# Patient Record
Sex: FEMALE | Race: WHITE | NOT HISPANIC OR LATINO | Employment: OTHER | URBAN - METROPOLITAN AREA
[De-identification: names, ages, dates, MRNs, and addresses within clinical notes are randomized per-mention and may not be internally consistent; named-entity substitution may affect disease eponyms.]

---

## 2020-03-14 ENCOUNTER — APPOINTMENT (OUTPATIENT)
Dept: GENERAL RADIOLOGY | Facility: HOSPITAL | Age: 55
End: 2020-03-14

## 2020-03-14 ENCOUNTER — APPOINTMENT (OUTPATIENT)
Dept: CT IMAGING | Facility: HOSPITAL | Age: 55
End: 2020-03-14

## 2020-03-14 ENCOUNTER — HOSPITAL ENCOUNTER (EMERGENCY)
Facility: HOSPITAL | Age: 55
Discharge: HOME | End: 2020-03-14
Attending: EMERGENCY MEDICINE | Admitting: EMERGENCY MEDICINE

## 2020-03-14 VITALS
OXYGEN SATURATION: 97 % | RESPIRATION RATE: 22 BRPM | HEIGHT: 66 IN | BODY MASS INDEX: 18.48 KG/M2 | SYSTOLIC BLOOD PRESSURE: 108 MMHG | WEIGHT: 115 LBS | DIASTOLIC BLOOD PRESSURE: 71 MMHG | TEMPERATURE: 97.8 F | HEART RATE: 91 BPM

## 2020-03-14 DIAGNOSIS — R10.9 ACUTE ABDOMINAL PAIN: ICD-10-CM

## 2020-03-14 DIAGNOSIS — R19.00 PELVIC MASS IN FEMALE: Primary | ICD-10-CM

## 2020-03-14 LAB
ALBUMIN SERPL-MCNC: 4 G/DL (ref 3.5–5.2)
ALBUMIN/GLOB SERPL: 1.8 G/DL
ALP SERPL-CCNC: 66 U/L (ref 39–117)
ALT SERPL W P-5'-P-CCNC: 5 U/L (ref 1–33)
ANION GAP SERPL CALCULATED.3IONS-SCNC: 12 MMOL/L (ref 5–15)
AST SERPL-CCNC: 16 U/L (ref 1–32)
BACTERIA UR QL AUTO: NORMAL /HPF
BASOPHILS # BLD AUTO: 0.02 10*3/MM3 (ref 0–0.2)
BASOPHILS NFR BLD AUTO: 0.2 % (ref 0–1.5)
BILIRUB SERPL-MCNC: 0.4 MG/DL (ref 0.2–1.2)
BILIRUB UR QL STRIP: NEGATIVE
BUN BLD-MCNC: 14 MG/DL (ref 6–20)
BUN/CREAT SERPL: 21.9 (ref 7–25)
CALCIUM SPEC-SCNC: 9 MG/DL (ref 8.6–10.5)
CHLORIDE SERPL-SCNC: 103 MMOL/L (ref 98–107)
CLARITY UR: CLEAR
CO2 SERPL-SCNC: 23 MMOL/L (ref 22–29)
COLOR UR: YELLOW
CREAT BLD-MCNC: 0.64 MG/DL (ref 0.57–1)
D-LACTATE SERPL-SCNC: 2.5 MMOL/L (ref 0.5–2)
DEPRECATED RDW RBC AUTO: 41.9 FL (ref 37–54)
EOSINOPHIL # BLD AUTO: 0.04 10*3/MM3 (ref 0–0.4)
EOSINOPHIL NFR BLD AUTO: 0.5 % (ref 0.3–6.2)
ERYTHROCYTE [DISTWIDTH] IN BLOOD BY AUTOMATED COUNT: 12 % (ref 12.3–15.4)
GFR SERPL CREATININE-BSD FRML MDRD: 97 ML/MIN/1.73
GLOBULIN UR ELPH-MCNC: 2.2 GM/DL
GLUCOSE BLD-MCNC: 116 MG/DL (ref 65–99)
GLUCOSE UR STRIP-MCNC: NEGATIVE MG/DL
HCT VFR BLD AUTO: 33.9 % (ref 34–46.6)
HGB BLD-MCNC: 11.3 G/DL (ref 12–15.9)
HGB UR QL STRIP.AUTO: NEGATIVE
HOLD SPECIMEN: NORMAL
HOLD SPECIMEN: NORMAL
HYALINE CASTS UR QL AUTO: NORMAL /LPF
IMM GRANULOCYTES # BLD AUTO: 0.01 10*3/MM3 (ref 0–0.05)
IMM GRANULOCYTES NFR BLD AUTO: 0.1 % (ref 0–0.5)
KETONES UR QL STRIP: NEGATIVE
LACTATE HOLD SPECIMEN: NORMAL
LEUKOCYTE ESTERASE UR QL STRIP.AUTO: ABNORMAL
LIPASE SERPL-CCNC: 13 U/L (ref 13–60)
LYMPHOCYTES # BLD AUTO: 1.84 10*3/MM3 (ref 0.7–3.1)
LYMPHOCYTES NFR BLD AUTO: 22.9 % (ref 19.6–45.3)
MCH RBC QN AUTO: 31.2 PG (ref 26.6–33)
MCHC RBC AUTO-ENTMCNC: 33.3 G/DL (ref 31.5–35.7)
MCV RBC AUTO: 93.6 FL (ref 79–97)
MONOCYTES # BLD AUTO: 0.4 10*3/MM3 (ref 0.1–0.9)
MONOCYTES NFR BLD AUTO: 5 % (ref 5–12)
NEUTROPHILS # BLD AUTO: 5.71 10*3/MM3 (ref 1.7–7)
NEUTROPHILS NFR BLD AUTO: 71.3 % (ref 42.7–76)
NITRITE UR QL STRIP: NEGATIVE
NRBC BLD AUTO-RTO: 0 /100 WBC (ref 0–0.2)
PH UR STRIP.AUTO: <=5 [PH] (ref 5–8)
PLATELET # BLD AUTO: 230 10*3/MM3 (ref 140–450)
PMV BLD AUTO: 10.5 FL (ref 6–12)
POTASSIUM BLD-SCNC: 4.1 MMOL/L (ref 3.5–5.2)
PROT SERPL-MCNC: 6.2 G/DL (ref 6–8.5)
PROT UR QL STRIP: NEGATIVE
RBC # BLD AUTO: 3.62 10*6/MM3 (ref 3.77–5.28)
RBC # UR: NORMAL /HPF
REF LAB TEST METHOD: NORMAL
SODIUM BLD-SCNC: 138 MMOL/L (ref 136–145)
SP GR UR STRIP: 1.01 (ref 1–1.03)
SQUAMOUS #/AREA URNS HPF: NORMAL /HPF
UROBILINOGEN UR QL STRIP: ABNORMAL
WBC NRBC COR # BLD: 8.02 10*3/MM3 (ref 3.4–10.8)
WBC UR QL AUTO: NORMAL /HPF
WHOLE BLOOD HOLD SPECIMEN: NORMAL
WHOLE BLOOD HOLD SPECIMEN: NORMAL

## 2020-03-14 PROCEDURE — 71045 X-RAY EXAM CHEST 1 VIEW: CPT

## 2020-03-14 PROCEDURE — 85025 COMPLETE CBC W/AUTO DIFF WBC: CPT | Performed by: EMERGENCY MEDICINE

## 2020-03-14 PROCEDURE — 80053 COMPREHEN METABOLIC PANEL: CPT | Performed by: EMERGENCY MEDICINE

## 2020-03-14 PROCEDURE — 83690 ASSAY OF LIPASE: CPT | Performed by: EMERGENCY MEDICINE

## 2020-03-14 PROCEDURE — 96374 THER/PROPH/DIAG INJ IV PUSH: CPT

## 2020-03-14 PROCEDURE — 96376 TX/PRO/DX INJ SAME DRUG ADON: CPT

## 2020-03-14 PROCEDURE — 74176 CT ABD & PELVIS W/O CONTRAST: CPT

## 2020-03-14 PROCEDURE — 96375 TX/PRO/DX INJ NEW DRUG ADDON: CPT

## 2020-03-14 PROCEDURE — 25010000002 ONDANSETRON PER 1 MG: Performed by: EMERGENCY MEDICINE

## 2020-03-14 PROCEDURE — 25010000002 MORPHINE PER 10 MG: Performed by: EMERGENCY MEDICINE

## 2020-03-14 PROCEDURE — 83605 ASSAY OF LACTIC ACID: CPT | Performed by: EMERGENCY MEDICINE

## 2020-03-14 PROCEDURE — 99284 EMERGENCY DEPT VISIT MOD MDM: CPT

## 2020-03-14 PROCEDURE — 81001 URINALYSIS AUTO W/SCOPE: CPT | Performed by: EMERGENCY MEDICINE

## 2020-03-14 RX ORDER — SODIUM CHLORIDE 0.9 % (FLUSH) 0.9 %
10 SYRINGE (ML) INJECTION AS NEEDED
Status: DISCONTINUED | OUTPATIENT
Start: 2020-03-14 | End: 2020-03-14 | Stop reason: HOSPADM

## 2020-03-14 RX ORDER — MORPHINE SULFATE 4 MG/ML
4 INJECTION, SOLUTION INTRAMUSCULAR; INTRAVENOUS ONCE
Status: COMPLETED | OUTPATIENT
Start: 2020-03-14 | End: 2020-03-14

## 2020-03-14 RX ORDER — DIPHENHYDRAMINE HCL 25 MG
25 CAPSULE ORAL EVERY 6 HOURS PRN
Status: ON HOLD | COMMUNITY
End: 2020-04-03

## 2020-03-14 RX ORDER — ONDANSETRON 4 MG/1
4 TABLET, ORALLY DISINTEGRATING ORAL EVERY 6 HOURS PRN
Qty: 20 TABLET | Refills: 0 | Status: SHIPPED | OUTPATIENT
Start: 2020-03-14 | End: 2020-03-19

## 2020-03-14 RX ORDER — IBUPROFEN 200 MG
TABLET ORAL EVERY 6 HOURS PRN
COMMUNITY

## 2020-03-14 RX ORDER — ONDANSETRON 2 MG/ML
4 INJECTION INTRAMUSCULAR; INTRAVENOUS ONCE
Status: COMPLETED | OUTPATIENT
Start: 2020-03-14 | End: 2020-03-14

## 2020-03-14 RX ORDER — OXYCODONE HYDROCHLORIDE AND ACETAMINOPHEN 5; 325 MG/1; MG/1
1 TABLET ORAL EVERY 4 HOURS PRN
Qty: 15 TABLET | Refills: 0 | OUTPATIENT
Start: 2020-03-14 | End: 2020-03-16

## 2020-03-14 RX ADMIN — ONDANSETRON 4 MG: 2 INJECTION INTRAMUSCULAR; INTRAVENOUS at 17:27

## 2020-03-14 RX ADMIN — MORPHINE SULFATE 4 MG: 4 INJECTION, SOLUTION INTRAMUSCULAR; INTRAVENOUS at 20:10

## 2020-03-14 RX ADMIN — MORPHINE SULFATE 4 MG: 4 INJECTION, SOLUTION INTRAMUSCULAR; INTRAVENOUS at 17:27

## 2020-03-14 RX ADMIN — SODIUM CHLORIDE 1000 ML: 9 INJECTION, SOLUTION INTRAVENOUS at 17:26

## 2020-03-14 NOTE — DISCHARGE INSTRUCTIONS
Contact the gynecology oncologist office, Dr. Mcguire, on Monday to schedule a follow-up for next week.    Take Percocet as needed for pain and Zofran as needed for nausea.    Rest and drink plenty of fluids.    Return to the ER sooner with any further concern or worsening of symptoms.

## 2020-03-14 NOTE — ED PROVIDER NOTES
Subjective   Roxie Carter is a 54 y.o. female who presents to the ED with c/o abdominal pain. The patient states that at approximately 03:00 this morning she started experiencing abdominal pain, right flank pain, diaphoresis, and diarrhea. Her flank pain worsens with movement and deep breaths, and her abdominal pain worsens when urinating. She also complains of left neck pain that radiates to her left elbow and her son notes that she experienced a syncopal episode. She reportedly has not experienced any diarrhea since this morning, and she denies a fever, congestion, and dysuria. She also notes that she was experiencing a cough and rhinorrhea approximately three weeks ago, which has resolved. She notes that last week she was experiencing suprapubic abdominal pain that resolved, but returned this morning at a higher severity. She denies any injuries. She reports a medical history of ovarian cancer that is in remission, but denies a history of a deep vein thrombosis or pulmonary embolism. She reports a surgical history of a hysterectomy and three back surgeries. There are no other acute complaints at this time.           History provided by:  Patient  Abdominal Pain   Pain location:  Generalized  Pain severity:  Moderate  Onset quality:  Sudden  Timing:  Constant  Associated symptoms: diarrhea    Associated symptoms: no cough, no dysuria and no fever        Review of Systems   Constitutional: Positive for diaphoresis. Negative for fever.   HENT: Negative for congestion and rhinorrhea.    Respiratory: Negative for cough.    Gastrointestinal: Positive for abdominal pain and diarrhea.   Genitourinary: Positive for flank pain. Negative for dysuria.   Musculoskeletal: Positive for neck pain.   Neurological: Positive for syncope.   All other systems reviewed and are negative.      Past Medical History:   Diagnosis Date   • Cancer (CMS/HCC)     OVARIAN   • Pneumonia        Allergies   Allergen Reactions   • Penicillins  Hives       Past Surgical History:   Procedure Laterality Date   • BACK SURGERY     • HYSTERECTOMY         History reviewed. No pertinent family history.    Social History     Socioeconomic History   • Marital status: Single     Spouse name: Not on file   • Number of children: Not on file   • Years of education: Not on file   • Highest education level: Not on file   Tobacco Use   • Smoking status: Current Every Day Smoker     Types: Electronic Cigarette   Substance and Sexual Activity   • Alcohol use: Defer   • Drug use: Never         Objective   Physical Exam   Constitutional: She is oriented to person, place, and time. She appears well-developed and well-nourished. No distress.   HENT:   Head: Normocephalic and atraumatic.   Nose: Nose normal.   Eyes: Conjunctivae are normal. No scleral icterus.   Neck: Normal range of motion. Neck supple.   Cardiovascular: Normal rate, regular rhythm and normal heart sounds.   Pulmonary/Chest: Effort normal and breath sounds normal. No respiratory distress.   Abdominal: Soft. There is tenderness.   There is mild diffuse tenderness to palpation. No peritoneal signs.    Musculoskeletal: Normal range of motion. She exhibits tenderness. She exhibits no edema.   Over the left trapezius muscle, there is reproducible tenderness to palpation. Over the right posterior thoracic cage, there is reproducible tenderness to palpation. There is no lower extremity edema. The calves are equal in size.   Neurological: She is alert and oriented to person, place, and time.   Skin: Skin is warm and dry.   Psychiatric: She has a normal mood and affect. Her behavior is normal.   Nursing note and vitals reviewed.      Procedures         ED Course     Recent Results (from the past 24 hour(s))   Comprehensive Metabolic Panel    Collection Time: 03/14/20  4:40 PM   Result Value Ref Range    Glucose 116 (H) 65 - 99 mg/dL    BUN 14 6 - 20 mg/dL    Creatinine 0.64 0.57 - 1.00 mg/dL    Sodium 138 136 - 145  mmol/L    Potassium 4.1 3.5 - 5.2 mmol/L    Chloride 103 98 - 107 mmol/L    CO2 23.0 22.0 - 29.0 mmol/L    Calcium 9.0 8.6 - 10.5 mg/dL    Total Protein 6.2 6.0 - 8.5 g/dL    Albumin 4.00 3.50 - 5.20 g/dL    ALT (SGPT) 5 1 - 33 U/L    AST (SGOT) 16 1 - 32 U/L    Alkaline Phosphatase 66 39 - 117 U/L    Total Bilirubin 0.4 0.2 - 1.2 mg/dL    eGFR Non African Amer 97 >60 mL/min/1.73    Globulin 2.2 gm/dL    A/G Ratio 1.8 g/dL    BUN/Creatinine Ratio 21.9 7.0 - 25.0    Anion Gap 12.0 5.0 - 15.0 mmol/L   Lipase    Collection Time: 03/14/20  4:40 PM   Result Value Ref Range    Lipase 13 13 - 60 U/L   Light Blue Top    Collection Time: 03/14/20  4:40 PM   Result Value Ref Range    Extra Tube hold for add-on    Green Top (Gel)    Collection Time: 03/14/20  4:40 PM   Result Value Ref Range    Extra Tube Hold for add-ons.    Lavender Top    Collection Time: 03/14/20  4:40 PM   Result Value Ref Range    Extra Tube hold for add-on    Gold Top - SST    Collection Time: 03/14/20  4:40 PM   Result Value Ref Range    Extra Tube Hold for add-ons.    CBC Auto Differential    Collection Time: 03/14/20  4:40 PM   Result Value Ref Range    WBC 8.02 3.40 - 10.80 10*3/mm3    RBC 3.62 (L) 3.77 - 5.28 10*6/mm3    Hemoglobin 11.3 (L) 12.0 - 15.9 g/dL    Hematocrit 33.9 (L) 34.0 - 46.6 %    MCV 93.6 79.0 - 97.0 fL    MCH 31.2 26.6 - 33.0 pg    MCHC 33.3 31.5 - 35.7 g/dL    RDW 12.0 (L) 12.3 - 15.4 %    RDW-SD 41.9 37.0 - 54.0 fl    MPV 10.5 6.0 - 12.0 fL    Platelets 230 140 - 450 10*3/mm3    Neutrophil % 71.3 42.7 - 76.0 %    Lymphocyte % 22.9 19.6 - 45.3 %    Monocyte % 5.0 5.0 - 12.0 %    Eosinophil % 0.5 0.3 - 6.2 %    Basophil % 0.2 0.0 - 1.5 %    Immature Grans % 0.1 0.0 - 0.5 %    Neutrophils, Absolute 5.71 1.70 - 7.00 10*3/mm3    Lymphocytes, Absolute 1.84 0.70 - 3.10 10*3/mm3    Monocytes, Absolute 0.40 0.10 - 0.90 10*3/mm3    Eosinophils, Absolute 0.04 0.00 - 0.40 10*3/mm3    Basophils, Absolute 0.02 0.00 - 0.20 10*3/mm3    Immature  Grans, Absolute 0.01 0.00 - 0.05 10*3/mm3    nRBC 0.0 0.0 - 0.2 /100 WBC   Lactic Acid, Plasma    Collection Time: 03/14/20  4:40 PM   Result Value Ref Range    Lactate 2.5 (C) 0.5 - 2.0 mmol/L   Urinalysis With Microscopic If Indicated (No Culture) - Urine, Clean Catch    Collection Time: 03/14/20  6:22 PM   Result Value Ref Range    Color, UA Yellow Yellow, Straw    Appearance, UA Clear Clear    pH, UA <=5.0 5.0 - 8.0    Specific Gravity, UA 1.014 1.001 - 1.030    Glucose, UA Negative Negative    Ketones, UA Negative Negative    Bilirubin, UA Negative Negative    Blood, UA Negative Negative    Protein, UA Negative Negative    Leuk Esterase, UA Trace (A) Negative    Nitrite, UA Negative Negative    Urobilinogen, UA 0.2 E.U./dL 0.2 - 1.0 E.U./dL   Urinalysis, Microscopic Only - Urine, Clean Catch    Collection Time: 03/14/20  6:22 PM   Result Value Ref Range    RBC, UA 0-2 None Seen, 0-2 /HPF    WBC, UA 0-2 None Seen, 0-2 /HPF    Bacteria, UA None Seen None Seen, Trace /HPF    Squamous Epithelial Cells, UA 0-2 None Seen, 0-2 /HPF    Hyaline Casts, UA 0-6 0 - 6 /LPF    Methodology Automated Microscopy      Note: In addition to lab results from this visit, the labs listed above may include labs taken at another facility or during a different encounter within the last 24 hours. Please correlate lab times with ED admission and discharge times for further clarification of the services performed during this visit.    CT Abdomen Pelvis Without Contrast   Preliminary Result   Large heterogeneous mass identified within the pelvis with   fluid seen scattered throughout the abdomen and pelvis. Findings   concerning for recurrence with some stranding of the mesentery in which   spread to the mesentery cannot be excluded.       DICTATED:   03/14/2020   EDITED/ls :   03/14/2020               XR Chest 1 View   Preliminary Result   Chronic changes within the lung fields with no acute   parenchymal disease.       DICTATED:    03/14/2020   EDITED/ls :   03/14/2020                 Vitals:    03/14/20 1830 03/14/20 1831 03/14/20 1900 03/14/20 1910   BP: 111/73  108/71    Patient Position:       Pulse:  90 82 91   Resp:       Temp:       TempSrc:       SpO2:  98% 94% 97%   Weight:       Height:         Medications   sodium chloride 0.9 % flush 10 mL (has no administration in time range)   Morphine sulfate (PF) injection 4 mg (has no administration in time range)   Morphine sulfate (PF) injection 4 mg (4 mg Intravenous Given 3/14/20 1727)   ondansetron (ZOFRAN) injection 4 mg (4 mg Intravenous Given 3/14/20 1727)   sodium chloride 0.9 % bolus 1,000 mL (0 mL Intravenous Stopped 3/14/20 1819)     ECG/EMG Results (last 24 hours)     ** No results found for the last 24 hours. **        No orders to display                                              MDM  Number of Diagnoses or Management Options  Acute abdominal pain: new and requires workup  Pelvic mass in female: new and requires workup  Diagnosis management comments: The patient presents with a complaint of lower abdominal pain.  Has a previous history of ovarian cancer in which she received chemotherapy for in 2014.  She reports that she was disease-free upon last check.    CT scan of the abdomen pelvis shows approximate 9 x 9 cm mass in the lower pelvis with scattered areas of fluid throughout the abdomen pelvis.    I discussed this finding with the on-call gynecology oncologist, Dr. Dwyer.  Dr. Dwyer is agreeable with outpatient follow-up.    Discussed findings with the patient and she likewise is agreeable with outpatient follow-up.    Lab evaluation shows mild elevation of lactic acid level, the patient was given IV fluids while here in the ER.    She will be discharged with Percocet for pain and Zofran to be taken as needed for nausea.    She is advised to rest and drink plenty of fluids.    Advised to return to the ER immediately with any further concern or worsening of  symptoms.       Amount and/or Complexity of Data Reviewed  Clinical lab tests: ordered and reviewed  Tests in the radiology section of CPT®: ordered and reviewed  Obtain history from someone other than the patient: yes  Review and summarize past medical records: yes  Discuss the patient with other providers: yes  Independent visualization of images, tracings, or specimens: yes        Final diagnoses:   Pelvic mass in female   Acute abdominal pain       Documentation assistance provided by bao Loredo.  Information recorded by the scribe was done at my direction and has been verified and validated by me.     Roxanne Loredo  03/14/20 1715       Nehal Campos MD  03/14/20 1953

## 2020-03-16 ENCOUNTER — TELEPHONE (OUTPATIENT)
Dept: GYNECOLOGIC ONCOLOGY | Facility: CLINIC | Age: 55
End: 2020-03-16

## 2020-03-16 ENCOUNTER — HOSPITAL ENCOUNTER (EMERGENCY)
Facility: HOSPITAL | Age: 55
Discharge: HOME | End: 2020-03-16
Attending: EMERGENCY MEDICINE | Admitting: EMERGENCY MEDICINE

## 2020-03-16 VITALS
BODY MASS INDEX: 18 KG/M2 | RESPIRATION RATE: 24 BRPM | TEMPERATURE: 98.7 F | SYSTOLIC BLOOD PRESSURE: 108 MMHG | OXYGEN SATURATION: 100 % | HEART RATE: 120 BPM | DIASTOLIC BLOOD PRESSURE: 78 MMHG | WEIGHT: 112 LBS | HEIGHT: 66 IN

## 2020-03-16 DIAGNOSIS — R10.9 ABDOMINAL PAIN, UNSPECIFIED ABDOMINAL LOCATION: Primary | ICD-10-CM

## 2020-03-16 DIAGNOSIS — R19.00 PELVIC MASS IN FEMALE: ICD-10-CM

## 2020-03-16 LAB
ALBUMIN SERPL-MCNC: 4.3 G/DL (ref 3.5–5.2)
ALBUMIN/GLOB SERPL: 1.5 G/DL
ALP SERPL-CCNC: 65 U/L (ref 39–117)
ALT SERPL W P-5'-P-CCNC: 7 U/L (ref 1–33)
ANION GAP SERPL CALCULATED.3IONS-SCNC: 10 MMOL/L (ref 5–15)
AST SERPL-CCNC: 20 U/L (ref 1–32)
BACTERIA UR QL AUTO: NORMAL /HPF
BASOPHILS # BLD AUTO: 0.02 10*3/MM3 (ref 0–0.2)
BASOPHILS NFR BLD AUTO: 0.3 % (ref 0–1.5)
BILIRUB SERPL-MCNC: 0.6 MG/DL (ref 0.2–1.2)
BILIRUB UR QL STRIP: NEGATIVE
BUN BLD-MCNC: 9 MG/DL (ref 6–20)
BUN/CREAT SERPL: 11.8 (ref 7–25)
CALCIUM SPEC-SCNC: 9.1 MG/DL (ref 8.6–10.5)
CHLORIDE SERPL-SCNC: 104 MMOL/L (ref 98–107)
CLARITY UR: CLEAR
CO2 SERPL-SCNC: 26 MMOL/L (ref 22–29)
COLOR UR: YELLOW
CREAT BLD-MCNC: 0.76 MG/DL (ref 0.57–1)
D-LACTATE SERPL-SCNC: 2.1 MMOL/L (ref 0.5–2)
DEPRECATED RDW RBC AUTO: 41.2 FL (ref 37–54)
EOSINOPHIL # BLD AUTO: 0.08 10*3/MM3 (ref 0–0.4)
EOSINOPHIL NFR BLD AUTO: 1.3 % (ref 0.3–6.2)
ERYTHROCYTE [DISTWIDTH] IN BLOOD BY AUTOMATED COUNT: 12 % (ref 12.3–15.4)
GFR SERPL CREATININE-BSD FRML MDRD: 79 ML/MIN/1.73
GLOBULIN UR ELPH-MCNC: 2.8 GM/DL
GLUCOSE BLD-MCNC: 121 MG/DL (ref 65–99)
GLUCOSE UR STRIP-MCNC: NEGATIVE MG/DL
HCT VFR BLD AUTO: 25.6 % (ref 34–46.6)
HGB BLD-MCNC: 8.5 G/DL (ref 12–15.9)
HGB UR QL STRIP.AUTO: NEGATIVE
HOLD SPECIMEN: NORMAL
HOLD SPECIMEN: NORMAL
HYALINE CASTS UR QL AUTO: NORMAL /LPF
IMM GRANULOCYTES # BLD AUTO: 0.01 10*3/MM3 (ref 0–0.05)
IMM GRANULOCYTES NFR BLD AUTO: 0.2 % (ref 0–0.5)
KETONES UR QL STRIP: ABNORMAL
LACTATE HOLD SPECIMEN: NORMAL
LEUKOCYTE ESTERASE UR QL STRIP.AUTO: ABNORMAL
LIPASE SERPL-CCNC: 9 U/L (ref 13–60)
LYMPHOCYTES # BLD AUTO: 1.64 10*3/MM3 (ref 0.7–3.1)
LYMPHOCYTES NFR BLD AUTO: 27.2 % (ref 19.6–45.3)
MCH RBC QN AUTO: 31.3 PG (ref 26.6–33)
MCHC RBC AUTO-ENTMCNC: 33.2 G/DL (ref 31.5–35.7)
MCV RBC AUTO: 94.1 FL (ref 79–97)
MONOCYTES # BLD AUTO: 0.44 10*3/MM3 (ref 0.1–0.9)
MONOCYTES NFR BLD AUTO: 7.3 % (ref 5–12)
NEUTROPHILS # BLD AUTO: 3.83 10*3/MM3 (ref 1.7–7)
NEUTROPHILS NFR BLD AUTO: 63.7 % (ref 42.7–76)
NITRITE UR QL STRIP: NEGATIVE
NRBC BLD AUTO-RTO: 0 /100 WBC (ref 0–0.2)
PH UR STRIP.AUTO: 7 [PH] (ref 5–8)
PLATELET # BLD AUTO: 193 10*3/MM3 (ref 140–450)
PMV BLD AUTO: 10.9 FL (ref 6–12)
POTASSIUM BLD-SCNC: 3.7 MMOL/L (ref 3.5–5.2)
PROT SERPL-MCNC: 7.1 G/DL (ref 6–8.5)
PROT UR QL STRIP: NEGATIVE
RBC # BLD AUTO: 2.72 10*6/MM3 (ref 3.77–5.28)
RBC # UR: NORMAL /HPF
REF LAB TEST METHOD: NORMAL
SODIUM BLD-SCNC: 140 MMOL/L (ref 136–145)
SP GR UR STRIP: 1.01 (ref 1–1.03)
SQUAMOUS #/AREA URNS HPF: NORMAL /HPF
UROBILINOGEN UR QL STRIP: ABNORMAL
WBC NRBC COR # BLD: 6.02 10*3/MM3 (ref 3.4–10.8)
WBC UR QL AUTO: NORMAL /HPF
WHOLE BLOOD HOLD SPECIMEN: NORMAL
WHOLE BLOOD HOLD SPECIMEN: NORMAL

## 2020-03-16 PROCEDURE — 96374 THER/PROPH/DIAG INJ IV PUSH: CPT

## 2020-03-16 PROCEDURE — 96375 TX/PRO/DX INJ NEW DRUG ADDON: CPT

## 2020-03-16 PROCEDURE — 99284 EMERGENCY DEPT VISIT MOD MDM: CPT

## 2020-03-16 PROCEDURE — 81001 URINALYSIS AUTO W/SCOPE: CPT | Performed by: EMERGENCY MEDICINE

## 2020-03-16 PROCEDURE — 83605 ASSAY OF LACTIC ACID: CPT | Performed by: EMERGENCY MEDICINE

## 2020-03-16 PROCEDURE — 25010000002 HYDROMORPHONE PER 4 MG: Performed by: EMERGENCY MEDICINE

## 2020-03-16 PROCEDURE — 80053 COMPREHEN METABOLIC PANEL: CPT | Performed by: EMERGENCY MEDICINE

## 2020-03-16 PROCEDURE — 96376 TX/PRO/DX INJ SAME DRUG ADON: CPT

## 2020-03-16 PROCEDURE — 85025 COMPLETE CBC W/AUTO DIFF WBC: CPT | Performed by: EMERGENCY MEDICINE

## 2020-03-16 PROCEDURE — 83690 ASSAY OF LIPASE: CPT | Performed by: EMERGENCY MEDICINE

## 2020-03-16 PROCEDURE — 25010000002 ONDANSETRON PER 1 MG: Performed by: EMERGENCY MEDICINE

## 2020-03-16 PROCEDURE — 25010000002 KETOROLAC TROMETHAMINE PER 15 MG: Performed by: EMERGENCY MEDICINE

## 2020-03-16 RX ORDER — OXYCODONE AND ACETAMINOPHEN 10; 325 MG/1; MG/1
1 TABLET ORAL EVERY 4 HOURS PRN
Qty: 15 TABLET | Refills: 0 | Status: SHIPPED | OUTPATIENT
Start: 2020-03-16 | End: 2020-03-19 | Stop reason: SDUPTHER

## 2020-03-16 RX ORDER — ONDANSETRON 2 MG/ML
4 INJECTION INTRAMUSCULAR; INTRAVENOUS ONCE
Status: COMPLETED | OUTPATIENT
Start: 2020-03-16 | End: 2020-03-16

## 2020-03-16 RX ORDER — HYDROMORPHONE HYDROCHLORIDE 1 MG/ML
0.5 INJECTION, SOLUTION INTRAMUSCULAR; INTRAVENOUS; SUBCUTANEOUS ONCE
Status: COMPLETED | OUTPATIENT
Start: 2020-03-16 | End: 2020-03-16

## 2020-03-16 RX ORDER — SODIUM CHLORIDE 0.9 % (FLUSH) 0.9 %
10 SYRINGE (ML) INJECTION AS NEEDED
Status: DISCONTINUED | OUTPATIENT
Start: 2020-03-16 | End: 2020-03-16 | Stop reason: HOSPADM

## 2020-03-16 RX ORDER — KETOROLAC TROMETHAMINE 30 MG/ML
15 INJECTION, SOLUTION INTRAMUSCULAR; INTRAVENOUS ONCE
Status: COMPLETED | OUTPATIENT
Start: 2020-03-16 | End: 2020-03-16

## 2020-03-16 RX ADMIN — ONDANSETRON 4 MG: 2 INJECTION INTRAMUSCULAR; INTRAVENOUS at 11:06

## 2020-03-16 RX ADMIN — HYDROMORPHONE HYDROCHLORIDE 0.5 MG: 1 INJECTION, SOLUTION INTRAMUSCULAR; INTRAVENOUS; SUBCUTANEOUS at 11:08

## 2020-03-16 RX ADMIN — KETOROLAC TROMETHAMINE 15 MG: 30 INJECTION, SOLUTION INTRAMUSCULAR; INTRAVENOUS at 11:07

## 2020-03-16 RX ADMIN — ONDANSETRON 4 MG: 2 INJECTION INTRAMUSCULAR; INTRAVENOUS at 14:12

## 2020-03-16 RX ADMIN — HYDROMORPHONE HYDROCHLORIDE 0.5 MG: 1 INJECTION, SOLUTION INTRAMUSCULAR; INTRAVENOUS; SUBCUTANEOUS at 14:13

## 2020-03-16 RX ADMIN — SODIUM CHLORIDE 1000 ML: 9 INJECTION, SOLUTION INTRAVENOUS at 11:06

## 2020-03-16 NOTE — TELEPHONE ENCOUNTER
Received call from patient son. States patient was told to be seen as an outpatient with Dr. Mcguire. Appt given for tomorrow at 1230. Patient son states that patient is having pretty significant pain and temp is 99.0. I asked if bowels were moving and he said yes but not like they should. I spoke with NP and Ester Castro. Plan was made for me to add patient to schedule and Ester to call to give advice on taking new meds.

## 2020-03-16 NOTE — TELEPHONE ENCOUNTER
I called and spoke with patient.  I was originally going to evaluate pain per APRN and see if this could be managed as outpatient. Patient was in distress on the phone and states her current narcotic is not helping. She is taking it as scheduled and it does not touch her pain which she rates as a 9/10. I offered to see if she could take immediate release oxycodone in conjunction with her current narcotic. I also told patient dr. Mcguire would be in surgery all day today and first available outpatient appointment would be tomorrow. She states she is in so much pain and can not wait that long and inquired about going to ED.  APRN aggred that if patient is asking to go she can go since pain can not be controlled outpatient. Patient will go to ED.

## 2020-03-16 NOTE — ED PROVIDER NOTES
Subjective   Ms. Merida is a 54-year-old female that presents to the emergency department with intractable abdominal pain.  She seen here 2 days ago and diagnosed with a new pelvic abdominal mass.  She has a history of ovarian cancer and this is concerning for a cancerous mass.  She is supposed to see Dr. Jamee Dwyer tomorrow.  Her pain is out of control she called the office they told her to come to the emergency department for evaluation.  She been taken Percocet fives without relief.  She had no nausea vomiting.  She denies any vaginal bleeding.  She has no other complaints.      History provided by:  Relative and patient   used: No    Abdominal Pain   Pain location:  LLQ and RLQ  Pain quality: cramping    Pain radiates to:  Does not radiate  Pain severity:  Severe  Onset quality:  Gradual  Timing:  Intermittent  Progression:  Worsening  Context comment:  New pelvic mass history of ovarian cancer.  Relieved by:  Nothing  Worsened by:  Nothing  Ineffective treatments: Oral Percocet 5.  Associated symptoms: nausea    Associated symptoms: no anorexia, no belching, no chest pain, no chills, no constipation, no fever, no flatus, no shortness of breath and no vomiting    Risk factors: has not had multiple surgeries, no NSAID use and not obese        Review of Systems   Constitutional: Negative for chills and fever.   Respiratory: Negative for chest tightness, shortness of breath and wheezing.    Cardiovascular: Negative for chest pain and palpitations.   Gastrointestinal: Positive for abdominal pain and nausea. Negative for anorexia, constipation, flatus and vomiting.   All other systems reviewed and are negative.      Past Medical History:   Diagnosis Date   • Cancer (CMS/HCC)     OVARIAN   • Pneumonia        Allergies   Allergen Reactions   • Penicillins Hives       Past Surgical History:   Procedure Laterality Date   • BACK SURGERY     • HYSTERECTOMY         No family history on file.    Social  History     Socioeconomic History   • Marital status: Single     Spouse name: Not on file   • Number of children: Not on file   • Years of education: Not on file   • Highest education level: Not on file   Tobacco Use   • Smoking status: Current Every Day Smoker     Types: Electronic Cigarette   Substance and Sexual Activity   • Alcohol use: Defer   • Drug use: Never           Objective   Physical Exam   Constitutional: She is oriented to person, place, and time. She appears well-developed and well-nourished. No distress.   HENT:   Head: Normocephalic and atraumatic.   Nose: Nose normal.   Eyes: Conjunctivae are normal. No scleral icterus.   Neck: Normal range of motion. Neck supple.   Cardiovascular: Normal rate, regular rhythm, normal heart sounds and intact distal pulses.   No murmur heard.  Pulmonary/Chest: Effort normal and breath sounds normal. No respiratory distress.   Abdominal: Soft. Bowel sounds are normal. There is tenderness.   Musculoskeletal: Normal range of motion.   Neurological: She is alert and oriented to person, place, and time.   Skin: Skin is warm and dry. She is not diaphoretic.   Psychiatric: She has a normal mood and affect. Her behavior is normal.   Nursing note and vitals reviewed.      Procedures           ED Course  ED Course as of Mar 16 1701   Mon Mar 16, 2020   1343 Discussed the patient with Lexy Castro nurse practitioner with Dr. Jamee Dwyer.  We discussed she has an appointment tomorrow.  She had for pain was uncontrolled that we could offer admission.  I spoke to Dr. Pang the hospitalist he advised that all elective surgeries have been canceled due to the national emergency.  Patient would like to try to go home with increasing oral pain medications.  She has an appointment tomorrow with Dr. Dwyer's office.    [AMAURY]      ED Course User Index  [AMAURY] Ronaldo Herzog PA           Recent Results (from the past 24 hour(s))   Comprehensive Metabolic Panel    Collection  Time: 03/16/20 10:23 AM   Result Value Ref Range    Glucose 121 (H) 65 - 99 mg/dL    BUN 9 6 - 20 mg/dL    Creatinine 0.76 0.57 - 1.00 mg/dL    Sodium 140 136 - 145 mmol/L    Potassium 3.7 3.5 - 5.2 mmol/L    Chloride 104 98 - 107 mmol/L    CO2 26.0 22.0 - 29.0 mmol/L    Calcium 9.1 8.6 - 10.5 mg/dL    Total Protein 7.1 6.0 - 8.5 g/dL    Albumin 4.30 3.50 - 5.20 g/dL    ALT (SGPT) 7 1 - 33 U/L    AST (SGOT) 20 1 - 32 U/L    Alkaline Phosphatase 65 39 - 117 U/L    Total Bilirubin 0.6 0.2 - 1.2 mg/dL    eGFR Non African Amer 79 >60 mL/min/1.73    Globulin 2.8 gm/dL    A/G Ratio 1.5 g/dL    BUN/Creatinine Ratio 11.8 7.0 - 25.0    Anion Gap 10.0 5.0 - 15.0 mmol/L   Lipase    Collection Time: 03/16/20 10:23 AM   Result Value Ref Range    Lipase 9 (L) 13 - 60 U/L   Light Blue Top    Collection Time: 03/16/20 10:23 AM   Result Value Ref Range    Extra Tube hold for add-on    Green Top (Gel)    Collection Time: 03/16/20 10:23 AM   Result Value Ref Range    Extra Tube Hold for add-ons.    Lavender Top    Collection Time: 03/16/20 10:23 AM   Result Value Ref Range    Extra Tube hold for add-on    Gold Top - SST    Collection Time: 03/16/20 10:23 AM   Result Value Ref Range    Extra Tube Hold for add-ons.    CBC Auto Differential    Collection Time: 03/16/20 10:23 AM   Result Value Ref Range    WBC 6.02 3.40 - 10.80 10*3/mm3    RBC 2.72 (L) 3.77 - 5.28 10*6/mm3    Hemoglobin 8.5 (L) 12.0 - 15.9 g/dL    Hematocrit 25.6 (L) 34.0 - 46.6 %    MCV 94.1 79.0 - 97.0 fL    MCH 31.3 26.6 - 33.0 pg    MCHC 33.2 31.5 - 35.7 g/dL    RDW 12.0 (L) 12.3 - 15.4 %    RDW-SD 41.2 37.0 - 54.0 fl    MPV 10.9 6.0 - 12.0 fL    Platelets 193 140 - 450 10*3/mm3    Neutrophil % 63.7 42.7 - 76.0 %    Lymphocyte % 27.2 19.6 - 45.3 %    Monocyte % 7.3 5.0 - 12.0 %    Eosinophil % 1.3 0.3 - 6.2 %    Basophil % 0.3 0.0 - 1.5 %    Immature Grans % 0.2 0.0 - 0.5 %    Neutrophils, Absolute 3.83 1.70 - 7.00 10*3/mm3    Lymphocytes, Absolute 1.64 0.70 - 3.10  10*3/mm3    Monocytes, Absolute 0.44 0.10 - 0.90 10*3/mm3    Eosinophils, Absolute 0.08 0.00 - 0.40 10*3/mm3    Basophils, Absolute 0.02 0.00 - 0.20 10*3/mm3    Immature Grans, Absolute 0.01 0.00 - 0.05 10*3/mm3    nRBC 0.0 0.0 - 0.2 /100 WBC   Lactic Acid, Plasma    Collection Time: 03/16/20 11:23 AM   Result Value Ref Range    Lactate 2.1 (C) 0.5 - 2.0 mmol/L   Lactic Acid, Reflex Timer (This will reflex a repeat order 3-3:15 hours after ordered.)    Collection Time: 03/16/20 11:23 AM   Result Value Ref Range    Hold Tube Hold for add-ons.    Urinalysis With Microscopic If Indicated (No Culture) - Urine, Clean Catch    Collection Time: 03/16/20 11:35 AM   Result Value Ref Range    Color, UA Yellow Yellow, Straw    Appearance, UA Clear Clear    pH, UA 7.0 5.0 - 8.0    Specific Gravity, UA 1.015 1.001 - 1.030    Glucose, UA Negative Negative    Ketones, UA Trace (A) Negative    Bilirubin, UA Negative Negative    Blood, UA Negative Negative    Protein, UA Negative Negative    Leuk Esterase, UA Small (1+) (A) Negative    Nitrite, UA Negative Negative    Urobilinogen, UA 1.0 E.U./dL 0.2 - 1.0 E.U./dL   Urinalysis, Microscopic Only - Urine, Clean Catch    Collection Time: 03/16/20 11:35 AM   Result Value Ref Range    RBC, UA None Seen None Seen, 0-2 /HPF    WBC, UA 0-2 None Seen, 0-2 /HPF    Bacteria, UA None Seen None Seen, Trace /HPF    Squamous Epithelial Cells, UA 0-2 None Seen, 0-2 /HPF    Hyaline Casts, UA 0-6 0 - 6 /LPF    Methodology Automated Microscopy      Note: In addition to lab results from this visit, the labs listed above may include labs taken at another facility or during a different encounter within the last 24 hours. Please correlate lab times with ED admission and discharge times for further clarification of the services performed during this visit.    No orders to display     Vitals:    03/16/20 1200 03/16/20 1311 03/16/20 1330 03/16/20 1400   BP: 122/74 127/79 116/73 108/78   BP Location:        Patient Position:       Pulse:       Resp:       Temp:       TempSrc:       SpO2: 95% 100% 99% 100%   Weight:       Height:         Medications   HYDROmorphone (DILAUDID) injection 0.5 mg (0.5 mg Intravenous Given 3/16/20 1108)   ondansetron (ZOFRAN) injection 4 mg (4 mg Intravenous Given 3/16/20 1106)   ketorolac (TORADOL) injection 15 mg (15 mg Intravenous Given 3/16/20 1107)   sodium chloride 0.9 % bolus 1,000 mL (0 mL Intravenous Stopped 3/16/20 1232)   HYDROmorphone (DILAUDID) injection 0.5 mg (0.5 mg Intravenous Given 3/16/20 1413)   ondansetron (ZOFRAN) injection 4 mg (4 mg Intravenous Given 3/16/20 1412)     ECG/EMG Results (last 24 hours)     ** No results found for the last 24 hours. **        No orders to display                                       MDM  Number of Diagnoses or Management Options  Abdominal pain, unspecified abdominal location: new and requires workup  Pelvic mass in female: new and requires workup     Amount and/or Complexity of Data Reviewed  Clinical lab tests: reviewed and ordered  Tests in the radiology section of CPT®: reviewed and ordered  Tests in the medicine section of CPT®: ordered and reviewed  Decide to obtain previous medical records or to obtain history from someone other than the patient: yes  Discuss the patient with other providers: yes    Patient Progress  Patient progress: stable      Final diagnoses:   Abdominal pain, unspecified abdominal location   Pelvic mass in female            Ronaldo Herzog PA  03/16/20 2613

## 2020-03-17 ENCOUNTER — TELEPHONE (OUTPATIENT)
Dept: GYNECOLOGIC ONCOLOGY | Facility: CLINIC | Age: 55
End: 2020-03-17

## 2020-03-17 ENCOUNTER — OFFICE VISIT (OUTPATIENT)
Dept: GYNECOLOGIC ONCOLOGY | Facility: CLINIC | Age: 55
End: 2020-03-17

## 2020-03-17 ENCOUNTER — HOSPITAL ENCOUNTER (OUTPATIENT)
Dept: CT IMAGING | Facility: HOSPITAL | Age: 55
Discharge: HOME | End: 2020-03-17
Admitting: OBSTETRICS & GYNECOLOGY

## 2020-03-17 ENCOUNTER — TELEPHONE (OUTPATIENT)
Dept: ONCOLOGY | Facility: CLINIC | Age: 55
End: 2020-03-17

## 2020-03-17 VITALS
BODY MASS INDEX: 19.29 KG/M2 | SYSTOLIC BLOOD PRESSURE: 123 MMHG | HEART RATE: 86 BPM | DIASTOLIC BLOOD PRESSURE: 54 MMHG | TEMPERATURE: 98.8 F | RESPIRATION RATE: 14 BRPM | HEIGHT: 66 IN | WEIGHT: 120 LBS

## 2020-03-17 VITALS
DIASTOLIC BLOOD PRESSURE: 76 MMHG | SYSTOLIC BLOOD PRESSURE: 138 MMHG | HEART RATE: 102 BPM | OXYGEN SATURATION: 94 % | RESPIRATION RATE: 20 BRPM | TEMPERATURE: 99.4 F

## 2020-03-17 DIAGNOSIS — R18.8 OTHER ASCITES: ICD-10-CM

## 2020-03-17 DIAGNOSIS — K59.03 DRUG-INDUCED CONSTIPATION: ICD-10-CM

## 2020-03-17 DIAGNOSIS — C56.9 MALIGNANT NEOPLASM OF OVARY, UNSPECIFIED LATERALITY (HCC): Primary | ICD-10-CM

## 2020-03-17 DIAGNOSIS — R63.0 POOR APPETITE: ICD-10-CM

## 2020-03-17 DIAGNOSIS — C56.9 MALIGNANT NEOPLASM OF OVARY, UNSPECIFIED LATERALITY (HCC): ICD-10-CM

## 2020-03-17 DIAGNOSIS — R18.0 MALIGNANT ASCITES: ICD-10-CM

## 2020-03-17 DIAGNOSIS — R19.00 PELVIC MASS: ICD-10-CM

## 2020-03-17 LAB
APPEARANCE FLD: ABNORMAL
COLOR FLD: ABNORMAL
LYMPHOCYTES NFR FLD MANUAL: 12 %
MACROPHAGE FLUID: 14 %
MESOTHL CELL NFR FLD MANUAL: 2 %
MONOCYTES NFR FLD: 8 %
NEUTROPHILS NFR FLD MANUAL: 64 %
RBC # FLD AUTO: ABNORMAL /MM3
WBC # FLD AUTO: 7406 /MM3

## 2020-03-17 PROCEDURE — 75989 ABSCESS DRAINAGE UNDER X-RAY: CPT

## 2020-03-17 PROCEDURE — C1729 CATH, DRAINAGE: HCPCS

## 2020-03-17 PROCEDURE — 99205 OFFICE O/P NEW HI 60 MIN: CPT | Performed by: OBSTETRICS & GYNECOLOGY

## 2020-03-17 PROCEDURE — 89051 BODY FLUID CELL COUNT: CPT | Performed by: RADIOLOGY

## 2020-03-17 RX ORDER — LIDOCAINE HYDROCHLORIDE 10 MG/ML
10 INJECTION, SOLUTION EPIDURAL; INFILTRATION; INTRACAUDAL; PERINEURAL ONCE
Status: DISCONTINUED | OUTPATIENT
Start: 2020-03-17 | End: 2020-03-19 | Stop reason: HOSPADM

## 2020-03-17 RX ORDER — POLYETHYLENE GLYCOL 3350 17 G/17G
17 POWDER, FOR SOLUTION ORAL DAILY
Qty: 250 G | Refills: 1 | Status: ON HOLD | OUTPATIENT
Start: 2020-03-17 | End: 2020-04-03

## 2020-03-17 NOTE — TELEPHONE ENCOUNTER
Pt sister, Steph, would like to be called with the results from today's appt, 3/17/20.    Please call Steph with these results at [phone].

## 2020-03-17 NOTE — H&P (VIEW-ONLY)
Roxie Carter  [id number]  1965      Reason for visit: History of ovarian cancer now with new pelvic mass, pain    Consultation:  Patient is being seen at the request of Dr. Campos, emergency room physician    History of present illness:  The patient is a 54 y.o. year old female who presents today for treatment and evaluation of the above issues.    Patient presented to the emergency room 3/14/2020 due to pain.  At that time, she underwent CT scan with the below results.  I discussed this with Dr. Campos the day of the CT scan.  She was given narcotics and discharge.  She represented to the emergency room yesterday with complaints of ongoing pain.  Due to COVID-19 patient was advised that it was in her best interest to be discharged and undergo outpatient management of her pain.  It was not thought that inpatient hospital admit was in her best interest.    Today, patient notes she had a history of ovarian cancer 2013.  She has noted to have a cyst at the time of routine Pap smear and was taken to surgery by her general OB/GYN.  She underwent subsequent chemotherapy, perhaps 5 cycles, she is uncertain of the drugs but did have complete alopecia.  She received G-CSF as a part of her treatment.  She complains of pain in her lower abdomen which radiates to her rib cage.  Movement exacerbates her pain.  She is taking Percocet and ibuprofen with some decrease in her pain but is still fairly persistent.  She notes that she has had intermittent pain for months.  She notes that her pain became severe on 3/13/2020 which prompted her emergency room visit the next day.  She is having a very poor appetite and is drinking liquids and urinating well but tolerating minimal solids.  She has not had a bowel movement for 4 days and is only taking stool softeners to facilitate this.  She complains of nausea but no emesis.  She is accompanied by her son.  For new patients, [facility] intake form from today was reviewed and confirmed  today.    OBGYN History:  She is a .  She does not use HRT. She does not have a history of abnormal pap smears.      Oncologic History:   No history exists.         Past Medical History:   Diagnosis Date   • Cancer (CMS/HCC)     OVARIAN   • Pneumonia        Past Surgical History:   Procedure Laterality Date   • BACK SURGERY     • HYSTERECTOMY         MEDICATIONS: The current medication list was reviewed with the patient and updated in the EMR this date per the Medical Assistant. Medication dosages and frequencies were confirmed to be accurate.      Allergies:  is allergic to penicillins.    Social History:   Social History     Socioeconomic History   • Marital status: Single     Spouse name: Not on file   • Number of children: Not on file   • Years of education: Not on file   • Highest education level: Not on file   Tobacco Use   • Smoking status: Current Every Day Smoker     Types: Electronic Cigarette   Substance and Sexual Activity   • Alcohol use: Defer   • Drug use: Never       Family History:  History reviewed. No pertinent family history.    Health Maintenance:    Health Maintenance   Topic Date Due   • MAMMOGRAM  1965   • ANNUAL PHYSICAL  1968   • ZOSTER VACCINE (1 of 2) 2015   • INFLUENZA VACCINE  2019   • HEPATITIS C SCREENING  2020   • COLONOSCOPY  2020   • TDAP/TD VACCINES (2 - Td) 2029   • PNEUMOCOCCAL VACCINE (19-64 MEDIUM RISK)  Completed         Review of Systems   Constitutional: Positive for appetite change, chills and fatigue. Negative for fever and unexpected weight change.   HENT: Positive for tinnitus. Negative for congestion, hearing loss and sore throat.    Eyes: Negative for redness and visual disturbance.   Respiratory: Positive for shortness of breath. Negative for cough and wheezing.         Chest pain   Cardiovascular: Negative for chest pain, palpitations and leg swelling.   Gastrointestinal: Positive for abdominal pain, constipation and  "nausea. Negative for abdominal distention, blood in stool, diarrhea and vomiting.        Heartburn   Endocrine: Negative.  Negative for cold intolerance and heat intolerance.   Genitourinary: Negative for difficulty urinating, dyspareunia, dysuria, frequency, genital sores, hematuria, pelvic pain, urgency, vaginal bleeding, vaginal discharge and vaginal pain.   Musculoskeletal: Negative for arthralgias, back pain, gait problem and joint swelling.   Skin: Negative for rash and wound.   Allergic/Immunologic: Negative for food allergies and immunocompromised state.   Neurological: Positive for dizziness, syncope, numbness and headaches. Negative for seizures, weakness and light-headedness.   Hematological: Negative for adenopathy. Does not bruise/bleed easily.   Psychiatric/Behavioral: Negative.  Negative for confusion, dysphoric mood and sleep disturbance. The patient is not nervous/anxious.        Physical Exam    Vitals:    03/17/20 1345   BP: 123/54   Pulse: 86   Resp: 14   Temp: 98.8 °F (37.1 °C)   TempSrc: Temporal   Weight: 54.4 kg (120 lb)   Height: 167.6 cm (66\")   PainSc:   5       Body mass index is 19.37 kg/m².    Wt Readings from Last 3 Encounters:   03/17/20 54.4 kg (120 lb)   03/16/20 50.8 kg (112 lb)   03/14/20 52.2 kg (115 lb)         GENERAL: Alert, well-appearing female appearing her stated age who is in no apparent distress.   HEENT: Sclera anicteric. Head normocephalic, atraumatic. Mucus membranes moist.   NECK: Trachea midline, supple, without masses.  No thyromegaly.   BREASTS: Deferred  CARDIOVASCULAR: Normal rate, regular rhythm, no murmurs, rubs, or gallops.  No peripheral edema.  RESPIRATORY: Clear to auscultation bilaterally, normal respiratory effort  BACK:  No CVA tenderness, no vertebral tenderness on palpation  GASTROINTESTINAL:  Abdomen is soft, mild to moderately-distended, no rebound or guarding, no palpable masses, or hernias.  Abdomen is diffusely tender particularly the " periumbilical and lower midabdomen areas.  No HSM.    SKIN:  Warm, dry, well-perfused.  All visible areas intact.  No rashes, lesions, ulcers.  PSYCHIATRIC: AO x3, with appropriate affect, normal thought processes.  NEUROLOGIC: No focal deficits.  Using wheelchair for long distance ambulation today.  MUSCULOSKELETAL: Tentative movements, using wheelchair for long distance ambulation today  EXTREMITIES:   No cyanosis, clubbing, symmetric.  LYMPHATICS:  No cervical or inguinal adenopathy noted.     PELVIC exam:    External genitalia are free from lesion.  On bimanual examination, fullness was appreciated.  Uterus, cervix and adnexa were absent.  There was no significant tenderness.  Rectovaginal exam was deferred.    ECOG PS 2    PROCEDURES: None    Diagnostic Data:    I personally reviewed CT images and discussed the case with Dr. Joyce Lee who originally read the CT scan.  There is perihepatic ascites.  There is pelvic ascites.  There is a pelvic mass with intimate relationship with the colon and this directly abuts the bladder as well.  This is a limited CT scan as it is a noncontrast scan.    Ct Abdomen Pelvis Without Contrast    Result Date: 3/15/2020  Large heterogeneous mass identified within the pelvis with fluid seen scattered throughout the abdomen and pelvis. Findings concerning for recurrence with some stranding of the mesentery in which spread to the mesentery cannot be excluded.  DICTATED:   03/14/2020 EDITED/ls :   03/14/2020   This report was finalized on 3/15/2020 11:17 AM by Dr. Gladys Lee MD.      Xr Chest 1 View    Result Date: 3/15/2020  Chronic changes within the lung fields with no acute parenchymal disease.  DICTATED:   03/14/2020 EDITED/ls :   03/14/2020  This report was finalized on 3/15/2020 11:16 AM by Dr. Gladys Lee MD.        Lab Results   Component Value Date    WBC 6.02 03/16/2020    HGB 8.5 (L) 03/16/2020    HCT 25.6 (L) 03/16/2020    MCV 94.1 03/16/2020    PLT  193 03/16/2020    NEUTROABS 3.83 03/16/2020    GLUCOSE 121 (H) 03/16/2020    BUN 9 03/16/2020    CREATININE 0.76 03/16/2020    EGFRIFNONA 79 03/16/2020     03/16/2020    K 3.7 03/16/2020     03/16/2020    CO2 26.0 03/16/2020    CALCIUM 9.1 03/16/2020    ALBUMIN 4.30 03/16/2020    AST 20 03/16/2020    ALT 7 03/16/2020    BILITOT 0.6 03/16/2020     No results found for:         Assessment/Plan   This is a 54 y.o. woman with history of ovarian cancer status post completion of treatment including chemotherapy in 2013.  No records are available.  New onset abdominal pain and findings consistent with recurrence.  Poor appetite, constipation.  Encounter Diagnoses   Name Primary?   • Malignant neoplasm of ovary, unspecified laterality (CMS/HCC) Yes   • Other ascites    • Malignant ascites    • Pelvic mass    • Poor appetite    • Drug-induced constipation      History of ovarian cancer now with clinical findings consistent with recurrence  -We will order contrast scan of chest abdomen and pelvis, Ca125  -We will get records from previous treatment at [facility]  -Patient was sent for CT-guided paracentesis.  Hopefully, cytology will be diagnostic.  -We discussed repeat treatment with carboplatin at AUC of 6+ paclitaxel at 175 mg/m² IV q. 21 days x 3 cycles with repeat assessment and consideration of interval debulking given the 7-year progression free interval of the patient's cancer and then a subsequent 3 cycles of chemotherapy.  -We reviewed the inherent side effects of Carboplatin/Paclitaxel chemotherapy, including but not limited to: bone marrow suppression, peripheral neuropathy, fatigue, alopecia, constipation, and less commonly nausea/vomiting, allergic reaction, extravasation.  Patient has minimal peripheral neuropathy from previous treatment.  -If cytology is not diagnostic, patient will need to undergo diagnostic laparoscopy with biopsies.    Poor appetite  -Patient was encouraged to  drink plenty of liquids and to take boost or Ensure for nutrition supplementation.  She is encouraged to undergo protein shakes.    Narcotic related and cancer related constipation  -It is recommended that patient start laxatives in addition to her stool softeners.  MiraLAX was prescribed.  I think that her narcotics are significantly contributing to her worsening pain and she has not had a bowel movement for 4 days.  We discussed enemas as well.    Pain assessment was performed today as a part of patient’s care.  For patients with pain related to surgery, gynecologic malignancy or cancer treatment, the plan is as noted in the assessment/plan.  For patients with pain not related to these issues, they are to seek any further needed care from a more appropriate provider, such as PCP.      Orders Placed This Encounter   Procedures   • CT Guided Paracentesis     Standing Status:   Future     Standing Expiration Date:   3/17/2021     Order Specific Question:   Reason for Exam:     Answer:   ascites     Order Specific Question:   Does this patient have a diabetic monitoring/medication delivering device on?     Answer:   No       FOLLOW UP: Discussion of cytology    Electronically Signed by: Jamee Mcguire MD  Date: 3/17/2020

## 2020-03-17 NOTE — POST-PROCEDURE NOTE
Interventional Radiology Operative Note    Date: 03/17/20     Time: 18:58     Pre-op Diagnosis: Ovarian cancer with new pelvic mass and pelvic fluid collection. Smaller perihepatic fluid collection.  Post-op Diagnosis: Same    Procedure: CT guided aspiration of pelvic fluid    Surgeon: JAIRO Alexander M.D.  Assistants: None    Sedation: None    Estimated Blood Loss (EBL): Trace     Urine Output (UOP): N/A (short procedure)    IVF: N/A (short procedure)    Findings: Small pelvic fluid collection in close association with pelvic mass    Specimens: 35 mL bloody fluid. Sent for request laboratory analysis    Complications: No immediate    Disposition: Recovery. Stable.

## 2020-03-17 NOTE — PROGRESS NOTES
Roxie Carter  [id number]  1965      Reason for visit: History of ovarian cancer now with new pelvic mass, pain    Consultation:  Patient is being seen at the request of Dr. Campos, emergency room physician    History of present illness:  The patient is a 54 y.o. year old female who presents today for treatment and evaluation of the above issues.    Patient presented to the emergency room 3/14/2020 due to pain.  At that time, she underwent CT scan with the below results.  I discussed this with Dr. Campos the day of the CT scan.  She was given narcotics and discharge.  She represented to the emergency room yesterday with complaints of ongoing pain.  Due to COVID-19 patient was advised that it was in her best interest to be discharged and undergo outpatient management of her pain.  It was not thought that inpatient hospital admit was in her best interest.    Today, patient notes she had a history of ovarian cancer 2013.  She has noted to have a cyst at the time of routine Pap smear and was taken to surgery by her general OB/GYN.  She underwent subsequent chemotherapy, perhaps 5 cycles, she is uncertain of the drugs but did have complete alopecia.  She received G-CSF as a part of her treatment.  She complains of pain in her lower abdomen which radiates to her rib cage.  Movement exacerbates her pain.  She is taking Percocet and ibuprofen with some decrease in her pain but is still fairly persistent.  She notes that she has had intermittent pain for months.  She notes that her pain became severe on 3/13/2020 which prompted her emergency room visit the next day.  She is having a very poor appetite and is drinking liquids and urinating well but tolerating minimal solids.  She has not had a bowel movement for 4 days and is only taking stool softeners to facilitate this.  She complains of nausea but no emesis.  She is accompanied by her son.  For new patients, [facility] intake form from today was reviewed and confirmed  today.    OBGYN History:  She is a .  She does not use HRT. She does not have a history of abnormal pap smears.      Oncologic History:   No history exists.         Past Medical History:   Diagnosis Date   • Cancer (CMS/HCC)     OVARIAN   • Pneumonia        Past Surgical History:   Procedure Laterality Date   • BACK SURGERY     • HYSTERECTOMY         MEDICATIONS: The current medication list was reviewed with the patient and updated in the EMR this date per the Medical Assistant. Medication dosages and frequencies were confirmed to be accurate.      Allergies:  is allergic to penicillins.    Social History:   Social History     Socioeconomic History   • Marital status: Single     Spouse name: Not on file   • Number of children: Not on file   • Years of education: Not on file   • Highest education level: Not on file   Tobacco Use   • Smoking status: Current Every Day Smoker     Types: Electronic Cigarette   Substance and Sexual Activity   • Alcohol use: Defer   • Drug use: Never       Family History:  History reviewed. No pertinent family history.    Health Maintenance:    Health Maintenance   Topic Date Due   • MAMMOGRAM  1965   • ANNUAL PHYSICAL  1968   • ZOSTER VACCINE (1 of 2) 2015   • INFLUENZA VACCINE  2019   • HEPATITIS C SCREENING  2020   • COLONOSCOPY  2020   • TDAP/TD VACCINES (2 - Td) 2029   • PNEUMOCOCCAL VACCINE (19-64 MEDIUM RISK)  Completed         Review of Systems   Constitutional: Positive for appetite change, chills and fatigue. Negative for fever and unexpected weight change.   HENT: Positive for tinnitus. Negative for congestion, hearing loss and sore throat.    Eyes: Negative for redness and visual disturbance.   Respiratory: Positive for shortness of breath. Negative for cough and wheezing.         Chest pain   Cardiovascular: Negative for chest pain, palpitations and leg swelling.   Gastrointestinal: Positive for abdominal pain, constipation and  "nausea. Negative for abdominal distention, blood in stool, diarrhea and vomiting.        Heartburn   Endocrine: Negative.  Negative for cold intolerance and heat intolerance.   Genitourinary: Negative for difficulty urinating, dyspareunia, dysuria, frequency, genital sores, hematuria, pelvic pain, urgency, vaginal bleeding, vaginal discharge and vaginal pain.   Musculoskeletal: Negative for arthralgias, back pain, gait problem and joint swelling.   Skin: Negative for rash and wound.   Allergic/Immunologic: Negative for food allergies and immunocompromised state.   Neurological: Positive for dizziness, syncope, numbness and headaches. Negative for seizures, weakness and light-headedness.   Hematological: Negative for adenopathy. Does not bruise/bleed easily.   Psychiatric/Behavioral: Negative.  Negative for confusion, dysphoric mood and sleep disturbance. The patient is not nervous/anxious.        Physical Exam    Vitals:    03/17/20 1345   BP: 123/54   Pulse: 86   Resp: 14   Temp: 98.8 °F (37.1 °C)   TempSrc: Temporal   Weight: 54.4 kg (120 lb)   Height: 167.6 cm (66\")   PainSc:   5       Body mass index is 19.37 kg/m².    Wt Readings from Last 3 Encounters:   03/17/20 54.4 kg (120 lb)   03/16/20 50.8 kg (112 lb)   03/14/20 52.2 kg (115 lb)         GENERAL: Alert, well-appearing female appearing her stated age who is in no apparent distress.   HEENT: Sclera anicteric. Head normocephalic, atraumatic. Mucus membranes moist.   NECK: Trachea midline, supple, without masses.  No thyromegaly.   BREASTS: Deferred  CARDIOVASCULAR: Normal rate, regular rhythm, no murmurs, rubs, or gallops.  No peripheral edema.  RESPIRATORY: Clear to auscultation bilaterally, normal respiratory effort  BACK:  No CVA tenderness, no vertebral tenderness on palpation  GASTROINTESTINAL:  Abdomen is soft, mild to moderately-distended, no rebound or guarding, no palpable masses, or hernias.  Abdomen is diffusely tender particularly the " periumbilical and lower midabdomen areas.  No HSM.    SKIN:  Warm, dry, well-perfused.  All visible areas intact.  No rashes, lesions, ulcers.  PSYCHIATRIC: AO x3, with appropriate affect, normal thought processes.  NEUROLOGIC: No focal deficits.  Using wheelchair for long distance ambulation today.  MUSCULOSKELETAL: Tentative movements, using wheelchair for long distance ambulation today  EXTREMITIES:   No cyanosis, clubbing, symmetric.  LYMPHATICS:  No cervical or inguinal adenopathy noted.     PELVIC exam:    External genitalia are free from lesion.  On bimanual examination, fullness was appreciated.  Uterus, cervix and adnexa were absent.  There was no significant tenderness.  Rectovaginal exam was deferred.    ECOG PS 2    PROCEDURES: None    Diagnostic Data:    I personally reviewed CT images and discussed the case with Dr. Joyce Lee who originally read the CT scan.  There is perihepatic ascites.  There is pelvic ascites.  There is a pelvic mass with intimate relationship with the colon and this directly abuts the bladder as well.  This is a limited CT scan as it is a noncontrast scan.    Ct Abdomen Pelvis Without Contrast    Result Date: 3/15/2020  Large heterogeneous mass identified within the pelvis with fluid seen scattered throughout the abdomen and pelvis. Findings concerning for recurrence with some stranding of the mesentery in which spread to the mesentery cannot be excluded.  DICTATED:   03/14/2020 EDITED/ls :   03/14/2020   This report was finalized on 3/15/2020 11:17 AM by Dr. Gladys Lee MD.      Xr Chest 1 View    Result Date: 3/15/2020  Chronic changes within the lung fields with no acute parenchymal disease.  DICTATED:   03/14/2020 EDITED/ls :   03/14/2020  This report was finalized on 3/15/2020 11:16 AM by Dr. Gladys Lee MD.        Lab Results   Component Value Date    WBC 6.02 03/16/2020    HGB 8.5 (L) 03/16/2020    HCT 25.6 (L) 03/16/2020    MCV 94.1 03/16/2020    PLT  193 03/16/2020    NEUTROABS 3.83 03/16/2020    GLUCOSE 121 (H) 03/16/2020    BUN 9 03/16/2020    CREATININE 0.76 03/16/2020    EGFRIFNONA 79 03/16/2020     03/16/2020    K 3.7 03/16/2020     03/16/2020    CO2 26.0 03/16/2020    CALCIUM 9.1 03/16/2020    ALBUMIN 4.30 03/16/2020    AST 20 03/16/2020    ALT 7 03/16/2020    BILITOT 0.6 03/16/2020     No results found for:         Assessment/Plan   This is a 54 y.o. woman with history of ovarian cancer status post completion of treatment including chemotherapy in 2013.  No records are available.  New onset abdominal pain and findings consistent with recurrence.  Poor appetite, constipation.  Encounter Diagnoses   Name Primary?   • Malignant neoplasm of ovary, unspecified laterality (CMS/HCC) Yes   • Other ascites    • Malignant ascites    • Pelvic mass    • Poor appetite    • Drug-induced constipation      History of ovarian cancer now with clinical findings consistent with recurrence  -We will order contrast scan of chest abdomen and pelvis, Ca125  -We will get records from previous treatment at [facility]  -Patient was sent for CT-guided paracentesis.  Hopefully, cytology will be diagnostic.  -We discussed repeat treatment with carboplatin at AUC of 6+ paclitaxel at 175 mg/m² IV q. 21 days x 3 cycles with repeat assessment and consideration of interval debulking given the 7-year progression free interval of the patient's cancer and then a subsequent 3 cycles of chemotherapy.  -We reviewed the inherent side effects of Carboplatin/Paclitaxel chemotherapy, including but not limited to: bone marrow suppression, peripheral neuropathy, fatigue, alopecia, constipation, and less commonly nausea/vomiting, allergic reaction, extravasation.  Patient has minimal peripheral neuropathy from previous treatment.  -If cytology is not diagnostic, patient will need to undergo diagnostic laparoscopy with biopsies.    Poor appetite  -Patient was encouraged to  drink plenty of liquids and to take boost or Ensure for nutrition supplementation.  She is encouraged to undergo protein shakes.    Narcotic related and cancer related constipation  -It is recommended that patient start laxatives in addition to her stool softeners.  MiraLAX was prescribed.  I think that her narcotics are significantly contributing to her worsening pain and she has not had a bowel movement for 4 days.  We discussed enemas as well.    Pain assessment was performed today as a part of patient’s care.  For patients with pain related to surgery, gynecologic malignancy or cancer treatment, the plan is as noted in the assessment/plan.  For patients with pain not related to these issues, they are to seek any further needed care from a more appropriate provider, such as PCP.      Orders Placed This Encounter   Procedures   • CT Guided Paracentesis     Standing Status:   Future     Standing Expiration Date:   3/17/2021     Order Specific Question:   Reason for Exam:     Answer:   ascites     Order Specific Question:   Does this patient have a diabetic monitoring/medication delivering device on?     Answer:   No       FOLLOW UP: Discussion of cytology    Electronically Signed by: Jamee Mcguire MD  Date: 3/17/2020

## 2020-03-17 NOTE — TELEPHONE ENCOUNTER
Patient is sitting at registration to get CT guided paracentesis.  Says she was sent there by Dr. Mcguire to get it done today after her appointment.  Registration is telling her she isn't scheduled. Tried to warm transfer patient, but could not get anyone.    She is in a lot of pain and wonders if she can take a pain pill while she waits for the test to be done.        Patient's cell phone [phone]

## 2020-03-17 NOTE — NURSING NOTE
Diagnostic paracentesis performed by Dr Alexander.  35 ml bloody fluid removed.  Pt tolerated well.    Report called to [facility].

## 2020-03-18 ENCOUNTER — HOSPITAL ENCOUNTER (OUTPATIENT)
Dept: CT IMAGING | Facility: HOSPITAL | Age: 55
Discharge: HOME | End: 2020-03-18

## 2020-03-18 ENCOUNTER — TELEPHONE (OUTPATIENT)
Dept: INFUSION THERAPY | Facility: HOSPITAL | Age: 55
End: 2020-03-18

## 2020-03-19 ENCOUNTER — APPOINTMENT (OUTPATIENT)
Dept: LAB | Facility: HOSPITAL | Age: 55
End: 2020-03-19

## 2020-03-19 DIAGNOSIS — R10.9 ABDOMINAL PAIN, UNSPECIFIED ABDOMINAL LOCATION: ICD-10-CM

## 2020-03-19 DIAGNOSIS — R19.00 PELVIC MASS IN FEMALE: ICD-10-CM

## 2020-03-19 DIAGNOSIS — C56.9 MALIGNANT NEOPLASM OF OVARY, UNSPECIFIED LATERALITY (HCC): Primary | ICD-10-CM

## 2020-03-19 DIAGNOSIS — T45.1X5A ALOPECIA DUE TO CYTOTOXIC DRUG: ICD-10-CM

## 2020-03-19 DIAGNOSIS — L65.8 ALOPECIA DUE TO CYTOTOXIC DRUG: ICD-10-CM

## 2020-03-19 LAB
LAB AP CASE REPORT: NORMAL
PATH REPORT.FINAL DX SPEC: NORMAL

## 2020-03-19 PROCEDURE — 88112 CYTOPATH CELL ENHANCE TECH: CPT | Performed by: OBSTETRICS & GYNECOLOGY

## 2020-03-19 RX ORDER — OXYCODONE AND ACETAMINOPHEN 10; 325 MG/1; MG/1
1 TABLET ORAL EVERY 4 HOURS PRN
Qty: 20 TABLET | Refills: 0 | Status: SHIPPED | OUTPATIENT
Start: 2020-03-19 | End: 2020-03-19 | Stop reason: SDUPTHER

## 2020-03-19 RX ORDER — OXYCODONE AND ACETAMINOPHEN 10; 325 MG/1; MG/1
1 TABLET ORAL EVERY 4 HOURS PRN
Qty: 40 TABLET | Refills: 0 | Status: SHIPPED | OUTPATIENT
Start: 2020-03-19 | End: 2020-04-02

## 2020-03-19 NOTE — TELEPHONE ENCOUNTER
Pt requests refill on percocet.  She only has 2 left and continues to have pain.  Counseled pt on bowel regimen of miralax, stool softener.  Milk of Magnesia and / or glycerin suppositories, if the first 2 don't work.

## 2020-03-20 ENCOUNTER — TELEPHONE (OUTPATIENT)
Dept: GYNECOLOGIC ONCOLOGY | Facility: CLINIC | Age: 55
End: 2020-03-20

## 2020-03-20 DIAGNOSIS — C56.9 MALIGNANT NEOPLASM OF OVARY, UNSPECIFIED LATERALITY (HCC): Primary | ICD-10-CM

## 2020-03-20 RX ORDER — ONDANSETRON HYDROCHLORIDE 8 MG/1
8 TABLET, FILM COATED ORAL 3 TIMES DAILY PRN
Qty: 30 TABLET | Refills: 5 | Status: ON HOLD | OUTPATIENT
Start: 2020-03-20 | End: 2020-04-03

## 2020-03-20 RX ORDER — LORATADINE 10 MG/1
TABLET ORAL
Qty: 12 TABLET | Refills: 1 | Status: SHIPPED | OUTPATIENT
Start: 2020-03-20 | End: 2020-04-02

## 2020-03-20 RX ORDER — PROMETHAZINE HYDROCHLORIDE 25 MG/1
25 TABLET ORAL EVERY 6 HOURS PRN
Qty: 30 TABLET | Refills: 5 | Status: ON HOLD | OUTPATIENT
Start: 2020-03-20 | End: 2020-04-03

## 2020-03-20 RX ORDER — DEXAMETHASONE 4 MG/1
TABLET ORAL
Qty: 11 TABLET | Refills: 5 | Status: ON HOLD | OUTPATIENT
Start: 2020-03-20 | End: 2020-04-03

## 2020-03-20 NOTE — TELEPHONE ENCOUNTER
Spoke with patient and sister. Informed them that the appointment on Monday was a repeat CT scan with contrast. Also, informed of tentative start date of Chemo and education appointment. Gave directions and pre procedure requirements. They both v/u.  I have requested records again from [place].      Patient sister stated that patient is still in an extreme amount of pain.

## 2020-03-20 NOTE — TELEPHONE ENCOUNTER
CALLER-MANISHA WALSH (SISTER)    PT-GIANA MORA-DR. LUIS DYER CALLED FOR SHUBHAM, MESSI, OR GABRIEL.    SHE ASKED WHY THE PT NEEDS ANOTHER BIOPSY FOR 3/23/20. SHE SAID THE PT HAD ANOTHER BIOPSY BEFORE AND WANTS TO KNOW IF STAFF HAS RESULTS FOR THIS.    MANISHA'S PHONE #: [phone] (ANYTIME)

## 2020-03-23 ENCOUNTER — HOSPITAL ENCOUNTER (OUTPATIENT)
Dept: CT IMAGING | Facility: HOSPITAL | Age: 55
Discharge: HOME | End: 2020-03-23
Admitting: OBSTETRICS & GYNECOLOGY

## 2020-03-23 ENCOUNTER — TELEPHONE (OUTPATIENT)
Dept: GYNECOLOGIC ONCOLOGY | Facility: CLINIC | Age: 55
End: 2020-03-23

## 2020-03-23 DIAGNOSIS — C56.9 MALIGNANT NEOPLASM OF OVARY, UNSPECIFIED LATERALITY (HCC): ICD-10-CM

## 2020-03-23 PROCEDURE — 74177 CT ABD & PELVIS W/CONTRAST: CPT

## 2020-03-23 PROCEDURE — 25010000002 IOPAMIDOL 61 % SOLUTION: Performed by: OBSTETRICS & GYNECOLOGY

## 2020-03-23 RX ADMIN — IOPAMIDOL 80 ML: 612 INJECTION, SOLUTION INTRAVENOUS at 15:30

## 2020-03-23 NOTE — TELEPHONE ENCOUNTER
Patient's sister called to ask what doctor called about patient is getting a CT scan done right now.  I told her that Dr. Mcguire wants to do surgery first because of the cancer cell type that she had, an appointment was made for her to come in and see Dr. Mcguire for discussion.     Patients sister phone number [phone]

## 2020-03-23 NOTE — TELEPHONE ENCOUNTER
I reviewed records from patient's original diagnosis.  She had a granulosa cell tumor of the ovary.  Given this histology, it is really in her best interest to proceed with surgery.  I called and left her message that she needed to call my office to discuss this.

## 2020-03-23 NOTE — TELEPHONE ENCOUNTER
Update for Edelmira Braxton.    Pt sister Deepa stated she call over to [facility] and was informed that they have not received any requests for pt medical records.    Deepa is calling to find out what is going on and to verify the office has the right cell and fax number.    The correct phone number is [phone] and the correct fax number is [phone].

## 2020-03-23 NOTE — TELEPHONE ENCOUNTER
Phoned patient and informed I have faxed request for records 4 times to number listed. They have only sent path report. Asked patient if she would call to request records as well.   English

## 2020-03-24 ENCOUNTER — TELEPHONE (OUTPATIENT)
Dept: GYNECOLOGIC ONCOLOGY | Facility: CLINIC | Age: 55
End: 2020-03-24

## 2020-03-24 NOTE — TELEPHONE ENCOUNTER
Patient's sister called and wants to know if her sister still needs to take her pre-chemo medications. I told her no she does not.    She wanted to know what stage her sisters cancer is. I told her that was not something I could answer and she can ask Dr. Mcguire at the appointment on 03/26/2020.

## 2020-03-26 ENCOUNTER — PATIENT EDUCATION (SURGERY INSTRUCTIONS) (OUTPATIENT)
Dept: GYNECOLOGIC ONCOLOGY | Facility: CLINIC | Age: 55
End: 2020-03-26

## 2020-03-26 ENCOUNTER — OFFICE VISIT (OUTPATIENT)
Dept: GYNECOLOGIC ONCOLOGY | Facility: CLINIC | Age: 55
End: 2020-03-26

## 2020-03-26 ENCOUNTER — APPOINTMENT (OUTPATIENT)
Dept: ONCOLOGY | Facility: HOSPITAL | Age: 55
End: 2020-03-26

## 2020-03-26 ENCOUNTER — DOCUMENTATION (OUTPATIENT)
Dept: SOCIAL WORK | Facility: HOSPITAL | Age: 55
End: 2020-03-26

## 2020-03-26 VITALS
TEMPERATURE: 98.9 F | WEIGHT: 116.3 LBS | SYSTOLIC BLOOD PRESSURE: 119 MMHG | HEART RATE: 109 BPM | HEIGHT: 66 IN | OXYGEN SATURATION: 97 % | DIASTOLIC BLOOD PRESSURE: 82 MMHG | RESPIRATION RATE: 16 BRPM | BODY MASS INDEX: 18.69 KG/M2

## 2020-03-26 DIAGNOSIS — Z59.9 FINANCIAL DIFFICULTIES: ICD-10-CM

## 2020-03-26 DIAGNOSIS — R63.0 POOR APPETITE: ICD-10-CM

## 2020-03-26 DIAGNOSIS — K59.03 DRUG-INDUCED CONSTIPATION: ICD-10-CM

## 2020-03-26 DIAGNOSIS — R18.0 MALIGNANT ASCITES: ICD-10-CM

## 2020-03-26 DIAGNOSIS — Z65.9 OTHER SOCIAL STRESSOR: ICD-10-CM

## 2020-03-26 DIAGNOSIS — C56.9 MALIGNANT NEOPLASM OF OVARY, UNSPECIFIED LATERALITY (HCC): Primary | ICD-10-CM

## 2020-03-26 DIAGNOSIS — R53.81 PHYSICAL DECONDITIONING: ICD-10-CM

## 2020-03-26 DIAGNOSIS — R10.9 ABDOMINAL PAIN, UNSPECIFIED ABDOMINAL LOCATION: ICD-10-CM

## 2020-03-26 DIAGNOSIS — D39.11 GRANULOSA CELL TUMOR OF OVARY, RIGHT: ICD-10-CM

## 2020-03-26 DIAGNOSIS — Z74.09 POOR MOBILITY: ICD-10-CM

## 2020-03-26 DIAGNOSIS — R19.00 PELVIC MASS: ICD-10-CM

## 2020-03-26 PROBLEM — G89.3 CANCER ASSOCIATED PAIN: Status: ACTIVE | Noted: 2020-03-26

## 2020-03-26 PROCEDURE — 99214 OFFICE O/P EST MOD 30 MIN: CPT | Performed by: OBSTETRICS & GYNECOLOGY

## 2020-03-26 RX ORDER — NEOMYCIN SULFATE 500 MG/1
TABLET ORAL
Qty: 6 TABLET | Refills: 0 | Status: ON HOLD | OUTPATIENT
Start: 2020-03-26 | End: 2020-04-03

## 2020-03-26 RX ORDER — ONDANSETRON 4 MG/1
TABLET, FILM COATED ORAL
Qty: 10 TABLET | Refills: 0 | Status: SHIPPED | OUTPATIENT
Start: 2020-03-26 | End: 2020-04-02

## 2020-03-26 RX ORDER — METRONIDAZOLE 500 MG/1
TABLET ORAL
Qty: 3 TABLET | Refills: 0 | Status: ON HOLD | OUTPATIENT
Start: 2020-03-26 | End: 2020-04-03

## 2020-03-26 RX ORDER — CELECOXIB 100 MG/1
200 CAPSULE ORAL
Status: CANCELLED | OUTPATIENT
Start: 2020-03-26

## 2020-03-26 RX ORDER — SODIUM, POTASSIUM,MAG SULFATES 17.5-3.13G
SOLUTION, RECONSTITUTED, ORAL ORAL
Qty: 2 BOTTLE | Refills: 0 | Status: ON HOLD | OUTPATIENT
Start: 2020-03-26 | End: 2020-04-03

## 2020-03-26 RX ORDER — ACETAMINOPHEN 325 MG/1
650 TABLET ORAL
Status: CANCELLED | OUTPATIENT
Start: 2020-03-26

## 2020-03-26 SDOH — ECONOMIC STABILITY - INCOME SECURITY: PROBLEM RELATED TO HOUSING AND ECONOMIC CIRCUMSTANCES, UNSPECIFIED: Z59.9

## 2020-03-26 NOTE — PROGRESS NOTES
Roxie Carter  [id number]  1965      Reason for visit: History of ovarian cancer now with new pelvic mass, pain    Consultation:  Patient is being seen at the request of Dr. Campos, emergency room physician    History of present illness:  The patient is a 54 y.o. year old female who presents today for treatment and evaluation of the above issues.    At previous visit 3/17/2020 :  Patient presented to the emergency room 3/14/2020 due to pain.  At that time, she underwent CT scan with the below results.  I discussed this with Dr. Campos the day of the CT scan.  She was given narcotics and discharge.  She represented to the emergency room yesterday with complaints of ongoing pain.  Due to COVID-19 patient was advised that it was in her best interest to be discharged and undergo outpatient management of her pain.  It was not thought that inpatient hospital admit was in her best interest.    Today, patient represents to discuss treatment options.  Initially, I had considered combined carboplatin and paclitaxel IV due to patient's symptoms and concern regarding locally aggressive cancer.  She underwent CT-guided paracentesis 3/19/2020 which was atypical on cytology.  In the interim, I was able to obtain records from [place] where she had her previous treatment.  This is now detailed in the oncologic history below.  Patient was diagnosed with a granulosa cell tumor at that time.  Patient is accompanied by her sister today.  She notes that her p.o. intake is improved and she is trying to drink as much Ensure nutritional supplements as possible.  She is in a wheelchair which she is using for ambulation due to pain.  She has questions about next steps in her care.  She expresses concerns regarding surgery and financial difficulties.    OBGYN History:  She is a .  She does not use HRT. She does not have a history of abnormal pap smears.  Oncologic History:     Ovarian cancer (CMS/HCC)      Granulosa cell tumor  of ovary, right    8/13/2013 Cancer Staged     Staging form: Ovary, Fallopian Tube, And Primary Peritoneal Carcinoma, AJCC 8th Edition  - Clinical stage from 8/13/2013: FIGO Stage IC (cT1c, cN0, cM0) - Signed by Jamee Mcguire MD on 3/26/2020       - 1/4/2014 Chemotherapy     OP OVARIAN PACLitaxel / CARBOplatin (Q21D)  x6 cycles remarkable for bone marrow suppression and G-CSF support      8/13/2013 Surgery     Total abdominal hysterectomy, bilateral salpingo-oophorectomy with pathology showing 5 cm granulosa cell tumor of the right ovary and a left fallopian tube intraepithelial serous neoplasm.      3/14/2020 Progression     CT Abdomen/Pelvis IMPRESSION:  Large heterogeneous mass identified within the pelvis with  fluid seen scattered throughout the abdomen and pelvis. Findings  concerning for recurrence with some stranding of the mesentery in which  spread to the mesentery cannot be excluded.           Past Medical History:   Diagnosis Date   • Cancer (CMS/HCC)     OVARIAN   • Pneumonia    • Vitamin D deficiency        Past Surgical History:   Procedure Laterality Date   • BACK SURGERY     • HYSTERECTOMY         MEDICATIONS: The current medication list was reviewed with the patient and updated in the EMR this date per the Medical Assistant. Medication dosages and frequencies were confirmed to be accurate.      Allergies:  is allergic to penicillins.    Social History:   Social History     Socioeconomic History   • Marital status: Single     Spouse name: Not on file   • Number of children: Not on file   • Years of education: Not on file   • Highest education level: Not on file   Tobacco Use   • Smoking status: Current Every Day Smoker     Types: Electronic Cigarette   • Smokeless tobacco: Never Used   Substance and Sexual Activity   • Alcohol use: Never     Frequency: Never   • Drug use: Never       Family History:  History reviewed. No pertinent family history.    Health Maintenance:    Health Maintenance  "  Topic Date Due   • MAMMOGRAM  1965   • ANNUAL PHYSICAL  04/19/1968   • ZOSTER VACCINE (1 of 2) 04/19/2015   • INFLUENZA VACCINE  08/01/2019   • HEPATITIS C SCREENING  03/16/2020   • COLONOSCOPY  03/16/2020   • TDAP/TD VACCINES (2 - Td) 01/02/2029   • PNEUMOCOCCAL VACCINE (19-64 MEDIUM RISK)  Completed         Review of Systems   Constitutional: Positive for appetite change, chills and fatigue. Negative for fever and unexpected weight change.   HENT: Positive for tinnitus. Negative for congestion, hearing loss and sore throat.    Eyes: Negative for redness and visual disturbance.   Respiratory: Positive for shortness of breath. Negative for cough and wheezing.         Chest pain   Cardiovascular: Negative for chest pain, palpitations and leg swelling.   Gastrointestinal: Positive for abdominal pain, constipation and nausea. Negative for abdominal distention, blood in stool, diarrhea and vomiting.        Heartburn   Endocrine: Negative.  Negative for cold intolerance and heat intolerance.   Genitourinary: Negative for difficulty urinating, dyspareunia, dysuria, frequency, genital sores, hematuria, pelvic pain, urgency, vaginal bleeding, vaginal discharge and vaginal pain.   Musculoskeletal: Negative for arthralgias, back pain, gait problem and joint swelling.   Skin: Negative for rash and wound.   Allergic/Immunologic: Negative for food allergies and immunocompromised state.   Neurological: Positive for dizziness, syncope, numbness and headaches. Negative for seizures, weakness and light-headedness.   Hematological: Negative for adenopathy. Does not bruise/bleed easily.   Psychiatric/Behavioral: Negative.  Negative for confusion, dysphoric mood and sleep disturbance. The patient is not nervous/anxious.        Physical Exam    Vitals:    03/26/20 1004   BP: 119/82   Pulse: 109   Resp: 16   Temp: 98.9 °F (37.2 °C)   TempSrc: Temporal   SpO2: 97%   Weight: 52.8 kg (116 lb 4.8 oz)   Height: 167 cm (65.75\") "   PainSc:   3   PainLoc: Abdomen       Body mass index is 18.92 kg/m².    Wt Readings from Last 3 Encounters:   03/26/20 52.8 kg (116 lb 4.8 oz)   03/17/20 54.4 kg (120 lb)   03/16/20 50.8 kg (112 lb)         GENERAL: Alert, thin-appearing female appearing her stated age who is in no apparent distress.   HEENT: Sclera anicteric. Head normocephalic, atraumatic. Mucus membranes moist.   NECK: Trachea midline, supple, without masses.  No thyromegaly.   BREASTS: Deferred  CARDIOVASCULAR:  Deferred  RESPIRATORY:  Deferred  BACK:  No CVA tenderness, no vertebral tenderness on palpation  GASTROINTESTINAL:   Deferred  SKIN:  Warm, dry, well-perfused.  All visible areas intact.  No rashes, lesions, ulcers.  PSYCHIATRIC: AO x3, with appropriate affect, normal thought processes.  NEUROLOGIC: No focal deficits.  Using wheelchair for long distance ambulation today.  MUSCULOSKELETAL: Tentative movements, using wheelchair for long distance ambulation today  EXTREMITIES:   No cyanosis, clubbing, symmetric.  LYMPHATICS:   Deferred     PELVIC exam:  Deferred    ECOG PS 2    PROCEDURES: None    Diagnostic Data:    I personally reviewed CT images and discussed the case with Dr. Joyce Lee who originally read the CT scan.  There is perihepatic ascites.  There is pelvic ascites.  There is a pelvic mass with intimate relationship with the colon and this directly abuts the bladder as well.  This is a limited CT scan as it is a noncontrast scan.    Ct Abdomen Pelvis Without Contrast    Result Date: 3/15/2020  Large heterogeneous mass identified within the pelvis with fluid seen scattered throughout the abdomen and pelvis. Findings concerning for recurrence with some stranding of the mesentery in which spread to the mesentery cannot be excluded.  DICTATED:   03/14/2020 EDITED/ls :   03/14/2020   This report was finalized on 3/15/2020 11:17 AM by Dr. Gladys Lee MD.      Ct Abdomen Pelvis With Contrast    Result Date:  3/24/2020  There is decrease seen in the amount of fluid within the abdomen and pelvis in the interval. There is a heterogeneous mass seen in the leftward aspect of the pelvis suggesting recurrent ovarian malignancy. Remainder of the CT abdomen and pelvis is stable.  D:  03/23/2020 E:  03/24/2020   This report was finalized on 3/24/2020 8:44 AM by Dr. Gladys Lee MD.      Xr Chest 1 View    Result Date: 3/15/2020  Chronic changes within the lung fields with no acute parenchymal disease.  DICTATED:   03/14/2020 EDITED/ls :   03/14/2020  This report was finalized on 3/15/2020 11:16 AM by Dr. Gladys Lee MD.      Ct Guided Paracentesis    Result Date: 3/17/2020   Percutaneous aspiration of pelvic fluid collection.  Plan:  Resume care with primary care provider. Sample of fluid sent for requested laboratory analysis. ______________________________________________________________________  PROCEDURE SUMMARY: - CT-guided aspiration of pelvic fluid collection - Additional procedure(s): None  PROCEDURE DETAILS:  Pre-procedure Consent: Informed consent for the procedure including risks, benefits and alternatives was obtained and time-out was performed prior to the procedure. Preparation: The site was prepared and draped using maximal sterile barrier technique including cutaneous antisepsis.  Anesthesia/sedation Level of anesthesia/sedation: None  Drainage catheter placement The patient was positioned supine. Initial imaging was performed. Local anesthesia was administered. The pelvic fluid collection was accessed under CT fluoroscopic guidance using initially an 18-gauge Chiba needle. The needle was found to push the fluid collection away. This needle was removed. A 22-gauge Chiba needle was then advanced under CT fluoroscopic guidance. Again, the needle would push the fluid collection away. However, we were able to place the tip of the needle in the fluid collection. Approximately 35 mL of bloody fluid was  aspirated. Needle removed. Occlusive dressing applied. - Initial imaging findings: Pelvic fluid collection associated with pelvic mass -Needle placed: 18-gauge followed by 22-gauge Chiba needle -Post-aspiration imaging findings: No immediate complication  Contrast Contrast agent: None Contrast volume (mL): 0  Radiation Dose CT dose length product (mGy-cm): 890  Additional Details Additional description of procedure: None Equipment details: None Specimens removed: 35 mL bloody fluid aspirated and sent for analysis. Estimated blood loss (mL): Less than 10 Standardized report: DrainPlacement  Attestation I was present and scrubbed for the entire procedure. Imaging reviewed. Agree with final report as written.  This report was finalized on 3/17/2020 7:07 PM by Rudolph Alexander.            Lab Results   Component Value Date    WBC 6.02 03/16/2020    HGB 8.5 (L) 03/16/2020    HCT 25.6 (L) 03/16/2020    MCV 94.1 03/16/2020     03/16/2020    NEUTROABS 3.83 03/16/2020    GLUCOSE 121 (H) 03/16/2020    BUN 9 03/16/2020    CREATININE 0.76 03/16/2020    EGFRIFNONA 79 03/16/2020     03/16/2020    K 3.7 03/16/2020     03/16/2020    CO2 26.0 03/16/2020    CALCIUM 9.1 03/16/2020    ALBUMIN 4.30 03/16/2020    AST 20 03/16/2020    ALT 7 03/16/2020    BILITOT 0.6 03/16/2020     No results found for:         Assessment/Plan   This is a 54 y.o. woman with history of granulosa cell tumor of the right ovary now with findings consistent with recurrence.  Pain, poor mobility, concern for nutritional compromise.  I reviewed CT with contrast images with patient and her sister in the context of discussing surgical plan.  Close proximity of mass to:, Its large size, close proximity to bladder, and close proximity to small bowel were discussed.    Encounter Diagnoses   Name Primary?   • Malignant neoplasm of ovary, unspecified laterality (CMS/HCC) Yes   • Poor mobility    • Abdominal pain, unspecified abdominal location    •  Physical deconditioning    • Granulosa cell tumor of ovary, right    • Malignant ascites    • Pelvic mass    • Drug-induced constipation    • Financial difficulties    • Poor appetite    • Other social stressor      History of granulosa cell tumor of right ovary now with clinical findings consistent with recurrence  -Patient is significantly symptomatic.  We discussed surgery as granulosa cell tumors are resistant to chemotherapy.  Patient was consented for  cystoscopy with temporary bilateral ureteral catheters, exploratory laparotomy, debulking of cancer, possible bowel resection, possible colostomy, possible bladder resection.      Risks and benefits of surgery were discussed.  This included, but was not limited to, infection and bleeding like when the skin is cut; damage to surrounding structures; and incisional complications.  Risk of DVT was addressed for major surgeries.  Standard of care efforts to minimize these risks were reviewed.  Typical hospital stay and recovery were discussed as well as post-procedure precautions.  Surgical implications of chronic illnesses on recovery and surgical outcome were reviewed.     Pain medication regimen for postoperative care was discussed.  Typical regimen and avoidance of narcotics was discussed.  Patient was educated that other factors, such as existing narcotic use, can impact postoperative pain management.      Risk of anastomotic leak both with side to side anastomosis and end anastomosis was discussed.  Patient and sister verbalized understanding that if the cancer is too deep in the pelvis there may not be an option for colonic reanastomosis.  We also discussed the possibility of bladder resection given the review the images.  Patient understands that if there is bladder resection or injury, she would have to have a Boateng catheter for an extended period of time.    Patient verbalized understanding of the plan including the risks and benefits.  Appropriate  perioperative testing including laboratory evaluation, EKG as clinically indicated, chest x-ray as clinically indicated, and preadmission evaluation were all ordered as a part of this patient's care.    Poor appetite  -Nutrition status improved, patient looks and feels better, she is taking nutritional supplements    Narcotic related and cancer related constipation  -Continue MiraLAX as needed    Poor mobility  -Wheelchair ordered    Financial concerns, social stressors  -Patient was seen by  today.    Pain assessment was performed today as a part of patient’s care.  For patients with pain related to surgery, gynecologic malignancy or cancer treatment, the plan is as noted in the assessment/plan.  For patients with pain not related to these issues, they are to seek any further needed care from a more appropriate provider, such as PCP.      Orders Placed This Encounter   Procedures   • Wheelchair     Order Specific Question:   Equipment     Answer:    Standard Wheelchair     Order Specific Question:   Wheelchair accessories     Answer:    Transport Chair, Adult, weight less than or equal to 300 lbs     Order Specific Question:   The face to face evaluation was performed on     Answer:   3/26/2020     Order Specific Question:   Length of Need (99 Months = Lifetime)     Answer:   99 Months = Lifetime   • Comprehensive metabolic panel     Standing Status:   Future     Standing Expiration Date:   3/26/2021   • Follow anesthesia standing orders.     Standing Status:   Future   • Obtain informed consent     Order Specific Question:   Informed Consent Given For     Answer:   LAPAROTOMY EXPLORATORY, DEBULKING, CYSTOSCOPY WITH TEMPORARY BILATERAL URETERAL STENTS, COLOSTOMY VS REANASTOMOSIS   • Provide NPO Instructions to Patient     Standing Status:   Future   • Chlorhexidine Skin Prep     Chlorhexidine Skin Prep and Instructions For All Patients Having A Procedure Requiring an Outward Incision if Not  Allergic. If Allergic, Give Antibacterial Skin Wipes and Instructions. Do Not Use For Facial Cases or on Any Mucus Membranes.     Standing Status:   Future   • ECG 12 Lead     Standing Status:   Future     Standing Expiration Date:   3/26/2021     Order Specific Question:   Reason for Exam:     Answer:   PRE OP   • Type and screen     Standing Status:   Future     Standing Expiration Date:   3/26/2021   • CBC and Differential     Standing Status:   Future     Standing Expiration Date:   3/26/2021     Order Specific Question:   Manual Differential     Answer:   No       FOLLOW UP: Surgical debulking    Electronically Signed by: Jamee Mcguire MD  Date: 3/26/2020

## 2020-03-26 NOTE — PATIENT INSTRUCTIONS
Inpatient Surgery Instructions          Roxie Carter  [id number]  1965    SURGEON:  Jamee Mcguire MD    Surgery Coordinator: Ester Castro  If you have any questions before or after surgery please call.  [phone]         Appointment    1. You have pre-admission testing (PAT) appointment on                                              .   You will need to be at hospital registration 10 minutes before that time. The registration department is located in the long hallway between the [facility] and [facility].If your PAT appointment is on Sunday, please enter through the Emergency Department.     2.  Your surgery has been scheduled on                                      You will need to be at the [address] [place] [address] second floor surgery registration on that day at              The Day(s) Before Surgery     ·  No solid food on                              all day. You may have clear liquids like chicken broth and jello.     · Plan to have someone take you home from the hospital.    · Do not use any products that contain nicotine or tobacco, such as cigarettes and e-cigarettes. These can delay healing after surgery. If you need help quitting, ask your health care provider.    ·  Do not take vitamins or aspirin one week before surgery ( if applicable).  On the morning of your surgery, you may take you prescription medications with a sip of water, unless told otherwise by your provider.  Bring them with you to the hospital (Diabetic patient should bring insulin if instructed by the managing physician). If you are taking a blood thinner ( Eliquis, Coumadin, Xarelto, Lovenox, Asprin, Heparin, etc.) please have the provider that manages this instruct you on when to stop taking prior to surgery.     · If you are feeling sick, have a fever or cough and have seen your PCP let our office know 48 hours prior to surgery. It may be  subject to rescheduling.       Eating and drinking restrictions              Follow instructions from your health care provider about eating and drinking, which may include:               Follow the clear liquid diet the day before surgery. If you chose to drink the IMPACT drink you can drink this as scheduled on this day.     ·  NO MILK, CREAM, OR ORANGE JUICE.                                                                                                    Bowel Prep Instructions            MOVIPREP or SUPREP      To begin the day before surgery:  Start 1st dose at 9:00am and finish within an hour.  Start 2nd dose at 10:30 am and finish within an hour.      METRONIDAZOLE 500 MG    To start the day before surgery:   Take 1 tablet by mouth  at 2 pm, 1 at 3 pm, and 1 at 10 pm.      NEOMYCIN 500 MG    To begin the day before surgery:  Take (2) 500 mg  tabs at 2:00pm, take (2) 500 mg tabs at 3:00pm, and take (2) 500 mg tabs at 10:00pm.      ONDANSETRON 4 MG      Take one tablet by mouth at 1 pm and then every 6 hours as needed for nausea.              Bowel Prep Instructions                 What supplies do I need to prepare in advance?   Obtain the following supplies at your local pharmacy:   · Ondansetron 4 mg tablet- take one tablet an hour prior to bowel prep.  · All of your prescriptions from the pharmacy including the bowel prep.  • Two bottles of clear liquid (32 ounces each): Gatorade        At 9 am take your first dose of the bowel prep and finish within an hour. Set a timer for every 15 minutes to keep pace until all of the solution is gone. Begin the second dose at 10:30 am follow the same instructions.      • Stay near a toilet, as you will have diarrhea.               Colon cleansing tips:   1. Stay near a toilet! You will have diarrhea, which can be quite sudden. This is normal.   2. Try drinking the solution with a straw. It may be easier to tolerate.   3. Rarely, people may have nausea or vomiting with  the prep. If this occurs, give yourself a 30 -minute break, rinse your mouth or brush your teeth, then continue drinking the prep solution.   4. You may have bowel cramps until the stool has flushed from your colon (this may take 2 to 4 hours and sometimes much longer).      Anal skin irritation or a flare of hemorrhoid inflammation may occur. If this happens, treat it with over-the-counter-remedies, such as hydrocortisone cream, baby wipes, Vaseline or TUCKS ( witch hazel)       The 2 bottles of gatorade need to be drank within 4 hours of your arrival time at the hospital. This means if your surgery arrival time is 05:30 am, then you need to begin drinking the gatorade at 01:30 am and have it finished before you leave your house. If you drink gatorade on the way to the hospital or in the lobby during registration this may effect your sugery time or cause it to be rescheduled.  The gatorade is important to replenish electrolytes after having diarrhea from the bowel prep. If these are not replenished and your lab work is abnormal the morning of surgery this may cause a delay or cancellation of your surgery.                   What happens after the procedure?  · You will be given pain medicine as needed.  · Your blood pressure, heart rate, breathing rate, and blood oxygen level will be monitored until the medicines you were given have worn off.  · You will need to stay in the hospital to recover for one to two days.  · You may have a liquid diet at first. You will most likely return to your usual diet the day after surgery.  · You will still have the urinary catheter in place. It will likely be removed the day after surgery.  · You may have to wear compression stockings. These stockings help to prevent blood clots and reduce swelling in your legs.  · You will be encouraged to walk as soon as possible. You will also use a device or do breathing exercises to keep your lungs clear.  · You may need to use a maxi-pad for  vaginal discharge/bleeding.      Post Surgical Care Instructions.     • You may be discharged from surgery with an abdominal binder, this is given to you for your comfort only. You do not need to wear it unless you feel that it helps with discomfort after your surgery.   • You will have a large abdominal incision, this will sometimes have glue or staples. If you have staples a one week appointment will be made to have them removed during a nurse visit. Sutures will dissolve on their own and glue will wear off over time. Please do not pick the glue.   • Keep incisions clean and dry. Do not use antibacterial washes or ointments on your incisions.   • Small amount of clear or pink tinged drainage from incisions is normal.  • You may have light vaginal bleeding and spotting up to 6 weeks after surgery.  • You will need to continue a bowel regimen after surgery to prevent constipation or bowel obstruction. Take your stool softener as prescribed. If you do not produce a bowel movent in 24 hours after surgery take a laxative ( milk of magnesia or miralax). Narcotics cause constipation, please take them as directed, try alternating ibuprofen and tylenol before taking the narcotic ( oxycodone, hydrocodone, etc.).

## 2020-03-26 NOTE — PROGRESS NOTES
MARINA met with pt and her sister during her clinic appointment to provide support and resources.  Pt is very emotional today due to several stressors that she is currently dealing with.  She has been in significant pain for over a week and has been told that she has a large mass and that she will need to have surgery to remove it.  Pt started working full-time with her employer in January (she had previously worked part-time) and she started receiving her insurance coverage.  She is now looking at being off for a minimum of six weeks, so is unsure how she will be able to afford to keep her insurance premiums paid.  She is also unsure if they will hold her job while she is off.  Pt states that she lives with her two adult sons.  Pt is very concerned about the financial stress of not being able to pay her household and monthly bills.  Pt is also concerned about her medical bills that will come along with her surgery.  MARINA informed pt of the various options she may have for coverage and that after it is discovered how much longer she will have her insurance, we will know which direction to point her in.  Pt has a supportive sister who accompanies her today.  MARINA will provide all the resource information to pt and also assist throughout her treatment process as needed.  MARINA provided support and contact information for future needs or concerns.  SW available for ongoing support and resource needs.

## 2020-03-30 ENCOUNTER — TELEPHONE (OUTPATIENT)
Dept: GYNECOLOGIC ONCOLOGY | Facility: CLINIC | Age: 55
End: 2020-03-30

## 2020-03-30 PROBLEM — C56.9 MALIGNANT NEOPLASM OF OVARY: Status: ACTIVE | Noted: 2020-03-30

## 2020-03-30 NOTE — TELEPHONE ENCOUNTER
Sister Amber called and states that her sister did get the impact drink and has been drinking it.       I also let her know that surgery time is now arrival at 07;30 am.      If she has questions she needs to call me back at my direct number. All of this message was left on a voicemail.

## 2020-04-02 ENCOUNTER — ANESTHESIA EVENT (OUTPATIENT)
Dept: PERIOP | Facility: HOSPITAL | Age: 55
End: 2020-04-02

## 2020-04-02 ENCOUNTER — APPOINTMENT (OUTPATIENT)
Dept: PREADMISSION TESTING | Facility: HOSPITAL | Age: 55
End: 2020-04-02

## 2020-04-02 ENCOUNTER — TELEPHONE (OUTPATIENT)
Dept: SOCIAL WORK | Facility: HOSPITAL | Age: 55
End: 2020-04-02

## 2020-04-02 VITALS — HEIGHT: 66 IN | BODY MASS INDEX: 18.54 KG/M2 | WEIGHT: 115.4 LBS

## 2020-04-02 DIAGNOSIS — C56.9 MALIGNANT NEOPLASM OF OVARY, UNSPECIFIED LATERALITY (HCC): ICD-10-CM

## 2020-04-02 LAB
ABO GROUP BLD: NORMAL
ALBUMIN SERPL-MCNC: 4.1 G/DL (ref 3.5–5.2)
ALBUMIN/GLOB SERPL: 1.3 G/DL
ALP SERPL-CCNC: 121 U/L (ref 39–117)
ALT SERPL W P-5'-P-CCNC: 38 U/L (ref 1–33)
ANION GAP SERPL CALCULATED.3IONS-SCNC: 11 MMOL/L (ref 5–15)
AST SERPL-CCNC: 41 U/L (ref 1–32)
BASOPHILS # BLD AUTO: 0.04 10*3/MM3 (ref 0–0.2)
BASOPHILS NFR BLD AUTO: 0.7 % (ref 0–1.5)
BILIRUB SERPL-MCNC: 0.4 MG/DL (ref 0.2–1.2)
BLD GP AB SCN SERPL QL: NEGATIVE
BUN BLD-MCNC: 30 MG/DL (ref 6–20)
BUN/CREAT SERPL: 47.6 (ref 7–25)
CALCIUM SPEC-SCNC: 9.8 MG/DL (ref 8.6–10.5)
CHLORIDE SERPL-SCNC: 103 MMOL/L (ref 98–107)
CO2 SERPL-SCNC: 26 MMOL/L (ref 22–29)
CREAT BLD-MCNC: 0.63 MG/DL (ref 0.57–1)
DEPRECATED RDW RBC AUTO: 44.9 FL (ref 37–54)
EOSINOPHIL # BLD AUTO: 0.16 10*3/MM3 (ref 0–0.4)
EOSINOPHIL NFR BLD AUTO: 2.7 % (ref 0.3–6.2)
ERYTHROCYTE [DISTWIDTH] IN BLOOD BY AUTOMATED COUNT: 12.9 % (ref 12.3–15.4)
GFR SERPL CREATININE-BSD FRML MDRD: 98 ML/MIN/1.73
GLOBULIN UR ELPH-MCNC: 3.1 GM/DL
GLUCOSE BLD-MCNC: 99 MG/DL (ref 65–99)
HBA1C MFR BLD: 4.5 % (ref 4.8–5.6)
HCT VFR BLD AUTO: 35.7 % (ref 34–46.6)
HGB BLD-MCNC: 11.5 G/DL (ref 12–15.9)
IMM GRANULOCYTES # BLD AUTO: 0.01 10*3/MM3 (ref 0–0.05)
IMM GRANULOCYTES NFR BLD AUTO: 0.2 % (ref 0–0.5)
LYMPHOCYTES # BLD AUTO: 1.1 10*3/MM3 (ref 0.7–3.1)
LYMPHOCYTES NFR BLD AUTO: 18.3 % (ref 19.6–45.3)
MCH RBC QN AUTO: 30.7 PG (ref 26.6–33)
MCHC RBC AUTO-ENTMCNC: 32.2 G/DL (ref 31.5–35.7)
MCV RBC AUTO: 95.2 FL (ref 79–97)
MONOCYTES # BLD AUTO: 0.44 10*3/MM3 (ref 0.1–0.9)
MONOCYTES NFR BLD AUTO: 7.3 % (ref 5–12)
NEUTROPHILS # BLD AUTO: 4.25 10*3/MM3 (ref 1.7–7)
NEUTROPHILS NFR BLD AUTO: 70.8 % (ref 42.7–76)
NRBC BLD AUTO-RTO: 0 /100 WBC (ref 0–0.2)
PLATELET # BLD AUTO: 520 10*3/MM3 (ref 140–450)
PMV BLD AUTO: 9.6 FL (ref 6–12)
POTASSIUM BLD-SCNC: 4.1 MMOL/L (ref 3.5–5.2)
PROT SERPL-MCNC: 7.2 G/DL (ref 6–8.5)
RBC # BLD AUTO: 3.75 10*6/MM3 (ref 3.77–5.28)
RH BLD: POSITIVE
SODIUM BLD-SCNC: 140 MMOL/L (ref 136–145)
T&S EXPIRATION DATE: NORMAL
WBC NRBC COR # BLD: 6 10*3/MM3 (ref 3.4–10.8)

## 2020-04-02 PROCEDURE — 36415 COLL VENOUS BLD VENIPUNCTURE: CPT

## 2020-04-02 PROCEDURE — 83036 HEMOGLOBIN GLYCOSYLATED A1C: CPT | Performed by: OBSTETRICS & GYNECOLOGY

## 2020-04-02 PROCEDURE — 86901 BLOOD TYPING SEROLOGIC RH(D): CPT | Performed by: OBSTETRICS & GYNECOLOGY

## 2020-04-02 PROCEDURE — 86850 RBC ANTIBODY SCREEN: CPT | Performed by: OBSTETRICS & GYNECOLOGY

## 2020-04-02 PROCEDURE — 85025 COMPLETE CBC W/AUTO DIFF WBC: CPT | Performed by: OBSTETRICS & GYNECOLOGY

## 2020-04-02 PROCEDURE — 93010 ELECTROCARDIOGRAM REPORT: CPT | Performed by: INTERNAL MEDICINE

## 2020-04-02 PROCEDURE — 86900 BLOOD TYPING SEROLOGIC ABO: CPT | Performed by: OBSTETRICS & GYNECOLOGY

## 2020-04-02 PROCEDURE — 93005 ELECTROCARDIOGRAM TRACING: CPT

## 2020-04-02 PROCEDURE — 80053 COMPREHEN METABOLIC PANEL: CPT | Performed by: OBSTETRICS & GYNECOLOGY

## 2020-04-02 RX ORDER — NUT.TX.COMP. IMMUNE SYSTM,REG 0.06G-1/ML
1 LIQUID (ML) ORAL 3 TIMES DAILY
Status: ON HOLD | COMMUNITY
End: 2020-04-03

## 2020-04-02 RX ORDER — SODIUM CHLORIDE 0.9 % (FLUSH) 0.9 %
10 SYRINGE (ML) INJECTION EVERY 12 HOURS SCHEDULED
Status: CANCELLED | OUTPATIENT
Start: 2020-04-02

## 2020-04-02 RX ORDER — FAMOTIDINE 10 MG/ML
20 INJECTION, SOLUTION INTRAVENOUS ONCE
Status: CANCELLED | OUTPATIENT
Start: 2020-04-02 | End: 2020-04-02

## 2020-04-02 RX ORDER — SODIUM CHLORIDE 0.9 % (FLUSH) 0.9 %
10 SYRINGE (ML) INJECTION AS NEEDED
Status: CANCELLED | OUTPATIENT
Start: 2020-04-02

## 2020-04-02 NOTE — DISCHARGE INSTRUCTIONS
The following information and instructions were given:    Do not eat, drink, smoke or chew gum after midnight the night before surgery. This includes no mints.  Take all routine, prescribed medications including heart and blood pressure medicines with a sip of water unless otherwise instructed by your physician.   Do NOT take diabetic medication unless instructed by your physician.    DO NOT shave for two days before your procedure.  Do not wear makeup.      DO NOT wear fingernail polish (gel/regular) and/or acrylic/artificial nails on the day of surgery.   If you had a recent manicure and would rather not remove polish or artificial nails, the minimum requirement is that the polish/artificial nails must be removed from the middle finger on each hand.      If you are having surgery/procedure on an upper extremity, fingernail polish/artificial fingernails must be removed for surgery.  NO EXCEPTIONS.      If you are having surgery/procedure on a lower extremity, toenail polish on both extremities must be removed for surgery.  NO EXCEPTIONS.    Remove all jewelry (advise to go to jeweler if unable to remove).  Jewelry, especially rings, can no longer be taped for surgery.    Leave anything you consider valuable at home.    Leave your suitcase in the car until after your surgery.    Bring the following with you the day of your procedure (when applicable):       -Picture ID and insurance cards       -Co-pay/deductible required by insurance       -Medications in the original bottles (not a list) including all over-the-counter medications if not brought to PAT       -Copy of advance directive, living will or power of  documents if not brought to PAT       -CPAP or BIPAP mask and tubing (do not bring machine)       -Skin prep instruction(s) sheet       -PAT Pass    Education booklet, brochure, handout or binder related to procedure given to patient.  Education booklet also includes general information related to  their recovery that mentions signs and symptoms of infection and when to call the doctor.    When applicable, an ERAS handout/booklet was given to patient.    Pain Control After Surgery handout given to patient.    Respirex use (handout given to patient) and pneumonia prevention education provided.    Signs and Symptoms of infection discussed with patient in Pre Admission Testing.  Patient instructed to call their doctor if any of the following symptoms are noted during recovery:  Fever of 100.4 F or higher, incision that is warm or has increasing bleeding, redness or drainage.    DVT Prevention instructions given verbally during Pre Admission Testing appointment that stress the importance of ambulation to improve blood circulation.  Also encouraged patient to perform foot exercises when in bed and application of a sequential device may be applied to lower extremities to improve circulation.      Please apply Chlorhexidine wipes to surgical area (if instructed) the night before procedure and the AM of procedure and document date/time of applications on skin prep instruction sheet.

## 2020-04-02 NOTE — TELEPHONE ENCOUNTER
MARINA called pt sister, Amber, to check on pt.  She states that pt is doing well and is understandably nervous about her surgery tomorrow.  She states that pt was approved for Medicaid.  She is planning to help pt apply for Social Security disability.  MARINA emailed the SS adult started kit checklist to help her with all the necessary paperwork that is required.  SW available for ongoing support and resource needs.

## 2020-04-02 NOTE — PAT
Patient to apply Chlorhexadine wipes  to surgical area (as instructed) the night before procedure and the AM of procedure. Wipes provided.    Patient told by Dr Mcguire that she can drink Gatorade in the morning.     Called and spoke with Dr Stephen regarding history of heart murmur with no recent echocardiogram. Patient denies any chest pain, increased shortness breath, or dizziness.  Patient takes prophylactic antibiotics before dental work.  Patient does not see a cardiologist and does not have a primary physician either.  Patient thinks the heart murmur was detected around 2013 when she was diagnosed with ovarian cancer.   Dr Stephen came to PAT and examined patient including listening to heart tones and did not detect a murmur at this time. Thus no further testing needed and patient may proceed with surgery per Dr Stephen.

## 2020-04-03 ENCOUNTER — ANESTHESIA (OUTPATIENT)
Dept: PERIOP | Facility: HOSPITAL | Age: 55
End: 2020-04-03

## 2020-04-03 ENCOUNTER — HOSPITAL ENCOUNTER (INPATIENT)
Facility: HOSPITAL | Age: 55
LOS: 3 days | Discharge: HOME | End: 2020-04-06
Attending: OBSTETRICS & GYNECOLOGY | Admitting: OBSTETRICS & GYNECOLOGY

## 2020-04-03 DIAGNOSIS — C56.9 MALIGNANT NEOPLASM OF OVARY, UNSPECIFIED LATERALITY (HCC): Primary | ICD-10-CM

## 2020-04-03 DIAGNOSIS — R53.81 DEBILITY: ICD-10-CM

## 2020-04-03 DIAGNOSIS — R63.0 POOR APPETITE: ICD-10-CM

## 2020-04-03 DIAGNOSIS — R19.00 PELVIC MASS: ICD-10-CM

## 2020-04-03 DIAGNOSIS — D39.11 GRANULOSA CELL TUMOR OF OVARY, RIGHT: ICD-10-CM

## 2020-04-03 LAB
ABO GROUP BLD: NORMAL
RH BLD: POSITIVE

## 2020-04-03 PROCEDURE — 25010000002 NEOSTIGMINE 10 MG/10ML SOLUTION: Performed by: NURSE ANESTHETIST, CERTIFIED REGISTERED

## 2020-04-03 PROCEDURE — 25010000002 FENTANYL CITRATE (PF) 100 MCG/2ML SOLUTION: Performed by: NURSE ANESTHETIST, CERTIFIED REGISTERED

## 2020-04-03 PROCEDURE — 25010000002 HYDROMORPHONE PER 4 MG: Performed by: NURSE ANESTHETIST, CERTIFIED REGISTERED

## 2020-04-03 PROCEDURE — 58957: CPT | Performed by: OBSTETRICS & GYNECOLOGY

## 2020-04-03 PROCEDURE — 25010000002 DEXAMETHASONE PER 1 MG: Performed by: NURSE ANESTHETIST, CERTIFIED REGISTERED

## 2020-04-03 PROCEDURE — 0DBU0ZZ EXCISION OF OMENTUM, OPEN APPROACH: ICD-10-PCS | Performed by: OBSTETRICS & GYNECOLOGY

## 2020-04-03 PROCEDURE — 25010000002 DEXAMETHASONE SODIUM PHOSPHATE 10 MG/ML SOLUTION: Performed by: ANESTHESIOLOGY

## 2020-04-03 PROCEDURE — 86901 BLOOD TYPING SEROLOGIC RH(D): CPT

## 2020-04-03 PROCEDURE — 0DBN0ZZ EXCISION OF SIGMOID COLON, OPEN APPROACH: ICD-10-PCS | Performed by: OBSTETRICS & GYNECOLOGY

## 2020-04-03 PROCEDURE — 0DBB0ZZ EXCISION OF ILEUM, OPEN APPROACH: ICD-10-PCS | Performed by: OBSTETRICS & GYNECOLOGY

## 2020-04-03 PROCEDURE — 44145 PARTIAL REMOVAL OF COLON: CPT | Performed by: OBSTETRICS & GYNECOLOGY

## 2020-04-03 PROCEDURE — 86900 BLOOD TYPING SEROLOGIC ABO: CPT

## 2020-04-03 PROCEDURE — 25010000002 BUPRENORPHINE PER 0.1 MG: Performed by: ANESTHESIOLOGY

## 2020-04-03 PROCEDURE — 0DBW0ZZ EXCISION OF PERITONEUM, OPEN APPROACH: ICD-10-PCS | Performed by: OBSTETRICS & GYNECOLOGY

## 2020-04-03 PROCEDURE — 25010000002 HYDROMORPHONE PER 4 MG: Performed by: OBSTETRICS & GYNECOLOGY

## 2020-04-03 PROCEDURE — 88309 TISSUE EXAM BY PATHOLOGIST: CPT | Performed by: OBSTETRICS & GYNECOLOGY

## 2020-04-03 PROCEDURE — 25010000002 ERTAPENEM 1 GM/100ML SOLUTION

## 2020-04-03 PROCEDURE — 88305 TISSUE EXAM BY PATHOLOGIST: CPT | Performed by: OBSTETRICS & GYNECOLOGY

## 2020-04-03 PROCEDURE — 25010000002 PROPOFOL 10 MG/ML EMULSION: Performed by: NURSE ANESTHETIST, CERTIFIED REGISTERED

## 2020-04-03 PROCEDURE — C1758 CATHETER, URETERAL: HCPCS | Performed by: OBSTETRICS & GYNECOLOGY

## 2020-04-03 PROCEDURE — 25010000002 ONDANSETRON PER 1 MG: Performed by: NURSE ANESTHETIST, CERTIFIED REGISTERED

## 2020-04-03 PROCEDURE — 44140 PARTIAL REMOVAL OF COLON: CPT | Performed by: OBSTETRICS & GYNECOLOGY

## 2020-04-03 DEVICE — CONTOUR CURVED CUTTER STAPLER RELOAD
Type: IMPLANTABLE DEVICE | Site: SIGMOID COLON | Status: FUNCTIONAL
Brand: CONTOUR

## 2020-04-03 DEVICE — CONTOUR CURVED CUTTER STAPLER
Type: IMPLANTABLE DEVICE | Site: ABDOMEN | Status: FUNCTIONAL
Brand: CONTOUR

## 2020-04-03 DEVICE — PROXIMATE RELOADABLE LINEAR CUTTER WITH SAFETY LOCK-OUT.  75MM LINEAR CUTTER.
Type: IMPLANTABLE DEVICE | Site: ABDOMEN | Status: FUNCTIONAL
Brand: PROXIMATE

## 2020-04-03 DEVICE — PROXIMATE LINEAR CUTTER RELOAD, BLUE, 75MM
Type: IMPLANTABLE DEVICE | Site: ABDOMEN | Status: FUNCTIONAL
Brand: PROXIMATE

## 2020-04-03 DEVICE — ENDOSCOPIC CURVED INTRALUMINAL STAPLER (ILS) 20 TITANIUM ADJUSTABLE HEIGHT STAPLES: Type: IMPLANTABLE DEVICE | Site: SIGMOID COLON | Status: FUNCTIONAL

## 2020-04-03 RX ORDER — PROMETHAZINE HYDROCHLORIDE 25 MG/1
25 TABLET ORAL ONCE AS NEEDED
Status: DISCONTINUED | OUTPATIENT
Start: 2020-04-03 | End: 2020-04-03

## 2020-04-03 RX ORDER — OXYCODONE HYDROCHLORIDE 5 MG/1
5 TABLET ORAL EVERY 4 HOURS PRN
Status: DISCONTINUED | OUTPATIENT
Start: 2020-04-03 | End: 2020-04-06 | Stop reason: HOSPADM

## 2020-04-03 RX ORDER — IPRATROPIUM BROMIDE AND ALBUTEROL SULFATE 2.5; .5 MG/3ML; MG/3ML
3 SOLUTION RESPIRATORY (INHALATION) ONCE AS NEEDED
Status: DISCONTINUED | OUTPATIENT
Start: 2020-04-03 | End: 2020-04-03

## 2020-04-03 RX ORDER — PROMETHAZINE HYDROCHLORIDE 25 MG/1
25 SUPPOSITORY RECTAL ONCE AS NEEDED
Status: DISCONTINUED | OUTPATIENT
Start: 2020-04-03 | End: 2020-04-03

## 2020-04-03 RX ORDER — NEOSTIGMINE METHYLSULFATE 1 MG/ML
INJECTION, SOLUTION INTRAVENOUS AS NEEDED
Status: DISCONTINUED | OUTPATIENT
Start: 2020-04-03 | End: 2020-04-03 | Stop reason: SURG

## 2020-04-03 RX ORDER — DEXAMETHASONE SODIUM PHOSPHATE 4 MG/ML
INJECTION, SOLUTION INTRA-ARTICULAR; INTRALESIONAL; INTRAMUSCULAR; INTRAVENOUS; SOFT TISSUE AS NEEDED
Status: DISCONTINUED | OUTPATIENT
Start: 2020-04-03 | End: 2020-04-03 | Stop reason: SURG

## 2020-04-03 RX ORDER — MEPERIDINE HYDROCHLORIDE 25 MG/ML
12.5 INJECTION INTRAMUSCULAR; INTRAVENOUS; SUBCUTANEOUS
Status: DISCONTINUED | OUTPATIENT
Start: 2020-04-03 | End: 2020-04-03

## 2020-04-03 RX ORDER — PROPOFOL 10 MG/ML
VIAL (ML) INTRAVENOUS AS NEEDED
Status: DISCONTINUED | OUTPATIENT
Start: 2020-04-03 | End: 2020-04-03 | Stop reason: SURG

## 2020-04-03 RX ORDER — SODIUM CHLORIDE 0.9 % (FLUSH) 0.9 %
3 SYRINGE (ML) INJECTION EVERY 12 HOURS SCHEDULED
Status: DISCONTINUED | OUTPATIENT
Start: 2020-04-03 | End: 2020-04-03

## 2020-04-03 RX ORDER — GLYCOPYRROLATE 0.2 MG/ML
INJECTION INTRAMUSCULAR; INTRAVENOUS AS NEEDED
Status: DISCONTINUED | OUTPATIENT
Start: 2020-04-03 | End: 2020-04-03 | Stop reason: SURG

## 2020-04-03 RX ORDER — HYDROCODONE BITARTRATE AND ACETAMINOPHEN 5; 325 MG/1; MG/1
1 TABLET ORAL ONCE AS NEEDED
Status: DISCONTINUED | OUTPATIENT
Start: 2020-04-03 | End: 2020-04-03

## 2020-04-03 RX ORDER — LIDOCAINE HYDROCHLORIDE 10 MG/ML
INJECTION, SOLUTION EPIDURAL; INFILTRATION; INTRACAUDAL; PERINEURAL AS NEEDED
Status: DISCONTINUED | OUTPATIENT
Start: 2020-04-03 | End: 2020-04-03 | Stop reason: SURG

## 2020-04-03 RX ORDER — TEMAZEPAM 7.5 MG/1
7.5 CAPSULE ORAL NIGHTLY PRN
Status: DISCONTINUED | OUTPATIENT
Start: 2020-04-03 | End: 2020-04-06 | Stop reason: HOSPADM

## 2020-04-03 RX ORDER — PROMETHAZINE HYDROCHLORIDE 12.5 MG/1
12.5 SUPPOSITORY RECTAL EVERY 6 HOURS PRN
Status: DISCONTINUED | OUTPATIENT
Start: 2020-04-03 | End: 2020-04-06 | Stop reason: HOSPADM

## 2020-04-03 RX ORDER — BUPIVACAINE HYDROCHLORIDE 2.5 MG/ML
INJECTION, SOLUTION EPIDURAL; INFILTRATION; INTRACAUDAL
Status: COMPLETED | OUTPATIENT
Start: 2020-04-03 | End: 2020-04-03

## 2020-04-03 RX ORDER — MAGNESIUM HYDROXIDE 1200 MG/15ML
LIQUID ORAL AS NEEDED
Status: DISCONTINUED | OUTPATIENT
Start: 2020-04-03 | End: 2020-04-03 | Stop reason: HOSPADM

## 2020-04-03 RX ORDER — FENTANYL CITRATE 50 UG/ML
INJECTION, SOLUTION INTRAMUSCULAR; INTRAVENOUS AS NEEDED
Status: DISCONTINUED | OUTPATIENT
Start: 2020-04-03 | End: 2020-04-03 | Stop reason: SURG

## 2020-04-03 RX ORDER — FENTANYL CITRATE 50 UG/ML
50 INJECTION, SOLUTION INTRAMUSCULAR; INTRAVENOUS
Status: DISCONTINUED | OUTPATIENT
Start: 2020-04-03 | End: 2020-04-03

## 2020-04-03 RX ORDER — SIMETHICONE 80 MG
80 TABLET,CHEWABLE ORAL 4 TIMES DAILY PRN
Status: DISCONTINUED | OUTPATIENT
Start: 2020-04-03 | End: 2020-04-06 | Stop reason: HOSPADM

## 2020-04-03 RX ORDER — OXYCODONE HYDROCHLORIDE 5 MG/1
10 TABLET ORAL EVERY 4 HOURS PRN
Status: DISCONTINUED | OUTPATIENT
Start: 2020-04-03 | End: 2020-04-06 | Stop reason: HOSPADM

## 2020-04-03 RX ORDER — HYDROMORPHONE HCL 110MG/55ML
0.5 PATIENT CONTROLLED ANALGESIA SYRINGE INTRAVENOUS
Status: DISCONTINUED | OUTPATIENT
Start: 2020-04-03 | End: 2020-04-06 | Stop reason: HOSPADM

## 2020-04-03 RX ORDER — DEXAMETHASONE SODIUM PHOSPHATE 10 MG/ML
INJECTION, SOLUTION INTRAMUSCULAR; INTRAVENOUS
Status: COMPLETED | OUTPATIENT
Start: 2020-04-03 | End: 2020-04-03

## 2020-04-03 RX ORDER — LIDOCAINE HYDROCHLORIDE 10 MG/ML
0.5 INJECTION, SOLUTION EPIDURAL; INFILTRATION; INTRACAUDAL; PERINEURAL ONCE AS NEEDED
Status: COMPLETED | OUTPATIENT
Start: 2020-04-03 | End: 2020-04-03

## 2020-04-03 RX ORDER — PROMETHAZINE HYDROCHLORIDE 25 MG/ML
6.25 INJECTION, SOLUTION INTRAMUSCULAR; INTRAVENOUS ONCE AS NEEDED
Status: DISCONTINUED | OUTPATIENT
Start: 2020-04-03 | End: 2020-04-03

## 2020-04-03 RX ORDER — NALOXONE HCL 0.4 MG/ML
0.4 VIAL (ML) INJECTION
Status: DISCONTINUED | OUTPATIENT
Start: 2020-04-03 | End: 2020-04-06 | Stop reason: HOSPADM

## 2020-04-03 RX ORDER — ONDANSETRON 4 MG/1
4 TABLET, FILM COATED ORAL EVERY 6 HOURS PRN
Status: DISCONTINUED | OUTPATIENT
Start: 2020-04-03 | End: 2020-04-06 | Stop reason: HOSPADM

## 2020-04-03 RX ORDER — FAMOTIDINE 20 MG/1
20 TABLET, FILM COATED ORAL ONCE
Status: COMPLETED | OUTPATIENT
Start: 2020-04-03 | End: 2020-04-03

## 2020-04-03 RX ORDER — BUPRENORPHINE HYDROCHLORIDE 0.32 MG/ML
INJECTION INTRAMUSCULAR; INTRAVENOUS
Status: COMPLETED | OUTPATIENT
Start: 2020-04-03 | End: 2020-04-03

## 2020-04-03 RX ORDER — HYDRALAZINE HYDROCHLORIDE 20 MG/ML
5 INJECTION INTRAMUSCULAR; INTRAVENOUS
Status: DISCONTINUED | OUTPATIENT
Start: 2020-04-03 | End: 2020-04-03

## 2020-04-03 RX ORDER — PROMETHAZINE HYDROCHLORIDE 12.5 MG/1
12.5 TABLET ORAL EVERY 6 HOURS PRN
Status: DISCONTINUED | OUTPATIENT
Start: 2020-04-03 | End: 2020-04-06 | Stop reason: HOSPADM

## 2020-04-03 RX ORDER — IBUPROFEN 600 MG/1
600 TABLET ORAL EVERY 6 HOURS PRN
Status: DISCONTINUED | OUTPATIENT
Start: 2020-04-03 | End: 2020-04-06 | Stop reason: HOSPADM

## 2020-04-03 RX ORDER — ROCURONIUM BROMIDE 10 MG/ML
INJECTION, SOLUTION INTRAVENOUS AS NEEDED
Status: DISCONTINUED | OUTPATIENT
Start: 2020-04-03 | End: 2020-04-03 | Stop reason: SURG

## 2020-04-03 RX ORDER — CELECOXIB 200 MG/1
200 CAPSULE ORAL
Status: COMPLETED | OUTPATIENT
Start: 2020-04-03 | End: 2020-04-03

## 2020-04-03 RX ORDER — PROMETHAZINE HYDROCHLORIDE 25 MG/ML
12.5 INJECTION, SOLUTION INTRAMUSCULAR; INTRAVENOUS EVERY 6 HOURS PRN
Status: DISCONTINUED | OUTPATIENT
Start: 2020-04-03 | End: 2020-04-06 | Stop reason: HOSPADM

## 2020-04-03 RX ORDER — ACETAMINOPHEN 325 MG/1
650 TABLET ORAL EVERY 6 HOURS SCHEDULED
Status: DISCONTINUED | OUTPATIENT
Start: 2020-04-03 | End: 2020-04-06 | Stop reason: HOSPADM

## 2020-04-03 RX ORDER — ACETAMINOPHEN 325 MG/1
650 TABLET ORAL
Status: COMPLETED | OUTPATIENT
Start: 2020-04-03 | End: 2020-04-03

## 2020-04-03 RX ORDER — ONDANSETRON 2 MG/ML
4 INJECTION INTRAMUSCULAR; INTRAVENOUS EVERY 6 HOURS PRN
Status: DISCONTINUED | OUTPATIENT
Start: 2020-04-03 | End: 2020-04-06 | Stop reason: HOSPADM

## 2020-04-03 RX ORDER — LABETALOL HYDROCHLORIDE 5 MG/ML
5 INJECTION, SOLUTION INTRAVENOUS
Status: DISCONTINUED | OUTPATIENT
Start: 2020-04-03 | End: 2020-04-03

## 2020-04-03 RX ORDER — SODIUM CHLORIDE, SODIUM LACTATE, POTASSIUM CHLORIDE, CALCIUM CHLORIDE 600; 310; 30; 20 MG/100ML; MG/100ML; MG/100ML; MG/100ML
75 INJECTION, SOLUTION INTRAVENOUS CONTINUOUS
Status: DISCONTINUED | OUTPATIENT
Start: 2020-04-03 | End: 2020-04-06 | Stop reason: HOSPADM

## 2020-04-03 RX ORDER — HYDROMORPHONE HYDROCHLORIDE 1 MG/ML
0.5 INJECTION, SOLUTION INTRAMUSCULAR; INTRAVENOUS; SUBCUTANEOUS
Status: DISCONTINUED | OUTPATIENT
Start: 2020-04-03 | End: 2020-04-03

## 2020-04-03 RX ORDER — NALOXONE HCL 0.4 MG/ML
0.1 VIAL (ML) INJECTION
Status: DISCONTINUED | OUTPATIENT
Start: 2020-04-03 | End: 2020-04-06 | Stop reason: HOSPADM

## 2020-04-03 RX ORDER — SODIUM CHLORIDE 0.9 % (FLUSH) 0.9 %
3-10 SYRINGE (ML) INJECTION AS NEEDED
Status: DISCONTINUED | OUTPATIENT
Start: 2020-04-03 | End: 2020-04-03

## 2020-04-03 RX ORDER — ONDANSETRON 2 MG/ML
4 INJECTION INTRAMUSCULAR; INTRAVENOUS ONCE AS NEEDED
Status: COMPLETED | OUTPATIENT
Start: 2020-04-03 | End: 2020-04-03

## 2020-04-03 RX ORDER — FAMOTIDINE 20 MG/1
20 TABLET, FILM COATED ORAL
Status: DISCONTINUED | OUTPATIENT
Start: 2020-04-03 | End: 2020-04-06 | Stop reason: HOSPADM

## 2020-04-03 RX ORDER — NALOXONE HCL 0.4 MG/ML
0.4 VIAL (ML) INJECTION AS NEEDED
Status: DISCONTINUED | OUTPATIENT
Start: 2020-04-03 | End: 2020-04-03

## 2020-04-03 RX ORDER — SODIUM CHLORIDE, SODIUM LACTATE, POTASSIUM CHLORIDE, CALCIUM CHLORIDE 600; 310; 30; 20 MG/100ML; MG/100ML; MG/100ML; MG/100ML
9 INJECTION, SOLUTION INTRAVENOUS CONTINUOUS
Status: DISCONTINUED | OUTPATIENT
Start: 2020-04-03 | End: 2020-04-03

## 2020-04-03 RX ADMIN — FENTANYL CITRATE 50 MCG: 50 INJECTION INTRAMUSCULAR; INTRAVENOUS at 15:23

## 2020-04-03 RX ADMIN — ACETAMINOPHEN 650 MG: 325 TABLET, FILM COATED ORAL at 23:55

## 2020-04-03 RX ADMIN — DEXAMETHASONE SODIUM PHOSPHATE 4 MG: 4 INJECTION, SOLUTION INTRAMUSCULAR; INTRAVENOUS at 11:50

## 2020-04-03 RX ADMIN — ROCURONIUM BROMIDE 50 MG: 10 INJECTION INTRAVENOUS at 11:40

## 2020-04-03 RX ADMIN — SODIUM CHLORIDE, POTASSIUM CHLORIDE, SODIUM LACTATE AND CALCIUM CHLORIDE 9 ML/HR: 600; 310; 30; 20 INJECTION, SOLUTION INTRAVENOUS at 09:54

## 2020-04-03 RX ADMIN — HYDROMORPHONE HYDROCHLORIDE 0.5 MG: 1 INJECTION, SOLUTION INTRAMUSCULAR; INTRAVENOUS; SUBCUTANEOUS at 15:34

## 2020-04-03 RX ADMIN — ONDANSETRON 4 MG: 2 INJECTION INTRAMUSCULAR; INTRAVENOUS at 14:55

## 2020-04-03 RX ADMIN — GLYCOPYRROLATE 0.3 MG: 0.2 INJECTION INTRAMUSCULAR; INTRAVENOUS at 14:55

## 2020-04-03 RX ADMIN — FAMOTIDINE 20 MG: 20 TABLET ORAL at 17:06

## 2020-04-03 RX ADMIN — OXYCODONE HYDROCHLORIDE 10 MG: 5 TABLET ORAL at 18:36

## 2020-04-03 RX ADMIN — LIDOCAINE HYDROCHLORIDE 0.2 ML: 10 INJECTION, SOLUTION EPIDURAL; INFILTRATION; INTRACAUDAL; PERINEURAL at 09:55

## 2020-04-03 RX ADMIN — BUPIVACAINE HYDROCHLORIDE 60 ML: 2.5 INJECTION, SOLUTION EPIDURAL; INFILTRATION; INTRACAUDAL; PERINEURAL at 11:42

## 2020-04-03 RX ADMIN — HYDROMORPHONE HYDROCHLORIDE 0.5 MG: 2 INJECTION, SOLUTION INTRAMUSCULAR; INTRAVENOUS; SUBCUTANEOUS at 17:06

## 2020-04-03 RX ADMIN — DEXAMETHASONE SODIUM PHOSPHATE 4 MG: 10 INJECTION INTRAMUSCULAR; INTRAVENOUS at 11:42

## 2020-04-03 RX ADMIN — SODIUM CHLORIDE, POTASSIUM CHLORIDE, SODIUM LACTATE AND CALCIUM CHLORIDE 75 ML/HR: 600; 310; 30; 20 INJECTION, SOLUTION INTRAVENOUS at 16:48

## 2020-04-03 RX ADMIN — BUPRENORPHINE HYDROCHLORIDE 0.3 MG: 0.32 INJECTION INTRAMUSCULAR; INTRAVENOUS at 11:42

## 2020-04-03 RX ADMIN — CELECOXIB 200 MG: 200 CAPSULE ORAL at 09:51

## 2020-04-03 RX ADMIN — HYDROMORPHONE HYDROCHLORIDE 0.5 MG: 1 INJECTION, SOLUTION INTRAMUSCULAR; INTRAVENOUS; SUBCUTANEOUS at 15:46

## 2020-04-03 RX ADMIN — NEOSTIGMINE 3 MG: 1 INJECTION INTRAVENOUS at 14:55

## 2020-04-03 RX ADMIN — ERTAPENEM SODIUM 1 G: 1 INJECTION, POWDER, LYOPHILIZED, FOR SOLUTION INTRAMUSCULAR; INTRAVENOUS at 11:34

## 2020-04-03 RX ADMIN — PROPOFOL 200 MG: 10 INJECTION, EMULSION INTRAVENOUS at 11:40

## 2020-04-03 RX ADMIN — SODIUM CHLORIDE, POTASSIUM CHLORIDE, SODIUM LACTATE AND CALCIUM CHLORIDE 75 ML/HR: 600; 310; 30; 20 INJECTION, SOLUTION INTRAVENOUS at 18:32

## 2020-04-03 RX ADMIN — ACETAMINOPHEN 650 MG: 325 TABLET ORAL at 09:51

## 2020-04-03 RX ADMIN — FAMOTIDINE 20 MG: 20 TABLET ORAL at 09:51

## 2020-04-03 RX ADMIN — ACETAMINOPHEN 650 MG: 325 TABLET, FILM COATED ORAL at 17:05

## 2020-04-03 RX ADMIN — IBUPROFEN 600 MG: 600 TABLET, FILM COATED ORAL at 19:00

## 2020-04-03 RX ADMIN — LIDOCAINE HYDROCHLORIDE 50 MG: 10 INJECTION, SOLUTION EPIDURAL; INFILTRATION; INTRACAUDAL; PERINEURAL at 11:40

## 2020-04-03 RX ADMIN — OXYCODONE HYDROCHLORIDE 10 MG: 5 TABLET ORAL at 23:55

## 2020-04-03 RX ADMIN — HYDROMORPHONE HYDROCHLORIDE 0.5 MG: 2 INJECTION, SOLUTION INTRAMUSCULAR; INTRAVENOUS; SUBCUTANEOUS at 22:26

## 2020-04-03 RX ADMIN — HYDROMORPHONE HYDROCHLORIDE 0.5 MG: 1 INJECTION, SOLUTION INTRAMUSCULAR; INTRAVENOUS; SUBCUTANEOUS at 15:58

## 2020-04-03 RX ADMIN — FENTANYL CITRATE 50 MCG: 50 INJECTION INTRAMUSCULAR; INTRAVENOUS at 15:28

## 2020-04-03 RX ADMIN — FENTANYL CITRATE 50 MCG: 50 INJECTION, SOLUTION INTRAMUSCULAR; INTRAVENOUS at 11:38

## 2020-04-03 RX ADMIN — FENTANYL CITRATE 50 MCG: 50 INJECTION, SOLUTION INTRAMUSCULAR; INTRAVENOUS at 13:49

## 2020-04-03 NOTE — ANESTHESIA POSTPROCEDURE EVALUATION
Patient: Roxie Carter    Procedure Summary     Date:  04/03/20 Room / Location:   [facility]    Anesthesia Start:  1134 Anesthesia Stop:  1507    Procedure:  LAPAROTOMY EXPLORATORY,  OPTIMAL DEBULKING, R=O , CYSTOSCOPY WITH URETERAL CATHTER INSERTION AND REMOVAL,ILEO RESECTION, SIDE TO SIDE ANASTOMOSIS, RECTAL SIGMOID RESECTION, LEFT URETERA LYSIS, AND PERITONEAL STRIPPING (N/A Abdomen) Diagnosis:       Malignant neoplasm of ovary, unspecified laterality (CMS/HCC)      (Malignant neoplasm of ovary, unspecified laterality (CMS/HCC) [C56.9])    Surgeon:  Jamee Mcguire MD Provider:  Jagjit Novoa Jr., MD    Anesthesia Type:  general ASA Status:  2          Anesthesia Type: general    Vitals  Vitals Value Taken Time   /94 4/3/2020  3:05 PM   Temp     Pulse 75 4/3/2020  3:06 PM   Resp     SpO2 100 % 4/3/2020  3:06 PM   Vitals shown include unvalidated device data.        Post Anesthesia Care and Evaluation    Patient location during evaluation: PACU  Patient participation: complete - patient participated  Level of consciousness: awake and alert  Pain score: 0  Pain management: adequate  Airway patency: patent  Anesthetic complications: No anesthetic complications  PONV Status: none  Cardiovascular status: hemodynamically stable and acceptable  Respiratory status: nonlabored ventilation, acceptable and nasal cannula  Hydration status: acceptable

## 2020-04-03 NOTE — ANESTHESIA PROCEDURE NOTES
Peripheral Block      Patient reassessed immediately prior to procedure    Start time: 4/3/2020 11:41 AM  Performed by  CRNA: Theron Lomas CRNA  Preanesthetic Checklist  Completed: patient identified, site marked, surgical consent, pre-op evaluation, timeout performed, IV checked, risks and benefits discussed and monitors and equipment checked  Prep:  Sterile barriers:cap, gloves and sterile barriers  Prep: ChloraPrep  Patient monitoring: blood pressure monitoring, continuous pulse oximetry and EKG  Procedure  Sedation:no  Performed under: general  Guidance:ultrasound guided  Images:still images not obtained    Laterality:Bilateral  Block Type:TAP  Injection Technique:single-shot  Needle Type:echogenic  Needle Gauge:20 G  Resistance on Injection: none    Medications Used: buprenorphine (BUPRENEX) injection, 0.3 mg  dexamethasone sodium phosphate injection, 4 mg  bupivacaine PF (MARCAINE) 0.25 % injection, 60 mL  Med admintered at 4/3/2020 11:42 AM      Post Assessment  Injection Assessment: negative aspiration for heme, no paresthesia on injection and incremental injection  Patient Tolerance:comfortable throughout block  Complications:no

## 2020-04-03 NOTE — ANESTHESIA PREPROCEDURE EVALUATION
Anesthesia Evaluation     Patient summary reviewed and Nursing notes reviewed   NPO Solid Status: > 8 hours  NPO Liquid Status: > 2 hours           Airway   Mallampati: I  TM distance: >3 FB  Neck ROM: full  No difficulty expected  Dental - normal exam     Pulmonary - normal exam    breath sounds clear to auscultation  (+) a smoker Former,   (-) asthma, sleep apnea  Cardiovascular - normal exam  Exercise tolerance: good (4-7 METS)    ECG reviewed  Rhythm: regular  Rate: normal    (-) hypertension, dysrhythmias, angina, CHF, orthopnea, WALLER, murmur, cardiac stents, hyperlipidemia      Neuro/Psych- negative ROS  (-) seizures, CVA  GI/Hepatic/Renal/Endo    (-) liver disease, no renal disease, diabetes    ROS Comment: Malignant ascites    Musculoskeletal     Abdominal    Substance History      OB/GYN      Comment: Pelvic mass. H/o ovarian cancer with evidence of recurrance.       Other   arthritis,    history of cancer active                    Anesthesia Plan    ASA 2     general       Anesthetic plan, all risks, benefits, and alternatives have been provided, discussed and informed consent has been obtained with: patient.    Plan discussed with CRNA.

## 2020-04-03 NOTE — PLAN OF CARE
Post op vss, cath in place draining tea color urine, pain managed with prn med. Abd binder on, staples intact.

## 2020-04-03 NOTE — ANESTHESIA PROCEDURE NOTES
Airway  Urgency: elective    Date/Time: 4/3/2020 11:42 AM  Airway not difficult    General Information and Staff    Patient location during procedure: OR  CRNA: Sha Cain CRNA    Indications and Patient Condition  Indications for airway management: airway protection    Preoxygenated: yes  MILS not maintained throughout  Mask difficulty assessment: 1 - vent by mask    Final Airway Details  Final airway type: endotracheal airway      Successful airway: ETT  Cuffed: yes   Successful intubation technique: video laryngoscopy  Facilitating devices/methods: intubating stylet  Blade: Robertson  ETT size (mm): 7.0  Cormack-Lehane Classification: grade I - full view of glottis  Placement verified by: chest auscultation and capnometry   Inital cuff pressure (cm H2O): 19  Measured from: lips  Number of attempts at approach: 1  Assessment: lips, teeth, and gum same as pre-op and atraumatic intubation

## 2020-04-03 NOTE — INTERVAL H&P NOTE
H&P reviewed. The patient was examined and there are no changes to the H&P.   ROS:  No fever, chills.  No presyncope/syncope.  +baseline SOA.  No CP, palps  T 98.6 HR 82 O2 99% /85    CV:  S1S2 reg no murmur appreciated  Resp:  CTAB, unlabored    CASEY Real    I saw and evaluated the patient. I agree with the findings and the plan of care as documented in the note.    Jamee Mcguire MD  04/03/20  11:02 AM

## 2020-04-03 NOTE — OP NOTE
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  Bilateral ureteral orifices were unremarkable.  There was tumor throughout the pelvis.  No ascites was encountered.  No nodularity was appreciated at the omentum.  This was adhered to the bladder peritoneum, vaginal apex, portion of ileum, and portion of rectosigmoid colon.  Remainder of the small and large bowel were free from tumor.  Liver, spleen, and stomach were free from palpable tumor.  At the completion of the procedure, no visible tumor remained.     Specimens removed: ID Type Source Tests Collected by Time   A (Not marked as sent) : left pelvic brim Tissue Pelvis TISSUE PATHOLOGY EXAM Jamee Mcguire MD 4/3/2020 1242   B (Not marked as sent) : PELVIC TUMOR WITH OF RECTO SIGMOID, AND PORTION OF TERMINAL ILEUM Tissue Pelvis TISSUE PATHOLOGY EXAM Jamee Mcguire MD 4/3/2020 1314   C (Not marked as sent) : PELVIC PERITONEUM  Tissue Pelvis TISSUE PATHOLOGY EXAM Jamee Mcguire MD 4/3/2020 1346   D (Not marked as sent) : ADDITIONAL RECTUM, STITCH AT PROXIMAL MARGIN Tissue Large Intestine, Rectum TISSUE  PATHOLOGY EXAM Jamee Mcguire MD 4/3/2020 1160      Fluid Intake and Output: I/O this shift:  In: 250 [I.V.:250]  Out: 950 [Urine:350; Blood:600]   Blood products used: No   Drains: Closed/Suction Drain Left LLQ Bulb 15 Fr. (Active)       Urethral Catheter Silicone 16 Fr. (Active)      Implant Information: Implant Name Type Inv. Item Serial No.  Lot No. LRB No. Used   STPLR LNR CUT PROX THK 75MM GRN TCT75 - [id number] Implant STPLR LNR CUT PROX THK 75MM GRN TCT75  [employer]  DIV OF J AND J [id number] N/A 1   RELOAD STPLR LNR CUT PROX 75MM LEAH TCR75 - [id number] Implant RELOAD STPLR LNR CUT PROX 75MM LEAH TCR75  [employer]  DIV OF J AND J [id number] N/A 4   STPLR CRV CUT CONTRD 2MM CS40G - [id number] Implant STPLR CRV CUT CONTRD 2MM CS40G  [employer]  DIV OF J AND J [id number] N/A 1   RELOAD STPLR THK CONTRD GRN CR40G - [id number] Implant RELOAD STPLR THK CONTRD GRN CR40G  [employer]  DIV OF J AND J [id number] N/A 1   STPLR ENDO CRV 25MM [id number] - [id number] Implant STPLR ENDO CRV 25MM [id number]  [employer]  DIV OF J AND J [id number] N/A 1      Complications:  No immediate   Intraoperative consult(s):    Condition: stable   Disposition: to PACU and then admit to  medical - surgical floor       Indications:  Patient is a pleasant 54-year-old woman with a history of granulosa cell tumor.  She was noted to have a pelvic mass and findings consistent with cancer recurrence.  Risks and benefits were discussed.  Consent was signed and on chart.    Procedure: After obtaining informed consent, patient was taken to the operating room and underwent general endotracheal anesthesia after patient and site verification.  Regional anesthesia block was performed by anesthesia team.  Feet were placed in Víctor stirrups.  Abdomen, perineum, and vagina were prepped and draped in the usual sterile fashion.   Cystoscopy was performed with the above findings.  Bilateral 5 Filipino whistle-tip  catheters were placed without difficulty under direct visualization.  Boateng catheter was anchored in the whistle-tip catheters were attached to sterile glove.  At the completion of the surgery, the ureteral catheters were removed.  Vertical midline incision was made and the abdomen was entered without difficulty.  The above findings were noted.  Bookwalter self-retaining retractor was placed.  Small bowel was adhered to the anterior pelvis.  Small bowel was serially inspected and this was a isolated area.  Therefore, right angle clamps were used to separate the mesentery from the terminal ileum approximately 20 cm away from the cecum.  This area was transected with a AYANNA stapler with a blue load and a subsequent area was transected in order to divide tumor from normal-appearing viscera.  LigaSure impact was used to divide the mesentery and the specimen was left intact with the tumor in the pelvis.  Colon was inspected and portion of the distal sigmoid colon and rectum were noted to be involved with tumor.  AYANNA stapler with a green load was used to transect the colon proximal to the tumor.  Blunt dissection and dissection with Bovie electrocautery was used to mobilize the tumor from the pelvis.  LigaSure impact was used to divide the colonic mesentery until the distal colon was isolated to the extent it could be transected using a contour stapler with a green load.  Mass was removed and sent for permanent pathology.  Peritoneal stripping was performed throughout the pelvis.  In order to facilitate this, left ureteral lysis was performed.  Dissection was facilitated with Bovie electrocautery and, when appropriate, LigaSure.  Sizer was placed in the vagina in order to peel tumor off the vaginal apex.  Vaginal apex was not entered.  Eventually, tumor was completely freed from the most distal portion of the rectum and the entire pelvis.  This was handed off the field for permanent pathology.  Portion of the distal rectum  was inspected and serosa remained.  There is concerned that this represented some residual tumor.  Therefore, contour stapler was again used with a green load to transect the small portion of the rectum.  This was marked with a suture at the proximal staple line was sent for permanent pathology.  Additional hemostasis was achieved at the deep pelvis using 2-0 Vicryl suture in a figure-of-eight fashion at the vaginal apex and a separate 2-0 Vicryl suture in a figure-of-eight fashion x2 at the left lateral perirectal tissue.  Bovie electrocautery was used to achieve further hemostasis.  Shoestring was placed on the colon which was noted to have adequate length and be well mobilized.  This descending portion of colon was noted to be viable.  Staple line was elevated with Allis clamps.  Suturing clamp was placed and a Ammon needle with 2-0 silk was passed through the suturing clamp.  Sizers were called for and a 25 mm EEA stapler was called for.  Anvil was secured.  Surgeon passed the EEA stapler through the anus and stapler was fired without difficulty.  Bubble test was performed and no leakage of air was identified.  Stapler was taken to a separate table and 2 concentric rings of tissue were identified.  Bowel clamp was removed.  Attention was turned to the ileum.  Shoestring clamps were again placed and subsequently removed.  Staple lines were elevated with Allis clamps and side-to-side anastomosis was performed using a 75 mm AYANNA stapler with a blue load.  Open portion of the viscera was elevated and a separate 75 mm stapler was used to close the defect.  Mesentery was reapproximated using 2-0 Vicryl suture in a simple running stitch at the small bowel.  Surgical field was copiously irrigated and aspirated.  Good hemostasis was noted throughout.  All instruments, retractors, laparotomy sponges were removed from the abdominal cavity.  Surgeon and assistant regowned and gloved and a closure tray was used to close the  abdominal cavity.  15 round Jose-Blum drain was placed at the right lower quadrant with its tail terminating deep in the pelvis.  This was secured to the skin using 2-0 silk in the usual fashion.  Fascia was closed with #1 looped PDS in a bulk closure.  All suprafascial tissue was irrigated, aspirated, and made hemostatic.  Deep dermis was reapproximated with 2-0 Vicryl in a simple running stitch.  Skin was closed with absorbable stapler and glue was placed at the skin.  All counts were correct.  Patient was taken to the recovery room in good condition.  There were no immediate complications.    Jamee Mcguire MD  04/03/20  15:12

## 2020-04-03 NOTE — NURSING NOTE
[facility] nurse consult for colostomy teaching and stoma marking in pre-op.  [facility] colostomy booklet used for brief education.  All quesitons answered to the best of my ability.  Stoma marking done to all 4 quadrants.  Patient has a deep umbilicus crease, please avoid.  Please consult [facility] nurse after surgery if needed.  Thank you

## 2020-04-04 LAB
ANION GAP SERPL CALCULATED.3IONS-SCNC: 8 MMOL/L (ref 5–15)
BASOPHILS # BLD AUTO: 0.03 10*3/MM3 (ref 0–0.2)
BASOPHILS NFR BLD AUTO: 0.4 % (ref 0–1.5)
BUN BLD-MCNC: 12 MG/DL (ref 6–20)
BUN/CREAT SERPL: 21.4 (ref 7–25)
CALCIUM SPEC-SCNC: 8.9 MG/DL (ref 8.6–10.5)
CHLORIDE SERPL-SCNC: 102 MMOL/L (ref 98–107)
CO2 SERPL-SCNC: 29 MMOL/L (ref 22–29)
CREAT BLD-MCNC: 0.56 MG/DL (ref 0.57–1)
DEPRECATED RDW RBC AUTO: 45.4 FL (ref 37–54)
EOSINOPHIL # BLD AUTO: 0.05 10*3/MM3 (ref 0–0.4)
EOSINOPHIL NFR BLD AUTO: 0.7 % (ref 0.3–6.2)
ERYTHROCYTE [DISTWIDTH] IN BLOOD BY AUTOMATED COUNT: 13.2 % (ref 12.3–15.4)
GFR SERPL CREATININE-BSD FRML MDRD: 113 ML/MIN/1.73
GLUCOSE BLD-MCNC: 128 MG/DL (ref 65–99)
HCT VFR BLD AUTO: 29 % (ref 34–46.6)
HGB BLD-MCNC: 9.2 G/DL (ref 12–15.9)
IMM GRANULOCYTES # BLD AUTO: 0.02 10*3/MM3 (ref 0–0.05)
IMM GRANULOCYTES NFR BLD AUTO: 0.3 % (ref 0–0.5)
LYMPHOCYTES # BLD AUTO: 1.5 10*3/MM3 (ref 0.7–3.1)
LYMPHOCYTES NFR BLD AUTO: 21.2 % (ref 19.6–45.3)
MCH RBC QN AUTO: 30.6 PG (ref 26.6–33)
MCHC RBC AUTO-ENTMCNC: 31.7 G/DL (ref 31.5–35.7)
MCV RBC AUTO: 96.3 FL (ref 79–97)
MONOCYTES # BLD AUTO: 0.43 10*3/MM3 (ref 0.1–0.9)
MONOCYTES NFR BLD AUTO: 6.1 % (ref 5–12)
NEUTROPHILS # BLD AUTO: 5.06 10*3/MM3 (ref 1.7–7)
NEUTROPHILS NFR BLD AUTO: 71.3 % (ref 42.7–76)
NRBC BLD AUTO-RTO: 0 /100 WBC (ref 0–0.2)
PLATELET # BLD AUTO: 422 10*3/MM3 (ref 140–450)
PMV BLD AUTO: 10.4 FL (ref 6–12)
POTASSIUM BLD-SCNC: 4.5 MMOL/L (ref 3.5–5.2)
RBC # BLD AUTO: 3.01 10*6/MM3 (ref 3.77–5.28)
SODIUM BLD-SCNC: 139 MMOL/L (ref 136–145)
WBC NRBC COR # BLD: 7.09 10*3/MM3 (ref 3.4–10.8)

## 2020-04-04 PROCEDURE — 25010000002 HYDROMORPHONE PER 4 MG: Performed by: OBSTETRICS & GYNECOLOGY

## 2020-04-04 PROCEDURE — 85025 COMPLETE CBC W/AUTO DIFF WBC: CPT | Performed by: OBSTETRICS & GYNECOLOGY

## 2020-04-04 PROCEDURE — 80048 BASIC METABOLIC PNL TOTAL CA: CPT | Performed by: OBSTETRICS & GYNECOLOGY

## 2020-04-04 PROCEDURE — 25010000002 ERTAPENEM PER 500 MG: Performed by: OBSTETRICS & GYNECOLOGY

## 2020-04-04 PROCEDURE — 25010000002 ENOXAPARIN PER 10 MG: Performed by: OBSTETRICS & GYNECOLOGY

## 2020-04-04 PROCEDURE — 63710000001 ONDANSETRON PER 8 MG: Performed by: OBSTETRICS & GYNECOLOGY

## 2020-04-04 RX ORDER — L.ACID,PARA/B.BIFIDUM/S.THERM 8B CELL
1 CAPSULE ORAL DAILY
Status: DISCONTINUED | OUTPATIENT
Start: 2020-04-04 | End: 2020-04-06 | Stop reason: HOSPADM

## 2020-04-04 RX ADMIN — ONDANSETRON HYDROCHLORIDE 4 MG: 4 TABLET, FILM COATED ORAL at 10:16

## 2020-04-04 RX ADMIN — OXYCODONE HYDROCHLORIDE 10 MG: 5 TABLET ORAL at 06:32

## 2020-04-04 RX ADMIN — ACETAMINOPHEN 650 MG: 325 TABLET, FILM COATED ORAL at 23:39

## 2020-04-04 RX ADMIN — ACETAMINOPHEN 650 MG: 325 TABLET, FILM COATED ORAL at 06:32

## 2020-04-04 RX ADMIN — OXYCODONE HYDROCHLORIDE 10 MG: 5 TABLET ORAL at 10:16

## 2020-04-04 RX ADMIN — SIMETHICONE CHEW TAB 80 MG 80 MG: 80 TABLET ORAL at 07:40

## 2020-04-04 RX ADMIN — FAMOTIDINE 20 MG: 20 TABLET ORAL at 06:32

## 2020-04-04 RX ADMIN — FAMOTIDINE 20 MG: 20 TABLET ORAL at 17:34

## 2020-04-04 RX ADMIN — OXYCODONE HYDROCHLORIDE 10 MG: 5 TABLET ORAL at 15:48

## 2020-04-04 RX ADMIN — IBUPROFEN 600 MG: 600 TABLET, FILM COATED ORAL at 07:35

## 2020-04-04 RX ADMIN — Medication 1 CAPSULE: at 11:54

## 2020-04-04 RX ADMIN — ERTAPENEM SODIUM 1 G: 1 INJECTION, POWDER, LYOPHILIZED, FOR SOLUTION INTRAMUSCULAR; INTRAVENOUS at 11:54

## 2020-04-04 RX ADMIN — ACETAMINOPHEN 650 MG: 325 TABLET, FILM COATED ORAL at 17:34

## 2020-04-04 RX ADMIN — HYDROMORPHONE HYDROCHLORIDE 0.5 MG: 2 INJECTION, SOLUTION INTRAMUSCULAR; INTRAVENOUS; SUBCUTANEOUS at 20:13

## 2020-04-04 RX ADMIN — ENOXAPARIN SODIUM 40 MG: 40 INJECTION SUBCUTANEOUS at 10:04

## 2020-04-04 RX ADMIN — OXYCODONE HYDROCHLORIDE 10 MG: 5 TABLET ORAL at 23:39

## 2020-04-04 RX ADMIN — ACETAMINOPHEN 650 MG: 325 TABLET, FILM COATED ORAL at 11:54

## 2020-04-04 NOTE — PLAN OF CARE
Problem: Patient Care Overview  Goal: Plan of Care Review  Outcome: Ongoing (interventions implemented as appropriate)  Flowsheets (Taken 4/4/2020 8739)  Progress: improving  Plan of Care Reviewed With: patient  Outcome Summary: VSS, c/o pain managed with prn and schd meds, f/c in place, rested well between care, ivf cont, clear liquid diet, adequate UOP, chewing gum at bedside, will cont to monitor

## 2020-04-04 NOTE — PLAN OF CARE
Problem: Patient Care Overview  Goal: Plan of Care Review  Outcome: Ongoing (interventions implemented as appropriate)  Flowsheets (Taken 4/4/2020 1831)  Progress: improving  Plan of Care Reviewed With: patient  Note:   Pt urinating without difficulty after Boateng removal this am. She is tolerating clear liquids. Nausea this am with zofran po x 1 which was effective. Up in room x 3 today and in ross x 1 this afternoon. QUIN drain remains in place. Pt sat in chair today until afternoon.

## 2020-04-04 NOTE — PROGRESS NOTES
Gynecologic Oncology   Daily Progress Note    Chief Complaint: Post-op    Subjective   Patient did well oernight.  Her pain is controlled.  She is not having nausea or emesis.  She is tolerating modest clear liquids.  She is using her incentive spirometer.      Objective   Temp:  [97.3 °F (36.3 °C)-98.6 °F (37 °C)] 98.2 °F (36.8 °C)  Heart Rate:  [75-92] 75  Resp:  [16-18] 18  BP: (100-156)/(62-95) 100/63  Vitals:    04/04/20 0300   BP: 100/63   Pulse: 75   Resp: 18   Temp: 98.2 °F (36.8 °C)   SpO2: 98%     I/O last 3 completed shifts:  In: 1750 [P.O.:500; I.V.:1250]  Out: 1815 [Urine:1150; Drains:65; Blood:600]     GENERAL: Alert, well-appearing female in no apparent distress.    CARDIOVASCULAR: Normal rate, regular rhythm, no murmurs, rubs, or gallops.    RESPIRATORY: Clear to auscultation bilaterally, normal respiratory effort  GASTROINTESTINAL:  Soft, appropriately tender, non-distended, no rebound or guarding.  Positive bowel sounds.  Incision(s) c/d/i.  GENITOURINARY: Boateng in place   SKIN:  Warm, dry, well-perfused.    PSYCHIATRIC: AO x3, with appropriate affect, normal thought processes  EXREMITIES: Symmetric. No peripheral edema.    Lab Results   Component Value Date    WBC 6.00 04/02/2020    HGB 11.5 (L) 04/02/2020    HCT 35.7 04/02/2020    MCV 95.2 04/02/2020     (H) 04/02/2020    NEUTROABS 4.25 04/02/2020    GLUCOSE 99 04/02/2020    BUN 30 (H) 04/02/2020    CREATININE 0.63 04/02/2020    EGFRIFNONA 98 04/02/2020     04/02/2020    K 4.1 04/02/2020     04/02/2020    CO2 26.0 04/02/2020    CALCIUM 9.8 04/02/2020         Assessment/Plan   Roxie Carter is a 54 y.o. female s/p LAPAROTOMY EXPLORATORY,  OPTIMAL DEBULKING, R=O , CYSTOSCOPY WITH URETERAL CATHTER INSERTION AND REMOVAL, ILEAL RESECTION WITH SIDE TO SIDE ANASTOMOSIS, RECTAL SIGMOID RESECTION/LAR, LEFT URETEROLYSIS, AND PERITONEAL STRIPPING performed 4/3/2020    1.  Post-operative care  -Routine care  encourage ambulation, IS  use, saline lock IV, d/c crow  -keep on clears for now, clear boost to BS    2.  PPX  -lovenox  -Pepcid    3.  Disposition  -continue hospital stay until reaching goals of d/c         Jamee Mcguire MD  04/04/20  08:31

## 2020-04-05 LAB
ANION GAP SERPL CALCULATED.3IONS-SCNC: 8 MMOL/L (ref 5–15)
BASOPHILS # BLD AUTO: 0.04 10*3/MM3 (ref 0–0.2)
BASOPHILS NFR BLD AUTO: 0.6 % (ref 0–1.5)
BUN BLD-MCNC: 8 MG/DL (ref 6–20)
BUN/CREAT SERPL: 12.9 (ref 7–25)
CALCIUM SPEC-SCNC: 8.4 MG/DL (ref 8.6–10.5)
CHLORIDE SERPL-SCNC: 104 MMOL/L (ref 98–107)
CO2 SERPL-SCNC: 29 MMOL/L (ref 22–29)
CREAT BLD-MCNC: 0.62 MG/DL (ref 0.57–1)
DEPRECATED RDW RBC AUTO: 46.4 FL (ref 37–54)
EOSINOPHIL # BLD AUTO: 0.25 10*3/MM3 (ref 0–0.4)
EOSINOPHIL NFR BLD AUTO: 3.9 % (ref 0.3–6.2)
ERYTHROCYTE [DISTWIDTH] IN BLOOD BY AUTOMATED COUNT: 13.1 % (ref 12.3–15.4)
GFR SERPL CREATININE-BSD FRML MDRD: 100 ML/MIN/1.73
GLUCOSE BLD-MCNC: 87 MG/DL (ref 65–99)
HCT VFR BLD AUTO: 27.2 % (ref 34–46.6)
HGB BLD-MCNC: 8.4 G/DL (ref 12–15.9)
IMM GRANULOCYTES # BLD AUTO: 0.02 10*3/MM3 (ref 0–0.05)
IMM GRANULOCYTES NFR BLD AUTO: 0.3 % (ref 0–0.5)
LYMPHOCYTES # BLD AUTO: 1.64 10*3/MM3 (ref 0.7–3.1)
LYMPHOCYTES NFR BLD AUTO: 25.4 % (ref 19.6–45.3)
MCH RBC QN AUTO: 30.4 PG (ref 26.6–33)
MCHC RBC AUTO-ENTMCNC: 30.9 G/DL (ref 31.5–35.7)
MCV RBC AUTO: 98.6 FL (ref 79–97)
MONOCYTES # BLD AUTO: 0.41 10*3/MM3 (ref 0.1–0.9)
MONOCYTES NFR BLD AUTO: 6.4 % (ref 5–12)
NEUTROPHILS # BLD AUTO: 4.09 10*3/MM3 (ref 1.7–7)
NEUTROPHILS NFR BLD AUTO: 63.4 % (ref 42.7–76)
NRBC BLD AUTO-RTO: 0 /100 WBC (ref 0–0.2)
PLATELET # BLD AUTO: 381 10*3/MM3 (ref 140–450)
PMV BLD AUTO: 10.1 FL (ref 6–12)
POTASSIUM BLD-SCNC: 3.4 MMOL/L (ref 3.5–5.2)
RBC # BLD AUTO: 2.76 10*6/MM3 (ref 3.77–5.28)
SODIUM BLD-SCNC: 141 MMOL/L (ref 136–145)
WBC NRBC COR # BLD: 6.45 10*3/MM3 (ref 3.4–10.8)

## 2020-04-05 PROCEDURE — 25010000002 ENOXAPARIN PER 10 MG: Performed by: OBSTETRICS & GYNECOLOGY

## 2020-04-05 PROCEDURE — 80048 BASIC METABOLIC PNL TOTAL CA: CPT | Performed by: OBSTETRICS & GYNECOLOGY

## 2020-04-05 PROCEDURE — 25010000002 ONDANSETRON PER 1 MG: Performed by: OBSTETRICS & GYNECOLOGY

## 2020-04-05 PROCEDURE — 25010000002 HYDROMORPHONE PER 4 MG: Performed by: OBSTETRICS & GYNECOLOGY

## 2020-04-05 PROCEDURE — 85025 COMPLETE CBC W/AUTO DIFF WBC: CPT | Performed by: OBSTETRICS & GYNECOLOGY

## 2020-04-05 RX ADMIN — ACETAMINOPHEN 650 MG: 325 TABLET, FILM COATED ORAL at 23:43

## 2020-04-05 RX ADMIN — ONDANSETRON 4 MG: 2 INJECTION INTRAMUSCULAR; INTRAVENOUS at 11:09

## 2020-04-05 RX ADMIN — FAMOTIDINE 20 MG: 20 TABLET ORAL at 08:06

## 2020-04-05 RX ADMIN — IBUPROFEN 600 MG: 600 TABLET, FILM COATED ORAL at 23:43

## 2020-04-05 RX ADMIN — HYDROMORPHONE HYDROCHLORIDE 0.5 MG: 2 INJECTION, SOLUTION INTRAMUSCULAR; INTRAVENOUS; SUBCUTANEOUS at 00:52

## 2020-04-05 RX ADMIN — ACETAMINOPHEN 650 MG: 325 TABLET, FILM COATED ORAL at 18:32

## 2020-04-05 RX ADMIN — FAMOTIDINE 20 MG: 20 TABLET ORAL at 16:33

## 2020-04-05 RX ADMIN — Medication 1 CAPSULE: at 11:09

## 2020-04-05 RX ADMIN — POLYETHYLENE GLYCOL 3350 17 G: 17 POWDER, FOR SOLUTION ORAL at 08:07

## 2020-04-05 RX ADMIN — IBUPROFEN 600 MG: 600 TABLET, FILM COATED ORAL at 16:33

## 2020-04-05 RX ADMIN — OXYCODONE HYDROCHLORIDE 5 MG: 5 TABLET ORAL at 16:54

## 2020-04-05 RX ADMIN — OXYCODONE HYDROCHLORIDE 10 MG: 5 TABLET ORAL at 11:28

## 2020-04-05 RX ADMIN — ACETAMINOPHEN 650 MG: 325 TABLET, FILM COATED ORAL at 05:29

## 2020-04-05 RX ADMIN — OXYCODONE HYDROCHLORIDE 5 MG: 5 TABLET ORAL at 16:21

## 2020-04-05 RX ADMIN — HYDROMORPHONE HYDROCHLORIDE 0.5 MG: 2 INJECTION, SOLUTION INTRAMUSCULAR; INTRAVENOUS; SUBCUTANEOUS at 05:41

## 2020-04-05 RX ADMIN — OXYCODONE HYDROCHLORIDE 10 MG: 5 TABLET ORAL at 20:50

## 2020-04-05 RX ADMIN — ACETAMINOPHEN 650 MG: 325 TABLET, FILM COATED ORAL at 11:09

## 2020-04-05 RX ADMIN — SIMETHICONE CHEW TAB 80 MG 80 MG: 80 TABLET ORAL at 05:34

## 2020-04-05 RX ADMIN — ENOXAPARIN SODIUM 40 MG: 40 INJECTION SUBCUTANEOUS at 08:06

## 2020-04-05 RX ADMIN — IBUPROFEN 600 MG: 600 TABLET, FILM COATED ORAL at 05:34

## 2020-04-05 RX ADMIN — OXYCODONE HYDROCHLORIDE 10 MG: 5 TABLET ORAL at 08:06

## 2020-04-05 NOTE — PLAN OF CARE
Problem: Patient Care Overview  Goal: Plan of Care Review  Outcome: Ongoing (interventions implemented as appropriate)  Flowsheets (Taken 4/5/2020 1872)  Progress: improving  Plan of Care Reviewed With: patient  Outcome Summary: VSS, Pt slept well. pain controlled with IV and oral meds. UOP-WNL. no c/o nausea. tolerating diet. will continue monitoring.

## 2020-04-05 NOTE — PLAN OF CARE
Problem: Patient Care Overview  Goal: Plan of Care Review  Outcome: Ongoing (interventions implemented as appropriate)  Flowsheets (Taken 4/5/2020 1529)  Progress: improving  Plan of Care Reviewed With: patient  Note:   Pt up x 4 today. She had nausea with zofran x 1 which was effective. Tolerated clear liquids, a few crackers today. She has not had a bm and states she has gas on her stomach but not passing flatus yet. Urinating without difficulty. QUIN drain pulled this am. VSS.

## 2020-04-05 NOTE — PROGRESS NOTES
Gynecologic Oncology   Daily Progress Note    Chief Complaint: Post-op    Subjective   Patient did well oernight.  Her pain is controlled.  She is not having nausea or emesis.  She is tolerating modest clear liquids.  She is using her incentive spirometer.  She is voiding.  She has not passed flatus.  She is tolerating clear liquid diet.    Objective   Temp:  [97.1 °F (36.2 °C)-98.6 °F (37 °C)] 97.9 °F (36.6 °C)  Heart Rate:  [81-94] 94  Resp:  [16-18] 16  BP: (104-128)/(60-78) 110/63  Vitals:    04/05/20 0700   BP: 110/63   Pulse: 94   Resp: 16   Temp: 97.9 °F (36.6 °C)   SpO2: 99%     I/O last 3 completed shifts:  In: 1420 [P.O.:1420]  Out: 2670 [Urine:2600; Drains:70]     GENERAL: Alert, well-appearing female in no apparent distress.    CARDIOVASCULAR: Normal rate, regular rhythm, no murmurs, rubs, or gallops.    RESPIRATORY: Clear to auscultation bilaterally, normal respiratory effort  GASTROINTESTINAL:  Soft, appropriately tender, non-distended, no rebound or guarding.  Positive bowel sounds.  Incision(s) c/d/i.  GENITOURINARY: Boateng removed  SKIN:  Warm, dry, well-perfused.    PSYCHIATRIC: AO x3, with appropriate affect, normal thought processes  EXREMITIES: Symmetric. No peripheral edema.    Lab Results   Component Value Date    WBC 6.45 04/05/2020    HGB 8.4 (L) 04/05/2020    HCT 27.2 (L) 04/05/2020    MCV 98.6 (H) 04/05/2020     04/05/2020    NEUTROABS 4.09 04/05/2020    GLUCOSE 87 04/05/2020    BUN 8 04/05/2020    CREATININE 0.62 04/05/2020    EGFRIFNONA 100 04/05/2020     04/05/2020    K 3.4 (L) 04/05/2020     04/05/2020    CO2 29.0 04/05/2020    CALCIUM 8.4 (L) 04/05/2020         Assessment/Plan   Roxie Carter is a 54 y.o. female s/p LAPAROTOMY EXPLORATORY,  OPTIMAL DEBULKING, R=O , CYSTOSCOPY WITH URETERAL CATHTER INSERTION AND REMOVAL, ILEAL RESECTION WITH SIDE TO SIDE ANASTOMOSIS, RECTAL SIGMOID RESECTION/LAR, LEFT URETEROLYSIS, AND PERITONEAL STRIPPING performed  4/3/2020    1.  Post-operative care  -Routine care  encourage ambulation, IS use, saline lock IV, d/c crow  -keep on clears for now, clear boost to BS  lactobacillus  -post op abx completed  -DC QUIN drain  2.  PPX  -lovenox  -Pepcid    3.  Disposition  -continue hospital stay until reaching goals of d/c; possible discharge in 1 to 2 days  -Sister with questions regarding discharge and can patient travel to [place] and stay with family during her recovery.  I think this is up to the patient and her family.         Jamee Mcguire MD  04/05/20  08:08

## 2020-04-06 VITALS
BODY MASS INDEX: 18.53 KG/M2 | RESPIRATION RATE: 18 BRPM | TEMPERATURE: 98.3 F | DIASTOLIC BLOOD PRESSURE: 55 MMHG | OXYGEN SATURATION: 98 % | HEART RATE: 85 BPM | HEIGHT: 66 IN | WEIGHT: 115.3 LBS | SYSTOLIC BLOOD PRESSURE: 105 MMHG

## 2020-04-06 LAB
ANION GAP SERPL CALCULATED.3IONS-SCNC: 11 MMOL/L (ref 5–15)
BASOPHILS # BLD AUTO: 0.02 10*3/MM3 (ref 0–0.2)
BASOPHILS NFR BLD AUTO: 0.2 % (ref 0–1.5)
BUN BLD-MCNC: 10 MG/DL (ref 6–20)
BUN/CREAT SERPL: 19.2 (ref 7–25)
CALCIUM SPEC-SCNC: 9 MG/DL (ref 8.6–10.5)
CHLORIDE SERPL-SCNC: 101 MMOL/L (ref 98–107)
CO2 SERPL-SCNC: 28 MMOL/L (ref 22–29)
CREAT BLD-MCNC: 0.52 MG/DL (ref 0.57–1)
DEPRECATED RDW RBC AUTO: 46.1 FL (ref 37–54)
EOSINOPHIL # BLD AUTO: 0.31 10*3/MM3 (ref 0–0.4)
EOSINOPHIL NFR BLD AUTO: 3.1 % (ref 0.3–6.2)
ERYTHROCYTE [DISTWIDTH] IN BLOOD BY AUTOMATED COUNT: 13 % (ref 12.3–15.4)
GFR SERPL CREATININE-BSD FRML MDRD: 123 ML/MIN/1.73
GLUCOSE BLD-MCNC: 92 MG/DL (ref 65–99)
HCT VFR BLD AUTO: 30.6 % (ref 34–46.6)
HGB BLD-MCNC: 9.5 G/DL (ref 12–15.9)
IMM GRANULOCYTES # BLD AUTO: 0.03 10*3/MM3 (ref 0–0.05)
IMM GRANULOCYTES NFR BLD AUTO: 0.3 % (ref 0–0.5)
LYMPHOCYTES # BLD AUTO: 1.3 10*3/MM3 (ref 0.7–3.1)
LYMPHOCYTES NFR BLD AUTO: 13.1 % (ref 19.6–45.3)
MCH RBC QN AUTO: 30.2 PG (ref 26.6–33)
MCHC RBC AUTO-ENTMCNC: 31 G/DL (ref 31.5–35.7)
MCV RBC AUTO: 97.1 FL (ref 79–97)
MONOCYTES # BLD AUTO: 0.48 10*3/MM3 (ref 0.1–0.9)
MONOCYTES NFR BLD AUTO: 4.8 % (ref 5–12)
NEUTROPHILS # BLD AUTO: 7.77 10*3/MM3 (ref 1.7–7)
NEUTROPHILS NFR BLD AUTO: 78.5 % (ref 42.7–76)
NRBC BLD AUTO-RTO: 0 /100 WBC (ref 0–0.2)
PLATELET # BLD AUTO: 416 10*3/MM3 (ref 140–450)
PMV BLD AUTO: 10 FL (ref 6–12)
POTASSIUM BLD-SCNC: 4.1 MMOL/L (ref 3.5–5.2)
RBC # BLD AUTO: 3.15 10*6/MM3 (ref 3.77–5.28)
SODIUM BLD-SCNC: 140 MMOL/L (ref 136–145)
WBC NRBC COR # BLD: 9.91 10*3/MM3 (ref 3.4–10.8)

## 2020-04-06 PROCEDURE — 63710000001 ONDANSETRON PER 8 MG: Performed by: OBSTETRICS & GYNECOLOGY

## 2020-04-06 PROCEDURE — 25010000002 ENOXAPARIN PER 10 MG: Performed by: OBSTETRICS & GYNECOLOGY

## 2020-04-06 PROCEDURE — 85025 COMPLETE CBC W/AUTO DIFF WBC: CPT | Performed by: OBSTETRICS & GYNECOLOGY

## 2020-04-06 PROCEDURE — 80048 BASIC METABOLIC PNL TOTAL CA: CPT | Performed by: OBSTETRICS & GYNECOLOGY

## 2020-04-06 RX ORDER — ANASTROZOLE 1 MG/1
1 TABLET ORAL DAILY
Qty: 30 TABLET | Refills: 6 | Status: SHIPPED | OUTPATIENT
Start: 2020-04-06 | End: 2020-11-07 | Stop reason: SDUPTHER

## 2020-04-06 RX ORDER — ONDANSETRON 4 MG/1
4 TABLET, FILM COATED ORAL EVERY 6 HOURS PRN
Qty: 10 TABLET | Refills: 0 | Status: SHIPPED | OUTPATIENT
Start: 2020-04-06 | End: 2020-10-05

## 2020-04-06 RX ORDER — POLYETHYLENE GLYCOL 3350 17 G/17G
17 POWDER, FOR SOLUTION ORAL DAILY PRN
Qty: 238 G | Refills: 0 | Status: SHIPPED | OUTPATIENT
Start: 2020-04-06 | End: 2020-10-05

## 2020-04-06 RX ORDER — OXYCODONE HYDROCHLORIDE 5 MG/1
5 TABLET ORAL EVERY 4 HOURS PRN
Qty: 20 TABLET | Refills: 0 | Status: SHIPPED | OUTPATIENT
Start: 2020-04-06 | End: 2020-04-13

## 2020-04-06 RX ORDER — ACETAMINOPHEN 325 MG/1
650 TABLET ORAL EVERY 6 HOURS PRN
Qty: 60 TABLET | Refills: 0 | Status: SHIPPED | OUTPATIENT
Start: 2020-04-06 | End: 2020-04-26 | Stop reason: SDUPTHER

## 2020-04-06 RX ADMIN — ACETAMINOPHEN 650 MG: 325 TABLET, FILM COATED ORAL at 05:57

## 2020-04-06 RX ADMIN — OXYCODONE HYDROCHLORIDE 10 MG: 5 TABLET ORAL at 03:00

## 2020-04-06 RX ADMIN — ACETAMINOPHEN 650 MG: 325 TABLET, FILM COATED ORAL at 12:25

## 2020-04-06 RX ADMIN — FAMOTIDINE 20 MG: 20 TABLET ORAL at 08:00

## 2020-04-06 RX ADMIN — ENOXAPARIN SODIUM 40 MG: 40 INJECTION SUBCUTANEOUS at 08:00

## 2020-04-06 RX ADMIN — Medication 1 CAPSULE: at 12:25

## 2020-04-06 RX ADMIN — ONDANSETRON HYDROCHLORIDE 4 MG: 4 TABLET, FILM COATED ORAL at 07:16

## 2020-04-06 RX ADMIN — IBUPROFEN 600 MG: 600 TABLET, FILM COATED ORAL at 05:57

## 2020-04-06 RX ADMIN — OXYCODONE HYDROCHLORIDE 10 MG: 5 TABLET ORAL at 08:00

## 2020-04-06 NOTE — DISCHARGE SUMMARY
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    On 4/3/2020 she was admitted and underwent LAPAROTOMY EXPLORATORY,  OPTIMAL DEBULKING, R=O , CYSTOSCOPY WITH URETERAL CATHTER INSERTION AND REMOVAL, ILEAL RESECTION WITH SIDE TO SIDE ANASTOMOSIS, RECTAL SIGMOID RESECTION/LAR, LEFT URETEROLYSIS, AND PERITONEAL STRIPPING.  Please refer to dictated operative note for further details.  Post-operatively she did well.  Her diet was advanced.  Nonsurgical medical illnesses were appropriately managed during her hospital stay.  Her POD1 labs showed no significant anemia.  Chemistries were unremarkable.  By POD3 she was tolerating a general diet, voiding spontaneously, having her pain controlled with oral medications, and otherwise meeting criteria for discharge and she was discharged home in stable condition.    /55   Pulse 85   Temp 98.3 °F (36.8 °C) (Oral)   Resp 18   Ht 167.6 cm (65.98\")   Wt 52.3 kg (115 lb 4.8 oz)   SpO2 98%   Breastfeeding No   BMI 18.62 kg/m²       Physical examination:  Constitutional: Patient is a pleasant woman appearing her stated age who is in no acute distress  Respiratory: Clear to auscultation bilaterally, normal effort  Cardiovascular: Heart regular rate and rhythm, no extremity edema  Gastrointestinal: Positive bowel sounds, soft, nondistended, appropriately tender, no rebound, no guarding.  Incision clean dry and intact  Extremities: No cyanosis clubbing or edema    WBC   Date Value Ref Range Status   04/06/2020 9.91 3.40 - 10.80 10*3/mm3 Final     RBC "   Date Value Ref Range Status   04/06/2020 3.15 (L) 3.77 - 5.28 10*6/mm3 Final     Hemoglobin   Date Value Ref Range Status   04/06/2020 9.5 (L) 12.0 - 15.9 g/dL Final     Hematocrit   Date Value Ref Range Status   04/06/2020 30.6 (L) 34.0 - 46.6 % Final     MCV   Date Value Ref Range Status   04/06/2020 97.1 (H) 79.0 - 97.0 fL Final     MCH   Date Value Ref Range Status   04/06/2020 30.2 26.6 - 33.0 pg Final     MCHC   Date Value Ref Range Status   04/06/2020 31.0 (L) 31.5 - 35.7 g/dL Final     RDW   Date Value Ref Range Status   04/06/2020 13.0 12.3 - 15.4 % Final     RDW-SD   Date Value Ref Range Status   04/06/2020 46.1 37.0 - 54.0 fl Final     MPV   Date Value Ref Range Status   04/06/2020 10.0 6.0 - 12.0 fL Final     Platelets   Date Value Ref Range Status   04/06/2020 416 140 - 450 10*3/mm3 Final     Neutrophil %   Date Value Ref Range Status   04/06/2020 78.5 (H) 42.7 - 76.0 % Final     Lymphocyte %   Date Value Ref Range Status   04/06/2020 13.1 (L) 19.6 - 45.3 % Final     Monocyte %   Date Value Ref Range Status   04/06/2020 4.8 (L) 5.0 - 12.0 % Final     Eosinophil %   Date Value Ref Range Status   04/06/2020 3.1 0.3 - 6.2 % Final     Basophil %   Date Value Ref Range Status   04/06/2020 0.2 0.0 - 1.5 % Final     Immature Grans %   Date Value Ref Range Status   04/06/2020 0.3 0.0 - 0.5 % Final     Neutrophils, Absolute   Date Value Ref Range Status   04/06/2020 7.77 (H) 1.70 - 7.00 10*3/mm3 Final     Lymphocytes, Absolute   Date Value Ref Range Status   04/06/2020 1.30 0.70 - 3.10 10*3/mm3 Final     Monocytes, Absolute   Date Value Ref Range Status   04/06/2020 0.48 0.10 - 0.90 10*3/mm3 Final     Eosinophils, Absolute   Date Value Ref Range Status   04/06/2020 0.31 0.00 - 0.40 10*3/mm3 Final     Basophils, Absolute   Date Value Ref Range Status   04/06/2020 0.02 0.00 - 0.20 10*3/mm3 Final     Immature Grans, Absolute   Date Value Ref Range Status   04/06/2020 0.03 0.00 - 0.05 10*3/mm3 Final     nRBC    Date Value Ref Range Status   04/06/2020 0.0 0.0 - 0.2 /100 WBC Final         Discharge Medications:     Discharge Medications      New Medications      Instructions Start Date   acetaminophen 325 MG tablet  Commonly known as:  TYLENOL   650 mg, Oral, Every 6 Hours PRN      anastrozole 1 MG tablet  Commonly known as:  ARIMIDEX   1 mg, Oral, Daily      ondansetron 4 MG tablet  Commonly known as:  ZOFRAN   4 mg, Oral, Every 6 Hours PRN      oxyCODONE 5 MG immediate release tablet  Commonly known as:  ROXICODONE   5 mg, Oral, Every 4 Hours PRN      polyethylene glycol powder  Commonly known as:  MIRALAX   17 g, Oral, Daily PRN      rivaroxaban 10 MG tablet  Commonly known as:  Xarelto   10 mg, Oral, Daily         Continue These Medications      Instructions Start Date   ibuprofen 200 MG tablet  Commonly known as:  ADVIL,MOTRIN   400-600 mg, Oral, Every 6 Hours PRN             Discharge Instructions:  She was instructed to call or return to medical attention for fever, severe pain, persistent nausea and vomiting, questions regarding medications, concerns regarding her incisions, excessive vaginal bleeding or discharge, or for any other acute concerns.  She was also instructed not to drive while taking narcotic pain medications, to abide by pelvic rest, avoid tub baths, and to avoid heavy lifting for at least 6 weeks.  Patient was educated regarding constipation prevention.      Condition at Discharge: Stable    Discharge Destination: Home    Results pending at time of discharge: Final surgical pathology     Follow Up: 3 weeks telemedicine visit    Electronically Signed by: Jamee Mcguire MD  Date: 4/6/2020      Time:  9:30 AM

## 2020-04-06 NOTE — PAYOR COMM NOTE
"Niurka Beth RN Utilization Review [phone]  Fax # [phone]  Ref # [id number]    Updated clinicals for continued IP stay      Roxie Carter (54 y.o. Female)     Date of Birth Social Security Number Address Home Phone MRN    1965  [address] [facility]  [address] [place] [address] [phone] [id number]    Mandaeism Marital Status          None Single       Admission Date Admission Type Admitting Provider Attending Provider Department, Room/Bed    4/3/20 Elective Jamee Mcguire MD Cottrill, Hope M., MD [facility] 5B, N542/1    Discharge Date Discharge Disposition Discharge Destination                       Attending Provider:  Jamee Mcguire MD    Allergies:  Penicillins    Isolation:  None   Infection:  None   Code Status:  CPR    Ht:  167.6 cm (65.98\")   Wt:  52.3 kg (115 lb 4.8 oz)    Admission Cmt:  None   Principal Problem:  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                 Active Insurance as of 4/3/2020     Primary Coverage     Payor Plan Insurance Group Employer/Plan Group    ANTHEM BLUE CROSS ANTHEM BLUE CROSS BLUE SHIELD PPO [id number]     Payor Plan Address Payor Plan Phone Number Payor Plan Fax Number Effective Dates    [address] [phone]  1/1/2020 - None Entered    [facility] [id number]       Subscriber Name Subscriber Birth Date Member ID       ROXIE CARTER 1965 [id number]                 Emergency Contacts      (Rel.) Home Phone Work Phone Mobile Phone    MANISHA WALSH (Power of ) -- -- [phone]    MARLEE CARTER (Brother) -- -- [phone]    michelle corbett (Son) -- -- [phone]            Vital Signs (last day)     Date/Time   Temp   Temp src   Pulse   Resp   BP   Patient Position   SpO2    04/06/20 0700   98.3 (36.8)   Oral   85   18   105/55   --   98    04/06/20 0300   97.2 (36.2)   Axillary   86   18   109/65   --   98    04/05/20 1900   98.2 (36.8)   Oral   91   18   " 104/54   --   98    04/05/20 1500   98.7 (37.1)   Oral   104   18   100/56   --   99    04/05/20 1100   97.8 (36.6)   Oral   84   20   110/65   --   99    04/05/20 0700   97.9 (36.6)   Oral   94   16   110/63   --   99    04/05/20 0300   98.6 (37)   Oral   81   16   104/60   Lying   97              Current Facility-Administered Medications   Medication Dose Route Frequency Provider Last Rate Last Dose   • acetaminophen (TYLENOL) tablet 650 mg  650 mg Oral Q6H Jamee Mcguire MD   650 mg at 04/06/20 0557   • enoxaparin (LOVENOX) syringe 40 mg  40 mg Subcutaneous Daily Jamee Mcguire MD   40 mg at 04/06/20 0800   • famotidine (PEPCID) tablet 20 mg  20 mg Oral BID AC Jamee Mcguire MD   20 mg at 04/06/20 0800   • HYDROmorphone (DILAUDID) injection 0.5 mg  0.5 mg Intravenous Q2H PRN Jamee Mcguire MD   0.5 mg at 04/05/20 0541    And   • naloxone (NARCAN) injection 0.4 mg  0.4 mg Intravenous Q5 Min PRN Jamee Mcguire MD       • HYDROmorphone (DILAUDID) injection 0.5 mg  0.5 mg Intravenous Q2H PRN Jamee Mcguire MD   0.5 mg at 04/05/20 0052    And   • naloxone (NARCAN) injection 0.1 mg  0.1 mg Intravenous Q5 Min PRN Jamee Mcguire MD       • ibuprofen (ADVIL,MOTRIN) tablet 600 mg  600 mg Oral Q6H PRN Jamee Mcguire MD   600 mg at 04/06/20 0557   • lactated ringers bolus 500 mL  500 mL Intravenous Once PRN Jamee Mcguire MD       • lactated ringers infusion  75 mL/hr Intravenous Continuous Jamee Mcguire MD   Stopped at 04/04/20 0900   • lactobacillus acidophilus (RISAQUAD) capsule 1 capsule  1 capsule Oral Daily Jamee Mcguire MD   1 capsule at 04/05/20 1109   • ondansetron (ZOFRAN) tablet 4 mg  4 mg Oral Q6H PRN Jamee Mcguire MD   4 mg at 04/06/20 0716    Or   • ondansetron (ZOFRAN) injection 4 mg  4 mg Intravenous Q6H PRN Jamee Mcguire MD   4 mg at 04/05/20 1109   • oxyCODONE (ROXICODONE) immediate release tablet 10 mg  10 mg Oral Q4H PRN Jamee Mcguire MD   10 mg  at 04/06/20 0800   • oxyCODONE (ROXICODONE) immediate release tablet 5 mg  5 mg Oral Q4H PRN Jamee Mcguire MD   5 mg at 04/05/20 1654   • polyethylene glycol 3350 powder (packet)  17 g Oral Daily Jamee Mcguire MD   17 g at 04/05/20 0807   • promethazine (PHENERGAN) injection 12.5 mg  12.5 mg Intravenous Q6H PRN Jamee Mcguire MD        Or   • promethazine (PHENERGAN) suppository 12.5 mg  12.5 mg Rectal Q6H PRN Jamee Mcguire MD        Or   • promethazine (PHENERGAN) tablet 12.5 mg  12.5 mg Oral Q6H PRN Jamee Mcguire MD       • simethicone (MYLICON) chewable tablet 80 mg  80 mg Oral 4x Daily PRN Jamee Mcguire MD   80 mg at 04/05/20 0534   • temazepam (RESTORIL) capsule 7.5 mg  7.5 mg Oral Nightly PRN Jamee Mcguire MD         Orders (active)      Start     Ordered    04/06/20 0814  Diet Regular; GI Soft  Diet Effective Now      04/06/20 0814    04/06/20 0814  DIET MESSAGE Patient would like toast with butter and breakfast potatoes for breakfast please.  Once     Comments:  Patient would like toast with butter and breakfast potatoes for breakfast please.    04/06/20 0814    04/06/20 0000  acetaminophen (TYLENOL) 325 MG tablet  Every 6 Hours PRN      04/06/20 0929    04/06/20 0000  oxyCODONE (ROXICODONE) 5 MG immediate release tablet  Every 4 Hours PRN      04/06/20 0929    04/06/20 0000  polyethylene glycol (MIRALAX) pack packet  Daily PRN      04/06/20 0929    04/06/20 0000  ondansetron (ZOFRAN) 4 MG tablet  Every 6 Hours PRN      04/06/20 0929    04/06/20 0000  rivaroxaban (Xarelto) 10 MG tablet  Daily      04/06/20 0929    04/05/20 0900  polyethylene glycol 3350 powder (packet)  Daily      04/03/20 1635    04/05/20 0832  Patient May Shower  Once      04/05/20 0831    04/05/20 0831  LEANDRO TSAI drain  Misc Nursing Order (Specify)  Once     Comments:  LEANDRO TSAI drain    04/05/20 0831    04/04/20 1600  Dietary Nutrition Supplements Boost Breeze (Clear Liquid)  3 Times Daily      04/04/20 0904     04/04/20 1200  lactobacillus acidophilus (RISAQUAD) capsule 1 capsule  Daily      04/04/20 0904    04/04/20 0958  Remove Boateng catheter  Misc Nursing Order (Specify)  Once     Comments:  Remove Boateng catheter    04/04/20 0958    04/04/20 0900  enoxaparin (LOVENOX) syringe 40 mg  Daily      04/03/20 1635    04/04/20 0600  CBC & Differential  Daily      04/03/20 1635    04/04/20 0600  Basic Metabolic Panel  Daily      04/03/20 1635    04/03/20 2000  Strict Intake and Output  Every 4 Hours     Comments:  q4h x2, then q8 hours unless different by floor policy    04/03/20 1635    04/03/20 1800  acetaminophen (TYLENOL) tablet 650 mg  Every 6 Hours Scheduled      04/03/20 1635    04/03/20 1730  famotidine (PEPCID) tablet 20 mg  2 Times Daily Before Meals      04/03/20 1635    04/03/20 1637  lactated ringers infusion  Continuous      04/03/20 1635    04/03/20 1636  Code Status and Medical Interventions:  Continuous      04/03/20 1635    04/03/20 1636  Vital Signs per hospital policy  Per Hospital Policy      04/03/20 1635    04/03/20 1636  Activity - Ad Marva  Until Discontinued      04/03/20 1635    04/03/20 1636  Abdominal Binder  Once      04/03/20 1635    04/03/20 1636  Incentive Spirometer - Nursing to Perform  Until Discontinued     Comments:  Every hour While Awake    04/03/20 1635    04/03/20 1636  Strip QUIN Drain  Every Shift      04/03/20 1635    04/03/20 1636  Continue Indwelling Urinary Catheter  Once      04/03/20 1635    04/03/20 1636  Assess Need for Indwelling Urinary Catheter - Follow Removal Protocol  Continuous     Comments:  Indwelling Urinary Catheter Removal Criteria  Discontinue Indwelling Urinary Catheter Unless One of the Following is Present:  Urinary Retention or Obstruction  Chronic Urinary Catheter Use  End of Life  Critical Illness with Strict I/O   Tract or Abdominal Surgery  Stage 3/4 Sacral / Perineal Wound  Required Activity Restriction: Trauma  Required Activity Restriction: Spine  Surgery  If Patient is Being Followed by Urology Contact Them PRIOR to Removal  Do Not Remove Indwelling Urinary Catheter Order is Present with a CLINICAL REASON to Maintain the Catheter. Provider is Required to Include a Clinical Reason to Maintain a Urinary Catheter    Patient Admitted With Indwelling Urinary Catheter (Not Placed at [facility])  Assess for Continued Need & Document Medical Necessity  If Infection is Suspected, Contact the Provider        04/03/20 1635 04/03/20 1636  Urinary Catheter Care  Every Shift      04/03/20 1635 04/03/20 1635  HYDROmorphone (DILAUDID) injection 0.5 mg  Every 2 Hours PRN      04/03/20 1635    04/03/20 1635  naloxone (NARCAN) injection 0.4 mg  Every 5 Minutes PRN      04/03/20 1635    04/03/20 1635  HYDROmorphone (DILAUDID) injection 0.5 mg  Every 2 Hours PRN      04/03/20 1635    04/03/20 1635  naloxone (NARCAN) injection 0.1 mg  Every 5 Minutes PRN      04/03/20 1635    04/03/20 1635  temazepam (RESTORIL) capsule 7.5 mg  Nightly PRN      04/03/20 1635    04/03/20 1635  promethazine (PHENERGAN) injection 12.5 mg  Every 6 Hours PRN      04/03/20 1635    04/03/20 1635  promethazine (PHENERGAN) suppository 12.5 mg  Every 6 Hours PRN      04/03/20 1635    04/03/20 1635  promethazine (PHENERGAN) tablet 12.5 mg  Every 6 Hours PRN      04/03/20 1635    04/03/20 1635  ondansetron (ZOFRAN) tablet 4 mg  Every 6 Hours PRN      04/03/20 1635    04/03/20 1635  ondansetron (ZOFRAN) injection 4 mg  Every 6 Hours PRN      04/03/20 1635    04/03/20 1635  simethicone (MYLICON) chewable tablet 80 mg  4 Times Daily PRN      04/03/20 1635    04/03/20 1635  ibuprofen (ADVIL,MOTRIN) tablet 600 mg  Every 6 Hours PRN      04/03/20 1635    04/03/20 1635  oxyCODONE (ROXICODONE) immediate release tablet 5 mg  Every 4 Hours PRN      04/03/20 1635    04/03/20 1635  oxyCODONE (ROXICODONE) immediate release tablet 10 mg  Every 4 Hours PRN      04/03/20 1635    04/03/20 1635  lactated ringers  bolus 500 mL  Once As Needed      04/03/20 1635    04/03/20 1454  Oxygen Therapy- Nasal Cannula; 2 LPM; Titrate for SPO2: equal to or greater than, 90%  Continuous      04/03/20 1453    04/03/20 1243  Tissue Pathology Exam      04/03/20 1243                   Operative/Procedure Notes (all)      Jamee Mcguire MD at 04/03/20 1205  Version 1 of 1           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]     Post-operative diagnosis(es): Post-Op Diagnosis Codes:     * Malignant neoplasm of ovary, unspecified laterality (CMS/HCC) [C56.9]   Procedure(s): Procedure(s):  LAPAROTOMY EXPLORATORY,  OPTIMAL DEBULKING, R=O , CYSTOSCOPY WITH URETERAL CATHTER INSERTION AND REMOVAL, ILEAL RESECTION WITH SIDE TO SIDE ANASTOMOSIS, RECTAL SIGMOID RESECTION/LAR, LEFT URETEROLYSIS, AND PERITONEAL STRIPPING     Antibiotics:  Advance ordered on call to OR     Anesthesia: Type: General with Block  ASA:  II     Objective      Operative findings:  At the time of cystoscopy, no tumor was noted at the bladder mucosa.  Bilateral ureteral orifices were unremarkable.  There was tumor throughout the pelvis.  No ascites was encountered.  No nodularity was appreciated at the omentum.  This was adhered to the bladder peritoneum, vaginal apex, portion of ileum, and portion of rectosigmoid colon.  Remainder of the small and large bowel were free from tumor.  Liver, spleen, and stomach were free from palpable tumor.  At the completion of the procedure, no visible tumor remained.     Specimens removed: ID Type Source Tests Collected by Time   A (Not marked as sent) : left pelvic brim Tissue Pelvis TISSUE PATHOLOGY EXAM Jamee Mcguire MD 4/3/2020 1242   B (Not marked as sent) : PELVIC TUMOR  WITH OF RECTO SIGMOID, AND PORTION OF TERMINAL ILEUM Tissue Pelvis TISSUE PATHOLOGY EXAM Jamee Mcguire MD 4/3/2020 1314   C (Not marked as sent) : PELVIC PERITONEUM  Tissue Pelvis TISSUE PATHOLOGY EXAM Jamee Mcguire MD 4/3/2020 1346   D (Not marked as sent) : ADDITIONAL RECTUM, STITCH AT PROXIMAL MARGIN Tissue Large Intestine, Rectum TISSUE PATHOLOGY EXAM Jamee Mcguire MD 4/3/2020 1348      Fluid Intake and Output: I/O this shift:  In: 250 [I.V.:250]  Out: 950 [Urine:350; Blood:600]   Blood products used: No   Drains: Closed/Suction Drain Left LLQ Bulb 15 Fr. (Active)       Urethral Catheter Silicone 16 Fr. (Active)      Implant Information: Implant Name Type Inv. Item Serial No.  Lot No. LRB No. Used   STPLR LNR CUT PROX THK 75MM GRN TCT75 - [id number] Implant STPLR LNR CUT PROX THK 75MM GRN TCT75  [employer]  DIV OF J AND J [id number] N/A 1   RELOAD STPLR LNR CUT PROX 75MM LEAH TCR75 - [id number] Implant RELOAD STPLR LNR CUT PROX 75MM LEAH TCR75  [employer]  DIV OF J AND J [id number] N/A 4   STPLR CRV CUT CONTRD 2MM CS40G - [id number] Implant STPLR CRV CUT CONTRD 2MM CS40G  [employer]  DIV OF J AND J [id number] N/A 1   RELOAD STPLR THK CONTRD GRN CR40G - [id number] Implant RELOAD STPLR THK CONTRD GRN CR40G  [employer]  DIV OF J AND J [id number] N/A 1   STPLR ENDO CRV 25MM ECS25A - [id number] Implant STPLR ENDO CRV 25MM [id number]  [employer]  DIV OF J AND J [id number] N/A 1      Complications:  No immediate   Intraoperative consult(s):    Condition: stable   Disposition: to PACU and then admit to  medical - surgical floor       Indications:  Patient is a pleasant 54-year-old woman with a history of granulosa cell tumor.  She was noted to have a pelvic mass and findings consistent with cancer recurrence.  Risks and benefits were discussed.  Consent was signed and on chart.    Procedure: After obtaining informed consent, patient was taken to the operating room and  underwent general endotracheal anesthesia after patient and site verification.  Regional anesthesia block was performed by anesthesia team.  Feet were placed in Víctor stirrups.  Abdomen, perineum, and vagina were prepped and draped in the usual sterile fashion.   Cystoscopy was performed with the above findings.  Bilateral 5 Turkmen whistle-tip catheters were placed without difficulty under direct visualization.  Boateng catheter was anchored in the whistle-tip catheters were attached to sterile glove.  At the completion of the surgery, the ureteral catheters were removed.  Vertical midline incision was made and the abdomen was entered without difficulty.  The above findings were noted.  Bookwalter self-retaining retractor was placed.  Small bowel was adhered to the anterior pelvis.  Small bowel was serially inspected and this was a isolated area.  Therefore, right angle clamps were used to separate the mesentery from the terminal ileum approximately 20 cm away from the cecum.  This area was transected with a AYANNA stapler with a blue load and a subsequent area was transected in order to divide tumor from normal-appearing viscera.  LigaSure impact was used to divide the mesentery and the specimen was left intact with the tumor in the pelvis.  Colon was inspected and portion of the distal sigmoid colon and rectum were noted to be involved with tumor.  AYANNA stapler with a green load was used to transect the colon proximal to the tumor.  Blunt dissection and dissection with Bovie electrocautery was used to mobilize the tumor from the pelvis.  LigaSure impact was used to divide the colonic mesentery until the distal colon was isolated to the extent it could be transected using a contour stapler with a green load.  Mass was removed and sent for permanent pathology.  Peritoneal stripping was performed throughout the pelvis.  In order to facilitate this, left ureteral lysis was performed.  Dissection was facilitated with Bovie  electrocautery and, when appropriate, LigaSure.  Sizer was placed in the vagina in order to peel tumor off the vaginal apex.  Vaginal apex was not entered.  Eventually, tumor was completely freed from the most distal portion of the rectum and the entire pelvis.  This was handed off the field for permanent pathology.  Portion of the distal rectum was inspected and serosa remained.  There is concerned that this represented some residual tumor.  Therefore, contour stapler was again used with a green load to transect the small portion of the rectum.  This was marked with a suture at the proximal staple line was sent for permanent pathology.  Additional hemostasis was achieved at the deep pelvis using 2-0 Vicryl suture in a figure-of-eight fashion at the vaginal apex and a separate 2-0 Vicryl suture in a figure-of-eight fashion x2 at the left lateral perirectal tissue.  Bovie electrocautery was used to achieve further hemostasis.  Shoestring was placed on the colon which was noted to have adequate length and be well mobilized.  This descending portion of colon was noted to be viable.  Staple line was elevated with Allis clamps.  Suturing clamp was placed and a Ammon needle with 2-0 silk was passed through the suturing clamp.  Sizers were called for and a 25 mm EEA stapler was called for.  Anvil was secured.  Surgeon passed the EEA stapler through the anus and stapler was fired without difficulty.  Bubble test was performed and no leakage of air was identified.  Stapler was taken to a separate table and 2 concentric rings of tissue were identified.  Bowel clamp was removed.  Attention was turned to the ileum.  Shoestring clamps were again placed and subsequently removed.  Staple lines were elevated with Allis clamps and side-to-side anastomosis was performed using a 75 mm AYANNA stapler with a blue load.  Open portion of the viscera was elevated and a separate 75 mm stapler was used to close the defect.  Mesentery was  reapproximated using 2-0 Vicryl suture in a simple running stitch at the small bowel.  Surgical field was copiously irrigated and aspirated.  Good hemostasis was noted throughout.  All instruments, retractors, laparotomy sponges were removed from the abdominal cavity.  Surgeon and assistant regowned and gloved and a closure tray was used to close the abdominal cavity.  15 round Jose-Blum drain was placed at the right lower quadrant with its tail terminating deep in the pelvis.  This was secured to the skin using 2-0 silk in the usual fashion.  Fascia was closed with #1 looped PDS in a bulk closure.  All suprafascial tissue was irrigated, aspirated, and made hemostatic.  Deep dermis was reapproximated with 2-0 Vicryl in a simple running stitch.  Skin was closed with absorbable stapler and glue was placed at the skin.  All counts were correct.  Patient was taken to the recovery room in good condition.  There were no immediate complications.    Jamee Mcguire MD  04/03/20  15:12      Electronically signed by Jamee Mcguire MD at 04/03/20 1530         Jamee Mcguire MD   Physician   Gynecology   Progress Notes   Signed   Date of Service:  04/05/20 0808   Creation Time:  04/05/20 0808            Signed        Expand All Collapse All      Show:Clear all  [x]Manual[x]Template[x]Copied    Added by:  [x]Jamee Mcguire MD    []Usman for details  Gynecologic Oncology   Daily Progress Note     Chief Complaint: Post-op        Subjective      Patient did well oernight.  Her pain is controlled.  She is not having nausea or emesis.  She is tolerating modest clear liquids.  She is using her incentive spirometer.  She is voiding.  She has not passed flatus.  She is tolerating clear liquid diet.        Objective      Temp:  [97.1 °F (36.2 °C)-98.6 °F (37 °C)] 97.9 °F (36.6 °C)  Heart Rate:  [81-94] 94  Resp:  [16-18] 16  BP: (104-128)/(60-78) 110/63      Vitals:     04/05/20 0700   BP: 110/63   Pulse: 94   Resp: 16     Temp: 97.9 °F (36.6 °C)   SpO2: 99%      I/O last 3 completed shifts:  In: 1420 [P.O.:1420]  Out: 2670 [Urine:2600; Drains:70]      GENERAL: Alert, well-appearing female in no apparent distress.    CARDIOVASCULAR: Normal rate, regular rhythm, no murmurs, rubs, or gallops.    RESPIRATORY: Clear to auscultation bilaterally, normal respiratory effort  GASTROINTESTINAL:  Soft, appropriately tender, non-distended, no rebound or guarding.  Positive bowel sounds.  Incision(s) c/d/i.  GENITOURINARY: Crow removed  SKIN:  Warm, dry, well-perfused.    PSYCHIATRIC: AO x3, with appropriate affect, normal thought processes  EXREMITIES: Symmetric. No peripheral edema.           Lab Results   Component Value Date     WBC 6.45 04/05/2020     HGB 8.4 (L) 04/05/2020     HCT 27.2 (L) 04/05/2020     MCV 98.6 (H) 04/05/2020      04/05/2020     NEUTROABS 4.09 04/05/2020     GLUCOSE 87 04/05/2020     BUN 8 04/05/2020     CREATININE 0.62 04/05/2020     EGFRIFNONA 100 04/05/2020      04/05/2020     K 3.4 (L) 04/05/2020      04/05/2020     CO2 29.0 04/05/2020     CALCIUM 8.4 (L) 04/05/2020               Assessment/Plan      Roxie Carter is a 54 y.o. female s/p LAPAROTOMY EXPLORATORY,  OPTIMAL DEBULKING, R=O , CYSTOSCOPY WITH URETERAL CATHTER INSERTION AND REMOVAL, ILEAL RESECTION WITH SIDE TO SIDE ANASTOMOSIS, RECTAL SIGMOID RESECTION/LAR, LEFT URETEROLYSIS, AND PERITONEAL STRIPPING performed 4/3/2020     1.  Post-operative care  -Routine care  encourage ambulation, IS use, saline lock IV, d/c crow  -keep on clears for now, clear boost to BS  lactobacillus  -post op abx completed  -DC QUIN drain  2.  PPX  -lovenox  -Pepcid     3.  Disposition  -continue hospital stay until reaching goals of d/c; possible discharge in 1 to 2 days  -Sister with questions regarding discharge and can patient travel to [place] and stay with family during her recovery.  I think this is up to the patient and her family.             Jamee Mcguire MD  04/05/20  08:08

## 2020-04-06 NOTE — PROGRESS NOTES
Case Management Discharge Note      Final Note: Home    Provided Post Acute Provider List?: N/A    Destination      No service has been selected for the patient.      Durable Medical Equipment - Selection Complete      Service Provider Request Status Selected Services Address Phone Number Fax Number    [facility] [address] [place] [id number] [phone]      Dialysis/Infusion      No service has been selected for the patient.      Home Medical Care      No service has been selected for the patient.      Therapy      No service has been selected for the patient.      Community Resources      No service has been selected for the patient.             Final Discharge Disposition Code: 01 - home or self-care

## 2020-04-06 NOTE — PAYOR COMM NOTE
"Niurka Beth RN Utilization Review [phone]  Fax # [phone]  Ref # [id number]      Please note discharge date. Auth needed for one additional day.    Roxie Carter (54 y.o. Female)     Date of Birth Social Security Number Address Home Phone MRN    1965  [facility]  [address] [place] [address] [phone] [id number]    Shinto Marital Status          None Single       Admission Date Admission Type Admitting Provider Attending Provider Department, Room/Bed    4/3/20 Elective Jamee Mcguire MD Cottrill, Hope M., MD [facility] 5B, N542/1    Discharge Date Discharge Disposition Discharge Destination         Home or Self Care              Attending Provider:  Jamee Mcguire MD    Allergies:  Penicillins    Isolation:  None   Infection:  None   Code Status:  CPR    Ht:  167.6 cm (65.98\")   Wt:  52.3 kg (115 lb 4.8 oz)    Admission Cmt:  None   Principal Problem:  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                 Active Insurance as of 4/3/2020     Primary Coverage     Payor Plan Insurance Group Employer/Plan Group    [employer] CROSS BLUE [employer]IELD PPO [id number]     Payor Plan Address Payor Plan Phone Number Payor Plan Fax Number Effective Dates    [address] [phone]  1/1/2020 - None Entered    [facility] [id number]       Subscriber Name Subscriber Birth Date Member ID       ROXIE CARTER 1965 [id number]                 Emergency Contacts      (Rel.) Home Phone Work Phone Mobile Phone    MANISHA WALSH (Power of ) -- -- [phone]    MARLEE CARTER (Brother) -- -- [phone]    lovmichelle soto (Son) -- -- [phone]               Discharge Summary      Jamee Mcguire MD at 04/06/20 0930          Gynecologic Oncology      Discharge Summary    Date of Admission: 4/3/2020    Date of Discharge: 4/6/2020    Admission Diagnoses:    Malignant neoplasm of " "ovary, unspecified laterality (CMS/HCC) [C56.9]  Ovarian cancer (CMS/HCC) [C56.9]      Discharge Diagnoses: Same      Hospital Course:  Roxie Carter is a 54 y.o. 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    On 4/3/2020 she was admitted and underwent LAPAROTOMY EXPLORATORY,  OPTIMAL DEBULKING, R=O , CYSTOSCOPY WITH URETERAL CATHTER INSERTION AND REMOVAL, ILEAL RESECTION WITH SIDE TO SIDE ANASTOMOSIS, RECTAL SIGMOID RESECTION/LAR, LEFT URETEROLYSIS, AND PERITONEAL STRIPPING.  Please refer to dictated operative note for further details.  Post-operatively she did well.  Her diet was advanced.  Nonsurgical medical illnesses were appropriately managed during her hospital stay.  Her POD1 labs showed no significant anemia.  Chemistries were unremarkable.  By POD3 she was tolerating a general diet, voiding spontaneously, having her pain controlled with oral medications, and otherwise meeting criteria for discharge and she was discharged home in stable condition.    /55   Pulse 85   Temp 98.3 °F (36.8 °C) (Oral)   Resp 18   Ht 167.6 cm (65.98\")   Wt 52.3 kg (115 lb 4.8 oz)   SpO2 98%   Breastfeeding No   BMI 18.62 kg/m²        Physical examination:  Constitutional: Patient is a pleasant woman appearing her stated age who is in no acute distress  Respiratory: Clear to auscultation bilaterally, normal effort  Cardiovascular: Heart regular rate and rhythm, no extremity edema  Gastrointestinal: Positive bowel sounds, soft, nondistended, appropriately tender, no rebound, no guarding.  Incision clean dry and intact  Extremities: No cyanosis clubbing or edema    WBC   Date Value Ref Range Status   04/06/2020 9.91 3.40 - 10.80 10*3/mm3 Final     RBC   Date Value Ref Range Status   04/06/2020 3.15 (L) 3.77 - 5.28 10*6/mm3 Final     Hemoglobin   Date Value Ref Range Status   04/06/2020 9.5 (L) 12.0 - 15.9 g/dL Final "     Hematocrit   Date Value Ref Range Status   04/06/2020 30.6 (L) 34.0 - 46.6 % Final     MCV   Date Value Ref Range Status   04/06/2020 97.1 (H) 79.0 - 97.0 fL Final     MCH   Date Value Ref Range Status   04/06/2020 30.2 26.6 - 33.0 pg Final     MCHC   Date Value Ref Range Status   04/06/2020 31.0 (L) 31.5 - 35.7 g/dL Final     RDW   Date Value Ref Range Status   04/06/2020 13.0 12.3 - 15.4 % Final     RDW-SD   Date Value Ref Range Status   04/06/2020 46.1 37.0 - 54.0 fl Final     MPV   Date Value Ref Range Status   04/06/2020 10.0 6.0 - 12.0 fL Final     Platelets   Date Value Ref Range Status   04/06/2020 416 140 - 450 10*3/mm3 Final     Neutrophil %   Date Value Ref Range Status   04/06/2020 78.5 (H) 42.7 - 76.0 % Final     Lymphocyte %   Date Value Ref Range Status   04/06/2020 13.1 (L) 19.6 - 45.3 % Final     Monocyte %   Date Value Ref Range Status   04/06/2020 4.8 (L) 5.0 - 12.0 % Final     Eosinophil %   Date Value Ref Range Status   04/06/2020 3.1 0.3 - 6.2 % Final     Basophil %   Date Value Ref Range Status   04/06/2020 0.2 0.0 - 1.5 % Final     Immature Grans %   Date Value Ref Range Status   04/06/2020 0.3 0.0 - 0.5 % Final     Neutrophils, Absolute   Date Value Ref Range Status   04/06/2020 7.77 (H) 1.70 - 7.00 10*3/mm3 Final     Lymphocytes, Absolute   Date Value Ref Range Status   04/06/2020 1.30 0.70 - 3.10 10*3/mm3 Final     Monocytes, Absolute   Date Value Ref Range Status   04/06/2020 0.48 0.10 - 0.90 10*3/mm3 Final     Eosinophils, Absolute   Date Value Ref Range Status   04/06/2020 0.31 0.00 - 0.40 10*3/mm3 Final     Basophils, Absolute   Date Value Ref Range Status   04/06/2020 0.02 0.00 - 0.20 10*3/mm3 Final     Immature Grans, Absolute   Date Value Ref Range Status   04/06/2020 0.03 0.00 - 0.05 10*3/mm3 Final     nRBC   Date Value Ref Range Status   04/06/2020 0.0 0.0 - 0.2 /100 WBC Final         Discharge Medications:     Discharge Medications      New Medications      Instructions Start  Date   acetaminophen 325 MG tablet  Commonly known as:  TYLENOL   650 mg, Oral, Every 6 Hours PRN      anastrozole 1 MG tablet  Commonly known as:  ARIMIDEX   1 mg, Oral, Daily      ondansetron 4 MG tablet  Commonly known as:  ZOFRAN   4 mg, Oral, Every 6 Hours PRN      oxyCODONE 5 MG immediate release tablet  Commonly known as:  ROXICODONE   5 mg, Oral, Every 4 Hours PRN      polyethylene glycol powder  Commonly known as:  MIRALAX   17 g, Oral, Daily PRN      rivaroxaban 10 MG tablet  Commonly known as:  Xarelto   10 mg, Oral, Daily         Continue These Medications      Instructions Start Date   ibuprofen 200 MG tablet  Commonly known as:  ADVIL,MOTRIN   400-600 mg, Oral, Every 6 Hours PRN             Discharge Instructions:  She was instructed to call or return to medical attention for fever, severe pain, persistent nausea and vomiting, questions regarding medications, concerns regarding her incisions, excessive vaginal bleeding or discharge, or for any other acute concerns.  She was also instructed not to drive while taking narcotic pain medications, to abide by pelvic rest, avoid tub baths, and to avoid heavy lifting for at least 6 weeks.  Patient was educated regarding constipation prevention.      Condition at Discharge: Stable    Discharge Destination: Home    Results pending at time of discharge: Final surgical pathology     Follow Up: 3 weeks telemedicine visit    Electronically Signed by: Aileen Mcguire MD  Date: 4/6/2020      Time:  9:30 AM      Electronically signed by Aileen Mcguire MD at 04/06/20 0946       Discharge Order (From admission, onward)     Start     Ordered    04/06/20 0925  Discharge patient  Once     Expected Discharge Date:  04/06/20    Discharge Disposition:  Home or Self Care    Physician of Record for Attribution - Please select from Treatment Team:  AILEEN MCGUIRE [[id number]]    Review needed by CMO to determine Physician of Record:  No       Question Answer Comment    Physician of Record for Attribution - Please select from Treatment Team AILEEN SMITH    Review needed by CMO to determine Physician of Record No        04/06/20 0906

## 2020-04-06 NOTE — PLAN OF CARE
Problem: Patient Care Overview  Goal: Plan of Care Review  Outcome: Ongoing (interventions implemented as appropriate)  Flowsheets (Taken 4/6/2020 1229)  Progress: improving  Plan of Care Reviewed With: patient  Outcome Summary: VSS, pain well controlled, tolerating PO diet, voiding spontaneously, ambulating well, incision CDI. Patient to be discharged, all instructions reviewed.

## 2020-04-06 NOTE — PLAN OF CARE
Problem: Patient Care Overview  Goal: Plan of Care Review  Outcome: Ongoing (interventions implemented as appropriate)  Flowsheets (Taken 4/6/2020 8161)  Progress: improving  Plan of Care Reviewed With: patient  Outcome Summary: VSS, pain controlled with oral meds. UOP-WNL. Pt slept well. no c/o nausea. will continue to monitor. anticipate d/c today. tolerating diet.

## 2020-04-06 NOTE — PROGRESS NOTES
Discharge Planning Assessment  [facility]     Patient Name: Roxie Carter  MRN: [id number]  Today's Date: 4/6/2020    Admit Date: 4/3/2020    Discharge Needs Assessment     Row Name 04/06/20 1015       Living Environment    Lives With  child(zora), adult    Name(s) of Who Lives With Patient  SonTheron    Current Living Arrangements  home/apartment/condo    Primary Care Provided by  self    Provides Primary Care For  no one    Family Caregiver if Needed  child(zora), adult    Family Caregiver Names  SonTheron    Quality of Family Relationships  supportive;helpful    Able to Return to Prior Arrangements  yes    Living Arrangement Comments  Lives in [place] with 2 adult sons in apartment with 16 steps.  States she uses these steps without difficulty       Resource/Environmental Concerns    Resource/Environmental Concerns  none    Transportation Concerns  car, none       Transition Planning    Patient/Family Anticipates Transition to  home with family    Patient/Family Anticipated Services at Transition  none    Transportation Anticipated  family or friend will provide       Discharge Needs Assessment    Readmission Within the Last 30 Days  no previous admission in last 30 days    Concerns to be Addressed  denies needs/concerns at this time    Equipment Currently Used at Home  none    Anticipated Changes Related to Illness  none    Equipment Needed After Discharge  walker, rolling Patient requesting a rolling walker    Provided Post Acute Provider List?  N/A        Discharge Plan     Row Name 04/06/20 1018       Plan    Plan  Plan is home    Patient/Family in Agreement with Plan  yes    Plan Comments  Met with patient in the room for initial discharge planning.  Lives in [place] with 2 adult sons.  They live in an apartment with 16 steps that she states she uses without difficulty.  Independent.  She has no PCP.  Her POA is Amber Saini.  She states she would like a rolling walker and  wheelchair.  Informed patient that insurance will pay for one or the other, but not both at the same time.  Instructed her that a wheelchair can be ordered later if it is needed.  Verbalized understanding.  Following for discharge needs.     Final Discharge Disposition Code  01 - home or self-care        Destination      Coordination has not been started for this encounter.      Durable Medical Equipment      Coordination has not been started for this encounter.      Dialysis/Infusion      Coordination has not been started for this encounter.      Home Medical Care      Coordination has not been started for this encounter.      Therapy      Coordination has not been started for this encounter.      Community Resources      Coordination has not been started for this encounter.        Expected Discharge Date and Time     Expected Discharge Date Expected Discharge Time    Apr 6, 2020         Demographic Summary     Row Name 04/06/20 1014       General Information    Admission Type  inpatient    Arrived From  operating room    Referral Source  admission list    Reason for Consult  discharge planning    Preferred Language  English        Functional Status     Row Name 04/06/20 1015       Functional Status    Usual Activity Tolerance  moderate    Current Activity Tolerance  moderate       Functional Status, IADL    Medications  independent    Meal Preparation  independent    Housekeeping  independent    Laundry  independent    Shopping  independent        Psychosocial    No documentation.       Abuse/Neglect    No documentation.       Legal    No documentation.       Substance Abuse    No documentation.       Patient Forms    No documentation.           Guerda Lam, RN

## 2020-04-07 ENCOUNTER — READMISSION MANAGEMENT (OUTPATIENT)
Dept: CALL CENTER | Facility: HOSPITAL | Age: 55
End: 2020-04-07

## 2020-04-07 ENCOUNTER — TELEPHONE (OUTPATIENT)
Dept: GYNECOLOGIC ONCOLOGY | Facility: CLINIC | Age: 55
End: 2020-04-07

## 2020-04-07 NOTE — TELEPHONE ENCOUNTER
Patient would like to speak to Edelmira Norman    Sister is going to take her to [place] for recovery.    She needs letter from Dr. Mcguire saying it is ok for her to travel out of the state.    She said it is already in her chart, but it needs to be printed and sent to the front office so her sister can pick it up.    Says she is 5 minutes down the road.    They will be waiting outside.  Please call when ready.  [phone]    Unable to transfer call at this time.

## 2020-04-07 NOTE — OUTREACH NOTE
Prep Survey      Responses   [facility] facility patient discharged from?  [place]   Is LACE score < 7 ?  No   Eligibility  Readm Mgmt   Discharge diagnosis  LAPAROTOMY EXPLORATORY,  OPTIMAL DEBULKING, R=O , CYSTOSCOPY WITH URETERAL CATHTER INSERTION AND REMOVAL, ILEAL RESECTION WITH SIDE TO SIDE ANASTOMOSIS, RECTAL SIGMOID RESECTION/LAR, LEFT URETEROLYSIS, AND PERITONEAL STRIPPING.  Please refer to dictated    COVID-19 Test Status  Not tested   Does the patient have one of the following disease processes/diagnoses(primary or secondary)?  General Surgery   Does the patient have Home health ordered?  No   Is there a DME ordered?  Yes   What DME was ordered?  [employer] rolling walker    Prep survey completed?  Yes          Sara Conklin RN

## 2020-04-09 ENCOUNTER — READMISSION MANAGEMENT (OUTPATIENT)
Dept: CALL CENTER | Facility: HOSPITAL | Age: 55
End: 2020-04-09

## 2020-04-09 NOTE — OUTREACH NOTE
General Surgery Week 1 Survey      Responses   [facility] patient discharged from?  [place]   Does the patient have one of the following disease processes/diagnoses(primary or secondary)?  General Surgery   Is there a successful TCM telephone encounter documented?  No   Week 1 attempt successful?  Yes   Call start time  1547   Call end time  1551   Discharge diagnosis  LAPAROTOMY EXPLORATORY,  OPTIMAL DEBULKING, R=O , CYSTOSCOPY WITH URETERAL CATHTER INSERTION AND REMOVAL, ILEAL RESECTION WITH SIDE TO SIDE ANASTOMOSIS, RECTAL SIGMOID RESECTION/LAR, LEFT URETEROLYSIS, AND PERITONEAL STRIPPING.  Please refer to dictated    Meds reviewed with patient/caregiver?  Yes   Is the patient having any side effects they believe may be caused by any medication additions or changes?  No   Does the patient have all medications related to this admission filled (includes all antibiotics, pain medications, etc.)  Yes   Is the patient taking all medications as directed (includes completed medication regime)?  Yes   Does the patient have a follow up appointment scheduled with their surgeon?  Yes   Has the patient kept scheduled appointments due by today?  Yes   Has home health visited the patient within 72 hours of discharge?  N/A   What DME was ordered?  g[employer]s rolling walker    Has all DME been delivered?  Yes   Psychosocial issues?  No   Did the patient receive a copy of their discharge instructions?  Yes   Nursing interventions  Reviewed instructions with patient   What is the patient's perception of their health status since discharge?  Improving   Nursing interventions  Nurse provided patient education   Is the patient /caregiver able to teach back basic post-op care?  Take showers only when approved by MD-sponge bathe until then, Lifting as instructed by MD in discharge instructions, No tub bath, swimming, or hot tub until instructed by MD, Keep incision areas clean,dry and protected   Is the patient/caregiver able to  teach back signs and symptoms of incisional infection?  Increased redness, swelling or pain at the incisonal site, Increased drainage or bleeding, Fever, Incisional warmth, Pus or odor from incision   Is the patient/caregiver able to teach back steps to recovery at home?  Set small, achievable goals for return to baseline health, Rest and rebuild strength, gradually increase activity   Is the patient/caregiver able to teach back the hierarchy of who to call/visit for symptoms/problems? PCP, Specialist, Home health nurse, Urgent Care, ED, 911  Yes   Week 1 call completed?  Yes          Wilfredo Petit RN

## 2020-04-13 ENCOUNTER — TELEPHONE (OUTPATIENT)
Dept: GYNECOLOGIC ONCOLOGY | Facility: CLINIC | Age: 55
End: 2020-04-13

## 2020-04-13 NOTE — TELEPHONE ENCOUNTER
Pt called.  She hasn't had a bowel movement in three days.  She is drinking a full dose of Miralax daily and drinking lots of fluids.  She is passing gas.  I advised she take a full adult dose of Milk of Magnesia and repeat in two hours if no results.  If no results after that she is to call our office.  She verbalized understanding.

## 2020-04-14 ENCOUNTER — READMISSION MANAGEMENT (OUTPATIENT)
Dept: CALL CENTER | Facility: HOSPITAL | Age: 55
End: 2020-04-14

## 2020-04-14 NOTE — OUTREACH NOTE
General Surgery Week 2 Survey      Responses   [facility] patient discharged from?  [place]   Does the patient have one of the following disease processes/diagnoses(primary or secondary)?  General Surgery   Week 2 attempt successful?  No   Unsuccessful attempts  Attempt 1          Rose Weathers LPN

## 2020-04-16 ENCOUNTER — READMISSION MANAGEMENT (OUTPATIENT)
Dept: CALL CENTER | Facility: HOSPITAL | Age: 55
End: 2020-04-16

## 2020-04-16 NOTE — OUTREACH NOTE
General Surgery Week 2 Survey      Responses   [facility] patient discharged from?  [place]   Does the patient have one of the following disease processes/diagnoses(primary or secondary)?  General Surgery   Week 2 attempt successful?  No   Unsuccessful attempts  Attempt 2          Liz Anand RN

## 2020-04-22 ENCOUNTER — READMISSION MANAGEMENT (OUTPATIENT)
Dept: CALL CENTER | Facility: HOSPITAL | Age: 55
End: 2020-04-22

## 2020-04-22 RX ORDER — OXYCODONE HYDROCHLORIDE 5 MG/1
5 TABLET ORAL EVERY 4 HOURS PRN
Qty: 10 TABLET | Refills: 0 | Status: SHIPPED | OUTPATIENT
Start: 2020-04-22 | End: 2020-05-14

## 2020-04-22 NOTE — OUTREACH NOTE
General Surgery Week 3 Survey      Responses   [facility] patient discharged from?  [place]   Does the patient have one of the following disease processes/diagnoses(primary or secondary)?  General Surgery   Week 3 attempt successful?  No   Unsuccessful attempts  Attempt 1          Emily Salinas RN

## 2020-04-23 ENCOUNTER — TELEPHONE (OUTPATIENT)
Dept: GYNECOLOGIC ONCOLOGY | Facility: CLINIC | Age: 55
End: 2020-04-23

## 2020-04-23 ENCOUNTER — OFFICE VISIT (OUTPATIENT)
Dept: GYNECOLOGIC ONCOLOGY | Facility: CLINIC | Age: 55
End: 2020-04-23

## 2020-04-23 ENCOUNTER — READMISSION MANAGEMENT (OUTPATIENT)
Dept: CALL CENTER | Facility: HOSPITAL | Age: 55
End: 2020-04-23

## 2020-04-23 DIAGNOSIS — Z98.890 POST-OPERATIVE STATE: Primary | ICD-10-CM

## 2020-04-23 PROCEDURE — 99024 POSTOP FOLLOW-UP VISIT: CPT | Performed by: OBSTETRICS & GYNECOLOGY

## 2020-04-23 NOTE — PROGRESS NOTES
"Roxie Carter  [id number]  1965      Reason for Visit: Postoperative evaluation    History of Present Illness:  Patient is a very pleasant 55 y.o. woman who presents for a post operative evaluation status post LAPAROTOMY EXPLORATORY,  OPTIMAL DEBULKING, R=O , CYSTOSCOPY WITH URETERAL CATHTER INSERTION AND REMOVAL, ILEAL RESECTION WITH SIDE TO SIDE ANASTOMOSIS, RECTAL SIGMOID RESECTION/LAR, LEFT URETEROLYSIS, AND PERITONEAL STRIPPING performed on 4/3/2020.        Surgery and hospital course were uncomplicated.  Today, patient notes normal bowel and bladder function.  Using miralax daily.  Some pressure.  Intermittent pressure with urination. Her pain is well controlled. She has questions about resuming normal activities. Vaginal spotting- occasionally, not daily.  Eating small frequent meals -hamburger, pasta.  Recovered in [place]. Back home now as of yesterday.  Clothes loose, but not \"falling off.\"  Son doing shopping.  No scale at home.  No incisional complaints -not draining or red.  Glue starting to fall off.  Occasional nausea - car ride home was rough.  No emesis.  Temp 101.3 Tuesday PM.  Felt fine. Took tylenol - temp went normal and no further fever.      TELEMEDICINE FOLLOW-UP     In order to limit face-to-face contact and enact \"social distancing\" in light of the COVID-19 outbreaks and in accordance to the recommendations per the CDC, WHO, and our individual department, Roxie Carter  was contacted.  Telemedicine was used to screen the patient for needs and conduct the patient's regularly scheduled follow-up.     COVID-19 screening: female specifically denies fever, body aches, cough, difficulty breathing, sore throat, runny nose, change in taste/smell, or known sick exposures. Travel as noted above.     Past Medical History, Past Surgical History, Social History, Family History have been reviewed and are without significant changes except as mentioned.    Review of Systems   All " other systems were reviewed and are negative except as mentioned above.    Medications:  The current medication list was reviewed in the EMR    ALLERGIES:    Allergies   Allergen Reactions   • Penicillins Hives           There were no vitals taken for this visit.       Physical Exam  Unable to perform    PATHOLOGY:  Final Diagnosis   1.  LEFT PELVIC BRIM, BIOPSY:  Fibrovascular and adipose connective tissues with mesothelium and hemosiderin, no tumor identified.   2.  PELVIC TUMOR WITH RECTOSIGMOID AND PARTIAL TERMINAL ILEUM:  Infiltrating granular cell tumor, tumor size approximately 1.6 x 1.5 x 1.0 cm.   Sigmoid and ileum with organizing hemorrhagic serositis.  No lymphvascular invasion identified.   Eight (8) mesenteric lymph nodes, no tumor seen.  3.  TISSUE SUBMITTED AS PELVIC PERITONEUM, EXCISION:  Fibrotically organizing hemorrhage, no tumor identified.   4.  ADDITIONAL RECTUM:  Fibrotically organizing serositis, no tumor identified.         ASSESSMENT/PLAN:  Roxie Caretr returns for a post-operative evaluation today.  All pathology reports were discussed.    On arimidex for hormonal management.   Encouraged PO.        Overall, the patient is very pleased with her care.  I recommended continuation of post operative precautions as discussed.     She is to follow-up in 3 weeks for face-to face visit.   I personally spent 13 minutes on the telephone with the patient.    Jamee Mcguire MD  04/23/20  11:27 AM

## 2020-04-23 NOTE — TELEPHONE ENCOUNTER
Tried to reach pt to do pre-telephone visit chart review.  No answer or option to leave message.  Will try again later.     I spoke with pt.  Chart review completed.

## 2020-04-23 NOTE — OUTREACH NOTE
General Surgery Week 3 Survey      Responses   [facility] patient discharged from?  [place]   Does the patient have one of the following disease processes/diagnoses(primary or secondary)?  General Surgery   Week 3 attempt successful?  Yes   Call start time  1629   Rescheduled  Rescheduled-pt requested [Sister Amber with limited patient information. States that patient has been in [place] with another sister until yesterday. Callback to patient #. ]   Call end time  1632   General alerts for this patient  Follow up calls to patient #. Sister Amber with limited information.    Discharge diagnosis  LAPAROTOMY EXPLORATORY,  OPTIMAL DEBULKING, R=O , CYSTOSCOPY WITH URETERAL CATHTER INSERTION AND REMOVAL, ILEAL RESECTION WITH SIDE TO SIDE ANASTOMOSIS, RECTAL SIGMOID RESECTION/LAR, LEFT URETEROLYSIS, AND PERITONEAL STRIPPING.  Please refer to dictated    Is patient permission given to speak with other caregiver?  Yes   List who call center can speak with  DON Clark   Person spoke with today (if not patient) and relationship  DON Clark RN

## 2020-04-24 ENCOUNTER — READMISSION MANAGEMENT (OUTPATIENT)
Dept: CALL CENTER | Facility: HOSPITAL | Age: 55
End: 2020-04-24

## 2020-04-24 NOTE — OUTREACH NOTE
General Surgery Week 3 Survey      Responses   [facility] patient discharged from?  [place]   Does the patient have one of the following disease processes/diagnoses(primary or secondary)?  General Surgery   Week 3 attempt successful?  No   Unsuccessful attempts  Attempt 2          Rose Weathers LPN

## 2020-04-27 RX ORDER — ACETAMINOPHEN 325 MG/1
650 TABLET ORAL EVERY 6 HOURS PRN
Qty: 60 TABLET | Refills: 0 | Status: SHIPPED | OUTPATIENT
Start: 2020-04-27 | End: 2020-06-05 | Stop reason: SDUPTHER

## 2020-04-30 LAB
CYTO UR: NORMAL
LAB AP CARIS, ADDENDUM: NORMAL
LAB AP CASE REPORT: NORMAL
LAB AP CLINICAL INFORMATION: NORMAL
PATH REPORT.FINAL DX SPEC: NORMAL
PATH REPORT.GROSS SPEC: NORMAL

## 2020-05-14 ENCOUNTER — OFFICE VISIT (OUTPATIENT)
Dept: GYNECOLOGIC ONCOLOGY | Facility: CLINIC | Age: 55
End: 2020-05-14

## 2020-05-14 VITALS
HEART RATE: 113 BPM | BODY MASS INDEX: 17.92 KG/M2 | SYSTOLIC BLOOD PRESSURE: 119 MMHG | OXYGEN SATURATION: 96 % | TEMPERATURE: 98 F | DIASTOLIC BLOOD PRESSURE: 66 MMHG | RESPIRATION RATE: 14 BRPM | WEIGHT: 111 LBS

## 2020-05-14 DIAGNOSIS — C56.1 MALIGNANT NEOPLASM OF RIGHT OVARY (HCC): ICD-10-CM

## 2020-05-14 DIAGNOSIS — D39.11 GRANULOSA CELL TUMOR OF OVARY, RIGHT: ICD-10-CM

## 2020-05-14 DIAGNOSIS — Z98.890 POST-OPERATIVE STATE: Primary | ICD-10-CM

## 2020-05-14 PROCEDURE — 99024 POSTOP FOLLOW-UP VISIT: CPT | Performed by: OBSTETRICS & GYNECOLOGY

## 2020-05-14 RX ORDER — NYSTATIN 100000 U/G
CREAM TOPICAL 2 TIMES DAILY PRN
Qty: 15 G | Refills: 0 | Status: SHIPPED | OUTPATIENT
Start: 2020-05-14 | End: 2020-10-05

## 2020-05-14 NOTE — PROGRESS NOTES
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 year old female who presents today for treatment and evaluation of the above issues.    Today, patient complains of difficulty with bowel movements.  She notes she has small frequent soft bowel movements.  She was taking MiraLAX daily and stool softeners daily.  She likes the milk of magnesia better than the MiraLAX so she has discontinued the MiraLAX.  She complains of intermittent left lower quadrant pain.  She is taking Tylenol and ibuprofen for this.  She notes some pelvic pressure.  She denies fecal urgency or incontinence.  She is no longer taking narcotics and has completed her Xarelto prophylactic anticoagulant.  She cannot walk or stand for long periods of time but walks today from the waiting area to her room without difficulty.  She notes that her discomfort and stamina is slowly improving.  She declines physical therapy referral for this.  She continues to have nausea every other day or about 3 times a week.  She is taking anti-medics for this and notes that she is overall eating better.  She had questions about aromatase treatment and if it could be contributing to nausea and hair loss.  She has a rash at her incision.    OBGYN History:  She is a .  She does not use HRT. She does not have a history of abnormal pap smears.  Oncologic History:     Granulosa cell tumor of ovary, right    2013 Cancer Staged     Staging form: Ovary, Fallopian Tube, And Primary Peritoneal Carcinoma, AJCC 8th Edition  - Clinical stage from 2013: FIGO Stage IC (cT1c, cN0, cM0) - Signed by Jamee Mcguire MD on 3/26/2020       - 2014 Chemotherapy     OP OVARIAN PACLitaxel / CARBOplatin (Q21D)  x6 cycles remarkable for bone marrow suppression and G-CSF support      2013 Surgery     Total abdominal hysterectomy, bilateral  salpingo-oophorectomy with pathology showing 5 cm granulosa cell tumor of the right ovary and a left fallopian tube intraepithelial serous neoplasm.      3/14/2020 Progression     CT Abdomen/Pelvis IMPRESSION:  Large heterogeneous mass identified within the pelvis with  fluid seen scattered throughout the abdomen and pelvis. Findings  concerning for recurrence with some stranding of the mesentery in which  spread to the mesentery cannot be excluded.      4/27/2020 Molecular Testing     CARIS testing results:  AK positive, 1+, 75%  ER negative, MSI stable, PD-L1 negative           Past Medical History:   Diagnosis Date   • Arthritis    • Cancer (CMS/HCC) 2013    OVARIAN; s/p hysterectomy with BSO and chemo therapy that ended in 2014   • Constipation    • Heartburn    • History of pneumonia 02/2019    no hospitalization    • Murmur     detected as an adult around age 30 and 40; requires prophylactic antibiotics before dental work; pt not sure if she has ever had an echo or when it might have been; Dr Stephen came down and listened to heart sounds on 4/2/20 and did not detect a murmur   • Pelvic mass    • Vitamin D deficiency        Past Surgical History:   Procedure Laterality Date   • BACK SURGERY      fusion    • COLONOSCOPY  2016   • ENDOSCOPY     • EXPLORATORY LAPAROTOMY N/A 4/3/2020    Procedure: LAPAROTOMY EXPLORATORY,  OPTIMAL DEBULKING, CYSTOSCOPY WITH URETERAL CATHTER INSERTION AND REMOVAL, ILEO RESECTION, SIDE TO SIDE ANASTOMOSIS, RECTAL SIGMOID RESECTION, LEFT URETERA LYSIS, AND PERITONEAL STRIPPING;  Surgeon: Jamee Mcguire MD;  Location: [facility];  Service: Gynecology Oncology;  Laterality: N/A;   • HYSTERECTOMY  2013    with BSO       MEDICATIONS: The current medication list was reviewed with the patient and updated in the EMR this date per the Medical Assistant. Medication dosages and frequencies were confirmed to be accurate.      Allergies:  is allergic to penicillins.    Social History:   Social  History     Socioeconomic History   • Marital status: Single     Spouse name: Not on file   • Number of children: Not on file   • Years of education: Not on file   • Highest education level: Not on file   Tobacco Use   • Smoking status: Former Smoker     Packs/day: 0.50     Years: 30.00     Pack years: 15.00     Types: Electronic Cigarette, Cigarettes   • Smokeless tobacco: Never Used   • Tobacco comment: vape-nicotine currently; quit cigarettes july 2019   Substance and Sexual Activity   • Alcohol use: Never     Frequency: Never   • Drug use: Never   • Sexual activity: Defer       Family History:    Family History   Problem Relation Age of Onset   • Diabetes type II Father    • Heart disease Father    • Heart attack Father    • Diabetes type II Mother    • Lung cancer Maternal Uncle        Health Maintenance:    Health Maintenance   Topic Date Due   • MAMMOGRAM  1965   • ANNUAL PHYSICAL  04/19/1968   • ZOSTER VACCINE (1 of 2) 04/19/2015   • HEPATITIS C SCREENING  03/16/2020   • COLONOSCOPY  03/16/2020   • INFLUENZA VACCINE  08/01/2020   • TDAP/TD VACCINES (2 - Td) 01/02/2029         Review of Systems   Constitutional: Positive for fatigue. Negative for appetite change, chills, fever and unexpected weight change.   HENT: Positive for tinnitus. Negative for congestion, hearing loss and sore throat.    Eyes: Negative for redness and visual disturbance.   Respiratory: Negative for cough, shortness of breath and wheezing.         Chest pain   Cardiovascular: Negative for chest pain, palpitations and leg swelling.   Gastrointestinal: Positive for abdominal pain, constipation and nausea. Negative for abdominal distention, blood in stool, diarrhea and vomiting.        Heartburn   Endocrine: Negative.  Negative for cold intolerance and heat intolerance.   Genitourinary: Negative for difficulty urinating, dyspareunia, dysuria, frequency, genital sores, hematuria, pelvic pain, urgency, vaginal bleeding, vaginal  discharge and vaginal pain.   Musculoskeletal: Negative for arthralgias, back pain, gait problem and joint swelling.   Skin: Negative for rash and wound.   Allergic/Immunologic: Negative for food allergies and immunocompromised state.   Neurological: Positive for dizziness, numbness and headaches. Negative for seizures, syncope, weakness and light-headedness.   Hematological: Negative for adenopathy. Does not bruise/bleed easily.   Psychiatric/Behavioral: Negative.  Negative for confusion, dysphoric mood and sleep disturbance. The patient is not nervous/anxious.        Physical Exam    Vitals:    05/14/20 0942   BP: 119/66   Pulse: 113   Resp: 14   Temp: 98 °F (36.7 °C)   TempSrc: Temporal   SpO2: 96%   Weight: 50.3 kg (111 lb)   PainSc:   6       Body mass index is 17.92 kg/m².    Wt Readings from Last 3 Encounters:   05/14/20 50.3 kg (111 lb)   04/03/20 52.3 kg (115 lb 4.8 oz)   04/02/20 52.3 kg (115 lb 6.4 oz)         GENERAL: Alert, thin-appearing female appearing her stated age who is in no apparent distress.   HEENT: Sclera anicteric. Head normocephalic, atraumatic. Mucus membranes moist.   NECK: Deferred  BREASTS: Deferred  CARDIOVASCULAR:  Deferred  RESPIRATORY:  Deferred  BACK: Deferred  GASTROINTESTINAL:   Soft, appropriately tender, no rebound, guarding or mass.  No hernia noted with Valsalva.  Incision is clean dry and intact with rash suggestive of cutaneous candidiasis.  SKIN:  Warm, dry, well-perfused.  All visible areas intact.  No lesions, ulcers.  PSYCHIATRIC: AO x3, with appropriate affect, normal thought processes.  NEUROLOGIC: No focal deficits.  Ambulating slowly and without difficulty  MUSCULOSKELETAL: Ambulating slowly and without difficulty  EXTREMITIES:   No cyanosis, clubbing, symmetric.  LYMPHATICS:   Deferred     PELVIC exam: External genitalia are free from lesion.  On bimanual examination, no mass was appreciated.  Uterus cervix and adnexa are surgically absent.  No nodularity was  appreciated.  Rectovaginal exam was deferred.    ECOG PS 1    PROCEDURES: None    Diagnostic Data:    Pathology:  Final Diagnosis   1.  LEFT PELVIC BRIM, BIOPSY:  Fibrovascular and adipose connective tissues with mesothelium and hemosiderin, no tumor identified.   2.  PELVIC TUMOR WITH RECTOSIGMOID AND PARTIAL TERMINAL ILEUM:  Infiltrating granular cell tumor, tumor size approximately 1.6 x 1.5 x 1.0 cm.   Sigmoid and ileum with organizing hemorrhagic serositis.  No lymphvascular invasion identified.   Eight (8) mesenteric lymph nodes, no tumor seen.  3.  TISSUE SUBMITTED AS PELVIC PERITONEUM, EXCISION:  Fibrotically organizing hemorrhage, no tumor identified.   4.  ADDITIONAL RECTUM:  Fibrotically organizing serositis, no tumor identified.     No radiology results for the last 30 days.    Lab Results   Component Value Date    WBC 9.91 04/06/2020    HGB 9.5 (L) 04/06/2020    HCT 30.6 (L) 04/06/2020    MCV 97.1 (H) 04/06/2020     04/06/2020    NEUTROABS 7.77 (H) 04/06/2020    GLUCOSE 92 04/06/2020    BUN 10 04/06/2020    CREATININE 0.52 (L) 04/06/2020    EGFRIFNONA 123 04/06/2020     04/06/2020    K 4.1 04/06/2020     04/06/2020    CO2 28.0 04/06/2020    CALCIUM 9.0 04/06/2020    ALBUMIN 4.10 04/02/2020    AST 41 (H) 04/02/2020    ALT 38 (H) 04/02/2020    BILITOT 0.4 04/02/2020     No results found for:         Assessment/Plan   This is a 55 y.o. woman with recurrent granulosa cell tumor of the ovary for postoperative evaluation.  On Arimidex.    Encounter Diagnoses   Name Primary?   • Post-operative state Yes   • Granulosa cell tumor of ovary, right    • Malignant neoplasm of right ovary (CMS/HCC)      History of granulosa cell tumor of right ovary now with clinical findings consistent with recurrence  Status post complete resection of cancer recurrence.  Surgery remarkable for intra-abdominal adhesive disease and extensive hemosiderin/hemorrhagic component which made it challenging at the  time to determine what was cancer and what was not.  Appropriate postoperative recovery.  Patient reassured.  -Continue Arimidex.  We discussed ongoing nausea and complains of hair loss.  Given patient's distressful initial presentation, REM attacks is probably the best tolerated treatment option for her at this point.  -She was advised that at some point she will likely have a cancer recurrence.  She was advised that surgery is the mainstay of treatment for this indolent slow-growing cancer and that it tends to be chemo resistant.  She verbalized understanding.  -She is encouraged to call with any concerns regarding her ongoing recovery.    Poor appetite  -Overall improved, weight back to baseline    Ongoing constipation  -Bowel regimen of stool softeners, milk of magnesia, plus minus MiraLAX    Poor mobility  -Improved, patient ambulating    Financial concerns, social stressors  -Patient previously seen by  today.    Pain assessment was performed today as a part of patient’s care.  For patients with pain related to surgery, gynecologic malignancy or cancer treatment, the plan is as noted in the assessment/plan.  For patients with pain not related to these issues, they are to seek any further needed care from a more appropriate provider, such as PCP.      No orders of the defined types were placed in this encounter.    FOLLOW UP: 3-month surveillance visit.  Patient to alternate between APRN and myself for surveillance visits.    Electronically Signed by: Jamee Mcguire MD  Date: 5/14/2020

## 2020-06-05 RX ORDER — ACETAMINOPHEN 325 MG/1
650 TABLET ORAL EVERY 6 HOURS PRN
Qty: 60 TABLET | Refills: 0 | Status: SHIPPED | OUTPATIENT
Start: 2020-06-05 | End: 2020-10-05

## 2020-06-09 ENCOUNTER — TELEPHONE (OUTPATIENT)
Dept: GYNECOLOGIC ONCOLOGY | Facility: CLINIC | Age: 55
End: 2020-06-09

## 2020-06-09 RX ORDER — NITROFURANTOIN 25; 75 MG/1; MG/1
100 CAPSULE ORAL 2 TIMES DAILY
Qty: 14 CAPSULE | Refills: 0 | Status: SHIPPED | OUTPATIENT
Start: 2020-06-09 | End: 2020-06-16

## 2020-06-09 NOTE — TELEPHONE ENCOUNTER
----- Message from Roxie Carter sent at 6/9/2020  1:11 PM EDT -----  Regarding: Non-Urgent Medical Question  Contact: [phone]  I have had this UTI for about 4 days now and I've been taking this over the counter medicine called AZO for it and it takes away the urgency to go pee for about 3 hours and then it comes back. Its not helping. Is there a prescription Dr Mcguire can call in for it for me?  Please get back to me as soon as you can if possible. Thank you....Roxie Carter    Spoke with pt then Dr. Mcguire.  Macrobid sent to pt's pharmacy.  Pt informed and advised to call our office back if symptoms persist or worsen.  She v/u.

## 2020-06-19 ENCOUNTER — TELEPHONE (OUTPATIENT)
Dept: GYNECOLOGIC ONCOLOGY | Facility: CLINIC | Age: 55
End: 2020-06-19

## 2020-06-26 ENCOUNTER — LAB (OUTPATIENT)
Dept: LAB | Facility: HOSPITAL | Age: 55
End: 2020-06-26

## 2020-06-26 ENCOUNTER — OFFICE VISIT (OUTPATIENT)
Dept: GYNECOLOGIC ONCOLOGY | Facility: CLINIC | Age: 55
End: 2020-06-26

## 2020-06-26 ENCOUNTER — TELEPHONE (OUTPATIENT)
Dept: GYNECOLOGIC ONCOLOGY | Facility: CLINIC | Age: 55
End: 2020-06-26

## 2020-06-26 DIAGNOSIS — R10.32 LLQ PAIN: ICD-10-CM

## 2020-06-26 DIAGNOSIS — D39.11 GRANULOSA CELL TUMOR OF OVARY, RIGHT: Primary | ICD-10-CM

## 2020-06-26 LAB
ALBUMIN SERPL-MCNC: 4.5 G/DL (ref 3.5–5.2)
ALBUMIN/GLOB SERPL: 1.8 G/DL
ALP SERPL-CCNC: 88 U/L (ref 39–117)
ALT SERPL W P-5'-P-CCNC: 16 U/L (ref 1–33)
AMYLASE SERPL-CCNC: 63 U/L (ref 28–100)
ANION GAP SERPL CALCULATED.3IONS-SCNC: 8 MMOL/L (ref 5–15)
AST SERPL-CCNC: 17 U/L (ref 1–32)
BACTERIA UR QL AUTO: ABNORMAL /HPF
BILIRUB SERPL-MCNC: 0.3 MG/DL (ref 0.2–1.2)
BILIRUB UR QL STRIP: NEGATIVE
BUN BLD-MCNC: 19 MG/DL (ref 6–20)
BUN/CREAT SERPL: 28.8 (ref 7–25)
CALCIUM SPEC-SCNC: 9.6 MG/DL (ref 8.6–10.5)
CHLORIDE SERPL-SCNC: 104 MMOL/L (ref 98–107)
CLARITY UR: CLEAR
CO2 SERPL-SCNC: 28 MMOL/L (ref 22–29)
COLOR UR: YELLOW
CREAT BLD-MCNC: 0.66 MG/DL (ref 0.57–1)
ERYTHROCYTE [DISTWIDTH] IN BLOOD BY AUTOMATED COUNT: 14.2 % (ref 12.3–15.4)
GFR SERPL CREATININE-BSD FRML MDRD: 93 ML/MIN/1.73
GLOBULIN UR ELPH-MCNC: 2.5 GM/DL
GLUCOSE BLD-MCNC: 101 MG/DL (ref 65–99)
GLUCOSE UR STRIP-MCNC: NEGATIVE MG/DL
HCT VFR BLD AUTO: 45.3 % (ref 34–46.6)
HGB BLD-MCNC: 14 G/DL (ref 12–15.9)
HGB UR QL STRIP.AUTO: NEGATIVE
HYALINE CASTS UR QL AUTO: ABNORMAL /LPF
KETONES UR QL STRIP: ABNORMAL
LEUKOCYTE ESTERASE UR QL STRIP.AUTO: ABNORMAL
LIPASE SERPL-CCNC: 22 U/L (ref 13–60)
LYMPHOCYTES # BLD AUTO: 2.2 10*3/MM3 (ref 0.7–3.1)
LYMPHOCYTES NFR BLD AUTO: 47.1 % (ref 19.6–45.3)
MCH RBC QN AUTO: 27.9 PG (ref 26.6–33)
MCHC RBC AUTO-ENTMCNC: 31 G/DL (ref 31.5–35.7)
MCV RBC AUTO: 90 FL (ref 79–97)
MONOCYTES # BLD AUTO: 0.3 10*3/MM3 (ref 0.1–0.9)
MONOCYTES NFR BLD AUTO: 6.7 % (ref 5–12)
NEUTROPHILS # BLD AUTO: 2.1 10*3/MM3 (ref 1.7–7)
NEUTROPHILS NFR BLD AUTO: 46.2 % (ref 42.7–76)
NITRITE UR QL STRIP: NEGATIVE
PH UR STRIP.AUTO: <=5 [PH] (ref 5–8)
PLATELET # BLD AUTO: 276 10*3/MM3 (ref 140–450)
PMV BLD AUTO: 7.8 FL (ref 6–12)
POTASSIUM BLD-SCNC: 4.2 MMOL/L (ref 3.5–5.2)
PROT SERPL-MCNC: 7 G/DL (ref 6–8.5)
PROT UR QL STRIP: NEGATIVE
RBC # BLD AUTO: 5.04 10*6/MM3 (ref 3.77–5.28)
RBC # UR: ABNORMAL /HPF
REF LAB TEST METHOD: ABNORMAL
SODIUM BLD-SCNC: 140 MMOL/L (ref 136–145)
SP GR UR STRIP: 1.03 (ref 1–1.03)
SQUAMOUS #/AREA URNS HPF: ABNORMAL /HPF
UROBILINOGEN UR QL STRIP: ABNORMAL
WBC NRBC COR # BLD: 4.6 10*3/MM3 (ref 3.4–10.8)
WBC UR QL AUTO: ABNORMAL /HPF

## 2020-06-26 PROCEDURE — 81001 URINALYSIS AUTO W/SCOPE: CPT

## 2020-06-26 PROCEDURE — 83690 ASSAY OF LIPASE: CPT

## 2020-06-26 PROCEDURE — 99214 OFFICE O/P EST MOD 30 MIN: CPT | Performed by: NURSE PRACTITIONER

## 2020-06-26 PROCEDURE — 36415 COLL VENOUS BLD VENIPUNCTURE: CPT

## 2020-06-26 PROCEDURE — 85025 COMPLETE CBC W/AUTO DIFF WBC: CPT

## 2020-06-26 PROCEDURE — 87086 URINE CULTURE/COLONY COUNT: CPT

## 2020-06-26 PROCEDURE — 82150 ASSAY OF AMYLASE: CPT

## 2020-06-26 PROCEDURE — 80053 COMPREHEN METABOLIC PANEL: CPT

## 2020-06-26 RX ORDER — HYDROCODONE BITARTRATE AND ACETAMINOPHEN 5; 325 MG/1; MG/1
1 TABLET ORAL EVERY 6 HOURS PRN
Qty: 20 TABLET | Refills: 0 | Status: SHIPPED | OUTPATIENT
Start: 2020-06-26 | End: 2020-10-05

## 2020-06-26 NOTE — TELEPHONE ENCOUNTER
Phoned patient. Offered her an appointment for this morning with Lexy at either 9 or 11. Patient choose 11. States that she could not get here by 9.

## 2020-06-26 NOTE — TELEPHONE ENCOUNTER
----- Message from Kcai Castro MA sent at 6/26/2020  8:10 AM EDT -----  Regarding: Non-Urgent Medical Question  Contact: [phone]      ----- Message -----  From: Roxie Carter  Sent: 6/25/2020   9:14 PM EDT  To: [facility]  Subject: Non-Urgent Medical Question                      I know I sent my last message out to you a little late in order to hear back from you with your answer. But if Dr Mcguire can't get me in earlier than the 2nd, should I go on into the ER to get looked at? I am in so much pain and anything I'm taking for it is not helping with it.  I am losing sleep over this pain as well.  Thank you again Roxie.Demarcus..

## 2020-06-27 LAB — BACTERIA SPEC AEROBE CULT: NO GROWTH

## 2020-06-30 ENCOUNTER — HOSPITAL ENCOUNTER (OUTPATIENT)
Dept: CT IMAGING | Facility: HOSPITAL | Age: 55
Discharge: HOME | End: 2020-06-30
Admitting: NURSE PRACTITIONER

## 2020-06-30 DIAGNOSIS — R10.32 LLQ PAIN: ICD-10-CM

## 2020-06-30 DIAGNOSIS — D39.11 GRANULOSA CELL TUMOR OF OVARY, RIGHT: ICD-10-CM

## 2020-06-30 PROCEDURE — 74177 CT ABD & PELVIS W/CONTRAST: CPT

## 2020-06-30 PROCEDURE — 25010000002 IOPAMIDOL 61 % SOLUTION: Performed by: NURSE PRACTITIONER

## 2020-06-30 RX ADMIN — IOPAMIDOL 60 ML: 612 INJECTION, SOLUTION INTRAVENOUS at 14:00

## 2020-07-02 ENCOUNTER — OFFICE VISIT (OUTPATIENT)
Dept: GYNECOLOGIC ONCOLOGY | Facility: CLINIC | Age: 55
End: 2020-07-02

## 2020-07-02 VITALS
OXYGEN SATURATION: 96 % | RESPIRATION RATE: 12 BRPM | HEART RATE: 88 BPM | TEMPERATURE: 98.2 F | SYSTOLIC BLOOD PRESSURE: 120 MMHG | WEIGHT: 115 LBS | BODY MASS INDEX: 18.57 KG/M2 | DIASTOLIC BLOOD PRESSURE: 56 MMHG

## 2020-07-02 DIAGNOSIS — R10.32 LLQ PAIN: ICD-10-CM

## 2020-07-02 DIAGNOSIS — C56.1 MALIGNANT NEOPLASM OF RIGHT OVARY (HCC): Primary | ICD-10-CM

## 2020-07-02 DIAGNOSIS — D39.11 GRANULOSA CELL TUMOR OF OVARY, RIGHT: ICD-10-CM

## 2020-07-02 PROCEDURE — 99214 OFFICE O/P EST MOD 30 MIN: CPT | Performed by: OBSTETRICS & GYNECOLOGY

## 2020-07-02 NOTE — PROGRESS NOTES
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 year old female who presents today for treatment and evaluation of the above issues.    Today, patient complains of LLQ pain and tingling at her incision site. This pain is associated with getting up or lying down or any activity that involves twisting or turning. This began about 2 weeks ago and has persisted. The only thing that makes the pain better is rest and the Lortab that she was recently prescribed. She is only rarely needing to take that stronger medication and shares that her brother  of an OD on Percocet. This pain is not associated with any diarrhea, increased constipation. She denies any fevers/chills or skin changes near her incision. Due to this persistent pain, she got a CT scan two days ago and is here today to review those images.    OBGYN History:  She is a .  She does not use HRT. She does not have a history of abnormal pap smears.  Oncologic History:     Granulosa cell tumor of ovary, right    2013 Cancer Staged     Staging form: Ovary, Fallopian Tube, And Primary Peritoneal Carcinoma, AJCC 8th Edition  - Clinical stage from 2013: FIGO Stage IC (cT1c, cN0, cM0) - Signed by Jamee Mcguire MD on 3/26/2020      2013 Surgery     Total abdominal hysterectomy, bilateral salpingo-oophorectomy with pathology showing 5 cm granulosa cell tumor of the right ovary and a left fallopian tube intraepithelial serous neoplasm.       - 2014 Chemotherapy     OP OVARIAN PACLitaxel / CARBOplatin (Q21D)  x6 cycles remarkable for bone marrow suppression and G-CSF support      3/14/2020 Progression     CT Abdomen/Pelvis IMPRESSION:  Large heterogeneous mass identified within the pelvis with  fluid seen scattered throughout the abdomen and pelvis.  Findings  concerning for recurrence with some stranding of the mesentery in which  spread to the mesentery cannot be excluded.      4/3/2020 Surgery     LAPAROTOMY EXPLORATORY,  OPTIMAL DEBULKING, R=O , CYSTOSCOPY WITH URETERAL CATHTER INSERTION AND REMOVAL, ILEAL RESECTION WITH SIDE TO SIDE ANASTOMOSIS, RECTAL SIGMOID RESECTION/LAR, LEFT URETEROLYSIS, AND PERITONEAL STRIPPING      4/27/2020 Molecular Testing     CARIS testing results:  MD positive, 1+, 75%  ER negative, MSI stable, PD-L1 negative           Past Medical History:   Diagnosis Date   • Arthritis    • Cancer (CMS/HCC) 2013    OVARIAN; s/p hysterectomy with BSO and chemo therapy that ended in 2014   • Constipation    • Heartburn    • History of pneumonia 02/2019    no hospitalization    • Murmur     detected as an adult around age 30 and 40; requires prophylactic antibiotics before dental work; pt not sure if she has ever had an echo or when it might have been; Dr Stephen came down and listened to heart sounds on 4/2/20 and did not detect a murmur   • Pelvic mass    • Vitamin D deficiency        Past Surgical History:   Procedure Laterality Date   • BACK SURGERY      fusion    • COLONOSCOPY  2016   • ENDOSCOPY     • EXPLORATORY LAPAROTOMY N/A 4/3/2020    Procedure: LAPAROTOMY EXPLORATORY,  OPTIMAL DEBULKING, CYSTOSCOPY WITH URETERAL CATHTER INSERTION AND REMOVAL, ILEO RESECTION, SIDE TO SIDE ANASTOMOSIS, RECTAL SIGMOID RESECTION, LEFT URETERA LYSIS, AND PERITONEAL STRIPPING;  Surgeon: Jamee Mcguire MD;  Location: [facility];  Service: Gynecology Oncology;  Laterality: N/A;   • HYSTERECTOMY  2013    with BSO       MEDICATIONS: The current medication list was reviewed with the patient and updated in the EMR this date per the Medical Assistant. Medication dosages and frequencies were confirmed to be accurate.      Allergies:  is allergic to penicillins.    Social History:   Social History     Socioeconomic History   • Marital status: Single     Spouse  name: Not on file   • Number of children: Not on file   • Years of education: Not on file   • Highest education level: Not on file   Tobacco Use   • Smoking status: Former Smoker     Packs/day: 0.50     Years: 30.00     Pack years: 15.00     Types: Electronic Cigarette, Cigarettes   • Smokeless tobacco: Never Used   • Tobacco comment: vape-nicotine currently; quit cigarettes july 2019   Substance and Sexual Activity   • Alcohol use: Never     Frequency: Never   • Drug use: Never   • Sexual activity: Defer       Family History:    Family History   Problem Relation Age of Onset   • Diabetes type II Father    • Heart disease Father    • Heart attack Father    • Diabetes type II Mother    • Lung cancer Maternal Uncle        Health Maintenance:    Health Maintenance   Topic Date Due   • MAMMOGRAM  1965   • ANNUAL PHYSICAL  04/19/1968   • ZOSTER VACCINE (1 of 2) 04/19/2015   • HEPATITIS C SCREENING  03/16/2020   • COLONOSCOPY  03/16/2020   • INFLUENZA VACCINE  08/01/2020   • TDAP/TD VACCINES (2 - Td) 01/02/2029         Review of Systems   Constitutional: Positive for fatigue. Negative for appetite change, chills, fever and unexpected weight change.   HENT: Negative for congestion, hearing loss, sore throat and tinnitus.    Eyes: Negative for redness and visual disturbance.   Respiratory: Negative for cough, shortness of breath and wheezing.         Chest pain   Cardiovascular: Negative for chest pain, palpitations and leg swelling.   Gastrointestinal: Positive for abdominal pain and constipation. Negative for abdominal distention, blood in stool, diarrhea, nausea and vomiting.        Heartburn   Endocrine: Negative.  Negative for cold intolerance and heat intolerance.   Genitourinary: Negative for difficulty urinating, dyspareunia, dysuria, frequency, genital sores, hematuria, pelvic pain, urgency, vaginal bleeding, vaginal discharge and vaginal pain.   Musculoskeletal: Negative for arthralgias, back pain, gait  problem and joint swelling.   Skin: Negative for rash and wound.   Allergic/Immunologic: Negative for food allergies and immunocompromised state.   Neurological: Positive for numbness. Negative for dizziness, seizures, syncope, weakness, light-headedness and headaches.   Hematological: Negative for adenopathy. Does not bruise/bleed easily.   Psychiatric/Behavioral: Negative.  Negative for confusion, dysphoric mood and sleep disturbance. The patient is not nervous/anxious.      Vitals:    07/02/20 1132   BP: 120/56   Pulse: 88   Resp: 12   Temp: 98.2 °F (36.8 °C)   TempSrc: Temporal   SpO2: 96%   Weight: 52.2 kg (115 lb)   PainSc:   5       Body mass index is 18.57 kg/m².    Wt Readings from Last 3 Encounters:   07/02/20 52.2 kg (115 lb)   05/14/20 50.3 kg (111 lb)   04/03/20 52.3 kg (115 lb 4.8 oz)         GENERAL: Alert, thin-appearing female appearing her stated age who is in no apparent distress.   HEENT: Sclera anicteric. Head normocephalic, atraumatic. Mucus membranes moist.   NECK: Deferred  BREASTS: Deferred  CARDIOVASCULAR:  Deferred  RESPIRATORY:  Deferred  BACK: Deferred  GASTROINTESTINAL:   Soft, appropriately tender, no rebound, guarding or mass. No abdominal wall defects noted, even with valsalva.  Incision is clean dry and intact. No rash noted.  SKIN:  Warm, dry, well-perfused.  All visible areas intact.  No lesions, ulcers.  PSYCHIATRIC: AO x3, with appropriate affect, normal thought processes.  NEUROLOGIC: No focal deficits.  Ambulating slowly and without difficulty  MUSCULOSKELETAL: Ambulating slowly and without difficulty  EXTREMITIES:   No cyanosis, clubbing, symmetric.  LYMPHATICS:   Deferred     PELVIC exam:deferred    ECOG PS 1    PROCEDURES: None    Diagnostic Data:    Pathology:  Final Diagnosis   1.  LEFT PELVIC BRIM, BIOPSY:  Fibrovascular and adipose connective tissues with mesothelium and hemosiderin, no tumor identified.   2.  PELVIC TUMOR WITH RECTOSIGMOID AND PARTIAL TERMINAL  ILEUM:  Infiltrating granular cell tumor, tumor size approximately 1.6 x 1.5 x 1.0 cm.   Sigmoid and ileum with organizing hemorrhagic serositis.  No lymphvascular invasion identified.   Eight (8) mesenteric lymph nodes, no tumor seen.  3.  TISSUE SUBMITTED AS PELVIC PERITONEUM, EXCISION:  Fibrotically organizing hemorrhage, no tumor identified.   4.  ADDITIONAL RECTUM:  Fibrotically organizing serositis, no tumor identified.     Ct Abdomen Pelvis With Contrast    Result Date: 7/1/2020  Expected postsurgical changes from resection of pelvic mass without evidence for bulky adenopathy or soft tissue nodular recurrence. No mesenteric reticulation or ascites to suggest peritoneal involvement.  No acute intraabdominal or intrapelvic abnormality otherwise noted.  D:  06/30/2020 E:  07/01/2020  This report was finalized on 7/1/2020 3:59 PM by Dr. Thierry Albarran.        Lab Results   Component Value Date    WBC 4.60 06/26/2020    HGB 14.0 06/26/2020    HCT 45.3 06/26/2020    MCV 90.0 06/26/2020     06/26/2020    NEUTROABS 2.10 06/26/2020    GLUCOSE 101 (H) 06/26/2020    BUN 19 06/26/2020    CREATININE 0.66 06/26/2020    EGFRIFNONA 93 06/26/2020     06/26/2020    K 4.2 06/26/2020     06/26/2020    CO2 28.0 06/26/2020    CALCIUM 9.6 06/26/2020    ALBUMIN 4.50 06/26/2020    AST 17 06/26/2020    ALT 16 06/26/2020    BILITOT 0.3 06/26/2020     No results found for:         Assessment/Plan   This is a 55 y.o. woman with recurrent granulosa cell tumor of the ovary for postoperative evaluation.  On Arimidex.    No diagnosis found.  History of granulosa cell tumor of right ovary now with clinical findings consistent with recurrence  - Status post complete resection of cancer recurrence.  Surgery remarkable for intra-abdominal adhesive disease and extensive hemosiderin/hemorrhagic component which made it challenging at the time to determine what was cancer and what was not.  Appropriate postoperative recovery.   Patient reassured.  -Continue Arimidex.  We discussed ongoing nausea and complaints of hair loss.  Given patient's distressful initial presentation, REM attacks is probably the best tolerated treatment option for her at this point.  -She was advised that at some point she will likely have a cancer recurrence.  She was advised that surgery is the mainstay of treatment for this indolent slow-growing cancer and that it tends to be chemo resistant.  She verbalized understanding.  -She is encouraged to call with any concerns regarding her ongoing recovery.    LLQ pain  - CT scan with no acute abnormality, read as above  - Potentially a left rectus sheath hematoma on review of images  - Discussed timeline of resolution with patient and treatment with conservative management and pain control  -Avoidance of narcotics emphasized.    Ongoing constipation  -Bowel regimen of stool softeners, milk of magnesia; acute worsening with narcotic pain medications, but still having a BM every 2-3 days    Pain assessment was performed today as a part of patient’s care.  For patients with pain related to surgery, gynecologic malignancy or cancer treatment, the plan is as noted in the assessment/plan.  For patients with pain not related to these issues, they are to seek any further needed care from a more appropriate provider, such as PCP.      No orders of the defined types were placed in this encounter.    FOLLOW UP: 3-month surveillance visit.  Patient to alternate between APRN and Dr. Mcguire for surveillance visits.    Patient was seen and examined with Dr. Galvan,  resident, who performed portions of the examination and documentation for this patient's care under my direct supervision.  I agree with the above documentation and plan.    Jamee Mcguire MD  07/05/20  5:33 PM

## 2020-07-05 PROBLEM — R10.32 LLQ PAIN: Status: ACTIVE | Noted: 2020-07-05

## 2020-07-08 VITALS
OXYGEN SATURATION: 98 % | TEMPERATURE: 97.8 F | HEART RATE: 98 BPM | RESPIRATION RATE: 18 BRPM | SYSTOLIC BLOOD PRESSURE: 139 MMHG | WEIGHT: 112 LBS | DIASTOLIC BLOOD PRESSURE: 86 MMHG | BODY MASS INDEX: 18.09 KG/M2

## 2020-07-08 NOTE — PROGRESS NOTES
GYN ONCOLOGY FOLLOW-UP    Roxie Carter  [id number]  1965    Chief Complaint: Follow-up (c/o abdominal pain)        History of present illness:  Roxie Carter is a 55 y.o. year old female who is here today for complaint of abdominal pain. She has a history of recurrent granulosa cell tumor of right ovary, most recently s/p secondary debulking earlier this year. Complete cancer history is outlined below.     Upon arrival she c/o abdominal pain, worsening over the last 1 week. She describes this as a sharb, burning pain in the LLQ that has become more persistent. The pain is causing trouble sleeping. Tylenol and ibuprofen have not helped. She was recently seen by a surgeon at  who wants to do an ultrasound for a possible hernia, but due to her cancer history, wanted to be evaluated here first to rule out cancer progression. Her bladder function is normal. Her bowel function is unchanged for her. She notes BMs every 2-3 days, last yesterday, uses Milk of Magnesia PRN.       Oncology History:       Granulosa cell tumor of ovary, right    8/13/2013 Cancer Staged     Staging form: Ovary, Fallopian Tube, And Primary Peritoneal Carcinoma, AJCC 8th Edition  - Clinical stage from 8/13/2013: FIGO Stage IC (cT1c, cN0, cM0) - Signed by Jamee Mcguire MD on 3/26/2020      8/13/2013 Surgery     Total abdominal hysterectomy, bilateral salpingo-oophorectomy with pathology showing 5 cm granulosa cell tumor of the right ovary and a left fallopian tube intraepithelial serous neoplasm.       - 1/4/2014 Chemotherapy     OP OVARIAN PACLitaxel / CARBOplatin (Q21D)  x6 cycles remarkable for bone marrow suppression and G-CSF support      3/14/2020 Progression     CT Abdomen/Pelvis IMPRESSION:  Large heterogeneous mass identified within the pelvis with  fluid seen scattered throughout the abdomen and pelvis. Findings  concerning for recurrence with some stranding of the mesentery in which  spread to the mesentery cannot  be excluded.      4/3/2020 Surgery     Exploratory laparotomy, optimal debulking (R=0), cystoscopy with temporary uteteral catheter, ileal resection with side-to-side anastomosis, rectosigmoid resection/LAR, left ureterolysis, and peritoneal stripping.   Pathology consistent with recurrent granulosa cell tumor at the pelvis with rectosigmoid and partial terminal ileum. No LVSI. Mesenteric nodes and other staging negative.       4/6/2020 -  Hormonal Therapy     Arimidex initiated      4/27/2020 Molecular Testing     CARIS testing results:  OK positive, 1+, 75%  ER negative, MSI stable, PD-L1 negative         Past Medical History:   Diagnosis Date   • Arthritis    • Cancer (CMS/HCC) 2013    OVARIAN; s/p hysterectomy with BSO and chemo therapy that ended in 2014   • Constipation    • Heartburn    • History of pneumonia 02/2019    no hospitalization    • Murmur     detected as an adult around age 30 and 40; requires prophylactic antibiotics before dental work; pt not sure if she has ever had an echo or when it might have been; Dr Stephen came down and listened to heart sounds on 4/2/20 and did not detect a murmur   • Pelvic mass    • Vitamin D deficiency        Past Surgical History:   Procedure Laterality Date   • BACK SURGERY      fusion    • COLONOSCOPY  2016   • ENDOSCOPY     • EXPLORATORY LAPAROTOMY N/A 4/3/2020    Procedure: LAPAROTOMY EXPLORATORY,  OPTIMAL DEBULKING, CYSTOSCOPY WITH URETERAL CATHTER INSERTION AND REMOVAL, ILEO RESECTION, SIDE TO SIDE ANASTOMOSIS, RECTAL SIGMOID RESECTION, LEFT URETERA LYSIS, AND PERITONEAL STRIPPING;  Surgeon: Jamee Mcguire MD;  Location: [facility];  Service: Gynecology Oncology;  Laterality: N/A;   • HYSTERECTOMY  2013    with BSO       MEDICATIONS: The current medication list was reviewed and reconciled.     Allergies:  is allergic to penicillins.    Family History   Problem Relation Age of Onset   • Diabetes type II Father    • Heart disease Father    • Heart attack  Father    • Diabetes type II Mother    • Lung cancer Maternal Uncle          Last imaging study was CT abdomen pelvis 3/23/2020 prior to secondary debulking.     Review of Systems   Constitutional: Positive for appetite change and fatigue. Negative for chills, fever and unexpected weight change.   Respiratory: Negative for cough, shortness of breath and wheezing.    Cardiovascular: Negative for chest pain, palpitations and leg swelling.   Gastrointestinal: Positive for abdominal pain (generalized, worst in LLQ) and constipation. Negative for abdominal distention, blood in stool, diarrhea, nausea and vomiting.   Endocrine: Negative.    Genitourinary: Negative for dysuria, frequency, genital sores, hematuria, pelvic pain, urgency, vaginal bleeding, vaginal discharge and vaginal pain.   Musculoskeletal: Positive for arthralgias. Negative for gait problem and joint swelling.   Neurological: Negative for dizziness, seizures, syncope, weakness, light-headedness, numbness and headaches.   Hematological: Negative for adenopathy.   Psychiatric/Behavioral: Positive for sleep disturbance (r/t discomfort).         Physical Exam  Vital Signs: /86   Pulse 98   Temp 97.8 °F (36.6 °C)   Resp 18   Wt 50.8 kg (112 lb)   SpO2 98%   BMI 18.09 kg/m²   Vitals:    06/26/20 1108   PainSc:   8   PainLoc: Abdomen           General Appearance:  alert, cooperative, no apparent distress and appears stated age, thin   Neurologic/Psychiatric: A&O x 3, gait steady, appropriate affect   HEENT:  Normocephalic, without obvious abnormality, mucous membranes moist   Neck: Supple, symmetrical, trachea midline, no adenopathy;  No thyromegaly, masses, or tenderness   Back:   Symmetric, no curvature, ROM normal, no CVA tenderness   Lungs:   Clear to auscultation bilaterally; respirations regular, even, and unlabored bilaterally   Heart:  Regular rate and rhythm, no murmurs appreciated   Breasts:  deferred   Abdomen:   Soft, non-distended, no  organomegaly and tenderness in LLQ. No rebound or guarding   Lymph nodes: No cervical, supraclavicular, inguinal adenopathy noted   Extremities: Normal, atraumatic; no clubbing, cyanosis, or edema    Pelvic: External Genitalia  without lesions or skin changes  Vagina  is pink, moist, without lesions.   Vaginal Cuff  Female Vaginal Cuff: smooth, intact and without visible lesions  Uterus  surgically absent  Ovaries  surgically absent bilaterally, without palpable masses or fullness and however exam limited d/t abdominal tenderness  Parametria  smooth  Rectovaginal  Female rectovaginal: deferred     ECOG Performance Status: (1) Restricted in Physically Strenuous Activity, Ambulatory & Able to Do Work of Light Nature    Procedure Notes:  No notes on file    Assessment and Plan:    Roxie was seen today for follow-up.    Diagnoses and all orders for this visit:    Granulosa cell tumor of ovary, right  -     CT Abdomen Pelvis With Contrast; Future    LLQ pain  -     CT Abdomen Pelvis With Contrast; Future  -     CBC & Differential; Future  -     Comprehensive Metabolic Panel; Future  -     Amylase; Future  -     Lipase; Future  -     Urinalysis With Culture If Indicated -; Future  -     HYDROcodone-acetaminophen (Norco) 5-325 MG per tablet; Take 1 tablet by mouth Every 6 (Six) Hours As Needed for Moderate Pain  or Severe Pain .        Patient and I discussed her current symptoms and differential diagnoses including recurrent malignancy, hernia, stool burden, pelvic adhesive disease, etc. Due to her history, imaging is warranted at this time to first rule out cancer progression. Due to pain, I will prescribe Norco to be used while imaging is pending. We reviewed medication instructions, risks, benefits, and potential side effects. She will be notified of CT results upon their return. If concern for malignancy, she will return for visit with physician. If negative, may consider further evaluation by GI. Patient v/u.        Pain assessment was performed today as a part of patient’s care.  For patients with pain related to surgery, gynecologic malignancy or cancer treatment, the plan is as noted in the assessment/plan.  For patients with pain not related to these issues, they are to seek any further needed care from a more appropriate provider, such as PCP.  Roxie Carter reports a pain score of 8.  Given her pain assessment as noted, treatment options were discussed and the following options were decided upon as a follow-up plan to address the patient's pain: prescription for opiod analgesics and use of non-medical modalities (ice, heat, stretching and/or behavior modifications).        Return to clinic to be determined upon return of CT results.       Lexy Wu APRN

## 2020-07-30 RX ORDER — NITROFURANTOIN 25; 75 MG/1; MG/1
100 CAPSULE ORAL 2 TIMES DAILY
Qty: 14 CAPSULE | Refills: 0 | Status: SHIPPED | OUTPATIENT
Start: 2020-07-30 | End: 2020-10-05

## 2020-09-11 RX ORDER — NITROFURANTOIN 25; 75 MG/1; MG/1
100 CAPSULE ORAL 2 TIMES DAILY
Qty: 14 CAPSULE | Refills: 0 | OUTPATIENT
Start: 2020-09-11

## 2020-10-05 ENCOUNTER — OFFICE VISIT (OUTPATIENT)
Dept: GYNECOLOGIC ONCOLOGY | Facility: CLINIC | Age: 55
End: 2020-10-05

## 2020-10-05 VITALS
DIASTOLIC BLOOD PRESSURE: 68 MMHG | BODY MASS INDEX: 19.22 KG/M2 | TEMPERATURE: 99.1 F | OXYGEN SATURATION: 97 % | WEIGHT: 119 LBS | SYSTOLIC BLOOD PRESSURE: 123 MMHG | HEART RATE: 88 BPM | RESPIRATION RATE: 12 BRPM

## 2020-10-05 DIAGNOSIS — C56.1 MALIGNANT NEOPLASM OF RIGHT OVARY (HCC): Primary | ICD-10-CM

## 2020-10-05 DIAGNOSIS — R19.00 PELVIC MASS: ICD-10-CM

## 2020-10-05 DIAGNOSIS — R10.32 LLQ PAIN: ICD-10-CM

## 2020-10-05 PROCEDURE — 99214 OFFICE O/P EST MOD 30 MIN: CPT | Performed by: NURSE PRACTITIONER

## 2020-10-05 NOTE — PROGRESS NOTES
GYN ONCOLOGY CANCER SURVEILLANCE FOLLOW-UP    Roxie Carter  [id number]  1965    Chief Complaint: Follow-up (LLQ discomfort)        History of present illness:  Roxie Carter is a 55 y.o. 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 Upon arrival today, patient states she is feeling generally well, but then during discussion describes occasional discomfort and pressure in LLQ. She admits to occasional constipation, uses milk of magnesia PRN, denies current constipation. She denies vaginal bleeding, concerning lesions, or changes in bladder function. She continues her Arimidex daily, doing well on hormone blocker.         Cancer History:   Oncology/Hematology History   Granulosa cell tumor of ovary, right   8/13/2013 Cancer Staged    Staging form: Ovary, Fallopian Tube, And Primary Peritoneal Carcinoma, AJCC 8th Edition  - Clinical stage from 8/13/2013: FIGO Stage IC (cT1c, cN0, cM0) - Signed by Jamee Mcguire MD on 3/26/2020     8/13/2013 Surgery    Total abdominal hysterectomy, bilateral salpingo-oophorectomy with pathology showing 5 cm granulosa cell tumor of the right ovary and a left fallopian tube intraepithelial serous neoplasm.      - 1/4/2014 Chemotherapy    OP OVARIAN PACLitaxel / CARBOplatin (Q21D)  x6 cycles remarkable for bone marrow suppression and G-CSF support     3/14/2020 Progression    CT Abdomen/Pelvis IMPRESSION:  Large heterogeneous mass identified within the pelvis with  fluid seen scattered throughout the abdomen and pelvis. Findings  concerning for recurrence with some stranding of the mesentery in which  spread to the mesentery cannot be excluded.     4/3/2020 Surgery    Exploratory laparotomy, optimal debulking (R=0), cystoscopy with temporary uteteral catheter, ileal resection with side-to-side anastomosis, rectosigmoid resection/LAR, left ureterolysis, and peritoneal stripping.   Pathology consistent  with recurrent granulosa cell tumor at the pelvis with rectosigmoid and partial terminal ileum. No LVSI. Mesenteric nodes and other staging negative.      4/6/2020 -  Hormonal Therapy    Arimidex initiated     4/27/2020 Molecular Testing    CARIS testing results:  NM positive, 1+, 75%  ER negative, MSI stable, PD-L1 negative     6/30/2020 Imaging    CT scan for new abdominal pain showed expected postsurgical changes from resection of pelvic mass without evidence for bulky adenopathy or soft tissue nodular recurrence. No mesenteric reticulation or ascites to suggest peritoneal involvement. No acute intraabdominal or intrapelvic abnormality otherwise noted. Significant colonic stool burden noted.          Past Medical History:   Diagnosis Date   • Arthritis    • Cancer (CMS/HCC) 2013    OVARIAN; s/p hysterectomy with BSO and chemo therapy that ended in 2014   • Constipation    • Heartburn    • History of pneumonia 02/2019    no hospitalization    • Murmur     detected as an adult around age 30 and 40; requires prophylactic antibiotics before dental work; pt not sure if she has ever had an echo or when it might have been; Dr Stephen came down and listened to heart sounds on 4/2/20 and did not detect a murmur   • Pelvic mass    • Vitamin D deficiency        Past Surgical History:   Procedure Laterality Date   • BACK SURGERY      fusion    • COLONOSCOPY  2016   • ENDOSCOPY     • EXPLORATORY LAPAROTOMY N/A 4/3/2020    Procedure: LAPAROTOMY EXPLORATORY,  OPTIMAL DEBULKING, CYSTOSCOPY WITH URETERAL CATHTER INSERTION AND REMOVAL, ILEO RESECTION, SIDE TO SIDE ANASTOMOSIS, RECTAL SIGMOID RESECTION, LEFT URETERA LYSIS, AND PERITONEAL STRIPPING;  Surgeon: Jamee Mcguire MD;  [facility];  Service: Gynecology Oncology;  Laterality: N/A;   • HYSTERECTOMY  2013    with BSO       MEDICATIONS: The current medication list was reviewed and reconciled.     Allergies:  is allergic to penicillins.    Family History   Problem  Relation Age of Onset   • Diabetes type II Father    • Heart disease Father    • Heart attack Father    • Diabetes type II Mother    • Lung cancer Maternal Uncle        Last imaging study was CT abdomen pelvis 6/30/2020.     Review of Systems   Constitutional: Negative for appetite change, chills, fatigue, fever and unexpected weight change.   Respiratory: Negative for cough, shortness of breath and wheezing.    Cardiovascular: Negative for chest pain, palpitations and leg swelling.   Gastrointestinal: Negative for abdominal distention, abdominal pain, blood in stool, constipation, diarrhea, nausea and vomiting.   Endocrine: Negative.    Genitourinary: Negative for dyspareunia, dysuria, frequency, genital sores, hematuria, pelvic pain, urgency, vaginal bleeding, vaginal discharge and vaginal pain.   Musculoskeletal: Negative for arthralgias, gait problem and joint swelling.   Neurological: Negative for dizziness, seizures, syncope, weakness, light-headedness, numbness and headaches.   Hematological: Negative for adenopathy.   Psychiatric/Behavioral: Negative.        Physical Exam  Vital Signs: /68   Pulse 88   Temp 99.1 °F (37.3 °C)   Resp 12   Wt 54 kg (119 lb)   SpO2 97%   BMI 19.22 kg/m²   Vitals:    10/05/20 1310   PainSc:   1   PainLoc: Abdomen           General Appearance:  alert, cooperative, no apparent distress, appears stated age and normal weight   Neurologic/Psychiatric: A&O x 3, gait steady, appropriate affect   HEENT:  Normocephalic, without obvious abnormality, mucous membranes moist   Lungs:   Clear to auscultation bilaterally; respirations regular, even, and unlabored bilaterally   Heart:  Regular rate and rhythm, no murmurs appreciated   Breasts:  deferred   Abdomen:   Soft, non-distended, no organomegaly and tenderness in LLQ   Lymph nodes: No cervical, supraclavicular, inguinal adenopathy noted   Extremities: Normal, atraumatic; no clubbing, cyanosis, or edema    Pelvic: External  Genitalia  without lesions or skin changes  Vagina  is pink, moist, without lesions.   Vaginal Cuff  Female Vaginal Cuff: smooth, intact and without visible lesions  Uterus  surgically absent  Ovaries  surgically absent bilaterally and new fullness appreciated at left adnexal region. Tenderness noted at left pelvis upon bimanual examination  Parametria  smooth  Rectovaginal  Female rectovaginal: deferred     ECOG Performance Status: (0) Fully Active - Able to Carry On All Pre-disease Performance Without Restriction    Procedure Note:  No notes on file      Assessment and Plan:  Roxie was seen today for follow-up.    Diagnoses and all orders for this visit:    Malignant neoplasm of right ovary (CMS/HCC)  -     CT Abdomen Pelvis With Contrast; Future    Pelvic mass  -     CT Abdomen Pelvis With Contrast; Future    LLQ pain  -     CT Abdomen Pelvis With Contrast; Future            Patient and I discussed her history, most recent imaging, and new physical exam findings. She has a palpable fullness vs mass at the left pelvis that is questionable for progression vs stool burden. She is not in acute pain, but there is tenderness to the area on bimanual exam. Last recurrence was at left pelvis and patient is understanding of risk. CT abdomen pelvis ordered now for further evaluation and to rule out recurrent disease. She will be notified of results upon their return and decisions will be made regarding follow-up at that time.     Pain assessment was performed today as a part of patient’s care.  For patients with pain related to surgery, gynecologic malignancy or cancer treatment, the plan is as noted in the assessment/plan.  For patients with pain not related to these issues, they are to seek any further needed care from a more appropriate provider, such as PCP.      Return to clinic to be determined following CT.       Electronically signed by CASEY Kelley on 10/05/20 at 14:28 EDT

## 2020-10-07 ENCOUNTER — HOSPITAL ENCOUNTER (OUTPATIENT)
Dept: CT IMAGING | Facility: HOSPITAL | Age: 55
Discharge: HOME | End: 2020-10-07
Admitting: NURSE PRACTITIONER

## 2020-10-07 DIAGNOSIS — C56.1 MALIGNANT NEOPLASM OF RIGHT OVARY (HCC): ICD-10-CM

## 2020-10-07 DIAGNOSIS — R10.32 LLQ PAIN: ICD-10-CM

## 2020-10-07 DIAGNOSIS — R19.00 PELVIC MASS: ICD-10-CM

## 2020-10-07 PROCEDURE — 25010000002 IOPAMIDOL 61 % SOLUTION: Performed by: NURSE PRACTITIONER

## 2020-10-07 PROCEDURE — 74177 CT ABD & PELVIS W/CONTRAST: CPT

## 2020-10-07 RX ADMIN — IOPAMIDOL 85 ML: 612 INJECTION, SOLUTION INTRAVENOUS at 13:54

## 2020-10-09 RX ORDER — AMOXICILLIN 250 MG
1 CAPSULE ORAL DAILY
Qty: 30 TABLET | Refills: 0 | Status: SHIPPED | OUTPATIENT
Start: 2020-10-09 | End: 2021-07-09

## 2020-10-09 NOTE — TELEPHONE ENCOUNTER
----- Message from CASEY Kelley sent at 10/8/2020  3:29 PM EDT -----  Please notify patient CT negative for disease, revealed fulness in left pelvis consistent with stool burden. Educate on home bowel regimen and schedule for follow-up in 3 months.

## 2020-10-09 NOTE — TELEPHONE ENCOUNTER
I called patient and let her know results.    She states she is taking milk of mag but it does not work.    I recomeneded Senna and she states she would like to try that.     I told her we could send it in so she knows what it is then she can get refills otc.     She wants this sent to [facility] pharmacy.

## 2020-10-13 DIAGNOSIS — K64.9 HEMORRHOIDS, UNSPECIFIED HEMORRHOID TYPE: Primary | ICD-10-CM

## 2020-10-13 RX ORDER — HYDROCORTISONE ACETATE 25 MG/1
25 SUPPOSITORY RECTAL 2 TIMES DAILY PRN
Qty: 60 EACH | Refills: 5 | Status: SHIPPED | OUTPATIENT
Start: 2020-10-13 | End: 2021-01-13

## 2020-10-14 ENCOUNTER — TELEPHONE (OUTPATIENT)
Dept: GYNECOLOGIC ONCOLOGY | Facility: CLINIC | Age: 55
End: 2020-10-14

## 2020-10-14 NOTE — TELEPHONE ENCOUNTER
Caller: JERICA LANDERS    Relationship to patient: SELF    Best call back number: [phone]    PT STATES THEY WERE TOLD TO MAKE A FOLLOW-UP APPT WITH YONATHAN OR DR SMITH

## 2020-10-20 ENCOUNTER — OFFICE VISIT (OUTPATIENT)
Dept: GYNECOLOGIC ONCOLOGY | Facility: CLINIC | Age: 55
End: 2020-10-20

## 2020-10-20 VITALS
WEIGHT: 120 LBS | TEMPERATURE: 98.4 F | OXYGEN SATURATION: 95 % | SYSTOLIC BLOOD PRESSURE: 132 MMHG | BODY MASS INDEX: 19.38 KG/M2 | RESPIRATION RATE: 18 BRPM | HEART RATE: 99 BPM | DIASTOLIC BLOOD PRESSURE: 60 MMHG

## 2020-10-20 DIAGNOSIS — K64.9 HEMORRHOIDS, UNSPECIFIED HEMORRHOID TYPE: ICD-10-CM

## 2020-10-20 DIAGNOSIS — R10.32 LLQ PAIN: ICD-10-CM

## 2020-10-20 DIAGNOSIS — K62.89 RECTAL PAIN: ICD-10-CM

## 2020-10-20 DIAGNOSIS — D39.11 GRANULOSA CELL TUMOR OF OVARY, RIGHT: ICD-10-CM

## 2020-10-20 DIAGNOSIS — K59.00 CONSTIPATION, UNSPECIFIED CONSTIPATION TYPE: Primary | ICD-10-CM

## 2020-10-20 PROBLEM — R63.0 POOR APPETITE: Status: RESOLVED | Noted: 2020-03-17 | Resolved: 2020-10-20

## 2020-10-20 PROBLEM — R18.0 MALIGNANT ASCITES: Status: RESOLVED | Noted: 2020-03-17 | Resolved: 2020-10-20

## 2020-10-20 PROBLEM — G89.3 CANCER ASSOCIATED PAIN: Status: RESOLVED | Noted: 2020-03-26 | Resolved: 2020-10-20

## 2020-10-20 PROBLEM — R19.00 PELVIC MASS: Status: RESOLVED | Noted: 2020-03-17 | Resolved: 2020-10-20

## 2020-10-20 PROCEDURE — 99214 OFFICE O/P EST MOD 30 MIN: CPT | Performed by: NURSE PRACTITIONER

## 2020-10-20 NOTE — PROGRESS NOTES
GYN ONCOLOGY FOLLOW-UP    Roxie Carter  [id number]  1965    Chief Complaint: Follow-up (c/o rectal pain and constipation)        History of present illness:  Roxie Carter is a 55 y.o. year old female who is here today for complaint of rectal pain, constipation, and rectal bleeding x1 after BM. She has a history of recurrent granulosa cell tumor, last seen in surveillance 2 weeks ago. She underwent CT scan at that time due to LLQ pain. Imaging was negative for disease, but showed diffuse stool burden. She describes severe constipation, only able to have BMs with use of milk of magnesia (MOM) over the last several weeks. She has tried colace and sennakot without success. She reports having to take 2 doses of MOM 3 days ago due to constipation and no results within several hours of the first dose. Shortly following second dose she had movement, reports over 10 BMs within 24 hours, starting with firm and painful and ending with very loose. She has not had any BMs over the last 2 days and feels her bloating and LLQ pain is returning. She c/o 1 occurrence of rectal bleeding and rectal pain (stabbing, throbbing) that has been persistent since her last emptying. She has Preparation H cream that is only somewhat helpful. Her insurance would not cover suppositories and the cost was over $1000 for 1 month supply. Ibuprofen is only mildly helpful for the pain.       Oncology History:    Oncology/Hematology History   Granulosa cell tumor of ovary, right   8/13/2013 Cancer Staged    Staging form: Ovary, Fallopian Tube, And Primary Peritoneal Carcinoma, AJCC 8th Edition  - Clinical stage from 8/13/2013: FIGO Stage IC (cT1c, cN0, cM0) - Signed by Jamee Mcguire MD on 3/26/2020     8/13/2013 Surgery    Total abdominal hysterectomy, bilateral salpingo-oophorectomy with pathology showing 5 cm granulosa cell tumor of the right ovary and a left fallopian tube intraepithelial serous neoplasm.      - 1/4/2014  Chemotherapy    OP OVARIAN PACLitaxel / CARBOplatin (Q21D)  x6 cycles remarkable for bone marrow suppression and G-CSF support     3/14/2020 Progression    CT Abdomen/Pelvis IMPRESSION:  Large heterogeneous mass identified within the pelvis with  fluid seen scattered throughout the abdomen and pelvis. Findings  concerning for recurrence with some stranding of the mesentery in which  spread to the mesentery cannot be excluded.     4/3/2020 Surgery    Exploratory laparotomy, optimal debulking (R=0), cystoscopy with temporary uteteral catheter, ileal resection with side-to-side anastomosis, rectosigmoid resection/LAR, left ureterolysis, and peritoneal stripping.   Pathology consistent with recurrent granulosa cell tumor at the pelvis with rectosigmoid and partial terminal ileum. No LVSI. Mesenteric nodes and other staging negative.      4/6/2020 -  Hormonal Therapy    Arimidex initiated     4/27/2020 Molecular Testing    CARIS testing results:  SD positive, 1+, 75%  ER negative, MSI stable, PD-L1 negative     6/30/2020 Imaging    CT scan for new abdominal pain showed expected postsurgical changes from resection of pelvic mass without evidence for bulky adenopathy or soft tissue nodular recurrence. No mesenteric reticulation or ascites to suggest peritoneal involvement. No acute intraabdominal or intrapelvic abnormality otherwise noted. Significant colonic stool burden noted.          Past Medical History:   Diagnosis Date   • Arthritis    • Cancer (CMS/HCC) 2013    OVARIAN; s/p hysterectomy with BSO and chemo therapy that ended in 2014   • Constipation    • Heartburn    • History of pneumonia 02/2019    no hospitalization    • Murmur     detected as an adult around age 30 and 40; requires prophylactic antibiotics before dental work; pt not sure if she has ever had an echo or when it might have been; Dr Stephen came down and listened to heart sounds on 4/2/20 and did not detect a murmur   • Pelvic mass    • Vitamin D  deficiency        Past Surgical History:   Procedure Laterality Date   • BACK SURGERY      fusion    • COLONOSCOPY  2016   • ENDOSCOPY     • EXPLORATORY LAPAROTOMY N/A 4/3/2020    Procedure: LAPAROTOMY EXPLORATORY,  OPTIMAL DEBULKING, CYSTOSCOPY WITH URETERAL CATHTER INSERTION AND REMOVAL, ILEO RESECTION, SIDE TO SIDE ANASTOMOSIS, RECTAL SIGMOID RESECTION, LEFT URETERA LYSIS, AND PERITONEAL STRIPPING;  Surgeon: Jamee Mcguire MD;  Location: [facility];  Service: Gynecology Oncology;  Laterality: N/A;   • HYSTERECTOMY  2013    with BSO       MEDICATIONS: The current medication list was reviewed and reconciled.     Allergies:  is allergic to penicillins.    Family History   Problem Relation Age of Onset   • Diabetes type II Father    • Heart disease Father    • Heart attack Father    • Diabetes type II Mother    • Lung cancer Maternal Uncle          Last imaging study was CT a/p 10/7/2020.       Review of Systems   Constitutional: Negative for appetite change, chills, fatigue, fever and unexpected weight change.   Respiratory: Negative for cough, shortness of breath and wheezing.    Cardiovascular: Negative for chest pain, palpitations and leg swelling.   Gastrointestinal: Positive for abdominal distention (bloating when constipated), abdominal pain (LLQ), anal bleeding, constipation and rectal pain. Negative for blood in stool, diarrhea, nausea and vomiting.   Endocrine: Negative.    Genitourinary: Negative for dyspareunia, dysuria, frequency, genital sores, hematuria, pelvic pain, urgency, vaginal bleeding, vaginal discharge and vaginal pain.   Musculoskeletal: Negative for arthralgias, gait problem and joint swelling.   Neurological: Negative for dizziness, seizures, syncope, weakness, light-headedness, numbness and headaches.   Hematological: Negative for adenopathy.   Psychiatric/Behavioral: Negative.          Physical Exam  Vital Signs: /60   Pulse 99   Temp 98.4 °F (36.9 °C) (Temporal)   Resp 18    Wt 54.4 kg (120 lb)   SpO2 95%   BMI 19.38 kg/m²   Vitals:    10/20/20 1425   PainSc:   4   PainLoc: Rectum           General Appearance:  alert, cooperative, no apparent distress, appears stated age and normal weight   Neurologic/Psychiatric: A&O x 3, gait steady, appropriate affect   HEENT:  Normocephalic, without obvious abnormality, mucous membranes moist   Abdomen:   Soft, non-distended, no organomegaly and tenderness in LLQ   Lymph nodes: No inguinal adenopathy noted   Extremities: Normal, atraumatic; no clubbing, cyanosis, or edema    Pelvic: External Genitalia  without lesions or skin changes  Vagina  is pink, moist, without lesions.   Vaginal Cuff  Female Vaginal Cuff: smooth, intact and without visible lesions  Uterus  surgically absent  Ovaries  surgically absent bilaterally and without palpable masses or fullness  Parametria  smooth   Rectovaginal--internal exam deferred, external hemorrhoid appreciated     ECOG Performance Status: (0) Fully Active - Able to Carry On All Pre-disease Performance Without Restriction    Procedure Notes:  No notes on file    Assessment and Plan:    Diagnoses and all orders for this visit:    1. Constipation, unspecified constipation type (Primary)  -     Ambulatory Referral to Gastroenterology    2. Hemorrhoids, unspecified hemorrhoid type  -     Ambulatory Referral to Gastroenterology    3. Rectal pain  -     Ambulatory Referral to Gastroenterology    4. LLQ pain    5. Granulosa cell tumor of ovary, right        Patient and I discussed her symptoms, recent imaging negative for disease, and home bowel regimen. There is no evidence that symptoms are associated with malignancy at present and she is in need of GI referral for evaluation and management. Patient v/u and agrees with plan. She is encouraged to continue topical preparation H for hemorrhoid management. If uncontrolled pain or severe rectal bleeding, present to ED.     Pain assessment was performed today as a part  of patient’s care.  For patients with pain related to surgery, gynecologic malignancy or cancer treatment, the plan is as noted in the assessment/plan.  For patients with pain not related to these issues, they are to seek any further needed care from a more appropriate provider, such as PCP.        Return to clinic for routine cancer surveillance or PRN new problems.       Electronically signed by CASEY Kelley on 10/23/20 at 15:39 EDT

## 2020-11-09 RX ORDER — ANASTROZOLE 1 MG/1
1 TABLET ORAL DAILY
Qty: 30 TABLET | Refills: 6 | Status: SHIPPED | OUTPATIENT
Start: 2020-11-09 | End: 2021-06-20 | Stop reason: SDUPTHER

## 2020-12-02 ENCOUNTER — OFFICE VISIT (OUTPATIENT)
Dept: GASTROENTEROLOGY | Facility: CLINIC | Age: 55
End: 2020-12-02

## 2020-12-02 VITALS
OXYGEN SATURATION: 97 % | SYSTOLIC BLOOD PRESSURE: 112 MMHG | HEART RATE: 113 BPM | TEMPERATURE: 97.7 F | DIASTOLIC BLOOD PRESSURE: 70 MMHG | WEIGHT: 122.4 LBS | HEIGHT: 66 IN | BODY MASS INDEX: 19.67 KG/M2

## 2020-12-02 DIAGNOSIS — R14.0 BLOATING: ICD-10-CM

## 2020-12-02 DIAGNOSIS — R10.32 LEFT LOWER QUADRANT ABDOMINAL PAIN: ICD-10-CM

## 2020-12-02 DIAGNOSIS — K59.00 CONSTIPATION, UNSPECIFIED CONSTIPATION TYPE: Primary | ICD-10-CM

## 2020-12-02 PROCEDURE — 99244 OFF/OP CNSLTJ NEW/EST MOD 40: CPT | Performed by: INTERNAL MEDICINE

## 2020-12-02 NOTE — PROGRESS NOTES
PCP: Provider, No Known    Chief Complaint   Patient presents with   • Constipation       History of Present Illness:   HPI  I appreciate the consult for constipation.  Ms. Carter is a 55-year-old with a history of arthritis and granulosa cell tumor of the ovary.  The original surgery was in 2013 in [place]. There  was a recurrence with subsequent surgery in April 2020 by Dr. Mcguire.  Ms. Carter had a colonoscopy 4 years ago in [place] that was normal.  She does take milk of magnesia every 3 to 4 days but this will cause her to have multiple bowel movements throughout the day.  She will occasionally take an over-the-counter stool softener.  Ms. Carter will notice abdominal bloating and slight distention if she does not take the milk of magnesia every few days.  Her diet consist of salad and she does have chicken and beef multiple days a week.  Ms. Carter does not drink any considerable amount of water.  Her main liquid intake is tea and Sin-Aid.  The patient denies any rose blood in the stool.  Her weight is stable.  Ms. Carter denies any difficult or painful swallowing.  There is no history of breakthrough heartburn.  Past Medical History:   Diagnosis Date   • Arthritis    • Cancer (CMS/HCC) 2013    [facility]; s/p hysterectomy with BSO and chemo therapy that ended in 2014   • Constipation    • Heartburn    • History of pneumonia 02/2019    no hospitalization    • Murmur     detected as an adult around age 30 and 40; requires prophylactic antibiotics before dental work; pt not sure if she has ever had an echo or when it might have been; Dr Stephen came down and listened to heart sounds on 4/2/20 and did not detect a murmur   • Pelvic mass    • Vitamin D deficiency        Past Surgical History:   Procedure Laterality Date   • BACK SURGERY      fusion    • COLONOSCOPY  2016   • ENDOSCOPY     • EXPLORATORY LAPAROTOMY N/A 4/3/2020    Procedure: LAPAROTOMY EXPLORATORY,  OPTIMAL DEBULKING, CYSTOSCOPY WITH  URETERAL CATHTER INSERTION AND REMOVAL, ILEO RESECTION, SIDE TO SIDE ANASTOMOSIS, RECTAL SIGMOID RESECTION, LEFT URETERA LYSIS, AND PERITONEAL STRIPPING;  Surgeon: Jamee Mcguire MD;  Location: [facility];  Service: Gynecology Oncology;  Laterality: N/A;   • HYSTERECTOMY  2013    with BSO         Current Outpatient Medications:   •  anastrozole (ARIMIDEX) 1 MG tablet, Take 1 tablet by mouth Daily., Disp: 30 tablet, Rfl: 6  •  hydrocortisone (ANUSOL-HC) 25 MG suppository, Insert 1 suppository into the rectum 2 (Two) Times a Day As Needed for Hemorrhoids., Disp: 60 each, Rfl: 5  •  ibuprofen (ADVIL,MOTRIN) 200 MG tablet, Take 400-600 mg by mouth Every 6 (Six) Hours As Needed for Mild Pain ., Disp: , Rfl:   •  sennosides-docusate (senna-docusate sodium) 8.6-50 MG per tablet, Take 1 tablet by mouth Daily., Disp: 30 tablet, Rfl: 0  •  polyethylene glycol (GoLYTELY) 236 g solution, Starting at noon on day prior to procedure, drink 8 ounces every 30 minutes until all gone or stools are clear. May add flavor packet., Disp: 4000 mL, Rfl: 0    Allergies   Allergen Reactions   • Penicillins Hives       Family History   Problem Relation Age of Onset   • Diabetes type II Father    • Heart disease Father    • Heart attack Father    • Diabetes type II Mother    • Lung cancer Maternal Uncle        Social History     Socioeconomic History   • Marital status: Single     Spouse name: Not on file   • Number of children: Not on file   • Years of education: Not on file   • Highest education level: Not on file   Tobacco Use   • Smoking status: Former Smoker     Packs/day: 0.50     Years: 30.00     Pack years: 15.00     Types: Electronic Cigarette, Cigarettes   • Smokeless tobacco: Never Used   • Tobacco comment: vape-nicotine currently; quit cigarettes july 2019   Substance and Sexual Activity   • Alcohol use: Never     Frequency: Never   • Drug use: Never   • Sexual activity: Defer       Review of Systems   Constitutional: Negative  for activity change, appetite change, fatigue, fever and unexpected weight change.   HENT: Negative for dental problem, hearing loss, mouth sores, postnasal drip, sneezing, trouble swallowing and voice change.    Eyes: Negative for pain, redness, itching and visual disturbance.   Respiratory: Negative for cough, choking, chest tightness, shortness of breath and wheezing.    Cardiovascular: Negative for chest pain, palpitations and leg swelling.   Gastrointestinal: Positive for abdominal distention (bloating), abdominal pain and constipation. Negative for anal bleeding, blood in stool, diarrhea, nausea, rectal pain and vomiting.        Heartburn   Endocrine: Negative for cold intolerance, heat intolerance, polydipsia, polyphagia and polyuria.   Genitourinary: Negative.  Negative for dysuria, enuresis, flank pain, hematuria and urgency.   Musculoskeletal: Negative for arthralgias, back pain, gait problem, joint swelling and myalgias.   Skin: Negative for color change, pallor and rash.   Allergic/Immunologic: Negative for environmental allergies, food allergies and immunocompromised state.   Neurological: Negative for dizziness, tremors, seizures, facial asymmetry, speech difficulty, numbness and headaches.   Hematological: Negative for adenopathy.   Psychiatric/Behavioral: Negative for behavioral problems, confusion, dysphoric mood, hallucinations and self-injury.       Vitals:    12/02/20 1416   BP: 112/70   Pulse: 113   Temp: 97.7 °F (36.5 °C)   SpO2: 97%       Physical Exam  Vitals signs reviewed.   Constitutional:       General: She is not in acute distress.  HENT:      Head: Normocephalic and atraumatic.      Nose: Nose normal.      Mouth/Throat:      Mouth: Mucous membranes are moist.      Pharynx: Oropharynx is clear.   Eyes:      General: No scleral icterus.     Extraocular Movements: Extraocular movements intact.   Neck:      Musculoskeletal: Normal range of motion. No neck rigidity.   Cardiovascular:       Rate and Rhythm: Normal rate and regular rhythm.      Heart sounds: No murmur. No gallop.    Pulmonary:      Effort: Pulmonary effort is normal.      Breath sounds: No wheezing or rales.   Abdominal:      General: Abdomen is flat. Bowel sounds are normal.      Palpations: Abdomen is soft.      Tenderness: There is abdominal tenderness (left lower quadrant). There is no guarding.   Musculoskeletal: Normal range of motion.         General: No swelling.   Skin:     General: Skin is warm and dry.      Coloration: Skin is not jaundiced.   Neurological:      General: No focal deficit present.      Mental Status: She is alert and oriented to person, place, and time.   Psychiatric:         Mood and Affect: Mood normal.         Thought Content: Thought content normal.         Judgment: Judgment normal.         Diagnoses and all orders for this visit:    1. Constipation, unspecified constipation type (Primary)  -     polyethylene glycol (GoLYTELY) 236 g solution; Starting at noon on day prior to procedure, drink 8 ounces every 30 minutes until all gone or stools are clear. May add flavor packet.  Dispense: 4000 mL; Refill: 0    2. Bloating  -     polyethylene glycol (GoLYTELY) 236 g solution; Starting at noon on day prior to procedure, drink 8 ounces every 30 minutes until all gone or stools are clear. May add flavor packet.  Dispense: 4000 mL; Refill: 0    3. Left lower quadrant abdominal pain  -     polyethylene glycol (GoLYTELY) 236 g solution; Starting at noon on day prior to procedure, drink 8 ounces every 30 minutes until all gone or stools are clear. May add flavor packet.  Dispense: 4000 mL; Refill: 0    The patient has history of granular cell tumor and the pathology in April was reviewed.  She had significant adhesions at the time based on pathology.  There is a degree of motility abnormality in pelvic floor dysfunction.       Plan: Discussed with the patient some dietary changes as well as encouraged her to augment  water consumption.           Will prescribe a bowel prep and then discussed bowel regimen daily.

## 2020-12-02 NOTE — PATIENT INSTRUCTIONS
Constipation, Adult  Constipation is when a person:  · Poops (has a bowel movement) fewer times in a week than normal.  · Has a hard time pooping.  · Has poop that is dry, hard, or bigger than normal.  Follow these instructions at home:  Eating and drinking    · Eat foods that have a lot of fiber, such as:  ? Fresh fruits and vegetables.  ? Whole grains.  ? Beans.  · Eat less of foods that are high in fat, low in fiber, or overly processed, such as:  ? French fries.  ? Hamburgers.  ? Cookies.  ? Candy.  ? Soda.  · Drink enough fluid to keep your pee (urine) clear or pale yellow.  General instructions  · Exercise regularly or as told by your doctor.  · Go to the restroom when you feel like you need to poop. Do not hold it in.  · Take over-the-counter and prescription medicines only as told by your doctor. These include any fiber supplements.  · Do pelvic floor retraining exercises, such as:  ? Doing deep breathing while relaxing your lower belly (abdomen).  ? Relaxing your pelvic floor while pooping.  · Watch your condition for any changes.  · Keep all follow-up visits as told by your doctor. This is important.  Contact a doctor if:  · You have pain that gets worse.  · You have a fever.  · You have not pooped for 4 days.  · You throw up (vomit).  · You are not hungry.  · You lose weight.  · You are bleeding from the anus.  · You have thin, pencil-like poop (stool).  Get help right away if:  · You have a fever, and your symptoms suddenly get worse.  · You leak poop or have blood in your poop.  · Your belly feels hard or bigger than normal (is bloated).  · You have very bad belly pain.  · You feel dizzy or you faint.  This information is not intended to replace advice given to you by your health care provider. Make sure you discuss any questions you have with your health care provider.  Document Revised: 11/30/2018 Document Reviewed: 06/07/2017  Elsevier Patient Education © 2020 [employer]

## 2020-12-23 ENCOUNTER — OFFICE VISIT (OUTPATIENT)
Dept: FAMILY MEDICINE CLINIC | Facility: CLINIC | Age: 55
End: 2020-12-23

## 2020-12-23 VITALS
RESPIRATION RATE: 16 BRPM | OXYGEN SATURATION: 99 % | SYSTOLIC BLOOD PRESSURE: 122 MMHG | DIASTOLIC BLOOD PRESSURE: 80 MMHG | HEIGHT: 66 IN | HEART RATE: 97 BPM | BODY MASS INDEX: 19.89 KG/M2 | WEIGHT: 123.8 LBS

## 2020-12-23 DIAGNOSIS — D39.11 GRANULOSA CELL TUMOR OF OVARY, RIGHT: ICD-10-CM

## 2020-12-23 DIAGNOSIS — M54.50 ACUTE BILATERAL LOW BACK PAIN WITHOUT SCIATICA: ICD-10-CM

## 2020-12-23 DIAGNOSIS — M62.89 PELVIC FLOOR DYSFUNCTION: ICD-10-CM

## 2020-12-23 DIAGNOSIS — K59.00 CONSTIPATION, UNSPECIFIED CONSTIPATION TYPE: Primary | ICD-10-CM

## 2020-12-23 PROCEDURE — 99204 OFFICE O/P NEW MOD 45 MIN: CPT | Performed by: INTERNAL MEDICINE

## 2020-12-23 RX ORDER — BACLOFEN 10 MG/1
10 TABLET ORAL 3 TIMES DAILY PRN
Qty: 90 TABLET | Refills: 1 | Status: SHIPPED | OUTPATIENT
Start: 2020-12-23 | End: 2021-06-09 | Stop reason: SDUPTHER

## 2020-12-23 RX ORDER — CYCLOBENZAPRINE HCL 10 MG
10 TABLET ORAL NIGHTLY PRN
Qty: 30 TABLET | Refills: 1 | Status: SHIPPED | OUTPATIENT
Start: 2020-12-23 | End: 2021-03-29 | Stop reason: SDUPTHER

## 2020-12-23 RX ORDER — DOCUSATE SODIUM 100 MG/1
100 CAPSULE, LIQUID FILLED ORAL 2 TIMES DAILY PRN
COMMUNITY
End: 2021-07-21

## 2020-12-23 RX ORDER — DIPHENHYDRAMINE HCL 25 MG
25 TABLET ORAL NIGHTLY PRN
COMMUNITY

## 2020-12-23 RX ORDER — DICLOFENAC SODIUM 75 MG/1
75 TABLET, DELAYED RELEASE ORAL 2 TIMES DAILY
Qty: 60 TABLET | Refills: 5 | Status: SHIPPED | OUTPATIENT
Start: 2020-12-23 | End: 2021-03-29 | Stop reason: SDUPTHER

## 2020-12-23 NOTE — PATIENT INSTRUCTIONS
Acute Back Pain, Adult  Acute back pain is sudden and usually short-lived. It is often caused by an injury to the muscles and tissues in the back. The injury may result from:  · A muscle or ligament getting overstretched or torn (strained). Ligaments are tissues that connect bones to each other. Lifting something improperly can cause a back strain.  · Wear and tear (degeneration) of the spinal disks. Spinal disks are circular tissue that provides cushioning between the bones of the spine (vertebrae).  · Twisting motions, such as while playing sports or doing yard work.  · A hit to the back.  · Arthritis.  You may have a physical exam, lab tests, and imaging tests to find the cause of your pain. Acute back pain usually goes away with rest and home care.  Follow these instructions at home:  Managing pain, stiffness, and swelling  · Take over-the-counter and prescription medicines only as told by your health care provider.  · Your health care provider may recommend applying ice during the first 24-48 hours after your pain starts. To do this:  ? Put ice in a plastic bag.  ? Place a towel between your skin and the bag.  ? Leave the ice on for 20 minutes, 2-3 times a day.  · If directed, apply heat to the affected area as often as told by your health care provider. Use the heat source that your health care provider recommends, such as a moist heat pack or a heating pad.  ? Place a towel between your skin and the heat source.  ? Leave the heat on for 20-30 minutes.  ? Remove the heat if your skin turns bright red. This is especially important if you are unable to feel pain, heat, or cold. You have a greater risk of getting burned.  Activity    · Do not stay in bed. Staying in bed for more than 1-2 days can delay your recovery.  · Sit up and stand up straight. Avoid leaning forward when you sit, or hunching over when you stand.  ? If you work at a desk, sit close to it so you do not need to lean over. Keep your chin tucked  "in. Keep your neck drawn back, and keep your elbows bent at a right angle. Your arms should look like the letter \"L.\"  ? Sit high and close to the steering wheel when you drive. Add lower back (lumbar) support to your car seat, if needed.  · Take short walks on even surfaces as soon as you are able. Try to increase the length of time you walk each day.  · Do not sit, drive, or  one place for more than 30 minutes at a time. Sitting or standing for long periods of time can put stress on your back.  · Do not drive or use heavy machinery while taking prescription pain medicine.  · Use proper lifting techniques. When you bend and lift, use positions that put less stress on your back:  ? Bend your knees.  ? Keep the load close to your body.  ? Avoid twisting.  · Exercise regularly as told by your health care provider. Exercising helps your back heal faster and helps prevent back injuries by keeping muscles strong and flexible.  · Work with a physical therapist to make a safe exercise program, as recommended by your health care provider. Do any exercises as told by your physical therapist.  Lifestyle  · Maintain a healthy weight. Extra weight puts stress on your back and makes it difficult to have good posture.  · Avoid activities or situations that make you feel anxious or stressed. Stress and anxiety increase muscle tension and can make back pain worse. Learn ways to manage anxiety and stress, such as through exercise.  General instructions  · Sleep on a firm mattress in a comfortable position. Try lying on your side with your knees slightly bent. If you lie on your back, put a pillow under your knees.  · Follow your treatment plan as told by your health care provider. This may include:  ? Cognitive or behavioral therapy.  ? Acupuncture or massage therapy.  ? Meditation or yoga.  Contact a health care provider if:  · You have pain that is not relieved with rest or medicine.  · You have increasing pain going down " into your legs or buttocks.  · Your pain does not improve after 2 weeks.  · You have pain at night.  · You lose weight without trying.  · You have a fever or chills.  Get help right away if:  · You develop new bowel or bladder control problems.  · You have unusual weakness or numbness in your arms or legs.  · You develop nausea or vomiting.  · You develop abdominal pain.  · You feel faint.  Summary  · Acute back pain is sudden and usually short-lived.  · Use proper lifting techniques. When you bend and lift, use positions that put less stress on your back.  · Take over-the-counter and prescription medicines and apply heat or ice as directed by your health care provider.  This information is not intended to replace advice given to you by your health care provider. Make sure you discuss any questions you have with your health care provider.  Document Revised: 04/07/2020 Document Reviewed: 08/01/2018  Elsevier Patient Education © 2020 [employer]

## 2020-12-23 NOTE — PROGRESS NOTES
Roxie Carter  1965  [id number]  Patient Care Team:  Chip Ramos MD as PCP - General (Internal Medicine)    Roxie Carter is a 55 y.o. female here today to establish care.  This patient is accompanied by their   who contributes to the history of their care.    Chief Complaint:    Chief Complaint   Patient presents with   • Establish Care   • Back Pain     trouble walking      History of Present Illness:    Has had back surgery I past. Avoids lifing heavy items. No trauma or falls. Had fusion done in 2008. Also had back surgery ion 2004, 2006 in [facility] ( Dr. Albarran). At this time had severe radiculopathy sx down right leg.    1 week of spasm in LB. Hurts to bend over, turn over in bed, stoop, twist. Radiates into both hips. Ibuprofen r tylenol do not help. Hurts to walk or sit upright. No LE pain. Denies numbness or tinigling. Denies weakness in legs.. Has had difficulty with pelvic relation since Gyn-onc surgery in April 2020 . Take MOM q 2 days. Last colonoscopy was in 2013/14.    Has tried ice and heat without relief. Pain ois sharp 8/10.    Past Medical History:   Diagnosis Date   • Arthritis    • Cancer (CMS/HCC) 2013    OVARIAN; s/p hysterectomy with BSO and chemo therapy that ended in 2014   • Constipation    • Heartburn    • History of pneumonia 02/2019    no hospitalization    • Low back pain    • Murmur     detected as an adult around age 30 and 40; requires prophylactic antibiotics before dental work; pt not sure if she has ever had an echo or when it might have been; Dr Stephen came down and listened to heart sounds on 4/2/20 and did not detect a murmur   • Ovarian cancer (CMS/HCC)    • Ovarian cyst    • Pelvic mass    • Vitamin D deficiency        Past Surgical History:   Procedure Laterality Date   • BACK SURGERY      fusion    • COLONOSCOPY  2016   • ENDOSCOPY     • EXPLORATORY LAPAROTOMY N/A 4/3/2020    Procedure: LAPAROTOMY EXPLORATORY,  OPTIMAL DEBULKING, CYSTOSCOPY WITH  URETERAL CATHTER INSERTION AND REMOVAL, ILEO RESECTION, SIDE TO SIDE ANASTOMOSIS, RECTAL SIGMOID RESECTION, LEFT URETERA LYSIS, AND PERITONEAL STRIPPING;  Surgeon: Jamee Mcguire MD;  Location: [facility] [place];  Service: Gynecology Oncology;  Laterality: N/A;   • HYSTERECTOMY  2013    with BSO        Family History   Problem Relation Age of Onset   • Diabetes type II Father    • Heart disease Father    • Heart attack Father    • Cancer Father    • Diabetes Father    • Hypertension Father    • Diabetes type II Mother    • Diabetes Mother    • Hypertension Mother    • Lung cancer Maternal Uncle    • Migraines Sister    • Migraines Brother         dec from Overdose       Social History     Socioeconomic History   • Marital status: Single     Spouse name: Not on file   • Number of children: Not on file   • Years of education: Not on file   • Highest education level: Not on file   Tobacco Use   • Smoking status: Former Smoker     Packs/day: 0.50     Years: 30.00     Pack years: 15.00     Types: Electronic Cigarette, Cigarettes   • Smokeless tobacco: Never Used   • Tobacco comment: vape-nicotine currently; quit cigarettes july 2019   Substance and Sexual Activity   • Alcohol use: Never     Frequency: Never   • Drug use: Never   • Sexual activity: Defer   Social History Narrative    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 Divorce x 18 years with 2 sons.        Allergies   Allergen Reactions   • Penicillins Hives       Review of Systems:    Review of Systems   Constitutional: Negative.  Negative for chills and fever.   HENT: Negative.    Eyes: Positive for itching.   Respiratory: Negative for cough and shortness of breath.    Cardiovascular: Negative.    Gastrointestinal: Positive for constipation and rectal pain. Negative for nausea and vomiting.   Endocrine: Negative.    Genitourinary: Negative for dysuria.        Rectal pain with urination   Musculoskeletal: Positive for back pain.   Neurological: Negative.   "  Psychiatric/Behavioral: Negative.        Vitals:    12/23/20 1429   BP: 122/80   Pulse: 97   Resp: 16   SpO2: 99%   Weight: 56.2 kg (123 lb 12.8 oz)   Height: 167.6 cm (65.98\")     Body mass index is 19.99 kg/m².      Current Outpatient Medications:   •  anastrozole (ARIMIDEX) 1 MG tablet, Take 1 tablet by mouth Daily., Disp: 30 tablet, Rfl: 6  •  diphenhydrAMINE (BENADRYL) 25 MG tablet, Take 25 mg by mouth Every Night., Disp: , Rfl:   •  docusate sodium (COLACE) 100 MG capsule, Take 100 mg by mouth 2 (Two) Times a Day As Needed for Constipation., Disp: , Rfl:   •  ibuprofen (ADVIL,MOTRIN) 200 MG tablet, Take 400-600 mg by mouth Every 6 (Six) Hours As Needed for Mild Pain ., Disp: , Rfl:   •  sennosides-docusate (senna-docusate sodium) 8.6-50 MG per tablet, Take 1 tablet by mouth Daily., Disp: 30 tablet, Rfl: 0  •  baclofen (LIORESAL) 10 MG tablet, Take 1 tablet by mouth 3 (Three) Times a Day As Needed for Muscle Spasms., Disp: 90 tablet, Rfl: 1  •  cyclobenzaprine (FLEXERIL) 10 MG tablet, Take 1 tablet by mouth At Night As Needed for Muscle Spasms., Disp: 30 tablet, Rfl: 1  •  diclofenac (VOLTAREN) 75 MG EC tablet, Take 1 tablet by mouth 2 (Two) Times a Day., Disp: 60 tablet, Rfl: 5  •  hydrocortisone (ANUSOL-HC) 25 MG suppository, Insert 1 suppository into the rectum 2 (Two) Times a Day As Needed for Hemorrhoids., Disp: 60 each, Rfl: 5  •  polyethylene glycol (GoLYTELY) 236 g solution, Starting at noon on day prior to procedure, drink 8 ounces every 30 minutes until all gone or stools are clear. May add flavor packet., Disp: 4000 mL, Rfl: 0    Physical Exam:    Physical Exam  Vitals signs and nursing note reviewed.   Constitutional:       General: She is not in acute distress.     Appearance: Normal appearance. She is well-developed. She is not diaphoretic.   HENT:      Head: Normocephalic and atraumatic.      Right Ear: External ear normal.      Left Ear: External ear normal.      Mouth/Throat:      Pharynx: No " oropharyngeal exudate.   Eyes:      General: No scleral icterus.        Right eye: No discharge.      Extraocular Movements: Extraocular movements intact.      Conjunctiva/sclera: Conjunctivae normal.   Neck:      Musculoskeletal: Normal range of motion and neck supple.      Thyroid: No thyromegaly.      Vascular: No JVD.      Trachea: No tracheal deviation.   Cardiovascular:      Rate and Rhythm: Normal rate and regular rhythm.      Pulses: Normal pulses.      Heart sounds: Normal heart sounds.      Comments: PMI nondisplaced  Pulmonary:      Effort: Pulmonary effort is normal.      Breath sounds: Normal breath sounds. No wheezing or rales.   Abdominal:      General: Bowel sounds are normal.      Palpations: Abdomen is soft.      Tenderness: There is no abdominal tenderness. There is no guarding or rebound.   Musculoskeletal:      Comments:  Relates with difficulty guarding her lower back.  She sits upright.  Diffuse and rigid spasm in the lumbar and lower thoracic paraspinous muscles.  Midline scars appreciated.  Marked decreased range of motion with anterior posterior flexion.  Leg raise negative.  2+ reflexes bilaterally.  Lower extremity strength is 5 out of 5 proximally and distally.   Lymphadenopathy:      Cervical: No cervical adenopathy.   Skin:     General: Skin is warm and dry.      Capillary Refill: Capillary refill takes less than 2 seconds.      Coloration: Skin is not pale.      Findings: No rash.   Neurological:      Mental Status: She is alert and oriented to person, place, and time.      Motor: No abnormal muscle tone.      Coordination: Coordination normal.   Psychiatric:         Judgment: Judgment normal.         Procedures    Results Review:    None    Assessment/Plan:  Is a 55-year-old female with a history of gynecologic cancer presents with acute back pain.  She has a history of 3 back surgeries in 2004 2006 2008.  Ultimately she had spinal fusion.  This sounds mechanical in nature however  given her history of neoplasm, radiographic studies starting of x-rays have been requested.  I placed her on diclofenac 75 mg p.o. twice daily.  Flexeril at bedtime to help her sleep.  Baclofen during the waking hours for muscle relaxant.  Back exercises were given.  I do believe her constipation is likely pelvic floor dysfunction.  I question whether the back pain and the pelvic floor dysfunction are not related with guarding.  I requested physical therapy for attention to the low back as well as pelvic floor exercises.  Problem List Items Addressed This Visit        Digestive    Constipation - Primary    Relevant Medications    polyethylene glycol (GoLYTELY) 236 g solution       Endocrine    Granulosa cell tumor of ovary, right    Relevant Medications    anastrozole (ARIMIDEX) 1 MG tablet      Other Visit Diagnoses     Acute bilateral low back pain without sciatica        Relevant Orders    Ambulatory Referral to Physical Therapy    XR Spine Lumbar 2 or 3 View    Pelvic floor dysfunction        Relevant Orders    Ambulatory Referral to Physical Therapy          Plan of care reviewed with patient at the conclusion of today's visit. Education was provided regarding diagnosis and management.  Patient verbalizes understanding of and agreement with management plan.    Return in about 2 weeks (around 1/6/2021), or low back pain.    Chip Ramos MD    Please note that portions of this note may have been completed with a voice recognition program. Efforts were made to edit the dictations, but occasionally words are mistranscribed.

## 2020-12-31 ENCOUNTER — HOSPITAL ENCOUNTER (OUTPATIENT)
Dept: GENERAL RADIOLOGY | Facility: HOSPITAL | Age: 55
Discharge: HOME | End: 2020-12-31
Admitting: INTERNAL MEDICINE

## 2020-12-31 DIAGNOSIS — M54.50 ACUTE BILATERAL LOW BACK PAIN WITHOUT SCIATICA: ICD-10-CM

## 2020-12-31 PROCEDURE — 72100 X-RAY EXAM L-S SPINE 2/3 VWS: CPT

## 2021-01-08 ENCOUNTER — OFFICE VISIT (OUTPATIENT)
Dept: FAMILY MEDICINE CLINIC | Facility: CLINIC | Age: 56
End: 2021-01-08

## 2021-01-08 VITALS
DIASTOLIC BLOOD PRESSURE: 70 MMHG | BODY MASS INDEX: 20.57 KG/M2 | OXYGEN SATURATION: 98 % | SYSTOLIC BLOOD PRESSURE: 115 MMHG | HEART RATE: 115 BPM | TEMPERATURE: 96.9 F | HEIGHT: 66 IN | WEIGHT: 128 LBS

## 2021-01-08 DIAGNOSIS — H57.12 LEFT EYE PAIN: Primary | ICD-10-CM

## 2021-01-08 DIAGNOSIS — M54.16 LEFT LUMBAR RADICULITIS: ICD-10-CM

## 2021-01-08 PROCEDURE — 99213 OFFICE O/P EST LOW 20 MIN: CPT | Performed by: INTERNAL MEDICINE

## 2021-01-08 RX ORDER — GENTAMICIN SULFATE 3 MG/ML
1 SOLUTION/ DROPS OPHTHALMIC 3 TIMES DAILY
Qty: 15 ML | Refills: 0 | Status: SHIPPED | OUTPATIENT
Start: 2021-01-08 | End: 2021-01-13

## 2021-01-08 RX ORDER — TRAMADOL HYDROCHLORIDE 50 MG/1
50 TABLET ORAL 2 TIMES DAILY
Qty: 60 TABLET | Refills: 1 | Status: SHIPPED | OUTPATIENT
Start: 2021-01-08 | End: 2021-03-08 | Stop reason: SDUPTHER

## 2021-01-08 NOTE — PROGRESS NOTES
"Roxie Carter  1965  [id number]  Patient Care Team:  Chip Ramos MD as PCP - General (Internal Medicine)    Roxie Carter is a 55 y.o. female here today for follow up.     This patient is accompanied by their self who contributes to the history of their care.    Chief Complaint:    Chief Complaint   Patient presents with   • Back Pain     Pt states she is having major back pain that stays all day. Pt states she was prescribed muclse relaxers but she can only take them at night because they make her sleepy. Pt would like to dicuss further with PCP.   • Eye Pain     Pt states her left eye is constantly watery and drains a lot. Pt also states when she wakes up in the morning its crusted and she has a hard time opening.        History of Present Illness:  I have reviewed and/or updated the patient's past medical, past surgical, family, social history, problem list and allergies as appropriate.     This lady has had 3 surgical procedures on her lumbar spine.  She has a history of gynecologic cancer.  She reports back today complaining of persistent back pain.  It is no hurting back constantly and radiates into left leg. No numbness or tingling denies weakness.  Muscle relax help but makes her drowsy during day denies any perineal numbness or tingling.    Additionally has left upper eye discomfort that seems to edge or under her eyelid.  Intense pruritus as well as pain.  Note of photophobia.  Is been clear discharge in her eyes matted in the morning.  No recent fevers or chills.  No upper respiratory symptoms.    Review of Systems:    Review of Systems   Eyes: Positive for pain and discharge. Negative for blurred vision and double vision.   Gastrointestinal: Negative.    Musculoskeletal: Positive for back pain.       Vitals:    01/08/21 1333   BP: 115/70   Pulse: 115   Temp: 96.9 °F (36.1 °C)   SpO2: 98%   Weight: 58.1 kg (128 lb)   Height: 167.6 cm (65.98\")   PainSc:   8   PainLoc: Back     Body " mass index is 20.67 kg/m².      Current Outpatient Medications:   •  anastrozole (ARIMIDEX) 1 MG tablet, Take 1 tablet by mouth Daily., Disp: 30 tablet, Rfl: 6  •  baclofen (LIORESAL) 10 MG tablet, Take 1 tablet by mouth 3 (Three) Times a Day As Needed for Muscle Spasms., Disp: 90 tablet, Rfl: 1  •  cyclobenzaprine (FLEXERIL) 10 MG tablet, Take 1 tablet by mouth At Night As Needed for Muscle Spasms., Disp: 30 tablet, Rfl: 1  •  diphenhydrAMINE (BENADRYL) 25 MG tablet, Take 25 mg by mouth Every Night., Disp: , Rfl:   •  ibuprofen (ADVIL,MOTRIN) 200 MG tablet, Take 400-600 mg by mouth Every 6 (Six) Hours As Needed for Mild Pain ., Disp: , Rfl:   •  diclofenac (VOLTAREN) 75 MG EC tablet, Take 1 tablet by mouth 2 (Two) Times a Day., Disp: 60 tablet, Rfl: 5  •  docusate sodium (COLACE) 100 MG capsule, Take 100 mg by mouth 2 (Two) Times a Day As Needed for Constipation., Disp: , Rfl:   •  gentamicin (GARAMYCIN) 0.3 % ophthalmic solution, Administer 1 drop into the left eye 3 (Three) Times a Day., Disp: 15 mL, Rfl: 0  •  hydrocortisone (ANUSOL-HC) 25 MG suppository, Insert 1 suppository into the rectum 2 (Two) Times a Day As Needed for Hemorrhoids., Disp: 60 each, Rfl: 5  •  polyethylene glycol (GoLYTELY) 236 g solution, Starting at noon on day prior to procedure, drink 8 ounces every 30 minutes until all gone or stools are clear. May add flavor packet., Disp: 4000 mL, Rfl: 0  •  sennosides-docusate (senna-docusate sodium) 8.6-50 MG per tablet, Take 1 tablet by mouth Daily., Disp: 30 tablet, Rfl: 0  •  traMADol (Ultram) 50 MG tablet, Take 1 tablet by mouth 2 (Two) Times a Day., Disp: 60 tablet, Rfl: 1    Physical Exam:    Physical Exam  Vitals signs and nursing note reviewed.   Constitutional:       General: She is not in acute distress.     Appearance: She is well-developed. She is not diaphoretic.   HENT:      Head: Normocephalic and atraumatic.      Right Ear: External ear normal.      Left Ear: External ear normal.       Mouth/Throat:      Pharynx: No oropharyngeal exudate.   Eyes:      General: No scleral icterus.        Right eye: No discharge.      Conjunctiva/sclera: Conjunctivae normal.   Neck:      Musculoskeletal: Normal range of motion and neck supple.      Thyroid: No thyromegaly.      Vascular: No JVD.      Trachea: No tracheal deviation.   Cardiovascular:      Rate and Rhythm: Normal rate and regular rhythm.      Heart sounds: Normal heart sounds.      Comments: PMI nondisplaced  Pulmonary:      Effort: Pulmonary effort is normal.      Breath sounds: Normal breath sounds. No wheezing or rales.   Abdominal:      General: Bowel sounds are normal.      Palpations: Abdomen is soft.      Tenderness: There is no abdominal tenderness. There is no guarding or rebound.   Musculoskeletal:      Comments: Walks with spine held and forward flexion.  Straight leg raise positive on left.  Deep tendon reflexes 1+ on the left 2+ on the right.  Postoperative changes in the lumbar spine.  There is left paraspinous guarding.  Her left dorsiflexion seems to be weaker than right at 4 out of 5.   Lymphadenopathy:      Cervical: No cervical adenopathy.   Skin:     General: Skin is warm and dry.      Capillary Refill: Capillary refill takes less than 2 seconds.      Coloration: Skin is not pale.      Findings: No rash.   Neurological:      Mental Status: She is alert and oriented to person, place, and time.      Motor: No abnormal muscle tone.      Coordination: Coordination normal.   Psychiatric:         Judgment: Judgment normal.         Procedures    Results Review:    None    Assessment/Plan:  This lady seems to be worsening with her back pain.  She has a complicated history of 3 surgeries last being a fusion in 2008.  She has a history of gynecologic cancer.  Her x-rays showed normal hardware alignment.  She has now developed radicular symptoms and warrants an MRI.  I have also requested her evaluation with pain management for possible  injection therapy.  Regarding her eye I am unclear of what is going on here however placed her on some gentamicin drops send her to an ophthalmologist for evaluation.  Problem List Items Addressed This Visit        Eye    Left eye pain - Primary    Relevant Orders    Ambulatory Referral to Ophthalmology       Neuro    Left lumbar radiculitis    Relevant Medications    traMADol (Ultram) 50 MG tablet    Other Relevant Orders    MRI Lumbar Spine Without Contrast    Ambulatory Referral to Pain Management    Urine Drug Screen - Urine, Clean Catch          Plan of care reviewed with patient at the conclusion of today's visit. Education was provided regarding diagnosis and management.  Patient verbalizes understanding of and agreement with management plan.    Return in about 4 weeks (around 2/5/2021) for Annual.    Chip Ramos MD    Please note that portions of this note may have been completed with a voice recognition program. Efforts were made to edit the dictations, but occasionally words are mistranscribed.

## 2021-01-13 ENCOUNTER — OFFICE VISIT (OUTPATIENT)
Dept: GYNECOLOGIC ONCOLOGY | Facility: CLINIC | Age: 56
End: 2021-01-13

## 2021-01-13 VITALS
SYSTOLIC BLOOD PRESSURE: 106 MMHG | WEIGHT: 128.2 LBS | TEMPERATURE: 98.4 F | HEART RATE: 114 BPM | RESPIRATION RATE: 16 BRPM | DIASTOLIC BLOOD PRESSURE: 72 MMHG | HEIGHT: 66 IN | OXYGEN SATURATION: 93 % | BODY MASS INDEX: 20.6 KG/M2

## 2021-01-13 DIAGNOSIS — M54.16 LEFT LUMBAR RADICULITIS: Primary | ICD-10-CM

## 2021-01-13 DIAGNOSIS — D39.11 GRANULOSA CELL TUMOR OF OVARY, RIGHT: Primary | ICD-10-CM

## 2021-01-13 DIAGNOSIS — M54.16 LEFT LUMBAR RADICULITIS: ICD-10-CM

## 2021-01-13 DIAGNOSIS — D39.11 GRANULOSA CELL TUMOR OF OVARY, RIGHT: ICD-10-CM

## 2021-01-13 PROCEDURE — 99212 OFFICE O/P EST SF 10 MIN: CPT | Performed by: NURSE PRACTITIONER

## 2021-01-13 NOTE — PROGRESS NOTES
GYN ONCOLOGY CANCER SURVEILLANCE FOLLOW-UP    Roxie Carter  [id number]  1965    Subjective   Chief Complaint: Follow-up (no gyn complaints)        History of present illness:     Roxie Carter is a 55 y.o. 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 She reports she is feeling generally well today and has no gyn complaints. She denies vaginal bleeding, pelvic pain, and changes in bladder function. She continues her Arimidex daily, doing well on hormone blocker.  She feels her bowel function is much improved since her GI evaluation. She is using colace daily, now having BMs every 3-4 days instead of weekly or less. She is considering using Miralax PRN with a goal of BMs every 1-2 days to prevent discomfort. She is having some back pain, rates 2/10, is scheduled for MRI of her back next week.        Cancer History:   Oncology/Hematology History   Granulosa cell tumor of ovary, right   8/13/2013 Cancer Staged    Staging form: Ovary, Fallopian Tube, And Primary Peritoneal Carcinoma, AJCC 8th Edition  - Clinical stage from 8/13/2013: FIGO Stage IC (cT1c, cN0, cM0) - Signed by Jamee Mcguire MD on 3/26/2020     8/13/2013 Surgery    Total abdominal hysterectomy, bilateral salpingo-oophorectomy with pathology showing 5 cm granulosa cell tumor of the right ovary and a left fallopian tube intraepithelial serous neoplasm.      - 1/4/2014 Chemotherapy    OP OVARIAN PACLitaxel / CARBOplatin (Q21D)  x6 cycles remarkable for bone marrow suppression and G-CSF support     3/14/2020 Progression    CT Abdomen/Pelvis IMPRESSION:  Large heterogeneous mass identified within the pelvis with  fluid seen scattered throughout the abdomen and pelvis. Findings  concerning for recurrence with some stranding of the mesentery in which  spread to the mesentery cannot be excluded.     4/3/2020 Surgery    Exploratory laparotomy, optimal debulking (R=0),  "cystoscopy with temporary uteteral catheter, ileal resection with side-to-side anastomosis, rectosigmoid resection/LAR, left ureterolysis, and peritoneal stripping.   Pathology consistent with recurrent granulosa cell tumor at the pelvis with rectosigmoid and partial terminal ileum. No LVSI. Mesenteric nodes and other staging negative.      4/6/2020 -  Hormonal Therapy    Arimidex initiated     4/27/2020 Molecular Testing    CARIS testing results:  WA positive, 1+, 75%  ER negative, MSI stable, PD-L1 negative     6/30/2020 Imaging    CT scan for new abdominal pain showed expected postsurgical changes from resection of pelvic mass without evidence for bulky adenopathy or soft tissue nodular recurrence. No mesenteric reticulation or ascites to suggest peritoneal involvement. No acute intraabdominal or intrapelvic abnormality otherwise noted. Significant colonic stool burden noted.            The current medication list and allergy list were reviewed and reconciled.     Past Medical History, Past Surgical History, Social History, Family History have been reviewed and are without significant changes except as mentioned.      Review of Systems   Constitutional: Negative.    Gastrointestinal: Positive for constipation (but improved). Negative for abdominal distention, abdominal pain and vomiting.   Genitourinary: Negative.    Musculoskeletal: Positive for arthralgias and back pain.         Objective   Physical Exam  Vital Signs: /72   Pulse 114   Temp 98.4 °F (36.9 °C) (Temporal)   Resp 16   Ht 167.6 cm (65.98\")   Wt 58.2 kg (128 lb 3.2 oz)   SpO2 93%   BMI 20.70 kg/m²   Vitals:    01/13/21 1335   PainSc:   2   PainLoc: Back           General Appearance:  alert, cooperative, no apparent distress, appears stated age and normal weight   Neurologic/Psychiatric: A&O x 3, gait steady, appropriate affect   HEENT:  Normocephalic, without obvious abnormality, mucous membranes moist   Abdomen:   Soft, non-tender, " non-distended and no organomegaly   Lymph nodes: No cervical, supraclavicular, inguinal adenopathy noted   Pelvic: External Genitalia  without lesions or skin changes  Vagina  is pink, moist, without lesions.   Vaginal Cuff  Female Vaginal Cuff: smooth, intact and without visible lesions  Uterus  surgically absent  Ovaries  surgically absent bilaterally and without palpable masses or fullness  Parametria  smooth  Rectovaginal  Female rectovaginal: deferred     ECOG Performance Status: (0) Fully Active - Able to Carry On All Pre-disease Performance Without Restriction      PHQ-9 Total Score: 1    Procedure Note:  No notes on file         Assessment and Plan:    Diagnoses and all orders for this visit:    1. Granulosa cell tumor of ovary, right (Primary)          There is no evidence of disease upon today's exam. Continue every 3 month visits for the first 2 years, then every 6 months. She is understanding to call with any changes in pelvic symptoms or general GYN concerns at any time between regularly scheduled visits.     Pain assessment was performed today as a part of patient’s care.  For patients with pain related to surgery, gynecologic malignancy or cancer treatment, the plan is as noted in the assessment/plan.  For patients with pain not related to these issues, they are to seek any further needed care from a more appropriate provider, such as PCP.        Follow-up:     Return to clinic in 3 months for ongoing cancer surveillance.      Electronically signed by CASEY Kelley on 01/15/21 at 15:16 EST

## 2021-01-19 ENCOUNTER — TELEPHONE (OUTPATIENT)
Dept: MRI IMAGING | Facility: HOSPITAL | Age: 56
End: 2021-01-19

## 2021-01-19 NOTE — TELEPHONE ENCOUNTER
Contacted physicians office concerning scheduled MRI lumbar without and pending orders in the chart for MRI TSP & LSP with and without. Spoke to physicians nurse and she clarified that the patient should be scheduled for the with and without studies due to her history of cancer and the without study should be cancelled. Will contact patient for r/s and get her back in before her follow up with physician on 2/5

## 2021-01-20 ENCOUNTER — HOSPITAL ENCOUNTER (OUTPATIENT)
Dept: MRI IMAGING | Facility: HOSPITAL | Age: 56
End: 2021-01-20

## 2021-01-27 ENCOUNTER — HOSPITAL ENCOUNTER (OUTPATIENT)
Dept: MRI IMAGING | Facility: HOSPITAL | Age: 56
Discharge: HOME | End: 2021-01-27

## 2021-01-27 DIAGNOSIS — D39.11 GRANULOSA CELL TUMOR OF OVARY, RIGHT: ICD-10-CM

## 2021-01-27 DIAGNOSIS — M54.16 LEFT LUMBAR RADICULITIS: ICD-10-CM

## 2021-01-27 PROCEDURE — A9577 INJ MULTIHANCE: HCPCS | Performed by: INTERNAL MEDICINE

## 2021-01-27 PROCEDURE — 0 GADOBENATE DIMEGLUMINE 529 MG/ML SOLUTION: Performed by: INTERNAL MEDICINE

## 2021-01-27 PROCEDURE — 72157 MRI CHEST SPINE W/O & W/DYE: CPT

## 2021-01-27 PROCEDURE — 72158 MRI LUMBAR SPINE W/O & W/DYE: CPT

## 2021-01-27 RX ADMIN — GADOBENATE DIMEGLUMINE 10 ML: 529 INJECTION, SOLUTION INTRAVENOUS at 15:44

## 2021-03-08 DIAGNOSIS — M54.16 LEFT LUMBAR RADICULITIS: ICD-10-CM

## 2021-03-08 RX ORDER — TRAMADOL HYDROCHLORIDE 50 MG/1
50 TABLET ORAL 2 TIMES DAILY
Qty: 60 TABLET | Refills: 1 | Status: SHIPPED | OUTPATIENT
Start: 2021-03-08 | End: 2021-07-07 | Stop reason: SDUPTHER

## 2021-03-08 NOTE — TELEPHONE ENCOUNTER
Last seen: 01/08/2021  Next scheduled: Not scheduled  Last fill: 01/08/2021  CSA up to date: YES  Date of last UDS: 01/08/2021  UDS consistent: CONSISTENT

## 2021-03-30 RX ORDER — CYCLOBENZAPRINE HCL 10 MG
10 TABLET ORAL NIGHTLY PRN
Qty: 30 TABLET | Refills: 1 | Status: SHIPPED | OUTPATIENT
Start: 2021-03-30 | End: 2021-05-28 | Stop reason: SDUPTHER

## 2021-03-30 RX ORDER — DICLOFENAC SODIUM 75 MG/1
75 TABLET, DELAYED RELEASE ORAL 2 TIMES DAILY
Qty: 60 TABLET | Refills: 5 | Status: SHIPPED | OUTPATIENT
Start: 2021-03-30 | End: 2021-07-17 | Stop reason: SDUPTHER

## 2021-04-14 ENCOUNTER — OFFICE VISIT (OUTPATIENT)
Dept: GYNECOLOGIC ONCOLOGY | Facility: CLINIC | Age: 56
End: 2021-04-14

## 2021-04-14 ENCOUNTER — LAB (OUTPATIENT)
Dept: LAB | Facility: HOSPITAL | Age: 56
End: 2021-04-14

## 2021-04-14 VITALS
HEIGHT: 66 IN | DIASTOLIC BLOOD PRESSURE: 68 MMHG | HEART RATE: 110 BPM | TEMPERATURE: 98.4 F | SYSTOLIC BLOOD PRESSURE: 108 MMHG | BODY MASS INDEX: 20.25 KG/M2 | RESPIRATION RATE: 16 BRPM | OXYGEN SATURATION: 98 % | WEIGHT: 126 LBS

## 2021-04-14 DIAGNOSIS — N89.8 VAGINAL FLATUS: ICD-10-CM

## 2021-04-14 DIAGNOSIS — N89.8 VAGINAL DISCHARGE: ICD-10-CM

## 2021-04-14 DIAGNOSIS — D39.11 GRANULOSA CELL TUMOR OF OVARY, RIGHT: Primary | ICD-10-CM

## 2021-04-14 DIAGNOSIS — D39.11 GRANULOSA CELL TUMOR OF OVARY, RIGHT: ICD-10-CM

## 2021-04-14 LAB
CREAT SERPL-MCNC: 0.8 MG/DL (ref 0.57–1)
GFR SERPL CREATININE-BSD FRML MDRD: 74 ML/MIN/1.73

## 2021-04-14 PROCEDURE — 86336 INHIBIN A: CPT

## 2021-04-14 PROCEDURE — 83520 IMMUNOASSAY QUANT NOS NONAB: CPT

## 2021-04-14 PROCEDURE — 82565 ASSAY OF CREATININE: CPT

## 2021-04-14 PROCEDURE — 36415 COLL VENOUS BLD VENIPUNCTURE: CPT

## 2021-04-14 PROCEDURE — 99214 OFFICE O/P EST MOD 30 MIN: CPT | Performed by: OBSTETRICS & GYNECOLOGY

## 2021-04-15 PROBLEM — N89.8 VAGINAL DISCHARGE: Status: ACTIVE | Noted: 2021-04-15

## 2021-04-15 NOTE — PROGRESS NOTES
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 year old female who presents today for treatment and evaluation of the above issues.    Today, patient complains of intermittent green-brown vaginal discharge.  There are no exacerbating or alleviating factors.  Upon further questioning, she does note vaginal flatus.  She continues to have issues with constipation although this is overall improved.  She has no other complaints today.    OBGYN History:  She is a .  She does not use HRT. She does not have a history of abnormal pap smears.  Oncologic History:  Oncology/Hematology History   Granulosa cell tumor of ovary, right   2013 Cancer Staged    Staging form: Ovary, Fallopian Tube, And Primary Peritoneal Carcinoma, AJCC 8th Edition  - Clinical stage from 2013: FIGO Stage IC (cT1c, cN0, cM0) - Signed by Jamee Mcguire MD on 3/26/2020     2013 Surgery    Total abdominal hysterectomy, bilateral salpingo-oophorectomy with pathology showing 5 cm granulosa cell tumor of the right ovary and a left fallopian tube intraepithelial serous neoplasm.      - 2014 Chemotherapy    OP OVARIAN PACLitaxel / CARBOplatin (Q21D)  x6 cycles remarkable for bone marrow suppression and G-CSF support     3/14/2020 Progression    CT Abdomen/Pelvis IMPRESSION:  Large heterogeneous mass identified within the pelvis with  fluid seen scattered throughout the abdomen and pelvis. Findings  concerning for recurrence with some stranding of the mesentery in which  spread to the mesentery cannot be excluded.     4/3/2020 Surgery    Exploratory laparotomy, optimal debulking (R=0), cystoscopy with temporary uteteral catheter, ileal resection with side-to-side anastomosis, rectosigmoid resection/LAR, left ureterolysis, and peritoneal stripping.    Pathology consistent with recurrent granulosa cell tumor at the pelvis with rectosigmoid and partial terminal ileum. No LVSI. Mesenteric nodes and other staging negative.      4/6/2020 -  Hormonal Therapy    Arimidex initiated     4/27/2020 Molecular Testing    CARIS testing results:  CO positive, 1+, 75%  ER negative, MSI stable, PD-L1 negative     6/30/2020 Imaging    CT scan for new abdominal pain showed expected postsurgical changes from resection of pelvic mass without evidence for bulky adenopathy or soft tissue nodular recurrence. No mesenteric reticulation or ascites to suggest peritoneal involvement. No acute intraabdominal or intrapelvic abnormality otherwise noted. Significant colonic stool burden noted.            Past Medical History:   Diagnosis Date   • Arthritis    • Cancer (CMS/HCC) 2013    OVARIAN; s/p hysterectomy with BSO and chemo therapy that ended in 2014   • Constipation    • Heartburn    • History of pneumonia 02/2019    no hospitalization    • Low back pain    • Murmur     detected as an adult around age 30 and 40; requires prophylactic antibiotics before dental work; pt not sure if she has ever had an echo or when it might have been; Dr Stephen came down and listened to heart sounds on 4/2/20 and did not detect a murmur   • Ovarian cancer (CMS/HCC)    • Ovarian cyst    • Pelvic mass    • Vitamin D deficiency        Past Surgical History:   Procedure Laterality Date   • BACK SURGERY      fusion    • COLONOSCOPY  2016   • ENDOSCOPY     • EXPLORATORY LAPAROTOMY N/A 4/3/2020    Procedure: LAPAROTOMY EXPLORATORY,  OPTIMAL DEBULKING, CYSTOSCOPY WITH URETERAL CATHTER INSERTION AND REMOVAL, ILEO RESECTION, SIDE TO SIDE ANASTOMOSIS, RECTAL SIGMOID RESECTION, LEFT URETERA LYSIS, AND PERITONEAL STRIPPING;  Surgeon: Jamee Mcguire MD;  Location: [facility];  Service: Gynecology Oncology;  Laterality: N/A;   • HYSTERECTOMY  2013    with BSO       MEDICATIONS: The current medication list was  reviewed with the patient and updated in the EMR this date per the Medical Assistant. Medication dosages and frequencies were confirmed to be accurate.      Allergies:  is allergic to penicillins.    Social History:   Social History     Socioeconomic History   • Marital status: Single     Spouse name: Not on file   • Number of children: Not on file   • Years of education: Not on file   • Highest education level: Not on file   Tobacco Use   • Smoking status: Former Smoker     Packs/day: 0.50     Years: 30.00     Pack years: 15.00     Types: Electronic Cigarette, Cigarettes   • Smokeless tobacco: Never Used   • Tobacco comment: vape-nicotine currently; quit cigarettes july 2019   Vaping Use   • Vaping Use: Former   • Quit date: 4/14/2019   • Substances: Nicotine   • Devices: Pre-filled or refillable cartridge   Substance and Sexual Activity   • Alcohol use: Never   • Drug use: Never   • Sexual activity: Defer       Family History:    Family History   Problem Relation Age of Onset   • Diabetes type II Father    • Heart disease Father    • Heart attack Father    • Cancer Father    • Diabetes Father    • Hypertension Father    • Diabetes type II Mother    • Diabetes Mother    • Hypertension Mother    • Lung cancer Maternal Uncle    • Migraines Sister    • Migraines Brother         dec from Overdose       Health Maintenance:    Health Maintenance   Topic Date Due   • MAMMOGRAM  Never done   • COLONOSCOPY  Never done   • ANNUAL PHYSICAL  Never done   • COVID-19 Vaccine (1) Never done   • HEPATITIS C SCREENING  Never done   • ZOSTER VACCINE (2 of 2) 08/13/2020   • INFLUENZA VACCINE  08/01/2021   • TDAP/TD VACCINES (2 - Td) 01/02/2029   • Pneumococcal Vaccine 0-64  Aged Out         Review of Systems   Constitutional: Positive for fatigue. Negative for appetite change, chills, fever and unexpected weight change.   HENT: Positive for tinnitus. Negative for congestion, hearing loss and sore throat.    Eyes: Negative for redness  "and visual disturbance.   Respiratory: Negative for cough, shortness of breath and wheezing.         Chest pain   Cardiovascular: Negative for chest pain, palpitations and leg swelling.   Gastrointestinal: Positive for constipation. Negative for abdominal distention, abdominal pain, blood in stool, diarrhea, nausea and vomiting.        Heartburn   Endocrine: Negative.  Negative for cold intolerance and heat intolerance.   Genitourinary: Positive for vaginal discharge. Negative for difficulty urinating, dyspareunia, dysuria, frequency, genital sores, hematuria, pelvic pain, urgency, vaginal bleeding and vaginal pain.        Vaginal flatus   Musculoskeletal: Negative for arthralgias, back pain, gait problem and joint swelling.   Skin: Negative for rash and wound.   Allergic/Immunologic: Negative for food allergies and immunocompromised state.   Neurological: Positive for dizziness, numbness and headaches. Negative for seizures, syncope, weakness and light-headedness.   Hematological: Negative for adenopathy. Does not bruise/bleed easily.   Psychiatric/Behavioral: Negative.  Negative for confusion, dysphoric mood and sleep disturbance. The patient is not nervous/anxious.        Physical Exam    Vitals:    04/14/21 1356   BP: 108/68  Comment: LUE   Pulse: 110   Resp: 16   Temp: 98.4 °F (36.9 °C)   TempSrc: Infrared   SpO2: 98%  Comment: RA   Weight: 57.2 kg (126 lb)   Height: 167.6 cm (66\")   PainSc: 0-No pain       Body mass index is 20.34 kg/m².    Wt Readings from Last 3 Encounters:   04/14/21 57.2 kg (126 lb)   01/13/21 58.2 kg (128 lb 3.2 oz)   01/27/21 58.1 kg (128 lb)     GENERAL: Alert, well-appearing female appearing her stated age who is in no apparent distress.   HEENT: Sclera anicteric. Head normocephalic, atraumatic. Mucus membranes moist.   NECK: Supple and free from thyromegaly  BREASTS: Deferred  CARDIOVASCULAR: Heart regular rate and rhythm without murmur rub or gallop, no bilateral lower extremity " edema  RESPIRATORY: Clear to auscultation bilaterally, normal effort  BACK: Deferred  GASTROINTESTINAL:   Soft, non-tender, no rebound, guarding or mass.  No hernia.  Incision well-healed.  Nondistended.  SKIN:  Warm, dry, well-perfused.  All visible areas intact.  No lesions, ulcers.  PSYCHIATRIC: AO x3, with appropriate affect, normal thought processes.  NEUROLOGIC: No focal deficits.  Ambulating slowly and without difficulty  MUSCULOSKELETAL: Ambulating slowly and without difficulty  EXTREMITIES:   No cyanosis, clubbing, symmetric.  LYMPHATICS:   No adenopathy in bilateral neck and groin areas.     PELVIC exam: External genitalia are free from lesion.  On speculum examination, there is scarring at the vaginal apex, but there is no discharge or findings consistent with fistula.  Divot at the vaginal apex consistent with scarring was gently probed with Q-tip and no track was identified.  Exam was somewhat limited due to discomfort with this portion of the examination.  On bimanual examination, no mass was appreciated.  Uterus cervix and adnexa are surgically absent.  No nodularity was appreciated.  Rectovaginal exam was deferred.    ECOG PS 0    PROCEDURES: None    Diagnostic Data:    No radiology results for the last 30 days.    Lab Results   Component Value Date    WBC 4.60 06/26/2020    HGB 14.0 06/26/2020    HCT 45.3 06/26/2020    MCV 90.0 06/26/2020     06/26/2020    NEUTROABS 2.10 06/26/2020    GLUCOSE 101 (H) 06/26/2020    BUN 19 06/26/2020    CREATININE 0.80 04/14/2021    EGFRIFNONA 74 04/14/2021     06/26/2020    K 4.2 06/26/2020     06/26/2020    CO2 28.0 06/26/2020    CALCIUM 9.6 06/26/2020    ALBUMIN 4.50 06/26/2020    AST 17 06/26/2020    ALT 16 06/26/2020    BILITOT 0.3 06/26/2020     No results found for:         Assessment/Plan   This is a 55 y.o. woman with recurrent granulosa cell tumor of the ovary for postoperative evaluation.  On Arimidex.    Encounter Diagnoses   Name  Primary?   • Granulosa cell tumor of ovary, right Yes   • Vaginal flatus    • Vaginal discharge      History of granulosa cell tumor of right ovary now with recurrence  -Cancer history as detailed above.  Last CT imaging October 2020 - for recurrence.  Interval MRI of back and lumbar spine for back pain also negative for recurrence.  -Continue Arimidex.    -Symptoms suggestive of enterovaginal fistula without evidence of fistulization noted at the examination today.  Repeat CT scan.    Ongoing constipation  -Status post GI evaluation.  -Bowel regimen of stool softeners, MiraLAX    Pain assessment was performed today as a part of patient’s care.  For patients with pain related to surgery, gynecologic malignancy or cancer treatment, the plan is as noted in the assessment/plan.  For patients with pain not related to these issues, they are to seek any further needed care from a more appropriate provider, such as PCP.      Orders Placed This Encounter   Procedures   • CT Abdomen Pelvis With Contrast     Standing Status:   Future     Standing Expiration Date:   4/14/2022     Order Specific Question:   Will Oral Contrast be needed for this procedure?     Answer:   Yes   • Inhibin A, Ultrasensitive     Standing Status:   Future     Number of Occurrences:   1     Standing Expiration Date:   4/14/2022     Order Specific Question:   Release to patient     Answer:   Immediate   • Inhibin B     Standing Status:   Future     Number of Occurrences:   1     Standing Expiration Date:   4/14/2022     Order Specific Question:   Release to patient     Answer:   Immediate   • Creatinine, Serum     Standing Status:   Future     Number of Occurrences:   1     Standing Expiration Date:   4/14/2022     Order Specific Question:   Release to patient     Answer:   Immediate     FOLLOW UP: 3-month surveillance visit.    Electronically Signed by: Jamee Mcguire MD  Date: 4/15/2021

## 2021-04-20 ENCOUNTER — TELEPHONE (OUTPATIENT)
Dept: GYNECOLOGIC ONCOLOGY | Facility: CLINIC | Age: 56
End: 2021-04-20

## 2021-04-20 LAB
INHIBIN A SERPL-MCNC: 0.8 PG/ML
INHIBIN B SERPL-MCNC: <7 PG/ML (ref 0–16.9)

## 2021-04-20 NOTE — TELEPHONE ENCOUNTER
----- Message from Kaci Castro MA sent at 4/20/2021  2:52 PM EDT -----  Regarding: FW: Non-Urgent Medical Question  Contact: [phone]    ----- Message -----  From: Roxie Carter  Sent: 4/20/2021   2:39 PM EDT  To: [facility] Clinical [facility]  Subject: RE: Non-Urgent Medical Question                  I still have not heard anything about having a CT done.   Please either call me at this number [phone]. Or email me the time and date when Dr. Dwyer wants me to have it done OK. If no answer at this number, just leave a message OK.  Thanks Miss Carter..

## 2021-04-21 ENCOUNTER — OFFICE VISIT (OUTPATIENT)
Dept: GASTROENTEROLOGY | Facility: CLINIC | Age: 56
End: 2021-04-21

## 2021-04-21 VITALS
BODY MASS INDEX: 20.34 KG/M2 | DIASTOLIC BLOOD PRESSURE: 80 MMHG | HEART RATE: 113 BPM | SYSTOLIC BLOOD PRESSURE: 136 MMHG | TEMPERATURE: 97.7 F | OXYGEN SATURATION: 98 % | WEIGHT: 126.6 LBS | HEIGHT: 66 IN

## 2021-04-21 DIAGNOSIS — K59.02 CONSTIPATION DUE TO OUTLET DYSFUNCTION: Primary | ICD-10-CM

## 2021-04-21 DIAGNOSIS — M62.89 PELVIC FLOOR DYSFUNCTION IN FEMALE: ICD-10-CM

## 2021-04-21 PROCEDURE — 99214 OFFICE O/P EST MOD 30 MIN: CPT | Performed by: INTERNAL MEDICINE

## 2021-04-21 NOTE — PROGRESS NOTES
Patient Name: Roxie Carter  YOB: 1965   Medical Record number: [id number]     PCP: Chip Ramos MD    Chief Complaint   Patient presents with   • Follow-up     3 month   Follow-up for constipation.    History of Present Illness:   HPI  Ms. Carter returns to the office for follow-up.  The patient states she has days with normal bowel movements but there is generally an issue with straining.  The patient will take a stool softener over-the-counter a couple days a week.  The patient may take milk of magnesia at times.  There is no history of blood in the stool.  The patient does have some lower abdominal bloating.  She denies any abdominal pain.  There is no history of nausea.  Ms. Carter is being evaluated for a fistula involving the vagina and colon.  She has no unexplained weight loss.  Her diet overall is good but she does not drink a considerable amount of water.  She has a history of 2 vaginal deliveries.  The first delivery involved significant injury requiring many stitches per the patient report.  Past Medical History:   Diagnosis Date   • Arthritis    • Cancer (CMS/HCC) 2013    OVARIAN; s/p hysterectomy with BSO and chemo therapy that ended in 2014   • Constipation    • Heartburn    • History of pneumonia 02/2019    no hospitalization    • Low back pain    • Murmur     detected as an adult around age 30 and 40; requires prophylactic antibiotics before dental work; pt not sure if she has ever had an echo or when it might have been; Dr Stephen came down and listened to heart sounds on 4/2/20 and did not detect a murmur   • Ovarian cancer (CMS/HCC)    • Ovarian cyst    • Pelvic mass    • Vitamin D deficiency        Past Surgical History:   Procedure Laterality Date   • BACK SURGERY      fusion    • COLONOSCOPY  2016   • ENDOSCOPY     • EXPLORATORY LAPAROTOMY N/A 4/3/2020    Procedure: LAPAROTOMY EXPLORATORY,  OPTIMAL DEBULKING, CYSTOSCOPY WITH URETERAL CATHTER INSERTION AND REMOVAL,  ILEO RESECTION, SIDE TO SIDE ANASTOMOSIS, RECTAL SIGMOID RESECTION, LEFT URETERA LYSIS, AND PERITONEAL STRIPPING;  Surgeon: Jamee Mcguire MD;  Location: [facility];  Service: Gynecology Oncology;  Laterality: N/A;   • HYSTERECTOMY  2013    with BSO         Current Outpatient Medications:   •  anastrozole (ARIMIDEX) 1 MG tablet, Take 1 tablet by mouth Daily., Disp: 30 tablet, Rfl: 6  •  baclofen (LIORESAL) 10 MG tablet, Take 1 tablet by mouth 3 (Three) Times a Day As Needed for Muscle Spasms., Disp: 90 tablet, Rfl: 1  •  cyclobenzaprine (FLEXERIL) 10 MG tablet, Take 1 tablet by mouth At Night As Needed for Muscle Spasms., Disp: 30 tablet, Rfl: 1  •  diphenhydrAMINE (BENADRYL) 25 MG tablet, Take 25 mg by mouth Every Night., Disp: , Rfl:   •  docusate sodium (COLACE) 100 MG capsule, Take 100 mg by mouth 2 (Two) Times a Day As Needed for Constipation., Disp: , Rfl:   •  ibuprofen (ADVIL,MOTRIN) 200 MG tablet, Take 400-600 mg by mouth Every 6 (Six) Hours As Needed for Mild Pain ., Disp: , Rfl:   •  traMADol (Ultram) 50 MG tablet, Take 1 tablet by mouth 2 (Two) Times a Day., Disp: 60 tablet, Rfl: 1  •  diclofenac (VOLTAREN) 75 MG EC tablet, Take 1 tablet by mouth 2 (Two) Times a Day., Disp: 60 tablet, Rfl: 5  •  polyethylene glycol (GoLYTELY) 236 g solution, Starting at noon on day prior to procedure, drink 8 ounces every 30 minutes until all gone or stools are clear. May add flavor packet., Disp: 4000 mL, Rfl: 0  •  sennosides-docusate (senna-docusate sodium) 8.6-50 MG per tablet, Take 1 tablet by mouth Daily., Disp: 30 tablet, Rfl: 0    Allergies   Allergen Reactions   • Penicillins Hives       Family History   Problem Relation Age of Onset   • Diabetes type II Father    • Heart disease Father    • Heart attack Father    • Cancer Father    • Diabetes Father    • Hypertension Father    • Diabetes type II Mother    • Diabetes Mother    • Hypertension Mother    • Lung cancer Maternal Uncle    • Migraines Sister    •  Migraines Brother         dec from Overdose       Social History     Socioeconomic History   • Marital status: Single     Spouse name: Not on file   • Number of children: Not on file   • Years of education: Not on file   • Highest education level: Not on file   Tobacco Use   • Smoking status: Former Smoker     Packs/day: 0.50     Years: 30.00     Pack years: 15.00     Types: Electronic Cigarette, Cigarettes   • Smokeless tobacco: Never Used   • Tobacco comment: vape-nicotine currently; quit cigarettes july 2019   Vaping Use   • Vaping Use: Former   • Quit date: 4/14/2019   • Substances: Nicotine   • Devices: Pre-filled or refillable cartridge   Substance and Sexual Activity   • Alcohol use: Never   • Drug use: Never   • Sexual activity: Defer       Review of Systems   Constitutional: Negative for activity change, appetite change, fatigue, fever and unexpected weight change.   HENT: Negative for dental problem, hearing loss, mouth sores, postnasal drip, sneezing, trouble swallowing and voice change.    Eyes: Negative for pain, redness, itching and visual disturbance.   Respiratory: Negative for cough, choking, chest tightness, shortness of breath and wheezing.    Cardiovascular: Negative for chest pain, palpitations and leg swelling.   Gastrointestinal: Positive for nausea. Negative for abdominal distention, abdominal pain, anal bleeding, blood in stool, constipation, diarrhea, rectal pain and vomiting.        Hemorrhoids, strain when having bowel movements   Endocrine: Negative for cold intolerance, heat intolerance, polydipsia, polyphagia and polyuria.   Genitourinary: Negative.  Negative for dysuria, enuresis, flank pain, hematuria and urgency.   Musculoskeletal: Negative for arthralgias, back pain, gait problem, joint swelling and myalgias.   Skin: Negative for color change, pallor and rash.   Allergic/Immunologic: Negative for environmental allergies, food allergies and immunocompromised state.   Neurological:  Negative for dizziness, tremors, seizures, facial asymmetry, speech difficulty, numbness and headaches.   Hematological: Negative for adenopathy.   Psychiatric/Behavioral: Negative for behavioral problems, confusion, dysphoric mood, hallucinations and self-injury.       Vitals:    04/21/21 1542   BP: 136/80   Pulse: 113   Temp: 97.7 °F (36.5 °C)   SpO2: 98%       Physical Exam  Vitals reviewed.   Constitutional:       General: She is not in acute distress.     Appearance: Normal appearance.   HENT:      Head: Normocephalic and atraumatic.      Nose: Nose normal.      Mouth/Throat:      Mouth: Mucous membranes are moist.      Pharynx: Oropharynx is clear.   Eyes:      General: No scleral icterus.     Extraocular Movements: Extraocular movements intact.   Cardiovascular:      Rate and Rhythm: Normal rate and regular rhythm.      Heart sounds: No murmur heard.   No gallop.    Pulmonary:      Effort: Pulmonary effort is normal.      Breath sounds: No wheezing or rales.   Abdominal:      General: Bowel sounds are normal.      Palpations: Abdomen is soft.      Tenderness: There is no abdominal tenderness. There is no guarding.   Musculoskeletal:         General: No swelling. Normal range of motion.      Cervical back: Normal range of motion. No rigidity.   Skin:     General: Skin is dry.      Coloration: Skin is not jaundiced.   Neurological:      Mental Status: She is alert and oriented to person, place, and time.   Psychiatric:         Mood and Affect: Mood normal.         Thought Content: Thought content normal.         Judgment: Judgment normal.         Diagnoses and all orders for this visit:    1. Constipation due to outlet dysfunction (Primary)  -     Ambulatory Referral to Physical Therapy Pelvic Floor    2. Pelvic floor dysfunction in female  -     Ambulatory Referral to Physical Therapy Pelvic Floor    The patient has a clinical history that is suggestive of pelvic floor dysfunction and outlet related  constipation.  I did receive and reviewed the colonoscopy report from 6 years ago which was done for left lower abdominal pain and this was unremarkable.  The patient has a CT scan scheduled for 2 weeks.  There is a history suggestive of fistula between the vagina and colon.      Plan: Will refer the patient for pelvic floor training to help with constipation.

## 2021-05-04 ENCOUNTER — HOSPITAL ENCOUNTER (OUTPATIENT)
Dept: CT IMAGING | Facility: HOSPITAL | Age: 56
Discharge: HOME | End: 2021-05-04
Admitting: OBSTETRICS & GYNECOLOGY

## 2021-05-04 DIAGNOSIS — N89.8 VAGINAL FLATUS: ICD-10-CM

## 2021-05-04 DIAGNOSIS — D39.11 GRANULOSA CELL TUMOR OF OVARY, RIGHT: ICD-10-CM

## 2021-05-04 PROCEDURE — 74177 CT ABD & PELVIS W/CONTRAST: CPT

## 2021-05-04 PROCEDURE — 25010000002 IOPAMIDOL 61 % SOLUTION: Performed by: OBSTETRICS & GYNECOLOGY

## 2021-05-04 RX ADMIN — IOPAMIDOL 95 ML: 612 INJECTION, SOLUTION INTRAVENOUS at 09:13

## 2021-05-19 ENCOUNTER — TELEPHONE (OUTPATIENT)
Dept: GASTROENTEROLOGY | Facility: CLINIC | Age: 56
End: 2021-05-19

## 2021-05-19 NOTE — TELEPHONE ENCOUNTER
YOU REFERRED PATIENT TO PHYSICAL THERAPY. THEY CALLED THE PATIENT THREE TIMES AND WE SENT LETTER WITH NO RESPONSE. CAN WE CLOSE REFERRAL.

## 2021-05-28 ENCOUNTER — OFFICE VISIT (OUTPATIENT)
Dept: FAMILY MEDICINE CLINIC | Facility: CLINIC | Age: 56
End: 2021-05-28

## 2021-05-28 VITALS
BODY MASS INDEX: 20.93 KG/M2 | WEIGHT: 130.2 LBS | HEIGHT: 66 IN | OXYGEN SATURATION: 97 % | DIASTOLIC BLOOD PRESSURE: 80 MMHG | SYSTOLIC BLOOD PRESSURE: 112 MMHG | HEART RATE: 119 BPM

## 2021-05-28 DIAGNOSIS — Z79.899 MEDICATION MANAGEMENT: ICD-10-CM

## 2021-05-28 DIAGNOSIS — Z13.220 SCREENING CHOLESTEROL LEVEL: ICD-10-CM

## 2021-05-28 DIAGNOSIS — Z11.59 ENCOUNTER FOR HEPATITIS C SCREENING TEST FOR LOW RISK PATIENT: ICD-10-CM

## 2021-05-28 DIAGNOSIS — R79.89 LOW VITAMIN D LEVEL: ICD-10-CM

## 2021-05-28 DIAGNOSIS — C56.9 GRANULOSA CELL CARCINOMA OF OVARY, UNSPECIFIED LATERALITY (HCC): ICD-10-CM

## 2021-05-28 DIAGNOSIS — Z13.29 THYROID DISORDER SCREENING: ICD-10-CM

## 2021-05-28 DIAGNOSIS — G89.29 CHRONIC LOW BACK PAIN, UNSPECIFIED BACK PAIN LATERALITY, UNSPECIFIED WHETHER SCIATICA PRESENT: ICD-10-CM

## 2021-05-28 DIAGNOSIS — M54.50 CHRONIC LOW BACK PAIN, UNSPECIFIED BACK PAIN LATERALITY, UNSPECIFIED WHETHER SCIATICA PRESENT: ICD-10-CM

## 2021-05-28 DIAGNOSIS — R53.83 OTHER FATIGUE: Primary | ICD-10-CM

## 2021-05-28 DIAGNOSIS — Z12.31 ENCOUNTER FOR SCREENING MAMMOGRAM FOR MALIGNANT NEOPLASM OF BREAST: ICD-10-CM

## 2021-05-28 PROBLEM — F41.9 ANXIETY: Status: ACTIVE | Noted: 2021-05-28

## 2021-05-28 PROBLEM — F51.04 PSYCHOPHYSIOLOGICAL INSOMNIA: Status: ACTIVE | Noted: 2021-05-28

## 2021-05-28 PROCEDURE — 99396 PREV VISIT EST AGE 40-64: CPT | Performed by: INTERNAL MEDICINE

## 2021-05-28 RX ORDER — DULOXETIN HYDROCHLORIDE 30 MG/1
30 CAPSULE, DELAYED RELEASE ORAL DAILY
Qty: 30 CAPSULE | Refills: 2 | Status: SHIPPED | OUTPATIENT
Start: 2021-05-28 | End: 2021-07-17 | Stop reason: SDUPTHER

## 2021-05-28 RX ORDER — CYCLOBENZAPRINE HCL 10 MG
10 TABLET ORAL NIGHTLY PRN
Qty: 30 TABLET | Refills: 1 | Status: SHIPPED | OUTPATIENT
Start: 2021-05-28 | End: 2021-07-09

## 2021-05-28 RX ORDER — ZOLPIDEM TARTRATE 10 MG/1
10 TABLET ORAL NIGHTLY PRN
Qty: 30 TABLET | Refills: 2 | Status: SHIPPED | OUTPATIENT
Start: 2021-05-28 | End: 2021-06-28 | Stop reason: SDUPTHER

## 2021-05-28 NOTE — PROGRESS NOTES
Roxie Carter  1965  [id number]  Patient Care Team:  Chip Ramos MD as PCP - General (Internal Medicine)    Roxie Carter is a 56 y.o. female here today to establish care.  This patient is accompanied by their self who contributes to the history of their care.    Chief Complaint:    Chief Complaint   Patient presents with   • Annual Exam        History of Present Illness:      No further vaginal discharge.; however passing air vaginally occasionally ( 2-3 times per month. Still with back pain, occasionally taking tramadol.  Has seen pain management in past ( years ago while living in [place]) with 3-4 months after injections. Would like to see them again. Pain is mainly localized I back but occasionally radiates into left leg to level of just above posterior knee. Occasional LE numbness. Denies weakness. Has difficulty standing without assistance if she gets down on floor. Denies falling.    Upcoming dental appointment, saw Noe last September. Wears seatbelt, and has both carbon monoxide/smoke detectors. Not in relationship; feels safe at home. Has not worked since March 2020 ( [employer]).     Reports adan incision prutitis with out rash. Has tried cream prescribed by Dr. Means.      Reports Gerd, with spicy food. Controlled with Tums, No trouble swallowing FORREST is infrequent    Insomnia since first back surgery. Benedryl occasionally helps. Ambien helped in past. Worries a lot.  Cymbalta helped a lot.   . Cant shut brain off.     Past Medical History:   Diagnosis Date   • Arthritis    • Cancer (CMS/HCC) 2013    OVARIAN; s/p hysterectomy with BSO and chemo therapy that ended in 2014   • Constipation    • Heartburn    • History of pneumonia 02/2019    no hospitalization    • Low back pain    • Murmur     detected as an adult around age 30 and 40; requires prophylactic antibiotics before dental work; pt not sure if she has ever had an echo or when it might have been; Dr Stephen came  down and listened to heart sounds on 4/2/20 and did not detect a murmur   • Ovarian cancer (CMS/HCC)    • Ovarian cyst    • Pelvic mass    • Vitamin D deficiency        Past Surgical History:   Procedure Laterality Date   • BACK SURGERY      fusion    • COLONOSCOPY  2016   • ENDOSCOPY     • EXPLORATORY LAPAROTOMY N/A 4/3/2020    Procedure: LAPAROTOMY EXPLORATORY,  OPTIMAL DEBULKING, CYSTOSCOPY WITH URETERAL CATHTER INSERTION AND REMOVAL, ILEO RESECTION, SIDE TO SIDE ANASTOMOSIS, RECTAL SIGMOID RESECTION, LEFT URETERA LYSIS, AND PERITONEAL STRIPPING;  Surgeon: Jamee Mcguire MD;  Location: [facility];  Service: Gynecology Oncology;  Laterality: N/A;   • HYSTERECTOMY  2013    with BSO        Family History   Problem Relation Age of Onset   • Diabetes type II Father    • Heart disease Father    • Heart attack Father    • Cancer Father    • Diabetes Father    • Hypertension Father    • Diabetes type II Mother    • Diabetes Mother    • Hypertension Mother    • Lung cancer Maternal Uncle    • Migraines Sister    • Migraines Brother         dec from Overdose       Social History     Socioeconomic History   • Marital status: Single     Spouse name: Not on file   • Number of children: Not on file   • Years of education: Not on file   • Highest education level: Not on file   Tobacco Use   • Smoking status: Former Smoker     Packs/day: 0.50     Years: 30.00     Pack years: 15.00     Types: Electronic Cigarette, Cigarettes   • Smokeless tobacco: Never Used   • Tobacco comment: vape-nicotine currently; quit cigarettes july 2019   Vaping Use   • Vaping Use: Former   • Quit date: 4/14/2019   • Substances: Nicotine   • Devices: Pre-filled or refillable cartridge   Substance and Sexual Activity   • Alcohol use: Never   • Drug use: Never   • Sexual activity: Defer       Allergies   Allergen Reactions   • Penicillins Hives       Depression: PHQ-2 Depression Screening  Little interest or pleasure in doing things?  0   Feeling down,  "depressed, or hopeless?   1   PHQ-2 Total Score 1      Immunization History   Administered Date(s) Administered   • Flulaval/Fluarix/Fluzone Quad 09/30/2017, 01/02/2019   • Hepatitis A 01/17/2019   • Influenza, Unspecified 11/01/2020   • Pneumococcal Polysaccharide (PPSV23) 01/02/2019   • Shingrix 06/18/2020   • Tdap 01/02/2019       Intimate partner violence ( Screen on initial visit, pregnant women, women with injuries, older adult with injury or evidence of neglect):  -Violence can be a problem in many people's lives, so I now ask every patient about trauma or abuse they may have experienced in a relationship.  • Stress/Safety - Do you feel safe in your relationship? N/A  • Afraid/Abused - Have you ever been in a relationship where you were threatened, hurt, or afraid? N/A  • Friend/Family - Are your friends aware you have been hurt? N/A  • Emergency Plan - Do you have a safe place to go and the resources you need in an emergency? N/A    Review of Systems:    Review of Systems   Constitutional: Positive for fatigue. Negative for chills and unexpected weight gain.   HENT: Positive for hearing loss. Negative for ear discharge, ear pain and sinus pressure.    Eyes: Negative.    Respiratory: Negative for cough, shortness of breath and wheezing.    Cardiovascular: Negative for chest pain, palpitations and leg swelling.   Gastrointestinal: Positive for abdominal pain, nausea, vomiting and GERD.   Endocrine: Negative for cold intolerance, heat intolerance, polydipsia and polyuria.   Genitourinary: Negative.         Passing air though vagina   Musculoskeletal: Positive for back pain.   Skin: Negative.    Psychiatric/Behavioral: Positive for sleep disturbance. The patient is nervous/anxious.        Vitals:    05/28/21 1538   BP: 112/80   Pulse: 119   SpO2: 97%   Weight: 59.1 kg (130 lb 3.2 oz)   Height: 167.6 cm (65.98\")     Body mass index is 21.03 kg/m².      Current Outpatient Medications:   •  anastrozole (ARIMIDEX) 1 " MG tablet, Take 1 tablet by mouth Daily., Disp: 30 tablet, Rfl: 6  •  baclofen (LIORESAL) 10 MG tablet, Take 1 tablet by mouth 3 (Three) Times a Day As Needed for Muscle Spasms., Disp: 90 tablet, Rfl: 1  •  cyclobenzaprine (FLEXERIL) 10 MG tablet, Take 1 tablet by mouth At Night As Needed for Muscle Spasms., Disp: 30 tablet, Rfl: 1  •  diclofenac (VOLTAREN) 75 MG EC tablet, Take 1 tablet by mouth 2 (Two) Times a Day., Disp: 60 tablet, Rfl: 5  •  diphenhydrAMINE (BENADRYL) 25 MG tablet, Take 25 mg by mouth Every Night., Disp: , Rfl:   •  docusate sodium (COLACE) 100 MG capsule, Take 100 mg by mouth 2 (Two) Times a Day As Needed for Constipation., Disp: , Rfl:   •  ibuprofen (ADVIL,MOTRIN) 200 MG tablet, Take 400-600 mg by mouth Every 6 (Six) Hours As Needed for Mild Pain ., Disp: , Rfl:   •  sennosides-docusate (senna-docusate sodium) 8.6-50 MG per tablet, Take 1 tablet by mouth Daily., Disp: 30 tablet, Rfl: 0  •  traMADol (Ultram) 50 MG tablet, Take 1 tablet by mouth 2 (Two) Times a Day., Disp: 60 tablet, Rfl: 1  •  DULoxetine (Cymbalta) 30 MG capsule, Take 1 capsule by mouth Daily., Disp: 30 capsule, Rfl: 2  •  zolpidem (AMBIEN) 10 MG tablet, Take 1 tablet by mouth At Night As Needed for Sleep., Disp: 30 tablet, Rfl: 2    Physical Exam:    Physical Exam  Vitals and nursing note reviewed.   Constitutional:       General: She is not in acute distress.     Appearance: She is well-developed. She is not diaphoretic.   HENT:      Head: Normocephalic and atraumatic.      Right Ear: Tympanic membrane, ear canal and external ear normal.      Left Ear: Tympanic membrane, ear canal and external ear normal.      Mouth/Throat:      Mouth: Mucous membranes are moist.      Pharynx: No oropharyngeal exudate.   Eyes:      General: No scleral icterus.        Right eye: No discharge.         Left eye: No discharge.      Extraocular Movements: Extraocular movements intact.      Conjunctiva/sclera: Conjunctivae normal.   Neck:       Thyroid: No thyromegaly.      Vascular: No JVD.      Trachea: No tracheal deviation.   Cardiovascular:      Rate and Rhythm: Normal rate and regular rhythm.      Pulses: Normal pulses.      Heart sounds: Normal heart sounds.      Comments: PMI nondisplaced  Pulmonary:      Effort: Pulmonary effort is normal.      Breath sounds: Normal breath sounds. No wheezing or rales.   Abdominal:      General: Bowel sounds are normal.      Palpations: Abdomen is soft.      Tenderness: There is no abdominal tenderness. There is no right CVA tenderness, left CVA tenderness, guarding or rebound.   Musculoskeletal:      Cervical back: Normal range of motion and neck supple.      Comments: Normal gait   Lymphadenopathy:      Cervical: No cervical adenopathy.   Skin:     General: Skin is warm and dry.      Capillary Refill: Capillary refill takes less than 2 seconds.      Coloration: Skin is not pale.      Findings: No rash.   Neurological:      General: No focal deficit present.      Mental Status: She is alert and oriented to person, place, and time.      Motor: No abnormal muscle tone.      Coordination: Coordination normal.   Psychiatric:         Mood and Affect: Mood normal.         Behavior: Behavior normal.         Thought Content: Thought content normal.         Judgment: Judgment normal.         Procedures    Results Review:    None     Assessment/Plan:     Problem List Items Addressed This Visit     None      Visit Diagnoses     Other fatigue    -  Primary    Relevant Orders    CBC & Differential    Comprehensive Metabolic Panel    Granulosa cell carcinoma of ovary, unspecified laterality (CMS/HCC)        Encounter for hepatitis C screening test for low risk patient        Relevant Orders    Hepatitis C Antibody    Thyroid disorder screening        Relevant Orders    TSH Rfx On Abnormal To Free T4    Low vitamin D level        Relevant Orders    Vitamin D 25 Hydroxy    Screening cholesterol level        Relevant Orders     Lipid Panel    Encounter for screening mammogram for malignant neoplasm of breast        Relevant Orders    Mammo Screening Digital Tomosynthesis Bilateral With CAD    Medication management        Relevant Medications    zolpidem (AMBIEN) 10 MG tablet    Other Relevant Orders    Urine Drug Screen - Urine, Clean Catch    Chronic low back pain, unspecified back pain laterality, unspecified whether sciatica present        Relevant Orders    Ambulatory Referral to Pain Management          Plan of care was reviewed with patient at the conclusion of today's visit. Counseled patient with regards to good nutrition and diet. Maintaining a healthy lifestyle including exercise and physical activities. Spoke with patient on ways to reduce stress, getting adequate sleep and injury prevention.  Discussed mammogram, colon cancer screening, osteoporosis and pap smear including benefit of early detection and potential need for follow-up. Patient agrees to screening mammogram, colonoscopy, bone density and gyn referral today. Annual dental and eye exams were encouraged. Encouraged patient to continue to follow up with annual immunizations.     Return in about 6 weeks (around 7/9/2021), or insomnia/ambien.    Chip Ramos MD    Please note that portions of this note may have been completed with a voice recognition program. Efforts were made to edit the dictations, but occasionally words are mistranscribed.

## 2021-05-28 NOTE — ASSESSMENT & PLAN NOTE
Suspect her insomnia is related to anxiety.  In the past she is done with Cymbalta.  Since it has pain management indications think this.  To resume this.  Start her on 30 mg.  Her target dose was 60 mg prior.  I will see her back in 6 weeks to assess whether dose changes needed.  Ambien for insomnia.  Urine drug screen is on file Miah was reviewed.  CSA and

## 2021-06-10 RX ORDER — BACLOFEN 10 MG/1
10 TABLET ORAL 3 TIMES DAILY PRN
Qty: 90 TABLET | Refills: 1 | Status: SHIPPED | OUTPATIENT
Start: 2021-06-10 | End: 2022-08-07 | Stop reason: SDUPTHER

## 2021-06-21 RX ORDER — ANASTROZOLE 1 MG/1
1 TABLET ORAL DAILY
Qty: 30 TABLET | Refills: 6 | Status: SHIPPED | OUTPATIENT
Start: 2021-06-21 | End: 2021-07-09

## 2021-06-24 ENCOUNTER — LAB (OUTPATIENT)
Dept: LAB | Facility: HOSPITAL | Age: 56
End: 2021-06-24

## 2021-06-24 DIAGNOSIS — Z13.220 SCREENING CHOLESTEROL LEVEL: ICD-10-CM

## 2021-06-24 DIAGNOSIS — Z13.29 THYROID DISORDER SCREENING: ICD-10-CM

## 2021-06-24 DIAGNOSIS — R79.89 LOW VITAMIN D LEVEL: ICD-10-CM

## 2021-06-24 DIAGNOSIS — Z11.59 ENCOUNTER FOR HEPATITIS C SCREENING TEST FOR LOW RISK PATIENT: ICD-10-CM

## 2021-06-24 DIAGNOSIS — R53.83 OTHER FATIGUE: ICD-10-CM

## 2021-06-24 LAB
ALBUMIN SERPL-MCNC: 4.3 G/DL (ref 3.5–5.2)
ALBUMIN/GLOB SERPL: 1.6 G/DL
ALP SERPL-CCNC: 88 U/L (ref 39–117)
ALT SERPL W P-5'-P-CCNC: 12 U/L (ref 1–33)
ANION GAP SERPL CALCULATED.3IONS-SCNC: 11.1 MMOL/L (ref 5–15)
AST SERPL-CCNC: 18 U/L (ref 1–32)
BASOPHILS # BLD AUTO: 0.03 10*3/MM3 (ref 0–0.2)
BASOPHILS NFR BLD AUTO: 0.7 % (ref 0–1.5)
BILIRUB SERPL-MCNC: 0.3 MG/DL (ref 0–1.2)
BUN SERPL-MCNC: 14 MG/DL (ref 6–20)
BUN/CREAT SERPL: 16.5 (ref 7–25)
CALCIUM SPEC-SCNC: 9.6 MG/DL (ref 8.6–10.5)
CHLORIDE SERPL-SCNC: 104 MMOL/L (ref 98–107)
CHOLEST SERPL-MCNC: 198 MG/DL (ref 0–200)
CO2 SERPL-SCNC: 25.9 MMOL/L (ref 22–29)
CREAT SERPL-MCNC: 0.85 MG/DL (ref 0.57–1)
DEPRECATED RDW RBC AUTO: 42.6 FL (ref 37–54)
EOSINOPHIL # BLD AUTO: 0.12 10*3/MM3 (ref 0–0.4)
EOSINOPHIL NFR BLD AUTO: 2.7 % (ref 0.3–6.2)
ERYTHROCYTE [DISTWIDTH] IN BLOOD BY AUTOMATED COUNT: 12.2 % (ref 12.3–15.4)
GFR SERPL CREATININE-BSD FRML MDRD: 69 ML/MIN/1.73
GLOBULIN UR ELPH-MCNC: 2.7 GM/DL
GLUCOSE SERPL-MCNC: 100 MG/DL (ref 65–99)
HCT VFR BLD AUTO: 42.6 % (ref 34–46.6)
HDLC SERPL-MCNC: 53 MG/DL (ref 40–60)
HGB BLD-MCNC: 14.1 G/DL (ref 12–15.9)
IMM GRANULOCYTES # BLD AUTO: 0.01 10*3/MM3 (ref 0–0.05)
IMM GRANULOCYTES NFR BLD AUTO: 0.2 % (ref 0–0.5)
LDLC SERPL CALC-MCNC: 123 MG/DL (ref 0–100)
LDLC/HDLC SERPL: 2.27 {RATIO}
LYMPHOCYTES # BLD AUTO: 1.26 10*3/MM3 (ref 0.7–3.1)
LYMPHOCYTES NFR BLD AUTO: 28.8 % (ref 19.6–45.3)
MCH RBC QN AUTO: 31.4 PG (ref 26.6–33)
MCHC RBC AUTO-ENTMCNC: 33.1 G/DL (ref 31.5–35.7)
MCV RBC AUTO: 94.9 FL (ref 79–97)
MONOCYTES # BLD AUTO: 0.27 10*3/MM3 (ref 0.1–0.9)
MONOCYTES NFR BLD AUTO: 6.2 % (ref 5–12)
NEUTROPHILS NFR BLD AUTO: 2.68 10*3/MM3 (ref 1.7–7)
NEUTROPHILS NFR BLD AUTO: 61.4 % (ref 42.7–76)
NRBC BLD AUTO-RTO: 0 /100 WBC (ref 0–0.2)
PLATELET # BLD AUTO: 267 10*3/MM3 (ref 140–450)
PMV BLD AUTO: 11.1 FL (ref 6–12)
POTASSIUM SERPL-SCNC: 4 MMOL/L (ref 3.5–5.2)
PROT SERPL-MCNC: 7 G/DL (ref 6–8.5)
RBC # BLD AUTO: 4.49 10*6/MM3 (ref 3.77–5.28)
SODIUM SERPL-SCNC: 141 MMOL/L (ref 136–145)
TRIGL SERPL-MCNC: 124 MG/DL (ref 0–150)
TSH SERPL DL<=0.05 MIU/L-ACNC: 0.4 UIU/ML (ref 0.27–4.2)
VLDLC SERPL-MCNC: 22 MG/DL (ref 5–40)
WBC # BLD AUTO: 4.37 10*3/MM3 (ref 3.4–10.8)

## 2021-06-24 PROCEDURE — 82306 VITAMIN D 25 HYDROXY: CPT

## 2021-06-24 PROCEDURE — 80061 LIPID PANEL: CPT

## 2021-06-24 PROCEDURE — 80050 GENERAL HEALTH PANEL: CPT

## 2021-06-24 PROCEDURE — 86803 HEPATITIS C AB TEST: CPT

## 2021-06-25 LAB
25(OH)D3 SERPL-MCNC: 22.5 NG/ML (ref 30–100)
HCV AB SER DONR QL: NORMAL

## 2021-06-28 DIAGNOSIS — Z79.899 MEDICATION MANAGEMENT: ICD-10-CM

## 2021-06-28 RX ORDER — ZOLPIDEM TARTRATE 10 MG/1
10 TABLET ORAL NIGHTLY PRN
Qty: 30 TABLET | Refills: 2 | Status: SHIPPED | OUTPATIENT
Start: 2021-06-28 | End: 2021-07-17 | Stop reason: SDUPTHER

## 2021-06-28 NOTE — TELEPHONE ENCOUNTER
Last fill: 5/28/21  Last office visit: 5/28/21  Next office visit: 7/9/21  CSA up-to-date?1/8/21  Date of last UDS:1/8/21  UDS consistent:

## 2021-07-07 DIAGNOSIS — M54.16 LEFT LUMBAR RADICULITIS: ICD-10-CM

## 2021-07-07 RX ORDER — TRAMADOL HYDROCHLORIDE 50 MG/1
50 TABLET ORAL 2 TIMES DAILY
Qty: 60 TABLET | Refills: 1 | Status: SHIPPED | OUTPATIENT
Start: 2021-07-07 | End: 2021-10-29 | Stop reason: SDUPTHER

## 2021-07-09 ENCOUNTER — TELEPHONE (OUTPATIENT)
Dept: FAMILY MEDICINE CLINIC | Facility: CLINIC | Age: 56
End: 2021-07-09

## 2021-07-09 ENCOUNTER — OFFICE VISIT (OUTPATIENT)
Dept: FAMILY MEDICINE CLINIC | Facility: CLINIC | Age: 56
End: 2021-07-09

## 2021-07-09 ENCOUNTER — PRIOR AUTHORIZATION (OUTPATIENT)
Dept: FAMILY MEDICINE CLINIC | Facility: CLINIC | Age: 56
End: 2021-07-09

## 2021-07-09 VITALS
DIASTOLIC BLOOD PRESSURE: 82 MMHG | SYSTOLIC BLOOD PRESSURE: 114 MMHG | HEIGHT: 66 IN | HEART RATE: 106 BPM | WEIGHT: 123.8 LBS | OXYGEN SATURATION: 98 % | BODY MASS INDEX: 19.89 KG/M2

## 2021-07-09 DIAGNOSIS — R10.32 LLQ PAIN: ICD-10-CM

## 2021-07-09 DIAGNOSIS — N89.8 VAGINAL DISCHARGE: Primary | ICD-10-CM

## 2021-07-09 DIAGNOSIS — F51.04 PSYCHOPHYSIOLOGICAL INSOMNIA: ICD-10-CM

## 2021-07-09 DIAGNOSIS — E55.9 HYPOVITAMINOSIS D: ICD-10-CM

## 2021-07-09 PROBLEM — F51.01 PRIMARY INSOMNIA: Status: ACTIVE | Noted: 2021-07-09

## 2021-07-09 PROCEDURE — 99214 OFFICE O/P EST MOD 30 MIN: CPT | Performed by: INTERNAL MEDICINE

## 2021-07-09 RX ORDER — FLUCONAZOLE 100 MG/1
100 TABLET ORAL DAILY
Qty: 3 TABLET | Refills: 0 | Status: SHIPPED | OUTPATIENT
Start: 2021-07-09 | End: 2021-07-21

## 2021-07-09 NOTE — PROGRESS NOTES
Roxie Carter  1965  [id number]  Patient Care Team:  Chip Ramos MD as PCP - General (Internal Medicine)    Roxie Carter is a 56 y.o. female here today for follow up.     This patient is accompanied by their self who contributes to the history of their care.    Chief Complaint:    Chief Complaint   Patient presents with   • Depression     Follow up, Medication is working very well       History of Present Illness:  I have reviewed and/or updated the patient's past medical, past surgical, family, social history, problem list and allergies as appropriate.     Both her mood and her sleep are markedly improved.  She continues to take the Cymbalta as well as the Ambien.  She has developed a recurrence of her vaginal discharge.  It happened while she was visiting her mother in [place].  She is related to this she had been having a bowel movement.  She indicates that the discharge is yellow less brown.  Sometimes it is malodorous.  No fevers or chills.  There is some lower abdominal discomfort at times.  No diarrhea.  Her vomiting.  She had a CT of her abdomen for similar discharge that was unremarkable.  She sees her gynecologic oncologist at the end of July.  She is also having some perivaginal irritation and erythema.  This was concurrent with dysuria.    Review of Systems:    Review of Systems   Constitutional: Negative for fatigue and fever.   HENT: Negative.    Respiratory: Negative.    Gastrointestinal: Positive for constipation. Negative for nausea and vomiting.   Endocrine: Negative for cold intolerance and heat intolerance.   Genitourinary: Positive for amenorrhea and vaginal discharge. Negative for dysuria, hematuria, pelvic pressure and vaginal pain.   Neurological: Negative for dizziness, weakness, light-headedness and numbness.   Hematological: Negative for adenopathy.   Psychiatric/Behavioral: Negative for sleep disturbance and depressed mood.       Vitals:    07/09/21 1246   BP:  "114/82   Pulse: 106   SpO2: 98%   Weight: 56.2 kg (123 lb 12.8 oz)   Height: 167.6 cm (65.98\")   PainSc: 0-No pain     Body mass index is 19.99 kg/m².      Current Outpatient Medications:   •  baclofen (LIORESAL) 10 MG tablet, Take 1 tablet by mouth 3 (Three) Times a Day As Needed for Muscle Spasms., Disp: 90 tablet, Rfl: 1  •  diclofenac (VOLTAREN) 75 MG EC tablet, Take 1 tablet by mouth 2 (Two) Times a Day., Disp: 60 tablet, Rfl: 5  •  diphenhydrAMINE (BENADRYL) 25 MG tablet, Take 25 mg by mouth Every Night., Disp: , Rfl:   •  docusate sodium (COLACE) 100 MG capsule, Take 100 mg by mouth 2 (Two) Times a Day As Needed for Constipation., Disp: , Rfl:   •  DULoxetine (Cymbalta) 30 MG capsule, Take 1 capsule by mouth Daily., Disp: 30 capsule, Rfl: 2  •  ibuprofen (ADVIL,MOTRIN) 200 MG tablet, Take 400-600 mg by mouth Every 6 (Six) Hours As Needed for Mild Pain ., Disp: , Rfl:   •  traMADol (Ultram) 50 MG tablet, Take 1 tablet by mouth 2 (Two) Times a Day., Disp: 60 tablet, Rfl: 1  •  zolpidem (AMBIEN) 10 MG tablet, Take 1 tablet by mouth At Night As Needed for Sleep., Disp: 30 tablet, Rfl: 2  •  fluconazole (Diflucan) 100 MG tablet, Take 1 tablet by mouth Daily., Disp: 3 tablet, Rfl: 0    Physical Exam:    Physical Exam  Vitals and nursing note reviewed.   Constitutional:       General: She is not in acute distress.     Appearance: She is well-developed. She is not diaphoretic.   HENT:      Head: Normocephalic and atraumatic.      Right Ear: External ear normal.      Left Ear: External ear normal.      Mouth/Throat:      Mouth: Mucous membranes are dry.      Pharynx: No oropharyngeal exudate.   Eyes:      General: No scleral icterus.        Right eye: No discharge.      Extraocular Movements: Extraocular movements intact.      Conjunctiva/sclera: Conjunctivae normal.   Neck:      Thyroid: No thyromegaly.      Vascular: No JVD.      Trachea: No tracheal deviation.   Cardiovascular:      Rate and Rhythm: Normal rate and " regular rhythm.      Pulses: Normal pulses.      Heart sounds: Normal heart sounds.      Comments: PMI nondisplaced  Pulmonary:      Effort: Pulmonary effort is normal.      Breath sounds: Normal breath sounds. No wheezing or rales.   Abdominal:      General: Abdomen is flat. Bowel sounds are normal. There is no distension.      Palpations: Abdomen is soft.      Tenderness: There is no abdominal tenderness. There is no right CVA tenderness, left CVA tenderness, guarding or rebound.   Genitourinary:     Comments: Deferred  Musculoskeletal:      Cervical back: Normal range of motion and neck supple.      Comments: Normal gait   Lymphadenopathy:      Cervical: No cervical adenopathy.   Skin:     General: Skin is warm and dry.      Capillary Refill: Capillary refill takes less than 2 seconds.      Coloration: Skin is not pale.      Findings: No rash.   Neurological:      General: No focal deficit present.      Mental Status: She is alert and oriented to person, place, and time.      Motor: No abnormal muscle tone.      Coordination: Coordination normal.   Psychiatric:         Mood and Affect: Mood normal.         Judgment: Judgment normal.         Procedures    Results Review:    I reviewed the patient's new clinical results.    Assessment/Plan:     Problem List Items Addressed This Visit        Endocrine and Metabolic    Hypovitaminosis D    Current Assessment & Plan     Recommend 2000 units daily.  Would recheck this            Gastrointestinal Abdominal     LLQ pain    Current Assessment & Plan     Involving concerns of vaginal fistula.  Water-soluble.  Enema requested              Genitourinary and Reproductive     Vaginal discharge - Primary    Current Assessment & Plan     She now relates this to valve.  Concerned about colovaginal fistula.  Have requested a water-soluble barium enema.  Patient also will try to reschedule for sooner appointment with Dr. Dwyer.  Should she develop fevers or chills vascular to  seek care in the emergency room.         Relevant Orders    FL Barium Enema Water Soluble       Sleep    Psychophysiological insomnia    Current Assessment & Plan     This is improved with as needed Ambien as well as Cymbalta.  No refills are needed.  We will see her in 3 months regarding this.               Plan of care reviewed with patient at the conclusion of today's visit. Education was provided regarding diagnosis and management.  Patient verbalizes understanding of and agreement with management plan.    Return in about 3 months (around 10/9/2021).    Chip Ramos MD    Please note that portions of this note may have been completed with a voice recognition program. Efforts were made to edit the dictations, but occasionally words are mistranscribed.         Answers for HPI/ROS submitted by the patient on 7/2/2021  Please describe your symptoms.: This is a follow-up visit.  Have you had these symptoms before?: No  How long have you been having these symptoms?: Greater than 2 weeks  Please list any medications you are currently taking for this condition.: N/A  Please describe any probable cause for these symptoms. : N/A  What is the primary reason for your visit?: Other

## 2021-07-09 NOTE — TELEPHONE ENCOUNTER
(Key: [id number]) - [id number]  traMADol HCl 50MG tablets  Status: Sent to Plan  Created: July 9th, 2021 [phone]  Sent: July 9th, 2021

## 2021-07-09 NOTE — ASSESSMENT & PLAN NOTE
This is improved with as needed Ambien as well as Cymbalta.  No refills are needed.  We will see her in 3 months regarding this.

## 2021-07-09 NOTE — ASSESSMENT & PLAN NOTE
She now relates this to valve.  Concerned about colovaginal fistula.  Have requested a water-soluble barium enema.  Patient also will try to reschedule for sooner appointment with Dr. Dwyer.  Should she develop fevers or chills vascular to seek care in the emergency room.

## 2021-07-12 RX ORDER — ANORECTAL OINTMENT 15.7; .44; 24; 20.6 G/100G; G/100G; G/100G; G/100G
1 OINTMENT TOPICAL 2 TIMES DAILY
Qty: 71 G | Refills: 3 | Status: SHIPPED | OUTPATIENT
Start: 2021-07-12 | End: 2021-09-20

## 2021-07-16 ENCOUNTER — TELEPHONE (OUTPATIENT)
Dept: FAMILY MEDICINE CLINIC | Facility: CLINIC | Age: 56
End: 2021-07-16

## 2021-07-17 DIAGNOSIS — Z79.899 MEDICATION MANAGEMENT: ICD-10-CM

## 2021-07-19 RX ORDER — DULOXETIN HYDROCHLORIDE 30 MG/1
30 CAPSULE, DELAYED RELEASE ORAL DAILY
Qty: 30 CAPSULE | Refills: 2 | Status: SHIPPED | OUTPATIENT
Start: 2021-07-19 | End: 2021-08-30 | Stop reason: SDUPTHER

## 2021-07-19 RX ORDER — ZOLPIDEM TARTRATE 10 MG/1
10 TABLET ORAL NIGHTLY PRN
Qty: 30 TABLET | Refills: 2 | Status: SHIPPED | OUTPATIENT
Start: 2021-07-19 | End: 2021-07-30 | Stop reason: SDUPTHER

## 2021-07-19 RX ORDER — ZOLPIDEM TARTRATE 10 MG/1
10 TABLET ORAL NIGHTLY PRN
Qty: 30 TABLET | Refills: 2 | OUTPATIENT
Start: 2021-07-19

## 2021-07-19 RX ORDER — DICLOFENAC SODIUM 75 MG/1
75 TABLET, DELAYED RELEASE ORAL 2 TIMES DAILY
Qty: 60 TABLET | Refills: 5 | Status: SHIPPED | OUTPATIENT
Start: 2021-07-19 | End: 2022-11-11 | Stop reason: SDUPTHER

## 2021-07-19 NOTE — TELEPHONE ENCOUNTER
Last seen: 07/09/2021   Next scheduled: 10/11/2021  Last fill: 06/28/2021 ([facility])  Last fill: 03/30/2021 ([facility]ta[facility])  CSA up to date: YES  Date of last UDS: 01/08/2021  UDS consistent: CONSISTENT

## 2021-07-21 ENCOUNTER — HOSPITAL ENCOUNTER (OUTPATIENT)
Dept: GENERAL RADIOLOGY | Facility: HOSPITAL | Age: 56
Discharge: HOME | End: 2021-07-21
Admitting: INTERNAL MEDICINE

## 2021-07-21 ENCOUNTER — OFFICE VISIT (OUTPATIENT)
Dept: GYNECOLOGIC ONCOLOGY | Facility: CLINIC | Age: 56
End: 2021-07-21

## 2021-07-21 VITALS
HEIGHT: 66 IN | WEIGHT: 122.3 LBS | DIASTOLIC BLOOD PRESSURE: 94 MMHG | OXYGEN SATURATION: 97 % | TEMPERATURE: 98.9 F | BODY MASS INDEX: 19.65 KG/M2 | RESPIRATION RATE: 18 BRPM | SYSTOLIC BLOOD PRESSURE: 130 MMHG | HEART RATE: 112 BPM

## 2021-07-21 DIAGNOSIS — N89.8 VAGINAL DISCHARGE: ICD-10-CM

## 2021-07-21 DIAGNOSIS — K59.00 CONSTIPATION, UNSPECIFIED CONSTIPATION TYPE: ICD-10-CM

## 2021-07-21 DIAGNOSIS — C56.1 MALIGNANT NEOPLASM OF RIGHT OVARY (HCC): ICD-10-CM

## 2021-07-21 DIAGNOSIS — D39.11 GRANULOSA CELL TUMOR OF OVARY, RIGHT: Primary | ICD-10-CM

## 2021-07-21 PROCEDURE — 99213 OFFICE O/P EST LOW 20 MIN: CPT | Performed by: OBSTETRICS & GYNECOLOGY

## 2021-07-21 PROCEDURE — 74018 RADEX ABDOMEN 1 VIEW: CPT

## 2021-07-21 RX ORDER — ANASTROZOLE 1 MG/1
TABLET ORAL
COMMUNITY
Start: 2021-07-17 | End: 2021-07-30 | Stop reason: SDUPTHER

## 2021-07-21 NOTE — PROGRESS NOTES
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 year old female who presents today for treatment and evaluation of the above issues.    Today, patient reports her vaginal discharge and flatus. She notices this with a BM, but it isn't bad today.  D/C is orange to brown and malodorous. She additionally reports her constipation is stable.  She has a bowel movement every 2 to 3 days but has to take milk of magnesia in order to facilitate bowel movement.  She has been seen by Dr. Brady 2020.  She is scheduled for a barium enema today, but had to reschedule.   She reports lower abdominal pain mainly at the LLQ.  Imaging thus far has been negative for fistula, but large stool burden has been noted.    OBGYN History:  She is a .  She does not use HRT. She does not have a history of abnormal pap smears.    Oncologic History:  Oncology/Hematology History   Granulosa cell tumor of ovary, right   2013 Cancer Staged    Staging form: Ovary, Fallopian Tube, And Primary Peritoneal Carcinoma, AJCC 8th Edition  - Clinical stage from 2013: FIGO Stage IC (cT1c, cN0, cM0) - Signed by Jamee Mcguire MD on 3/26/2020     2013 Surgery    Total abdominal hysterectomy, bilateral salpingo-oophorectomy with pathology showing 5 cm granulosa cell tumor of the right ovary and a left fallopian tube intraepithelial serous neoplasm.      - 2014 Chemotherapy    OP OVARIAN PACLitaxel / CARBOplatin (Q21D)  x6 cycles remarkable for bone marrow suppression and G-CSF support     3/14/2020 Progression    CT Abdomen/Pelvis IMPRESSION:  Large heterogeneous mass identified within the pelvis with  fluid seen scattered throughout the abdomen and pelvis. Findings  concerning for recurrence with some stranding of the mesentery in  which  spread to the mesentery cannot be excluded.     4/3/2020 Surgery    Exploratory laparotomy, optimal debulking (R=0), cystoscopy with temporary uteteral catheter, ileal resection with side-to-side anastomosis, rectosigmoid resection/LAR, left ureterolysis, and peritoneal stripping.   Pathology consistent with recurrent granulosa cell tumor at the pelvis with rectosigmoid and partial terminal ileum. No LVSI. Mesenteric nodes and other staging negative.      4/6/2020 -  Hormonal Therapy    Arimidex initiated     4/27/2020 Molecular Testing    CARIS testing results:  NH positive, 1+, 75%  ER negative, MSI stable, PD-L1 negative     6/30/2020 Imaging    CT scan for new abdominal pain showed expected postsurgical changes from resection of pelvic mass without evidence for bulky adenopathy or soft tissue nodular recurrence. No mesenteric reticulation or ascites to suggest peritoneal involvement. No acute intraabdominal or intrapelvic abnormality otherwise noted. Significant colonic stool burden noted.            Past Medical History:   Diagnosis Date   • Arthritis    • Cancer (CMS/HCC) 2013    OVARIAN; s/p hysterectomy with BSO and chemo therapy that ended in 2014   • Constipation    • Heartburn    • History of pneumonia 02/2019    no hospitalization    • Low back pain    • Murmur     detected as an adult around age 30 and 40; requires prophylactic antibiotics before dental work; pt not sure if she has ever had an echo or when it might have been; Dr Stephen came down and listened to heart sounds on 4/2/20 and did not detect a murmur   • Ovarian cancer (CMS/HCC)    • Ovarian cyst    • Pelvic mass    • Vitamin D deficiency        Past Surgical History:   Procedure Laterality Date   • BACK SURGERY      fusion    • COLONOSCOPY  2016   • ENDOSCOPY     • EXPLORATORY LAPAROTOMY N/A 4/3/2020    Procedure: LAPAROTOMY EXPLORATORY,  OPTIMAL DEBULKING, CYSTOSCOPY WITH URETERAL CATHTER INSERTION AND REMOVAL, ILEO RESECTION, SIDE  TO SIDE ANASTOMOSIS, RECTAL SIGMOID RESECTION, LEFT URETERA LYSIS, AND PERITONEAL STRIPPING;  Surgeon: Jamee Mcguire MD;  Location: [facility];  Service: Gynecology Oncology;  Laterality: N/A;   • HYSTERECTOMY  2013    with BSO       MEDICATIONS: The current medication list was reviewed with the patient and updated in the EMR this date per the Medical Assistant. Medication dosages and frequencies were confirmed to be accurate.      Allergies:  is allergic to penicillins.    Social History:   Social History     Socioeconomic History   • Marital status: Single     Spouse name: Not on file   • Number of children: Not on file   • Years of education: Not on file   • Highest education level: Not on file   Tobacco Use   • Smoking status: Former Smoker     Packs/day: 0.50     Years: 30.00     Pack years: 15.00     Types: Electronic Cigarette, Cigarettes   • Smokeless tobacco: Never Used   • Tobacco comment: vape-nicotine currently; quit cigarettes july 2019   Vaping Use   • Vaping Use: Former   • Quit date: 4/14/2019   • Substances: Nicotine   • Devices: Pre-filled or refillable cartridge   Substance and Sexual Activity   • Alcohol use: Never   • Drug use: Never   • Sexual activity: Defer       Family History:    Family History   Problem Relation Age of Onset   • Diabetes type II Father    • Heart disease Father    • Heart attack Father    • Cancer Father    • Diabetes Father    • Hypertension Father    • Diabetes type II Mother    • Diabetes Mother    • Hypertension Mother    • Lung cancer Maternal Uncle    • Migraines Sister    • Migraines Brother         dec from Overdose       Health Maintenance:    Health Maintenance   Topic Date Due   • MAMMOGRAM  Never done   • COVID-19 Vaccine (1) Never done   • ZOSTER VACCINE (2 of 2) 08/13/2020   • INFLUENZA VACCINE  10/01/2021   • ANNUAL PHYSICAL  05/29/2022   • COLORECTAL CANCER SCREENING  08/20/2024   • TDAP/TD VACCINES (2 - Td or Tdap) 01/02/2029   • HEPATITIS C SCREENING   "Completed   • Pneumococcal Vaccine 0-64  Aged Out       Review of Systems   Constitutional: Positive for fatigue.   HENT: Positive for tinnitus.    Respiratory:        Chest pain   Gastrointestinal: Positive for abdominal pain and constipation.        Heartburn   Genitourinary: Positive for vaginal discharge.        Vaginal flatus   Skin: Negative for rash and wound.   Neurological: Positive for dizziness, numbness and headaches.       Physical Exam    Vitals:    07/21/21 1417   BP: 130/94   Pulse: 112   Resp: 18   Temp: 98.9 °F (37.2 °C)   TempSrc: Temporal   SpO2: 97%   Weight: 55.5 kg (122 lb 4.8 oz)   Height: 167.6 cm (65.98\")   PainSc:   4       Body mass index is 19.75 kg/m².    Wt Readings from Last 3 Encounters:   07/21/21 55.5 kg (122 lb 4.8 oz)   07/09/21 56.2 kg (123 lb 12.8 oz)   05/28/21 59.1 kg (130 lb 3.2 oz)     GENERAL: Alert, well-appearing female appearing her stated age who is in no apparent distress.   HEENT: Sclera anicteric. Head normocephalic, atraumatic. Mucus membranes moist.   NECK: Supple and free from thyromegaly  BREASTS: Deferred  CARDIOVASCULAR: Heart regular rate and rhythm without murmur rub or gallop, no bilateral lower extremity edema  RESPIRATORY: Clear to auscultation bilaterally, normal effort  BACK: Deferred  GASTROINTESTINAL:   Soft, tender on exam, no rebound, guarding or mass.  No hernia.  Incision well-healed.  Nondistended.  SKIN:  Warm, dry, well-perfused.  All visible areas intact.  No lesions, ulcers.  PSYCHIATRIC: AO x3, with appropriate affect, normal thought processes.  NEUROLOGIC: No focal deficits.  Ambulating slowly and without difficulty  MUSCULOSKELETAL: Ambulating slowly and without difficulty  EXTREMITIES:   No cyanosis, clubbing, symmetric.  LYMPHATICS:   No adenopathy in bilateral neck and groin areas.     PELVIC exam: External genitalia are free from lesion.  On speculum examination, there is scarring at the vaginal apex, no vaginal discharge noted.  On " bimanual examination, no mass was appreciated.  Vague fullness was noted to the left.  Uterus cervix and adnexa are surgically absent.  No nodularity was appreciated.  Rectovaginal exam was deferred.    ECOG PS 0    PROCEDURES: None    Diagnostic Data:    XR Abdomen KUB    Result Date: 7/21/2021  There is moderate to large colonic stool burden, with bowel gas pattern otherwise nonobstructive. Lumbar fusion hardware is partially characterized.  This report was finalized on 7/21/2021 1:57 PM by Chip Wall.        Lab Results   Component Value Date    WBC 4.37 06/24/2021    HGB 14.1 06/24/2021    HCT 42.6 06/24/2021    MCV 94.9 06/24/2021     06/24/2021    NEUTROABS 2.68 06/24/2021    GLUCOSE 100 (H) 06/24/2021    BUN 14 06/24/2021    CREATININE 0.85 06/24/2021    EGFRIFNONA 69 06/24/2021     06/24/2021    K 4.0 06/24/2021     06/24/2021    CO2 25.9 06/24/2021    CALCIUM 9.6 06/24/2021    ALBUMIN 4.30 06/24/2021    AST 18 06/24/2021    ALT 12 06/24/2021    BILITOT 0.3 06/24/2021     No results found for:     Imaging:  CT A/P IMPRESSION 5/4/21:  No evidence for acute intra-abdominal or intrapelvic  abnormality with persistent moderate to large colonic stool burden and postsurgical changes however no evidence for bulky adenopathy, peritoneal       Assessment/Plan   This is a 56 y.o. woman with recurrent granulosa cell tumor of the ovary for follow-up.  On Arimidex.    Encounter Diagnoses   Name Primary?   • Granulosa cell tumor of ovary, right Yes   • Malignant neoplasm of right ovary (CMS/HCC)    • Vaginal discharge    • Constipation, unspecified constipation type      History of granulosa cell tumor of right ovary now with recurrence  -Cancer history as detailed above.  Last CT imaging October 2020 - for recurrence.  Interval MRI of back and lumbar spine for back pain also negative for recurrence.  -Continue Arimidex.    -Symptoms suggestive of enterovaginal fistula without evidence of  fistulization noted on examination.  Repeat CT scan 4/14 with no evidence of rectovaginal fistula.    Ongoing constipation  -Status post GI evaluation.  I have sent a message to Dr. Brady to see if patient should be reevaluated.  I reviewed her operative report and she had a 25 mm anastomosis, but 2 bowel resections performed.  I wonder if there could be a relationship between one of the anastomosis and her symptoms.  Low rectal anastomosis patients are at risk for stricture.  -Bowel regimen of MiraLAX which results in bowel movements every 2 to 3 days.  Strongly recommended that patient consider multifactor approach to her constipation and this was discussed in detail including prunes, stool softeners, MiraLAX, and milk of magnesia.  -Goal of daily bowel movements discussed    Pain assessment was performed today as a part of patient’s care.  For patients with pain related to surgery, gynecologic malignancy or cancer treatment, the plan is as noted in the assessment/plan.  For patients with pain not related to these issues, they are to seek any further needed care from a more appropriate provider, such as PCP.      No orders of the defined types were placed in this encounter.    FOLLOW UP: 3-month surveillance visit.    Patient was seen and examined with Dr. Ramires,  resident, who performed portions of the examination and documentation for this patient's care under my direct supervision.  I agree with the above documentation and plan.    Jamee Mcguire MD  07/21/21  15:03 EDT

## 2021-07-23 ENCOUNTER — TELEPHONE (OUTPATIENT)
Dept: GASTROENTEROLOGY | Facility: CLINIC | Age: 56
End: 2021-07-23

## 2021-07-23 NOTE — TELEPHONE ENCOUNTER
Left message for patient to call back to schedule a colonoscopy (2 day bowel prep) with Dr Brady.    Arturo Brady MD Arnett, Amanda, [facility]  Please schedule the patient for a colonoscopy due to constipation.  She will need a large volume bowel prep and 2 days of clear liquids.           Previous Messages       ----- Message -----   From: Jamee Mcguire MD   Sent: 7/21/2021   2:38 PM EDT   To: Arturo Brady MD   Subject: mutual patient                                   Patient continues to have constipation.  Does she need colonoscopy to check rectal anastomosis?   LMK - thanks!

## 2021-07-26 ENCOUNTER — TELEPHONE (OUTPATIENT)
Dept: GYNECOLOGIC ONCOLOGY | Facility: CLINIC | Age: 56
End: 2021-07-26

## 2021-07-26 DIAGNOSIS — Z12.11 SCREENING FOR COLON CANCER: Primary | ICD-10-CM

## 2021-07-26 NOTE — TELEPHONE ENCOUNTER
----- Message from Lakshmi Gupta RN sent at 7/26/2021  9:08 AM EDT -----  Regarding: FW: mutual patient  Hey so it looks like she is already a patient of Dr. Brady's but I dont know if we can just request an appt or if we have to put in a new referral.  Can you let me know if there's something I need to do to make sure this can get scheduled?  Thanks!  Lakshmi   ----- Message -----  From: Jamee Mcguire MD  Sent: 7/23/2021   6:57 PM EDT  To: [facility]  Subject: FW: mutual patient                               Please get patient scheduled to see Dr. Brady.  Her surgical resection reanastomosis was more recent than 2014 and I think she needs to get in to be seen for colonoscopy.  I messaged him as well.  Thanks    ----- Message -----  From: Arturo Brady MD  Sent: 7/22/2021   5:42 PM EDT  To: Jamee Mcguire MD  Subject: RE: mutual patient                               I can perform a colonoscopy to check the anastomosis.  At the time of her office consultation the history seem to be related to pelvic floor and outlet function constipation.  The exam from 2014 was normal in [place].  Thanks.  ----- Message -----  From: Jamee Mcguire MD  Sent: 7/21/2021   2:38 PM EDT  To: Arturo Brady MD  Subject: mutual patient                                   Patient continues to have constipation.  Does she need colonoscopy to check rectal anastomosis?  LMK - thanks!

## 2021-07-30 ENCOUNTER — PATIENT MESSAGE (OUTPATIENT)
Dept: FAMILY MEDICINE CLINIC | Facility: CLINIC | Age: 56
End: 2021-07-30

## 2021-07-30 DIAGNOSIS — Z79.899 MEDICATION MANAGEMENT: ICD-10-CM

## 2021-07-30 RX ORDER — ZOLPIDEM TARTRATE 10 MG/1
10 TABLET ORAL NIGHTLY PRN
Qty: 30 TABLET | Refills: 2 | Status: SHIPPED | OUTPATIENT
Start: 2021-07-30 | End: 2021-08-28 | Stop reason: SDUPTHER

## 2021-07-30 RX ORDER — ANASTROZOLE 1 MG/1
1 TABLET ORAL DAILY
Qty: 30 TABLET | Refills: 5 | Status: SHIPPED | OUTPATIENT
Start: 2021-07-30 | End: 2021-08-30 | Stop reason: SDUPTHER

## 2021-07-30 NOTE — TELEPHONE ENCOUNTER
From: Roxie Carter  To: Chip Ramos MD  Sent: 7/30/2021 1:59 PM EDT  Subject: Prescription Question    I was wondering if Dr. Ramos have called in my prescription for Ambien yet? [facility] where I  my prescriptions at says they don't have a prescription for it to be refilled.  Can someone please help me with this.  Thank you   Roxie Carter..

## 2021-08-12 ENCOUNTER — OFFICE VISIT (OUTPATIENT)
Dept: FAMILY MEDICINE CLINIC | Facility: CLINIC | Age: 56
End: 2021-08-12

## 2021-08-12 VITALS
HEART RATE: 109 BPM | TEMPERATURE: 97.8 F | HEIGHT: 66 IN | SYSTOLIC BLOOD PRESSURE: 118 MMHG | BODY MASS INDEX: 19.29 KG/M2 | WEIGHT: 120 LBS | OXYGEN SATURATION: 99 % | DIASTOLIC BLOOD PRESSURE: 72 MMHG

## 2021-08-12 DIAGNOSIS — R30.0 DYSURIA: Primary | ICD-10-CM

## 2021-08-12 DIAGNOSIS — R19.7 DIARRHEA, UNSPECIFIED TYPE: ICD-10-CM

## 2021-08-12 DIAGNOSIS — B37.31 VAGINAL CANDIDIASIS: ICD-10-CM

## 2021-08-12 LAB
BILIRUB BLD-MCNC: NEGATIVE MG/DL
CLARITY, POC: ABNORMAL
COLOR UR: ABNORMAL
GLUCOSE UR STRIP-MCNC: NEGATIVE MG/DL
KETONES UR QL: ABNORMAL
LEUKOCYTE EST, POC: NEGATIVE
NITRITE UR-MCNC: NEGATIVE MG/ML
PH UR: 5.5 [PH] (ref 5–8)
PROT UR STRIP-MCNC: ABNORMAL MG/DL
RBC # UR STRIP: NEGATIVE /UL
SP GR UR: 1.03 (ref 1–1.03)
UROBILINOGEN UR QL: NORMAL

## 2021-08-12 PROCEDURE — 99214 OFFICE O/P EST MOD 30 MIN: CPT | Performed by: NURSE PRACTITIONER

## 2021-08-12 RX ORDER — FLUCONAZOLE 150 MG/1
150 TABLET ORAL DAILY
Qty: 3 TABLET | Refills: 0 | Status: SHIPPED | OUTPATIENT
Start: 2021-08-12 | End: 2021-08-15

## 2021-08-12 NOTE — PROGRESS NOTES
"Chief Complaint  Vaginal Discharge (Pt states she has a chronic history of vaginal discharge. She states she's had discharge for over a year. ) and Difficulty Urinating (Pt states it burns when she goes to the bathroom. She states she's had a lot of vaginal itching and redness. She states this has been going on since yesterday.)    Subjective          Roxie Carter presents to [facility]     History of Present Illness  She has been having vaginal itching, redness, and dysuria.  She had some bleeding externally from irritation.  She had chills yesterday and today.  No body aches or fever.  She has to take a laxative daily to make herself go.  She has been having runny stool also.  This feels like a yeast infection. She noticed mucus in her stool last week. She had similar issues with vaginal irritation and pain a couple months ago and was seen by her PCP who gave her Diflucan. This cleared up the problem at that time.    Objective   Vital Signs:   /72   Pulse 109   Temp 97.8 °F (36.6 °C)   Ht 167.6 cm (65.98\")   Wt 54.4 kg (120 lb)   SpO2 99%   BMI 19.38 kg/m²       Physical Exam  Vitals reviewed.   Constitutional:       Appearance: Normal appearance.   HENT:      Head: Normocephalic and atraumatic.   Pulmonary:      Effort: Pulmonary effort is normal.   Genitourinary:     Exam position: Lithotomy position.          Comments: Patient does have redness of bilateral labia majora  Skin:     General: Skin is warm and dry.   Neurological:      General: No focal deficit present.      Mental Status: She is alert and oriented to person, place, and time.   Psychiatric:         Mood and Affect: Mood normal.         Behavior: Behavior normal.         Thought Content: Thought content normal.         Judgment: Judgment normal.            Result Review :                 Assessment and Plan    Diagnoses and all orders for this visit:    1. Dysuria (Primary) -UA negative for " infection.  -     POC Urinalysis Dipstick, Automated    2. Vaginal candidiasis -patient is having symptoms of a yeast infection. She had one of these not long ago. She took Diflucan and it resolved the issue.  --Start Diflucan 150 mg daily for 3 days.  --Patient should continue to wash her genitals with a light soap.  --She can use the moisturizing cream on the excoriated area on her buttocks, but encouraged to not use it in the vagina.  -     fluconazole (Diflucan) 150 MG tablet; Take 1 tablet by mouth Daily for 3 days.  Dispense: 3 tablet; Refill: 0    3. Diarrhea, unspecified type -this is complicating patient's  Genital issue. --Discussed with patient that she should stop the laxatives for a couple days until her stool thickens back up. She did see mucus in her stool last week.  --Will check for C. difficile.  -     Clostridium Difficile Toxin, PCR - Stool, Per Rectum; Future        Follow Up   Return if symptoms worsen or fail to improve, for Follow-up.  Patient was given instructions and counseling regarding her condition or for health maintenance advice. Please see specific information pulled into the AVS if appropriate.

## 2021-08-18 ENCOUNTER — OUTSIDE FACILITY SERVICE (OUTPATIENT)
Dept: GASTROENTEROLOGY | Facility: CLINIC | Age: 56
End: 2021-08-18

## 2021-08-18 PROCEDURE — 45378 DIAGNOSTIC COLONOSCOPY: CPT | Performed by: INTERNAL MEDICINE

## 2021-08-28 DIAGNOSIS — Z79.899 MEDICATION MANAGEMENT: ICD-10-CM

## 2021-08-28 NOTE — TELEPHONE ENCOUNTER
Last fill: 7/30/21  Last office visit: 8/12/21  Next office visit: 10/11/21  CSA up-to-date? 1/8/21  Date of last UDS: 1/8/21  UDS consistent: n/a

## 2021-08-30 RX ORDER — ZOLPIDEM TARTRATE 10 MG/1
10 TABLET ORAL NIGHTLY PRN
Qty: 30 TABLET | Refills: 2 | Status: SHIPPED | OUTPATIENT
Start: 2021-08-30 | End: 2021-09-30 | Stop reason: SDUPTHER

## 2021-08-30 RX ORDER — ANASTROZOLE 1 MG/1
1 TABLET ORAL DAILY
Qty: 30 TABLET | Refills: 5 | Status: SHIPPED | OUTPATIENT
Start: 2021-08-30 | End: 2021-09-29 | Stop reason: SDUPTHER

## 2021-08-30 RX ORDER — DULOXETIN HYDROCHLORIDE 30 MG/1
30 CAPSULE, DELAYED RELEASE ORAL DAILY
Qty: 30 CAPSULE | Refills: 2 | Status: SHIPPED | OUTPATIENT
Start: 2021-08-30 | End: 2021-09-20

## 2021-09-20 ENCOUNTER — OFFICE VISIT (OUTPATIENT)
Dept: FAMILY MEDICINE CLINIC | Facility: CLINIC | Age: 56
End: 2021-09-20

## 2021-09-20 VITALS
HEART RATE: 86 BPM | HEIGHT: 66 IN | SYSTOLIC BLOOD PRESSURE: 130 MMHG | BODY MASS INDEX: 19.67 KG/M2 | DIASTOLIC BLOOD PRESSURE: 88 MMHG | OXYGEN SATURATION: 96 % | WEIGHT: 122.4 LBS

## 2021-09-20 DIAGNOSIS — R07.9 CHEST PAIN IN ADULT: Primary | ICD-10-CM

## 2021-09-20 DIAGNOSIS — R42 VERTIGO: ICD-10-CM

## 2021-09-20 DIAGNOSIS — R55 POSTURAL DIZZINESS WITH PRESYNCOPE: ICD-10-CM

## 2021-09-20 DIAGNOSIS — M54.2 NECK PAIN: ICD-10-CM

## 2021-09-20 DIAGNOSIS — F41.9 ANXIETY: ICD-10-CM

## 2021-09-20 DIAGNOSIS — R42 POSTURAL DIZZINESS WITH PRESYNCOPE: ICD-10-CM

## 2021-09-20 DIAGNOSIS — R20.0 ARM NUMBNESS LEFT: ICD-10-CM

## 2021-09-20 PROCEDURE — 99214 OFFICE O/P EST MOD 30 MIN: CPT | Performed by: INTERNAL MEDICINE

## 2021-09-20 PROCEDURE — 93000 ELECTROCARDIOGRAM COMPLETE: CPT | Performed by: INTERNAL MEDICINE

## 2021-09-20 RX ORDER — FLUTICASONE PROPIONATE 50 MCG
2 SPRAY, SUSPENSION (ML) NASAL DAILY
Qty: 5 ML | Refills: 3 | Status: SHIPPED | OUTPATIENT
Start: 2021-09-20

## 2021-09-20 RX ORDER — DULOXETIN HYDROCHLORIDE 30 MG/1
60 CAPSULE, DELAYED RELEASE ORAL DAILY
Qty: 60 CAPSULE | Refills: 6 | Status: SHIPPED | OUTPATIENT
Start: 2021-09-20 | End: 2022-01-25 | Stop reason: ALTCHOICE

## 2021-09-20 RX ORDER — MECLIZINE HYDROCHLORIDE 50 MG/1
50 TABLET ORAL 3 TIMES DAILY PRN
Qty: 60 TABLET | Refills: 0 | Status: SHIPPED | OUTPATIENT
Start: 2021-09-20 | End: 2023-03-22

## 2021-09-20 RX ORDER — PROPRANOLOL HYDROCHLORIDE 10 MG/1
10 TABLET ORAL 2 TIMES DAILY
Qty: 60 TABLET | Refills: 6 | Status: SHIPPED | OUTPATIENT
Start: 2021-09-20 | End: 2021-12-17

## 2021-09-20 RX ORDER — DULOXETIN HYDROCHLORIDE 30 MG/1
60 CAPSULE, DELAYED RELEASE ORAL DAILY
Qty: 60 CAPSULE | Refills: 6 | Status: SHIPPED | OUTPATIENT
Start: 2021-09-20 | End: 2021-09-20 | Stop reason: SDUPTHER

## 2021-09-20 NOTE — PROGRESS NOTES
Roxie Carter  1965  [id number]  Patient Care Team:  Chip Ramos MD as PCP - General (Internal Medicine)  Arturo Brady MD as Consulting Physician (Gastroenterology)  Jamee Mcguire MD as Consulting Physician (Gynecologic Oncology)    Roxie Carter is a 56 y.o. female here today to establish care.  This patient is accompanied by their self who contributes to the history of their care.    Chief Complaint:    Chief Complaint   Patient presents with   • Dizziness     Started three weeks, Happens everyday. Pt states she does not have to do anything to cause it to happen.    • Chest Pain     with numbness going down Lt arm. Started about four days ago. Pain increases at night   • Neck Pain     Behind Lt ear.  STarted the same time chest pain.    • Cold Feet     Has a tingle sensation         History of Present Illness:    Reports 2-3 weeks dizzieness lasting all day., associated with spinning sensation and nausea. No alleviating factors. Exacerbating factors include with looking to right/left with head movement and looing to right with moving eyes to right. Occasional with assuming upright position. She has not been diagnosed with vertigo, but has had these sx before howevere they woul not last. Denies earaches, tinnitis. There has been sinus congestion    Reports racing heart associated with chest pain which is stabbing. Gets light heaed with this. She mentions skipped beats. Can last about 15 min, usually happens in evening not necc associated with movement. She does get associated SOA. Denies orthopnea or PND. Denies cough or congestion. Denies recent FORREST. No alleviating factors except time    Experiences nausea in evening- not necc associated with chest pain/    Reports 3 day hx or left arm numbness, intermittent, lasting 15 min and IS associated with chest pain. Denies weakness in UE. The left side of her neck is numb and feels unusual  Concomitant with chest discomfort and LUE  numbess.    There has been exertional SOA.    Past Medical History:   Diagnosis Date   • Arthritis    • Cancer (CMS/HCC) 2013    OVARIAN; s/p hysterectomy with BSO and chemo therapy that ended in 2014   • Constipation    • Heartburn    • History of pneumonia 02/2019    no hospitalization    • Low back pain    • Murmur     detected as an adult around age 30 and 40; requires prophylactic antibiotics before dental work; pt not sure if she has ever had an echo or when it might have been; Dr Stephen came down and listened to heart sounds on 4/2/20 and did not detect a murmur   • Ovarian cancer (CMS/HCC)    • Ovarian cyst    • Pelvic mass    • Vitamin D deficiency        Past Surgical History:   Procedure Laterality Date   • BACK SURGERY      fusion    • COLONOSCOPY  2016   • ENDOSCOPY     • EXPLORATORY LAPAROTOMY N/A 4/3/2020    Procedure: LAPAROTOMY EXPLORATORY,  OPTIMAL DEBULKING, CYSTOSCOPY WITH URETERAL CATHTER INSERTION AND REMOVAL, ILEO RESECTION, SIDE TO SIDE ANASTOMOSIS, RECTAL SIGMOID RESECTION, LEFT URETERA LYSIS, AND PERITONEAL STRIPPING;  Surgeon: Jamee Mcguire MD;  Location: [facility];  Service: Gynecology Oncology;  Laterality: N/A;   • HYSTERECTOMY  2013    with BSO        Family History   Problem Relation Age of Onset   • Diabetes type II Father    • Heart disease Father    • Heart attack Father    • Cancer Father    • Diabetes Father    • Hypertension Father    • Diabetes type II Mother    • Diabetes Mother    • Hypertension Mother    • Lung cancer Maternal Uncle    • Migraines Sister    • Migraines Brother         dec from Overdose       Social History     Socioeconomic History   • Marital status: Single     Spouse name: Not on file   • Number of children: Not on file   • Years of education: Not on file   • Highest education level: Not on file   Tobacco Use   • Smoking status: Former Smoker     Packs/day: 0.50     Years: 30.00     Pack years: 15.00     Types: Electronic Cigarette, Cigarettes   •  "Smokeless tobacco: Never Used   • Tobacco comment: vape-nicotine currently; quit cigarettes july 2019   Vaping Use   • Vaping Use: Former   • Quit date: 4/14/2019   • Substances: Nicotine   • Devices: Pre-filled or refillable cartridge   Substance and Sexual Activity   • Alcohol use: Never   • Drug use: Never   • Sexual activity: Defer       Allergies   Allergen Reactions   • Penicillins Hives       Review of Systems:    Review of Systems   Constitutional: Positive for fatigue. Negative for fever, unexpected weight gain and unexpected weight loss.   HENT: Positive for postnasal drip.    Eyes: Negative.    Respiratory: Positive for shortness of breath. Negative for cough.    Cardiovascular: Positive for chest pain and palpitations.   Endocrine: Negative.    Musculoskeletal: Positive for neck pain.   Skin: Negative.    Neurological: Positive for facial asymmetry.   Psychiatric/Behavioral: Positive for sleep disturbance. The patient is nervous/anxious.        Vitals:    09/20/21 1047   BP: 130/88   Pulse: 86   SpO2: 96%   Weight: 55.5 kg (122 lb 6.4 oz)   Height: 167.6 cm (65.98\")   PainSc:   3   PainLoc: Chest     Body mass index is 19.77 kg/m².      Current Outpatient Medications:   •  anastrozole (ARIMIDEX) 1 MG tablet, Take 1 tablet by mouth Daily., Disp: 30 tablet, Rfl: 5  •  baclofen (LIORESAL) 10 MG tablet, Take 1 tablet by mouth 3 (Three) Times a Day As Needed for Muscle Spasms., Disp: 90 tablet, Rfl: 1  •  diclofenac (VOLTAREN) 75 MG EC tablet, Take 1 tablet by mouth 2 (Two) Times a Day., Disp: 60 tablet, Rfl: 5  •  diphenhydrAMINE (BENADRYL) 25 MG tablet, Take 25 mg by mouth Every Night., Disp: , Rfl:   •  DULoxetine (Cymbalta) 30 MG capsule, Take 2 capsules by mouth Daily., Disp: 60 capsule, Rfl: 6  •  ibuprofen (ADVIL,MOTRIN) 200 MG tablet, Take 400-600 mg by mouth Every 6 (Six) Hours As Needed for Mild Pain ., Disp: , Rfl:   •  traMADol (Ultram) 50 MG tablet, Take 1 tablet by mouth 2 (Two) Times a Day., " Disp: 60 tablet, Rfl: 1  •  zolpidem (AMBIEN) 10 MG tablet, Take 1 tablet by mouth At Night As Needed for Sleep., Disp: 30 tablet, Rfl: 2  •  fluticasone (Flonase) 50 MCG/ACT nasal spray, 2 sprays into the nostril(s) as directed by provider Daily., Disp: 5 mL, Rfl: 3  •  meclizine (ANTIVERT) 50 MG tablet, Take 1 tablet by mouth 3 (Three) Times a Day As Needed for Dizziness., Disp: 60 tablet, Rfl: 0  •  propranolol (INDERAL) 10 MG tablet, Take 1 tablet by mouth 2 (Two) Times a Day., Disp: 60 tablet, Rfl: 6    Physical Exam:    Physical Exam  Vitals and nursing note reviewed.   Constitutional:       General: She is not in acute distress.     Appearance: Normal appearance. She is well-developed. She is not diaphoretic.   HENT:      Head: Normocephalic and atraumatic.      Right Ear: Tympanic membrane and external ear normal.      Left Ear: Tympanic membrane and external ear normal.      Mouth/Throat:      Mouth: Mucous membranes are moist.      Pharynx: Oropharynx is clear. No oropharyngeal exudate.   Eyes:      General: No scleral icterus.        Right eye: No discharge.      Conjunctiva/sclera: Conjunctivae normal.   Neck:      Thyroid: No thyromegaly.      Vascular: No JVD.      Trachea: No tracheal deviation.   Cardiovascular:      Rate and Rhythm: Normal rate and regular rhythm.      Pulses: Normal pulses.      Heart sounds: Normal heart sounds.      Comments: PMI nondisplaced  Pulmonary:      Effort: Pulmonary effort is normal.      Breath sounds: Normal breath sounds. No wheezing or rales.   Abdominal:      General: Bowel sounds are normal.      Palpations: Abdomen is soft.      Tenderness: There is no abdominal tenderness. There is no guarding or rebound.   Musculoskeletal:      Cervical back: Normal range of motion and neck supple.      Comments: Normal gait   Lymphadenopathy:      Cervical: No cervical adenopathy.   Skin:     General: Skin is warm and dry.      Capillary Refill: Capillary refill takes less  than 2 seconds.      Coloration: Skin is not pale.      Findings: No rash.   Neurological:      General: No focal deficit present.      Mental Status: She is alert and oriented to person, place, and time.      Motor: No abnormal muscle tone.      Coordination: Coordination normal.   Psychiatric:         Judgment: Judgment normal.           ECG 12 Lead    Date/Time: 9/20/2021 11:35 AM  Performed by: Chip Ramos MD  Authorized by: Chip Ramos MD   Comparison: compared with previous ECG from 4/2/2020  Rhythm: sinus rhythm  Rate: normal  Conduction: conduction normal  ST Segments: ST segments normal  T Waves: T waves normal  Other: no other findings  Comments: Compared to EKG from 4/2/2020 no significant changes noted            Results Review:    None    Assessment/Plan:     Problem List Items Addressed This Visit        Cardiac and Vasculature    Chest pain in adult - Primary    Relevant Orders    ECG 12 Lead    Ambulatory Referral to [facility] [place] - [facility]    XR Spine Cervical Complete 4 or 5 View       ENT    Vertigo    Relevant Orders    Ambulatory Referral to Physical Therapy Vestibular       Mental Health    Anxiety    Current Assessment & Plan     A lot of the symptoms could be a manifestation of anxiety.  She does endorse anxiety and possible panic attacks.  I have increased her Cymbalta 60 mg daily.  Consider cognitive behavioral therapy.  Propranolol 10 mg p.o. twice daily added mainly for anxiety.            Musculoskeletal and Injuries    Neck pain    Relevant Orders    XR Spine Cervical Complete 4 or 5 View       Symptoms and Signs    Postural dizziness with presyncope    Relevant Orders    Ambulatory Referral to [facility] [place] - [facility]    XR Spine Cervical Complete 4 or 5 View    Arm numbness left    Relevant Orders    XR Spine Cervical Complete 4 or 5 View          Plan of care reviewed with patient at the conclusion of today's visit. Education was provided  regarding diagnosis and management.  Patient verbalizes understanding of and agreement with management plan.    Return in about 2 weeks (around 10/4/2021).    Chip Ramos MD    Please note that portions of this note may have been completed with a voice recognition program. Efforts were made to edit the dictations, but occasionally words are mistranscribed.         Answers for HPI/ROS submitted by the patient on 9/13/2021  Please describe your symptoms.: I have been having dizzy spells for about 2 weeks.  Have you had these symptoms before?: Yes  How long have you been having these symptoms?: 1-2 weeks  Please list any medications you are currently taking for this condition.: N/A  Please describe any probable cause for these symptoms. : I fill like I have vertigo  What is the primary reason for your visit?: Other

## 2021-09-20 NOTE — ASSESSMENT & PLAN NOTE
A lot of the symptoms could be a manifestation of anxiety.  She does endorse anxiety and possible panic attacks.  I have increased her Cymbalta 60 mg daily.  Consider cognitive behavioral therapy.  Propranolol 10 mg p.o. twice daily added mainly for anxiety.

## 2021-09-23 ENCOUNTER — OFFICE VISIT (OUTPATIENT)
Dept: CARDIOLOGY | Facility: HOSPITAL | Age: 56
End: 2021-09-23

## 2021-09-23 ENCOUNTER — HOSPITAL ENCOUNTER (OUTPATIENT)
Dept: CARDIOLOGY | Facility: HOSPITAL | Age: 56
Discharge: HOME | End: 2021-09-23

## 2021-09-23 VITALS
HEART RATE: 75 BPM | DIASTOLIC BLOOD PRESSURE: 73 MMHG | HEIGHT: 66 IN | RESPIRATION RATE: 16 BRPM | BODY MASS INDEX: 20.11 KG/M2 | WEIGHT: 125.13 LBS | OXYGEN SATURATION: 99 % | SYSTOLIC BLOOD PRESSURE: 136 MMHG | TEMPERATURE: 97 F

## 2021-09-23 DIAGNOSIS — R42 DIZZINESS: ICD-10-CM

## 2021-09-23 DIAGNOSIS — R00.2 PALPITATIONS: ICD-10-CM

## 2021-09-23 DIAGNOSIS — R20.2 NUMBNESS AND TINGLING OF BOTH FEET: ICD-10-CM

## 2021-09-23 DIAGNOSIS — R20.0 NUMBNESS AND TINGLING OF BOTH FEET: ICD-10-CM

## 2021-09-23 DIAGNOSIS — R42 LIGHTHEADEDNESS: ICD-10-CM

## 2021-09-23 DIAGNOSIS — R07.9 CHEST PAIN, UNSPECIFIED TYPE: Primary | ICD-10-CM

## 2021-09-23 PROCEDURE — 99214 OFFICE O/P EST MOD 30 MIN: CPT | Performed by: NURSE PRACTITIONER

## 2021-09-23 PROCEDURE — 93246 EXT ECG>7D<15D RECORDING: CPT

## 2021-09-23 NOTE — PROGRESS NOTES
Chief Complaint  Dizziness, Establish Care, and Chest Pain    Subjective    History of Present Illness {CC  Problem List  Visit  Diagnosis   Encounters  Notes  Medications  Labs  Result Review Imaging  Media :23}     Roxie Carter, 56 y.o. female with vertigo, anxiety presents to [facility] for Dizziness, Establish Care, and Chest Pain.    Patient with recent visit to PCP Chip Ramos MD and endorsed dizziness, chest pain.  She noted postural dizziness with presyncope, sharp chest pain.  ECG completed with no evidence of ACS.  Patient was referred to [facility] heart valve clinic for further evaluation.    Chest pain symptom onset was 5-6 years, but worsened one week ago; symptoms have been improving since that time. Symptoms include a weight on her chest and a pressure pushing outward located from substernal to substernal left chest, with radiation to arm and neck. Pain is almost constant. The pain is worsened by lying down, relieved by: no factors. The patient denies fatigue, leg swelling, palpitations, rapid heart beat, shortness of breath, and syncope. Previous cardiac workup includes: none. Family history for premature cardiac disease includes: father with MI/CABG before 65, mother with aortic aneurysm repair.  Patient is a former smoker; 15 pack year history.    The patient also endorses lightheadedness and dizziness, not worsened when standing. She feels dizzy and feels like she is in a fog occasionally. She notes intermittent palpitations. Also notes cold and tingling feet occasionally. No previous history of elevated blood pressure, SBP generally less than 140.       Objective     Vital Signs:   Vitals:    09/23/21 1413 09/23/21 1415 09/23/21 1416   BP: 151/97 135/77 136/73   BP Location: Right arm Left arm Left arm   Patient Position: Sitting Standing Sitting   Cuff Size: Adult Adult Adult   Pulse: 74 81 75   Resp:   16   Temp:  97 °F (36.1 °C) 97 °F (36.1  "°C)   TempSrc:  Temporal Temporal   SpO2: 99% 99% 99%   Weight:   56.8 kg (125 lb 2 oz)   Height:   167.6 cm (66\")     Body mass index is 20.2 kg/m².  Physical Exam  Vitals and nursing note reviewed.   Constitutional:       Appearance: Normal appearance.   HENT:      Head: Normocephalic.   Eyes:      Extraocular Movements: Extraocular movements intact.   Neck:      Vascular: No carotid bruit.   Cardiovascular:      Rate and Rhythm: Normal rate and regular rhythm.      Pulses: Normal pulses.      Heart sounds: Normal heart sounds, S1 normal and S2 normal. No murmur heard.     Pulmonary:      Effort: Pulmonary effort is normal. No respiratory distress.      Breath sounds: Normal breath sounds.   Musculoskeletal:      Cervical back: Neck supple.      Right lower leg: No edema.      Left lower leg: No edema.   Skin:     General: Skin is warm and dry.   Neurological:      General: No focal deficit present.      Mental Status: She is alert.   Psychiatric:         Mood and Affect: Mood normal.         Behavior: Behavior normal.         Thought Content: Thought content normal.        Data Reviewed:{ Labs  Result Review  Imaging  Med Tab  Media :23}     ECG 12 Lead (09/20/2021 11:35)  CBC & Differential (06/24/2021 13:49)  Comprehensive Metabolic Panel (06/24/2021 13:49)  TSH Rfx On Abnormal To Free T4 (06/24/2021 13:49)  Lipid Panel (06/24/2021 13:49)      Assessment and Plan {CC Problem List  Visit Diagnosis  ROS  Review (Popup)  Health Maintenance  Quality  BestPractice  Medications  SmartSets  SnapShot Encounters  Media :23}     1. Chest pain, unspecified type  -Nonexertional.  Given her family history of early CAD, smoking history we will pursue ischemic evaluation.  - Adult Transthoracic Echo Complete W/ Cont if Necessary Per Protocol; Future  - Treadmill Stress Test; Future    2. Lightheadedness  -No orthostatic symptoms.  Orthostatic BPs negative at time of visit.  - Adult Transthoracic Echo Complete " W/ Cont if Necessary Per Protocol; Future    3. Dizziness  - Holter Monitor - 72 Hour Up To 15 Days; Future  - Adult Transthoracic Echo Complete W/ Cont if Necessary Per Protocol; Future    4. Palpitations  - Holter Monitor - 72 Hour Up To 15 Days; Future  - Adult Transthoracic Echo Complete W/ Cont if Necessary Per Protocol; Future    5. Numbness and tingling of both feet  -Intermittent numbness and tingling of both feet, also feels as if they are cool and decreased blood flow.  Normal pulses at time of visit.  - Doppler Ankle Brachial Index Single Level CAR; Future      Follow Up {Instructions Charge Capture  Follow-up Communications :23}     Return in about 6 weeks (around 11/4/2021) for Phone visit; Monitor results.    Patient was given instructions and counseling regarding her condition or for health maintenance advice. Please see specific information pulled into the AVS if appropriate.  Patient was instructed to call the Heart [facility] Center with any questions, concerns, or worsening symptoms.    *Please note that portions of this note were completed with a voice recognition program. Efforts were made to edit the dictations, but occasionally words are mistranscribed.

## 2021-09-23 NOTE — PROGRESS NOTES
[facility] Heart Monitor Documentation    Roxie Carter  1965  [id number]  09/23/21    CASEY Aguirre    [] ZIO XT Patch  Model [id number] Prescribed for  Days    · Serial Number: (N + 9 Digits) N   · Apply-By Date on Box:   · USPS Tracking Number:   · USPS Tracking        [] [employer] BodyGuardian MINI PLUS Mobile Cardiac Telemetry  Model BGMINIPLUS Prescribed for  Days    · Serial Number: (BGM + 7 Digits) BGM  · Shipped-By Date on Box:   · UPS Tracking Number: [id number]  · UPS Tracking      [x] [employer] BodyGuardian MINI Holter Monitor  Model BGMINIEL Prescribed for 14 Days    · Serial Number: (7 Digits) [id number]  · Shipped-By Date on Box: 09/13/2021  · UPS Tracking Number: [id number]  · UPS Tracking        This monitor was applied to the patient's chest and checked for proper functioning.  Ms. Roxie Carter was instructed in the proper use of this monitor.  She was given the opportunity to ask questions and left the office with the device 's instruction manual.    Tigist Cunha CMA, 15:03 EDT, 09/23/21                  [facility]

## 2021-09-30 DIAGNOSIS — Z79.899 MEDICATION MANAGEMENT: ICD-10-CM

## 2021-09-30 RX ORDER — ANASTROZOLE 1 MG/1
1 TABLET ORAL DAILY
Qty: 30 TABLET | Refills: 5 | Status: SHIPPED | OUTPATIENT
Start: 2021-09-30 | End: 2021-11-02 | Stop reason: SDUPTHER

## 2021-10-01 RX ORDER — ZOLPIDEM TARTRATE 10 MG/1
10 TABLET ORAL NIGHTLY PRN
Qty: 30 TABLET | Refills: 2 | Status: SHIPPED | OUTPATIENT
Start: 2021-10-01 | End: 2021-10-29 | Stop reason: SDUPTHER

## 2021-10-01 NOTE — TELEPHONE ENCOUNTER
Rx Refill Note  Requested Prescriptions     Pending Prescriptions Disp Refills   • zolpidem (AMBIEN) 10 MG tablet 30 tablet 2     Sig: Take 1 tablet by mouth At Night As Needed for Sleep.      Last office visit with prescribing clinician: 9/20/2021      Next office visit with prescribing clinician: 10/11/2021            Chai Bird MA  10/01/21, 08:37 EDT         CSC: 1/8/2021  UDS: 1/8/2021

## 2021-10-04 RX ORDER — DULOXETIN HYDROCHLORIDE 30 MG/1
60 CAPSULE, DELAYED RELEASE ORAL DAILY
Qty: 60 CAPSULE | Refills: 6 | OUTPATIENT
Start: 2021-10-04

## 2021-10-11 ENCOUNTER — OFFICE VISIT (OUTPATIENT)
Dept: FAMILY MEDICINE CLINIC | Facility: CLINIC | Age: 56
End: 2021-10-11

## 2021-10-11 VITALS
OXYGEN SATURATION: 97 % | DIASTOLIC BLOOD PRESSURE: 82 MMHG | HEIGHT: 66 IN | BODY MASS INDEX: 20.2 KG/M2 | SYSTOLIC BLOOD PRESSURE: 110 MMHG | HEART RATE: 124 BPM

## 2021-10-11 DIAGNOSIS — N30.01 ACUTE CYSTITIS WITH HEMATURIA: Primary | ICD-10-CM

## 2021-10-11 DIAGNOSIS — R00.0 TACHYCARDIA: ICD-10-CM

## 2021-10-11 DIAGNOSIS — R30.0 DYSURIA: ICD-10-CM

## 2021-10-11 DIAGNOSIS — N30.01 ACUTE CYSTITIS WITH HEMATURIA: ICD-10-CM

## 2021-10-11 LAB
BILIRUB BLD-MCNC: ABNORMAL MG/DL
CLARITY, POC: CLEAR
COLOR UR: ABNORMAL
EXPIRATION DATE: ABNORMAL
GLUCOSE UR STRIP-MCNC: ABNORMAL MG/DL
KETONES UR QL: ABNORMAL
LEUKOCYTE EST, POC: ABNORMAL
Lab: ABNORMAL
NITRITE UR-MCNC: POSITIVE MG/ML
PH UR: 5 [PH] (ref 5–8)
PROT UR STRIP-MCNC: ABNORMAL MG/DL
RBC # UR STRIP: ABNORMAL /UL
SP GR UR: 1.03 (ref 1–1.03)
UROBILINOGEN UR QL: ABNORMAL

## 2021-10-11 PROCEDURE — 99214 OFFICE O/P EST MOD 30 MIN: CPT | Performed by: INTERNAL MEDICINE

## 2021-10-11 PROCEDURE — 87086 URINE CULTURE/COLONY COUNT: CPT | Performed by: INTERNAL MEDICINE

## 2021-10-11 RX ORDER — NITROFURANTOIN 25; 75 MG/1; MG/1
100 CAPSULE ORAL 2 TIMES DAILY
Qty: 14 CAPSULE | Refills: 0 | Status: SHIPPED | OUTPATIENT
Start: 2021-10-11 | End: 2021-10-18

## 2021-10-11 NOTE — PROGRESS NOTES
Chief Complaint   Patient presents with   • Urinary Tract Infection       HPI:  Roxie Carter is a 56 y.o. female who presents today for burning with urination and increased frequency for 1 week duration.  She has a history of recurrent UTIs.    ROS:  Constitutional: no fevers, night sweats or unexplained weight loss  Eyes: no vision changes  ENT: no runny nose, ear pain, sore throat  Cardio: no chest pain, palpitations  Pulm: no shortness of breath, wheezing, or cough  GI: no abdominal pain or changes in bowel movements  : no difficulty urinating  MSK: no difficulty ambulating, no joint pain  Neuro: no weakness, dizziness or headache  Psych: no trouble sleeping  Endo: no change in appetite      Past Medical History:   Diagnosis Date   • Arthritis    • Cancer (HCC) 2013    OVARIAN; s/p hysterectomy with BSO and chemo therapy that ended in 2014   • Constipation    • Heartburn    • History of pneumonia 02/2019    no hospitalization    • Low back pain    • Murmur     detected as an adult around age 30 and 40; requires prophylactic antibiotics before dental work; pt not sure if she has ever had an echo or when it might have been; Dr Stephen came down and listened to heart sounds on 4/2/20 and did not detect a murmur   • Ovarian cancer (HCC)    • Ovarian cyst    • Pelvic mass    • Vitamin D deficiency       Family History   Problem Relation Age of Onset   • Diabetes type II Father    • Heart disease Father    • Heart attack Father    • Cancer Father    • Diabetes Father    • Hypertension Father    • Diabetes type II Mother    • Diabetes Mother    • Hypertension Mother    • Lung cancer Maternal Uncle    • Migraines Sister    • Migraines Brother         dec from Overdose   • No Known Problems Maternal Grandmother    • No Known Problems Maternal Grandfather    • No Known Problems Paternal Grandmother    • No Known Problems Paternal Grandfather       Social History     Socioeconomic History   • Marital status: Single    Tobacco Use   • Smoking status: Former Smoker     Packs/day: 0.50     Years: 30.00     Pack years: 15.00     Types: Electronic Cigarette, Cigarettes   • Smokeless tobacco: Never Used   • Tobacco comment: vape-nicotine currently; quit cigarettes july 2019   Vaping Use   • Vaping Use: Former   • Quit date: 4/14/2019   • Substances: Nicotine   • Devices: Pre-filled or refillable cartridge   Substance and Sexual Activity   • Alcohol use: Never   • Drug use: Never   • Sexual activity: Defer      Allergies   Allergen Reactions   • Penicillins Hives      Immunization History   Administered Date(s) Administered   • FluLaval/Fluarix >6 Months 09/30/2017, 01/02/2019   • Flublok 18+yrs 11/01/2020   • Hepatitis A 01/17/2019   • Influenza, Unspecified 11/01/2020   • Pneumococcal Polysaccharide (PPSV23) 01/02/2019   • Shingrix 06/18/2020   • Tdap 01/02/2019        PE:  Vitals:    10/11/21 1419   BP: 110/82   Pulse: (!) 124   SpO2: 97%      Body mass index is 20.2 kg/m².    Gen Appearance: NAD  HEENT: Normocephalic, PERRLA, no thyromegaly, trache midline  Heart: RRR, normal S1 and S2, no murmur  Lungs: CTA b/l, no wheezing, no crackles  Abdomen: Soft, non-tender, non-distended, no guarding and BSx4  MSK: Moves all extremities well, normal gait, no peripheral edema  Pulses: Palpable and equal b/l  Lymph nodes: No palpable lymphadenopathy   Neuro: No focal deficits      Current Outpatient Medications   Medication Sig Dispense Refill   • anastrozole (ARIMIDEX) 1 MG tablet Take 1 tablet by mouth Daily. 30 tablet 5   • baclofen (LIORESAL) 10 MG tablet Take 1 tablet by mouth 3 (Three) Times a Day As Needed for Muscle Spasms. 90 tablet 1   • diclofenac (VOLTAREN) 75 MG EC tablet Take 1 tablet by mouth 2 (Two) Times a Day. 60 tablet 5   • diphenhydrAMINE (BENADRYL) 25 MG tablet Take 25 mg by mouth Every Night.     • DULoxetine (Cymbalta) 30 MG capsule Take 2 capsules by mouth Daily. 60 capsule 6   • fluticasone (Flonase) 50 MCG/ACT  nasal spray 2 sprays into the nostril(s) as directed by provider Daily. 5 mL 3   • ibuprofen (ADVIL,MOTRIN) 200 MG tablet Take 400-600 mg by mouth Every 6 (Six) Hours As Needed for Mild Pain .     • meclizine (ANTIVERT) 50 MG tablet Take 1 tablet by mouth 3 (Three) Times a Day As Needed for Dizziness. 60 tablet 0   • propranolol (INDERAL) 10 MG tablet Take 1 tablet by mouth 2 (Two) Times a Day. 60 tablet 6   • traMADol (Ultram) 50 MG tablet Take 1 tablet by mouth 2 (Two) Times a Day. 60 tablet 1   • zolpidem (AMBIEN) 10 MG tablet Take 1 tablet by mouth At Night As Needed for Sleep. 30 tablet 2   • nitrofurantoin, macrocrystal-monohydrate, (Macrobid) 100 MG capsule Take 1 capsule by mouth 2 (Two) Times a Day for 7 days. 14 capsule 0     No current facility-administered medications for this visit.      Urinalysis consistent with UTI.  Sending for culture.  Tachycardia did resolve a bit at the end of visit although she was still high 90s.  Likely due to infection.  Recommend follow-up with PCP in 1 to 2 weeks or sooner if symptoms do not resolve.  Discussed with patient that she may need IV antibiotics if she does not improve on oral medication within the next 2 to 3 days.  She was having chills a few days ago, suspicion for pyelonephritis.    Diagnoses and all orders for this visit:    1. Acute cystitis with hematuria (Primary)  -     Urine Culture - Urine, Urine, Clean Catch; Future  -     nitrofurantoin, macrocrystal-monohydrate, (Macrobid) 100 MG capsule; Take 1 capsule by mouth 2 (Two) Times a Day for 7 days.  Dispense: 14 capsule; Refill: 0    2. Dysuria  -     POCT urinalysis dipstick, automated  -     Urine Culture - Urine, Urine, Clean Catch; Future  -     nitrofurantoin, macrocrystal-monohydrate, (Macrobid) 100 MG capsule; Take 1 capsule by mouth 2 (Two) Times a Day for 7 days.  Dispense: 14 capsule; Refill: 0    3. Tachycardia         Return in about 2 weeks (around 10/25/2021).     Please note that portions  of this document were completed with a voice recognition program. Efforts were made to edit the dictations, but occasionally words are mis-transcribed.

## 2021-10-12 LAB — BACTERIA SPEC AEROBE CULT: ABNORMAL

## 2021-10-12 NOTE — PROGRESS NOTES
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 year old female who presents today for treatment and evaluation of the above issues.    Today, patient reports her vaginal discharge is now clear.  She continues to manage her constipation with milk of magnesia.  She has been seen by Dr. Brady 2020.     Yesterday she was evaluated by her PCP for dysuria + diagnosed with cystitis for which she is being treated with macrobid x 7days.  She is worried about recurrent UTIs.  We discussed the possibility of urology referral and patient was unsure about this.  She is asking about a GYN referral and I told her that I am her GYN so were okay on that.    OBGYN History:  She is a .  She does not use HRT. She does not have a history of abnormal pap smears.    Oncologic History:  Oncology/Hematology History   Granulosa cell tumor of ovary, right   2013 Cancer Staged    Staging form: Ovary, Fallopian Tube, And Primary Peritoneal Carcinoma, AJCC 8th Edition  - Clinical stage from 2013: FIGO Stage IC (cT1c, cN0, cM0) - Signed by Jamee Mcguire MD on 3/26/2020     2013 Surgery    Total abdominal hysterectomy, bilateral salpingo-oophorectomy with pathology showing 5 cm granulosa cell tumor of the right ovary and a left fallopian tube intraepithelial serous neoplasm.      - 2014 Chemotherapy    OP OVARIAN PACLitaxel / CARBOplatin (Q21D)  x6 cycles remarkable for bone marrow suppression and G-CSF support     3/14/2020 Progression    CT Abdomen/Pelvis IMPRESSION:  Large heterogeneous mass identified within the pelvis with  fluid seen scattered throughout the abdomen and pelvis. Findings  concerning for recurrence with some stranding of the mesentery in which  spread to the mesentery cannot be excluded.     4/3/2020 Surgery    Exploratory laparotomy, optimal debulking  (R=0), cystoscopy with temporary uteteral catheter, ileal resection with side-to-side anastomosis, rectosigmoid resection/LAR, left ureterolysis, and peritoneal stripping.   Pathology consistent with recurrent granulosa cell tumor at the pelvis with rectosigmoid and partial terminal ileum. No LVSI. Mesenteric nodes and other staging negative.      4/6/2020 -  Hormonal Therapy    Arimidex initiated     4/27/2020 Molecular Testing    CARIS testing results:  AR positive, 1+, 75%  ER negative, MSI stable, PD-L1 negative     6/30/2020 Imaging    CT scan for new abdominal pain showed expected postsurgical changes from resection of pelvic mass without evidence for bulky adenopathy or soft tissue nodular recurrence. No mesenteric reticulation or ascites to suggest peritoneal involvement. No acute intraabdominal or intrapelvic abnormality otherwise noted. Significant colonic stool burden noted.            Past Medical History:   Diagnosis Date   • Arthritis    • Cancer (HCC) 2013    OVARIAN; s/p hysterectomy with BSO and chemo therapy that ended in 2014   • Constipation    • Heartburn    • History of pneumonia 02/2019    no hospitalization    • Low back pain    • Murmur     detected as an adult around age 30 and 40; requires prophylactic antibiotics before dental work; pt not sure if she has ever had an echo or when it might have been; Dr Stephen came down and listened to heart sounds on 4/2/20 and did not detect a murmur   • Ovarian cancer (HCC)    • Ovarian cyst    • Pelvic mass    • Vitamin D deficiency        Past Surgical History:   Procedure Laterality Date   • BACK SURGERY      fusion    • COLONOSCOPY  2016   • ENDOSCOPY     • EXPLORATORY LAPAROTOMY N/A 4/3/2020    Procedure: LAPAROTOMY EXPLORATORY,  OPTIMAL DEBULKING, CYSTOSCOPY WITH URETERAL CATHTER INSERTION AND REMOVAL, ILEO RESECTION, SIDE TO SIDE ANASTOMOSIS, RECTAL SIGMOID RESECTION, LEFT URETERA LYSIS, AND PERITONEAL STRIPPING;  Surgeon: Jamee Mcguire MD;   Location: [facility] OR;  Service: Gynecology Oncology;  Laterality: N/A;   • HYSTERECTOMY  2013    with BSO       MEDICATIONS: The current medication list was reviewed with the patient and updated in the EMR this date per the Medical Assistant. Medication dosages and frequencies were confirmed to be accurate.      Allergies:  is allergic to penicillins.    Social History:   Social History     Socioeconomic History   • Marital status: Single   Tobacco Use   • Smoking status: Former Smoker     Packs/day: 0.50     Years: 30.00     Pack years: 15.00     Types: Electronic Cigarette, Cigarettes   • Smokeless tobacco: Never Used   Vaping Use   • Vaping Use: Former   • Quit date: 4/14/2019   • Substances: Nicotine   • Devices: Pre-filled or refillable cartridge   Substance and Sexual Activity   • Alcohol use: Never   • Drug use: Never   • Sexual activity: Defer       Family History:    Family History   Problem Relation Age of Onset   • Diabetes type II Father    • Heart disease Father    • Heart attack Father    • Cancer Father    • Diabetes Father    • Hypertension Father    • Diabetes type II Mother    • Diabetes Mother    • Hypertension Mother    • Lung cancer Maternal Uncle    • Migraines Sister    • Migraines Brother         dec from Overdose   • No Known Problems Maternal Grandmother    • No Known Problems Maternal Grandfather    • No Known Problems Paternal Grandmother    • No Known Problems Paternal Grandfather        Health Maintenance:    Health Maintenance   Topic Date Due   • MAMMOGRAM  Never done   • COVID-19 Vaccine (1) Never done   • ZOSTER VACCINE (2 of 2) 08/13/2020   • INFLUENZA VACCINE  08/01/2021   • ANNUAL PHYSICAL  05/29/2022   • TDAP/TD VACCINES (2 - Td or Tdap) 01/02/2029   • COLORECTAL CANCER SCREENING  08/18/2031   • HEPATITIS C SCREENING  Completed   • Pneumococcal Vaccine 0-64  Aged Out       Review of Systems   Constitutional: Positive for fatigue. Negative for appetite change, chills, fever and  "unexpected weight change.   HENT: Positive for tinnitus. Negative for ear discharge, ear pain, hearing loss, mouth sores, nosebleeds, postnasal drip, sinus pressure, sinus pain, sore throat and trouble swallowing.    Eyes: Negative for pain, itching and visual disturbance.   Respiratory: Negative for cough, shortness of breath and wheezing.         Chest pain   Cardiovascular: Negative for chest pain and palpitations.   Gastrointestinal: Positive for abdominal pain and constipation. Negative for blood in stool, diarrhea, nausea and vomiting.        Heartburn   Endocrine: Negative for heat intolerance.   Genitourinary: Positive for vaginal discharge. Negative for difficulty urinating, flank pain, frequency, hematuria, urgency and vaginal bleeding.   Musculoskeletal: Negative for arthralgias, gait problem and myalgias.   Skin: Negative for color change, rash and wound.   Allergic/Immunologic: Negative for immunocompromised state.   Neurological: Positive for dizziness, numbness and headaches. Negative for seizures, syncope and weakness.   Hematological: Negative for adenopathy. Does not bruise/bleed easily.   Psychiatric/Behavioral: Negative for agitation, confusion, dysphoric mood and sleep disturbance. The patient is not nervous/anxious.        Physical Exam    Vitals:    10/13/21 1404   BP: 139/86   Pulse: 95   Resp: 16   Temp: 97.7 °F (36.5 °C)   TempSrc: Temporal   SpO2: 98%   Weight: 56 kg (123 lb 6.4 oz)   Height: 167.6 cm (65.98\")   PainSc: 0-No pain       Body mass index is 19.93 kg/m².    Wt Readings from Last 3 Encounters:   10/14/21 55.8 kg (123 lb)   10/14/21 56 kg (123 lb 7.3 oz)   10/13/21 56 kg (123 lb 6.4 oz)     GENERAL: Alert, well-appearing female appearing her stated age who is in no apparent distress.   HEENT: Sclera anicteric. Head normocephalic, atraumatic. Mucus membranes moist.   NECK: Supple and free from thyromegaly  BREASTS: Deferred  CARDIOVASCULAR: Heart regular rate and rhythm without " murmur rub or gallop, no bilateral lower extremity edema  RESPIRATORY: Clear to auscultation bilaterally, normal effort  BACK: Deferred  GASTROINTESTINAL:   Soft, tender on exam, no rebound, guarding or mass.  No hernia.  Incision well-healed.  Nondistended.  SKIN:  Warm, dry, well-perfused.  All visible areas intact.  No lesions, ulcers.  PSYCHIATRIC: AO x3, with appropriate affect, normal thought processes.  NEUROLOGIC: No focal deficits.  Ambulating slowly and without difficulty  MUSCULOSKELETAL: Ambulating slowly and without difficulty  EXTREMITIES:   No cyanosis, clubbing, symmetric.  LYMPHATICS:   No adenopathy in bilateral neck and groin areas.     PELVIC exam: External genitalia are free from lesion.  On speculum examination, there is scarring at the vaginal apex, no vaginal discharge noted.  On bimanual examination, no mass was appreciated.  Vague fullness was noted to the left.  Uterus cervix and adnexa are surgically absent.  No nodularity was appreciated.  Rectovaginal exam was deferred.    ECOG PS 0    PROCEDURES: None    Diagnostic Data:    No radiology results for the last 30 days.    Lab Results   Component Value Date    WBC 4.37 06/24/2021    HGB 14.1 06/24/2021    HCT 42.6 06/24/2021    MCV 94.9 06/24/2021     06/24/2021    NEUTROABS 2.68 06/24/2021    GLUCOSE 100 (H) 06/24/2021    BUN 14 06/24/2021    CREATININE 0.85 06/24/2021    EGFRIFNONA 69 06/24/2021     06/24/2021    K 4.0 06/24/2021     06/24/2021    CO2 25.9 06/24/2021    CALCIUM 9.6 06/24/2021    ALBUMIN 4.30 06/24/2021    AST 18 06/24/2021    ALT 12 06/24/2021    BILITOT 0.3 06/24/2021     No results found for:     Imaging:  CT A/P IMPRESSION 5/4/21:  No evidence for acute intra-abdominal or intrapelvic  abnormality with persistent moderate to large colonic stool burden and postsurgical changes however no evidence for bulky adenopathy, peritoneal       Procedures:  Colonoscopy 8/2021:          Assessment/Plan    This is a 56 y.o. woman with recurrent granulosa cell tumor of the ovary for follow-up.  She is currently managed with Arimidex.    Encounter Diagnosis   Name Primary?   • Granulosa cell tumor of ovary, right Yes     History of granulosa cell tumor of right ovary with recurrence  -Cancer history as detailed above.  Last CT imaging October 2020 - for recurrence.  Interval MRI of back and lumbar spine for back pain also negative for recurrence.  -Continue Arimidex.    -Symptoms suggestive of enterovaginal fistula without evidence of fistulization noted on examination.  Repeat CT scan 4/2021 with no evidence of rectovaginal fistula and colonoscopy 8/2021 was unremarkable.    Ongoing constipation  -Status post GI evaluation with Dr. Brady. Recent colonoscopy performed with results as above demonstrating normal findings - no evidence of strictures or rectovaginal fistula.  -Bowel regimen of milk of magnesia, declines use of other agents.  -Goal of daily bowel movements     Pain assessment was performed today as a part of patient’s care.  For patients with pain related to surgery, gynecologic malignancy or cancer treatment, the plan is as noted in the assessment/plan.  For patients with pain not related to these issues, they are to seek any further needed care from a more appropriate provider, such as PCP.      No orders of the defined types were placed in this encounter.    FOLLOW UP: 3-month surveillance visit.    Patient was seen and examined with Dr. Brannon,  resident, who performed portions of the examination and documentation for this patient's care under my direct supervision.  I agree with the above documentation and plan.    Jamee Mcguire MD  10/14/21  13:28 EDT

## 2021-10-13 ENCOUNTER — PRIOR AUTHORIZATION (OUTPATIENT)
Dept: FAMILY MEDICINE CLINIC | Facility: CLINIC | Age: 56
End: 2021-10-13

## 2021-10-13 ENCOUNTER — OFFICE VISIT (OUTPATIENT)
Dept: GYNECOLOGIC ONCOLOGY | Facility: CLINIC | Age: 56
End: 2021-10-13

## 2021-10-13 VITALS
TEMPERATURE: 97.7 F | HEIGHT: 66 IN | RESPIRATION RATE: 16 BRPM | SYSTOLIC BLOOD PRESSURE: 139 MMHG | HEART RATE: 95 BPM | BODY MASS INDEX: 19.83 KG/M2 | OXYGEN SATURATION: 98 % | WEIGHT: 123.4 LBS | DIASTOLIC BLOOD PRESSURE: 86 MMHG

## 2021-10-13 DIAGNOSIS — D39.11 GRANULOSA CELL TUMOR OF OVARY, RIGHT: Primary | ICD-10-CM

## 2021-10-13 PROCEDURE — 99213 OFFICE O/P EST LOW 20 MIN: CPT | Performed by: OBSTETRICS & GYNECOLOGY

## 2021-10-14 ENCOUNTER — HOSPITAL ENCOUNTER (OUTPATIENT)
Dept: CARDIOLOGY | Facility: HOSPITAL | Age: 56
Discharge: HOME | End: 2021-10-14

## 2021-10-14 VITALS
WEIGHT: 123.46 LBS | HEIGHT: 66 IN | BODY MASS INDEX: 19.84 KG/M2 | DIASTOLIC BLOOD PRESSURE: 80 MMHG | SYSTOLIC BLOOD PRESSURE: 110 MMHG | HEART RATE: 85 BPM

## 2021-10-14 VITALS
HEIGHT: 65 IN | WEIGHT: 123 LBS | SYSTOLIC BLOOD PRESSURE: 126 MMHG | DIASTOLIC BLOOD PRESSURE: 68 MMHG | BODY MASS INDEX: 20.49 KG/M2

## 2021-10-14 DIAGNOSIS — R20.2 NUMBNESS AND TINGLING OF BOTH FEET: ICD-10-CM

## 2021-10-14 DIAGNOSIS — R42 LIGHTHEADEDNESS: ICD-10-CM

## 2021-10-14 DIAGNOSIS — R00.2 PALPITATIONS: ICD-10-CM

## 2021-10-14 DIAGNOSIS — R20.0 NUMBNESS AND TINGLING OF BOTH FEET: ICD-10-CM

## 2021-10-14 DIAGNOSIS — R07.9 CHEST PAIN, UNSPECIFIED TYPE: ICD-10-CM

## 2021-10-14 LAB
BH CV ECHO MEAS - AO MAX PG (FULL): 3 MMHG
BH CV ECHO MEAS - AO MAX PG: 5.6 MMHG
BH CV ECHO MEAS - AO MEAN PG (FULL): 1.4 MMHG
BH CV ECHO MEAS - AO MEAN PG: 2.7 MMHG
BH CV ECHO MEAS - AO ROOT AREA (BSA CORRECTED): 2
BH CV ECHO MEAS - AO ROOT AREA: 8 CM^2
BH CV ECHO MEAS - AO ROOT DIAM: 3.2 CM
BH CV ECHO MEAS - AO V2 MAX: 118.4 CM/SEC
BH CV ECHO MEAS - AO V2 MEAN: 74.8 CM/SEC
BH CV ECHO MEAS - AO V2 VTI: 21.9 CM
BH CV ECHO MEAS - AVA(I,A): 2.2 CM^2
BH CV ECHO MEAS - AVA(I,D): 2.2 CM^2
BH CV ECHO MEAS - AVA(V,A): 2.4 CM^2
BH CV ECHO MEAS - AVA(V,D): 2.4 CM^2
BH CV ECHO MEAS - BSA(HAYCOCK): 1.6 M^2
BH CV ECHO MEAS - BSA: 1.6 M^2
BH CV ECHO MEAS - BZI_BMI: 20.5 KILOGRAMS/M^2
BH CV ECHO MEAS - BZI_METRIC_HEIGHT: 165.1 CM
BH CV ECHO MEAS - BZI_METRIC_WEIGHT: 55.8 KG
BH CV ECHO MEAS - EDV(CUBED): 88.6 ML
BH CV ECHO MEAS - EDV(MOD-SP2): 45 ML
BH CV ECHO MEAS - EDV(MOD-SP4): 56 ML
BH CV ECHO MEAS - EDV(TEICH): 90.4 ML
BH CV ECHO MEAS - EF(CUBED): 56.2 %
BH CV ECHO MEAS - EF(MOD-BP): 48 %
BH CV ECHO MEAS - EF(MOD-SP2): 46.7 %
BH CV ECHO MEAS - EF(MOD-SP4): 50 %
BH CV ECHO MEAS - EF(TEICH): 48 %
BH CV ECHO MEAS - ESV(CUBED): 38.8 ML
BH CV ECHO MEAS - ESV(MOD-SP2): 24 ML
BH CV ECHO MEAS - ESV(MOD-SP4): 28 ML
BH CV ECHO MEAS - ESV(TEICH): 47 ML
BH CV ECHO MEAS - FS: 24 %
BH CV ECHO MEAS - IVS/LVPW: 0.94
BH CV ECHO MEAS - IVSD: 0.87 CM
BH CV ECHO MEAS - LA DIMENSION: 2.7 CM
BH CV ECHO MEAS - LA/AO: 0.85
BH CV ECHO MEAS - LAD MAJOR: 4 CM
BH CV ECHO MEAS - LAT PEAK E' VEL: 8.2 CM/SEC
BH CV ECHO MEAS - LATERAL E/E' RATIO: 6.8
BH CV ECHO MEAS - LV DIASTOLIC VOL/BSA (35-75): 34.8 ML/M^2
BH CV ECHO MEAS - LV MASS(C)D: 130.8 GRAMS
BH CV ECHO MEAS - LV MASS(C)DI: 81.3 GRAMS/M^2
BH CV ECHO MEAS - LV MAX PG: 2.6 MMHG
BH CV ECHO MEAS - LV MEAN PG: 1.3 MMHG
BH CV ECHO MEAS - LV SYSTOLIC VOL/BSA (12-30): 17.4 ML/M^2
BH CV ECHO MEAS - LV V1 MAX: 79.8 CM/SEC
BH CV ECHO MEAS - LV V1 MEAN: 51.8 CM/SEC
BH CV ECHO MEAS - LV V1 VTI: 13.6 CM
BH CV ECHO MEAS - LVIDD: 4.5 CM
BH CV ECHO MEAS - LVIDS: 3.4 CM
BH CV ECHO MEAS - LVLD AP2: 6.9 CM
BH CV ECHO MEAS - LVLD AP4: 7 CM
BH CV ECHO MEAS - LVLS AP2: 6 CM
BH CV ECHO MEAS - LVLS AP4: 6 CM
BH CV ECHO MEAS - LVOT AREA (M): 3.5 CM^2
BH CV ECHO MEAS - LVOT AREA: 3.5 CM^2
BH CV ECHO MEAS - LVOT DIAM: 2.1 CM
BH CV ECHO MEAS - LVPWD: 0.93 CM
BH CV ECHO MEAS - MED PEAK E' VEL: 8.3 CM/SEC
BH CV ECHO MEAS - MEDIAL E/E' RATIO: 6.7
BH CV ECHO MEAS - MV A MAX VEL: 76.5 CM/SEC
BH CV ECHO MEAS - MV DEC SLOPE: 495.7 CM/SEC^2
BH CV ECHO MEAS - MV DEC TIME: 0.17 SEC
BH CV ECHO MEAS - MV E MAX VEL: 57.3 CM/SEC
BH CV ECHO MEAS - MV E/A: 0.75
BH CV ECHO MEAS - MV P1/2T MAX VEL: 86.1 CM/SEC
BH CV ECHO MEAS - MV P1/2T: 50.9 MSEC
BH CV ECHO MEAS - MVA P1/2T LCG: 2.6 CM^2
BH CV ECHO MEAS - MVA(P1/2T): 4.3 CM^2
BH CV ECHO MEAS - PA ACC SLOPE: 648.5 CM/SEC^2
BH CV ECHO MEAS - PA ACC TIME: 0.14 SEC
BH CV ECHO MEAS - PA MAX PG: 4.3 MMHG
BH CV ECHO MEAS - PA PR(ACCEL): 17.2 MMHG
BH CV ECHO MEAS - PA V2 MAX: 103.2 CM/SEC
BH CV ECHO MEAS - PULM A REVS VEL: 35.1 CM/SEC
BH CV ECHO MEAS - PULM DIAS VEL: 38.2 CM/SEC
BH CV ECHO MEAS - PULM S/D: 1.2
BH CV ECHO MEAS - PULM SYS VEL: 44 CM/SEC
BH CV ECHO MEAS - RAP SYSTOLE: 3 MMHG
BH CV ECHO MEAS - RVSP: 23 MMHG
BH CV ECHO MEAS - SI(AO): 109.3 ML/M^2
BH CV ECHO MEAS - SI(CUBED): 30.9 ML/M^2
BH CV ECHO MEAS - SI(LVOT): 29.9 ML/M^2
BH CV ECHO MEAS - SI(MOD-SP2): 13.1 ML/M^2
BH CV ECHO MEAS - SI(MOD-SP4): 17.4 ML/M^2
BH CV ECHO MEAS - SI(TEICH): 27 ML/M^2
BH CV ECHO MEAS - SV(AO): 175.9 ML
BH CV ECHO MEAS - SV(CUBED): 49.8 ML
BH CV ECHO MEAS - SV(LVOT): 48.1 ML
BH CV ECHO MEAS - SV(MOD-SP2): 21 ML
BH CV ECHO MEAS - SV(MOD-SP4): 28 ML
BH CV ECHO MEAS - SV(TEICH): 43.4 ML
BH CV ECHO MEAS - TAPSE (>1.6): 1.4 CM
BH CV ECHO MEAS - TR MAX PG: 20 MMHG
BH CV ECHO MEAS - TR MAX VEL: 174.8 CM/SEC
BH CV ECHO MEASUREMENTS AVERAGE E/E' RATIO: 6.95
BH CV LOWER ARTERIAL LEFT ABI RATIO: 1
BH CV LOWER ARTERIAL LEFT DORSALIS PEDIS SYS MAX: 137 MMHG
BH CV LOWER ARTERIAL LEFT GREAT TOE SYS MAX: 95 MMHG
BH CV LOWER ARTERIAL LEFT POST TIBIAL SYS MAX: 134 MMHG
BH CV LOWER ARTERIAL LEFT TBI RATIO: 0.7
BH CV LOWER ARTERIAL RIGHT ABI RATIO: 1.1
BH CV LOWER ARTERIAL RIGHT DORSALIS PEDIS SYS MAX: 136 MMHG
BH CV LOWER ARTERIAL RIGHT GREAT TOE SYS MAX: 97 MMHG
BH CV LOWER ARTERIAL RIGHT POST TIBIAL SYS MAX: 144 MMHG
BH CV LOWER ARTERIAL RIGHT TBI RATIO: 0.7
BH CV STRESS BP STAGE 1: NORMAL
BH CV STRESS BP STAGE 2: NORMAL
BH CV STRESS DURATION MIN STAGE 1: 3
BH CV STRESS DURATION MIN STAGE 2: 3
BH CV STRESS DURATION MIN STAGE 3: 1
BH CV STRESS DURATION SEC STAGE 1: 0
BH CV STRESS DURATION SEC STAGE 2: 0
BH CV STRESS DURATION SEC STAGE 3: 21
BH CV STRESS GRADE STAGE 1: 10
BH CV STRESS GRADE STAGE 2: 12
BH CV STRESS GRADE STAGE 3: 14
BH CV STRESS HR STAGE 1: 148
BH CV STRESS HR STAGE 2: 164
BH CV STRESS HR STAGE 3: 171
BH CV STRESS METS STAGE 1: 5
BH CV STRESS METS STAGE 2: 7.5
BH CV STRESS METS STAGE 3: 10
BH CV STRESS O2 STAGE 1: 97
BH CV STRESS O2 STAGE 2: 99
BH CV STRESS O2 STAGE 3: 99
BH CV STRESS PROTOCOL 1: NORMAL
BH CV STRESS RECOVERY BP: NORMAL MMHG
BH CV STRESS RECOVERY HR: 105 BPM
BH CV STRESS SPEED STAGE 1: 1.7
BH CV STRESS SPEED STAGE 2: 2.5
BH CV STRESS SPEED STAGE 3: 3.4
BH CV STRESS STAGE 1: 1
BH CV STRESS STAGE 2: 2
BH CV STRESS STAGE 3: 3
BH CV XLRA - RV BASE: 2.8 CM
BH CV XLRA - RV LENGTH: 5.8 CM
BH CV XLRA - RV MID: 1.9 CM
LEFT ATRIUM VOLUME INDEX: 23.6 ML/M^2
LEFT ATRIUM VOLUME: 38 ML
LV EF 2D ECHO EST: 52 %
MAXIMAL PREDICTED HEART RATE: 164 BPM
PERCENT MAX PREDICTED HR: 104.27 %
STRESS BASELINE BP: NORMAL MMHG
STRESS BASELINE HR: 88 BPM
STRESS O2 SAT REST: 97 %
STRESS PERCENT HR: 123 %
STRESS POST ESTIMATED WORKLOAD: 9 METS
STRESS POST EXERCISE DUR MIN: 7 MIN
STRESS POST EXERCISE DUR SEC: 21 SEC
STRESS POST O2 SAT PEAK: 99 %
STRESS POST PEAK BP: NORMAL MMHG
STRESS POST PEAK HR: 171 BPM
STRESS TARGET HR: 139 BPM
UPPER ARTERIAL LEFT ARM BRACHIAL SYS MAX: 131 MMHG
UPPER ARTERIAL RIGHT ARM BRACHIAL SYS MAX: 125 MMHG

## 2021-10-14 PROCEDURE — 93017 CV STRESS TEST TRACING ONLY: CPT

## 2021-10-14 PROCEDURE — 93306 TTE W/DOPPLER COMPLETE: CPT

## 2021-10-14 PROCEDURE — 93018 CV STRESS TEST I&R ONLY: CPT | Performed by: INTERNAL MEDICINE

## 2021-10-14 PROCEDURE — 93306 TTE W/DOPPLER COMPLETE: CPT | Performed by: INTERNAL MEDICINE

## 2021-10-14 PROCEDURE — 93922 UPR/L XTREMITY ART 2 LEVELS: CPT | Performed by: INTERNAL MEDICINE

## 2021-10-14 PROCEDURE — 93922 UPR/L XTREMITY ART 2 LEVELS: CPT

## 2021-10-21 ENCOUNTER — TELEPHONE (OUTPATIENT)
Dept: CARDIOLOGY | Facility: HOSPITAL | Age: 56
End: 2021-10-21

## 2021-10-21 NOTE — TELEPHONE ENCOUNTER
Ananya miramontes [facility] called regarding short scan on patient. They received 15 hours and 19 minutes of EKG data for a 14 day monitor. Would you like to cancel test and repeat test or proceed with the 15 hours of data. Please note, that patient will be charged for the entire 14 days.

## 2021-10-25 NOTE — TELEPHONE ENCOUNTER
Mauro Mccabe, CASEY  You 10 minutes ago (8:07 AM)     CB    I would prefer if patient would repeat study.  Can you please contact patient and offer my suggestion, and let her know about the update.  Thank you.

## 2021-10-28 ENCOUNTER — OFFICE VISIT (OUTPATIENT)
Dept: GASTROENTEROLOGY | Facility: CLINIC | Age: 56
End: 2021-10-28

## 2021-10-28 DIAGNOSIS — K59.02 CONSTIPATION DUE TO OUTLET DYSFUNCTION: Primary | ICD-10-CM

## 2021-10-28 PROCEDURE — 99213 OFFICE O/P EST LOW 20 MIN: CPT | Performed by: INTERNAL MEDICINE

## 2021-10-28 NOTE — PROGRESS NOTES
Patient Name: Roxie Carter   YOB: 1965   Medical Record number: [id number]     PCP: Chip Ramos MD    Chief Complaint   Patient presents with   • Follow-up     6 month   Follow-up constipation.    History of Present Illness:   HPI   This was  a telephone visit.  The patient consented for telephone visit.  Ms. Carter still has issues with constipation.  The patient will take Senokot and milk of magnesia with minimal relief.  She denies any gross blood in the stool.  There is no history of localized abdominal pain.  She had no stricture at the time of her colonoscopy a few months ago.  Ms. Carter denies any difficult or painful swallowing.  There is no history of breakthrough heartburn.  Past Medical History:   Diagnosis Date   • Arthritis    • Cancer (HCC) 2013    OVARIAN; s/p hysterectomy with BSO and chemo therapy that ended in 2014   • Constipation    • Heartburn    • History of pneumonia 02/2019    no hospitalization    • Low back pain    • Murmur     detected as an adult around age 30 and 40; requires prophylactic antibiotics before dental work; pt not sure if she has ever had an echo or when it might have been; Dr Stephen came down and listened to heart sounds on 4/2/20 and did not detect a murmur   • Ovarian cancer (HCC)    • Ovarian cyst    • Pelvic mass    • Vitamin D deficiency        Past Surgical History:   Procedure Laterality Date   • BACK SURGERY      fusion    • COLONOSCOPY  2016   • ENDOSCOPY     • EXPLORATORY LAPAROTOMY N/A 4/3/2020    Procedure: LAPAROTOMY EXPLORATORY,  OPTIMAL DEBULKING, CYSTOSCOPY WITH URETERAL CATHTER INSERTION AND REMOVAL, ILEO RESECTION, SIDE TO SIDE ANASTOMOSIS, RECTAL SIGMOID RESECTION, LEFT URETERA LYSIS, AND PERITONEAL STRIPPING;  Surgeon: Jamee Mcguire MD;  Location: [facility];  Service: Gynecology Oncology;  Laterality: N/A;   • HYSTERECTOMY  2013    with BSO         Current Outpatient Medications:   •  anastrozole (ARIMIDEX) 1 MG tablet,  Take 1 tablet by mouth Daily., Disp: 30 tablet, Rfl: 5  •  baclofen (LIORESAL) 10 MG tablet, Take 1 tablet by mouth 3 (Three) Times a Day As Needed for Muscle Spasms., Disp: 90 tablet, Rfl: 1  •  diclofenac (VOLTAREN) 75 MG EC tablet, Take 1 tablet by mouth 2 (Two) Times a Day. (Patient taking differently: Take 75 mg by mouth As Needed.), Disp: 60 tablet, Rfl: 5  •  diphenhydrAMINE (BENADRYL) 25 MG tablet, Take 25 mg by mouth At Night As Needed., Disp: , Rfl:   •  DULoxetine (Cymbalta) 30 MG capsule, Take 2 capsules by mouth Daily., Disp: 60 capsule, Rfl: 6  •  fluticasone (Flonase) 50 MCG/ACT nasal spray, 2 sprays into the nostril(s) as directed by provider Daily., Disp: 5 mL, Rfl: 3  •  ibuprofen (ADVIL,MOTRIN) 200 MG tablet, Take 400-600 mg by mouth Every 6 (Six) Hours As Needed for Mild Pain ., Disp: , Rfl:   •  magnesium hydroxide (MILK OF MAGNESIA) 400 MG/5ML suspension, Take  by mouth Daily As Needed for Constipation., Disp: , Rfl:   •  meclizine (ANTIVERT) 50 MG tablet, Take 1 tablet by mouth 3 (Three) Times a Day As Needed for Dizziness., Disp: 60 tablet, Rfl: 0  •  propranolol (INDERAL) 10 MG tablet, Take 1 tablet by mouth 2 (Two) Times a Day., Disp: 60 tablet, Rfl: 6  •  traMADol (Ultram) 50 MG tablet, Take 1 tablet by mouth 2 (Two) Times a Day. (Patient taking differently: Take 50 mg by mouth 2 (Two) Times a Day As Needed.), Disp: 60 tablet, Rfl: 1  •  zolpidem (AMBIEN) 10 MG tablet, Take 1 tablet by mouth At Night As Needed for Sleep., Disp: 30 tablet, Rfl: 2    Allergies   Allergen Reactions   • Penicillins Hives       Family History   Problem Relation Age of Onset   • Diabetes type II Father    • Heart disease Father    • Heart attack Father    • Cancer Father    • Diabetes Father    • Hypertension Father    • Diabetes type II Mother    • Diabetes Mother    • Hypertension Mother    • Lung cancer Maternal Uncle    • Migraines Sister    • Migraines Brother         dec from Overdose   • No Known Problems  Maternal Grandmother    • No Known Problems Maternal Grandfather    • No Known Problems Paternal Grandmother    • No Known Problems Paternal Grandfather        Social History     Socioeconomic History   • Marital status: Single   Tobacco Use   • Smoking status: Former Smoker     Packs/day: 0.50     Years: 30.00     Pack years: 15.00     Types: Cigarettes, Electronic Cigarette   • Smokeless tobacco: Never Used   • Tobacco comment: I can't remember the dates when I started smoking. But I quit both cigarettes and vaping 2 years ago   Vaping Use   • Vaping Use: Former   • Quit date: 4/14/2019   • Substances: Nicotine   • Devices: Pre-filled or refillable cartridge   Substance and Sexual Activity   • Alcohol use: Never   • Drug use: Never   • Sexual activity: Not Currently       Review of Systems   Constitutional: Negative for activity change, appetite change, fatigue, fever and unexpected weight change.   HENT: Negative for dental problem, hearing loss, mouth sores, postnasal drip, sneezing, trouble swallowing and voice change.    Eyes: Negative for pain, redness, itching and visual disturbance.   Respiratory: Negative for cough, choking, chest tightness, shortness of breath and wheezing.    Cardiovascular: Negative for chest pain, palpitations and leg swelling.   Gastrointestinal: Positive for abdominal distention (bloating), abdominal pain and constipation. Negative for anal bleeding, blood in stool, diarrhea, nausea, rectal pain and vomiting.        Hemorrhoids   Endocrine: Negative for cold intolerance, heat intolerance, polydipsia, polyphagia and polyuria.   Genitourinary: Negative.  Negative for dysuria, enuresis, flank pain, hematuria and urgency.   Musculoskeletal: Negative for arthralgias, back pain, gait problem, joint swelling and myalgias.   Skin: Negative for color change, pallor and rash.   Allergic/Immunologic: Negative for environmental allergies, food allergies and immunocompromised state.    Neurological: Negative for dizziness, tremors, seizures, facial asymmetry, speech difficulty, numbness and headaches.   Hematological: Negative for adenopathy.   Psychiatric/Behavioral: Negative for behavioral problems, confusion, dysphoric mood, hallucinations and self-injury.       There were no vitals filed for this visit.    Physical Exam    Diagnoses and all orders for this visit:    1. Constipation due to outlet dysfunction (Primary)    The patient had no evidence of a stricture at the time of colonoscopy.  The history is consistent with pelvic floor outlet dysfunction causing issues with constipation.      Plan: Will begin stool softener daily.           May need to consider evaluation with physical therapy for pelvic floor dysfunction.  The patient did not proceed with this evaluation in the past.           Follow-up in 6 months.      This was a telephone visit for 10 minutes.

## 2021-10-29 DIAGNOSIS — Z79.899 MEDICATION MANAGEMENT: ICD-10-CM

## 2021-10-29 DIAGNOSIS — M54.16 LEFT LUMBAR RADICULITIS: ICD-10-CM

## 2021-10-29 RX ORDER — ZOLPIDEM TARTRATE 10 MG/1
10 TABLET ORAL NIGHTLY PRN
Qty: 30 TABLET | Refills: 2 | Status: SHIPPED | OUTPATIENT
Start: 2021-10-29 | End: 2022-02-06 | Stop reason: SDUPTHER

## 2021-10-29 RX ORDER — TRAMADOL HYDROCHLORIDE 50 MG/1
50 TABLET ORAL 2 TIMES DAILY
Qty: 60 TABLET | Refills: 1 | Status: SHIPPED | OUTPATIENT
Start: 2021-10-29 | End: 2022-11-11 | Stop reason: SDUPTHER

## 2021-10-29 RX ORDER — ANASTROZOLE 1 MG/1
1 TABLET ORAL DAILY
Qty: 30 TABLET | Refills: 5 | OUTPATIENT
Start: 2021-10-29

## 2021-10-29 NOTE — TELEPHONE ENCOUNTER
Denied on October 13  This request has not been approved. Based on the information submitted for review, you did not meet our guideline rules for the requested drug. In order for your request to be approved, your provider would need to show that you have met the guideline rules below. The details below are written in medical language. If you have questions, please contact your provider. In some cases, the requested medication or alternatives offered may have additional approval requirements. Our guideline named SEDATIVE HYPNOTICS requires the following rule(s) be met for approval: The member has had a trial (at least 3 weeks) of non-pharmacological therapies (such as stimulus control, sleep restriction, sleep hygiene measures, and relaxation therapy). Your doctor told us that you have a diagnosis of insomnia [a type of sleep condition]. We do not have records or chart notes showing you have had at least THREE weeks trial of non-pharmacological therapies or that you meet the other criteria listed above. This is why your request is denied. Please work with your doctor to discuss your treatment options or give us more information if it will allow us to approve this request. A written notification letter will follow with additional details.

## 2021-10-29 NOTE — TELEPHONE ENCOUNTER
Rx Refill Note  Requested Prescriptions     Pending Prescriptions Disp Refills   • traMADol (Ultram) 50 MG tablet 60 tablet 1     Sig: Take 1 tablet by mouth 2 (Two) Times a Day.   • zolpidem (AMBIEN) 10 MG tablet 30 tablet 2     Sig: Take 1 tablet by mouth At Night As Needed for Sleep.      Last office visit with prescribing clinician: 9/20/2021      Next office visit with prescribing clinician: Visit date not found     Indiana Ortiz MA  10/29/21, 13:32 EDT     Last fill: 10/01/2021-Ambien  Last fill: 07/07/2021- Tramadol

## 2021-10-29 NOTE — TELEPHONE ENCOUNTER
Insurance plan listed reasons it was denied below. Proceed with appeal process? Please advise, Thanks

## 2021-11-02 ENCOUNTER — PRIOR AUTHORIZATION (OUTPATIENT)
Dept: FAMILY MEDICINE CLINIC | Facility: CLINIC | Age: 56
End: 2021-11-02

## 2021-11-02 NOTE — TELEPHONE ENCOUNTER
Called and left a vm that she should still have some refills but if she does not to call and let me know.

## 2021-11-02 NOTE — TELEPHONE ENCOUNTER
(Key: [id number]) - [id number]  traMADol HCl 50MG tablets  Status: Sent to [facility]  Created: October 29th, 2021    Approved on November 2  The request has been approved. The authorization is effective for a maximum of 1 fills from 11/02/2021 to 12/01/2021, as long as the member is enrolled in their current health plan. The request was reviewed and approved by a licensed clinical pharmacist. A written notification letter will follow with additional details.

## 2021-11-03 RX ORDER — ANASTROZOLE 1 MG/1
1 TABLET ORAL DAILY
Qty: 30 TABLET | Refills: 5 | Status: SHIPPED | OUTPATIENT
Start: 2021-11-03 | End: 2022-04-02 | Stop reason: SDUPTHER

## 2021-11-03 RX ORDER — DICLOFENAC SODIUM 75 MG/1
75 TABLET, DELAYED RELEASE ORAL 2 TIMES DAILY
Qty: 60 TABLET | Refills: 5 | OUTPATIENT
Start: 2021-11-03

## 2021-11-05 ENCOUNTER — CLINICAL SUPPORT (OUTPATIENT)
Dept: CARDIOLOGY | Facility: HOSPITAL | Age: 56
End: 2021-11-05

## 2021-11-05 ENCOUNTER — OFFICE VISIT (OUTPATIENT)
Dept: GYNECOLOGIC ONCOLOGY | Facility: CLINIC | Age: 56
End: 2021-11-05

## 2021-11-05 ENCOUNTER — LAB (OUTPATIENT)
Dept: LAB | Facility: HOSPITAL | Age: 56
End: 2021-11-05

## 2021-11-05 VITALS
HEART RATE: 104 BPM | HEIGHT: 65 IN | TEMPERATURE: 96.4 F | SYSTOLIC BLOOD PRESSURE: 133 MMHG | BODY MASS INDEX: 20.46 KG/M2 | WEIGHT: 122.8 LBS | RESPIRATION RATE: 17 BRPM | DIASTOLIC BLOOD PRESSURE: 77 MMHG

## 2021-11-05 DIAGNOSIS — M54.50 CHRONIC MIDLINE LOW BACK PAIN WITHOUT SCIATICA: ICD-10-CM

## 2021-11-05 DIAGNOSIS — D39.11 GRANULOSA CELL TUMOR OF OVARY, RIGHT: Primary | ICD-10-CM

## 2021-11-05 DIAGNOSIS — R10.2 PELVIC PRESSURE IN FEMALE: ICD-10-CM

## 2021-11-05 DIAGNOSIS — N39.0 FREQUENT UTI: ICD-10-CM

## 2021-11-05 DIAGNOSIS — N89.8 VAGINAL DISCHARGE: ICD-10-CM

## 2021-11-05 DIAGNOSIS — B96.20 E. COLI UTI: ICD-10-CM

## 2021-11-05 DIAGNOSIS — G89.29 CHRONIC MIDLINE LOW BACK PAIN WITHOUT SCIATICA: ICD-10-CM

## 2021-11-05 DIAGNOSIS — N39.0 E. COLI UTI: ICD-10-CM

## 2021-11-05 DIAGNOSIS — D39.11 GRANULOSA CELL TUMOR OF OVARY, RIGHT: ICD-10-CM

## 2021-11-05 LAB
ALBUMIN SERPL-MCNC: 4.6 G/DL (ref 3.5–5.2)
ALBUMIN/GLOB SERPL: 1.8 G/DL
ALP SERPL-CCNC: 92 U/L (ref 39–117)
ALT SERPL W P-5'-P-CCNC: 11 U/L (ref 1–33)
ANION GAP SERPL CALCULATED.3IONS-SCNC: 12 MMOL/L (ref 5–15)
AST SERPL-CCNC: 15 U/L (ref 1–32)
BACTERIA UR QL AUTO: NORMAL /HPF
BILIRUB SERPL-MCNC: 0.3 MG/DL (ref 0–1.2)
BILIRUB UR QL STRIP: NEGATIVE
BUN SERPL-MCNC: 23 MG/DL (ref 6–20)
BUN/CREAT SERPL: 23.7 (ref 7–25)
CALCIUM SPEC-SCNC: 9.7 MG/DL (ref 8.6–10.5)
CHLORIDE SERPL-SCNC: 104 MMOL/L (ref 98–107)
CLARITY UR: CLEAR
CO2 SERPL-SCNC: 24 MMOL/L (ref 22–29)
COLOR UR: YELLOW
CREAT SERPL-MCNC: 0.97 MG/DL (ref 0.57–1)
ERYTHROCYTE [DISTWIDTH] IN BLOOD BY AUTOMATED COUNT: 12.9 % (ref 12.3–15.4)
GFR SERPL CREATININE-BSD FRML MDRD: 59 ML/MIN/1.73
GLOBULIN UR ELPH-MCNC: 2.5 GM/DL
GLUCOSE SERPL-MCNC: 70 MG/DL (ref 65–99)
GLUCOSE UR STRIP-MCNC: NEGATIVE MG/DL
HCT VFR BLD AUTO: 40.2 % (ref 34–46.6)
HGB BLD-MCNC: 13.6 G/DL (ref 12–15.9)
HGB UR QL STRIP.AUTO: NEGATIVE
HYALINE CASTS UR QL AUTO: NORMAL /LPF
KETONES UR QL STRIP: ABNORMAL
LEUKOCYTE ESTERASE UR QL STRIP.AUTO: ABNORMAL
LYMPHOCYTES # BLD AUTO: 1.8 10*3/MM3 (ref 0.7–3.1)
LYMPHOCYTES NFR BLD AUTO: 31.7 % (ref 19.6–45.3)
MCH RBC QN AUTO: 31.4 PG (ref 26.6–33)
MCHC RBC AUTO-ENTMCNC: 33.9 G/DL (ref 31.5–35.7)
MCV RBC AUTO: 92.7 FL (ref 79–97)
MONOCYTES # BLD AUTO: 0.4 10*3/MM3 (ref 0.1–0.9)
MONOCYTES NFR BLD AUTO: 6.6 % (ref 5–12)
NEUTROPHILS NFR BLD AUTO: 3.5 10*3/MM3 (ref 1.7–7)
NEUTROPHILS NFR BLD AUTO: 61.7 % (ref 42.7–76)
NITRITE UR QL STRIP: NEGATIVE
PH UR STRIP.AUTO: <=5 [PH] (ref 5–8)
PLATELET # BLD AUTO: 265 10*3/MM3 (ref 140–450)
PMV BLD AUTO: 8.2 FL (ref 6–12)
POTASSIUM SERPL-SCNC: 4.4 MMOL/L (ref 3.5–5.2)
PROT SERPL-MCNC: 7.1 G/DL (ref 6–8.5)
PROT UR QL STRIP: NEGATIVE
RBC # BLD AUTO: 4.33 10*6/MM3 (ref 3.77–5.28)
RBC # UR: NORMAL /HPF
REF LAB TEST METHOD: NORMAL
SODIUM SERPL-SCNC: 140 MMOL/L (ref 136–145)
SP GR UR STRIP: 1.02 (ref 1–1.03)
SQUAMOUS #/AREA URNS HPF: NORMAL /HPF
UROBILINOGEN UR QL STRIP: ABNORMAL
WBC # BLD AUTO: 5.7 10*3/MM3 (ref 3.4–10.8)
WBC UR QL AUTO: NORMAL /HPF

## 2021-11-05 PROCEDURE — 87661 TRICHOMONAS VAGINALIS AMPLIF: CPT | Performed by: NURSE PRACTITIONER

## 2021-11-05 PROCEDURE — 87801 DETECT AGNT MULT DNA AMPLI: CPT | Performed by: NURSE PRACTITIONER

## 2021-11-05 PROCEDURE — 87798 DETECT AGENT NOS DNA AMP: CPT | Performed by: NURSE PRACTITIONER

## 2021-11-05 PROCEDURE — 87086 URINE CULTURE/COLONY COUNT: CPT

## 2021-11-05 PROCEDURE — 83520 IMMUNOASSAY QUANT NOS NONAB: CPT

## 2021-11-05 PROCEDURE — 87591 N.GONORRHOEAE DNA AMP PROB: CPT | Performed by: NURSE PRACTITIONER

## 2021-11-05 PROCEDURE — 87491 CHLMYD TRACH DNA AMP PROBE: CPT | Performed by: NURSE PRACTITIONER

## 2021-11-05 PROCEDURE — 86336 INHIBIN A: CPT

## 2021-11-05 PROCEDURE — 99214 OFFICE O/P EST MOD 30 MIN: CPT | Performed by: NURSE PRACTITIONER

## 2021-11-05 PROCEDURE — 80053 COMPREHEN METABOLIC PANEL: CPT

## 2021-11-05 PROCEDURE — 36415 COLL VENOUS BLD VENIPUNCTURE: CPT

## 2021-11-05 PROCEDURE — 81001 URINALYSIS AUTO W/SCOPE: CPT

## 2021-11-05 PROCEDURE — 85025 COMPLETE CBC W/AUTO DIFF WBC: CPT

## 2021-11-05 NOTE — PROGRESS NOTES
[facility] Heart Monitor Documentation    Roxie Carter  1965  [id number]  11/05/21    CASEY Aguirre    [] ZIO XT Patch  Model [id number] Prescribed for  Days    · Serial Number: (N + 9 Digits) N   · Apply-By Date on Box:   · USPS Tracking Number:   · USPS Tracking        [] [employer]ian MINI PLUS Mobile Cardiac Telemetry  Model BGMINIPLUS Prescribed for  Days    · Serial Number: (BGM + 7 Digits) BGM  · Shipped-By Date on Box:   · UPS Tracking Number: [id number]  · UPS Tracking      [x] [employer]ian MINI Holter Monitor  Model BGMINIEL Prescribed for 14 Days    · Serial Number: (7 Digits) [id number]  · Shipped-By Date on Box: 10/30/2021  · UPS Tracking Number: [id number]  · UPS Tracking        This monitor was applied to the patient's chest and checked for proper functioning.  Ms. Roxie Carter was instructed in the proper use of this monitor.  She was given the opportunity to ask questions and left the office with the device 's instruction manual.    Tigist Cunha, [facility], 11:06 EDT, 11/05/21                  [facility]    Answers for HPI/ROS submitted by the patient on 11/1/2021  Please describe your symptoms.: Follow-up  Have you had these symptoms before?: Yes  How long have you been having these symptoms?: Greater than 2 weeks  What is the primary reason for your visit?: Other

## 2021-11-06 LAB — BACTERIA SPEC AEROBE CULT: NORMAL

## 2021-11-08 LAB
A VAGINAE DNA VAG QL NAA+PROBE: NORMAL SCORE
BVAB2 DNA VAG QL NAA+PROBE: NORMAL SCORE
C ALBICANS DNA VAG QL NAA+PROBE: NEGATIVE
C GLABRATA DNA VAG QL NAA+PROBE: NEGATIVE
C TRACH DNA VAG QL NAA+PROBE: NEGATIVE
MEGA1 DNA VAG QL NAA+PROBE: NORMAL SCORE
N GONORRHOEA DNA VAG QL NAA+PROBE: NEGATIVE
T VAGINALIS DNA VAG QL NAA+PROBE: NEGATIVE

## 2021-11-09 ENCOUNTER — TELEPHONE (OUTPATIENT)
Dept: GYNECOLOGIC ONCOLOGY | Facility: CLINIC | Age: 56
End: 2021-11-09

## 2021-11-09 LAB
INHIBIN A SERPL-MCNC: 0.7 PG/ML
INHIBIN B SERPL-MCNC: <7 PG/ML (ref 0–16.9)

## 2021-11-09 NOTE — TELEPHONE ENCOUNTER
Pt returned call and I told her NuSwab and UA was negative but we are waiting on her tumor markers to decide on next steps. Pt v/u.

## 2021-11-09 NOTE — TELEPHONE ENCOUNTER
----- Message from CASEY Kelley sent at 11/9/2021  8:29 AM EST -----  Please notify patient NuSwab and urine negative at this time. I am awaiting her tumor marker results to decide on nest steps. Thanks!

## 2021-11-17 ENCOUNTER — TELEPHONE (OUTPATIENT)
Dept: GYNECOLOGIC ONCOLOGY | Facility: CLINIC | Age: 56
End: 2021-11-17

## 2021-11-17 DIAGNOSIS — N39.0 RECURRENT UTI: Primary | ICD-10-CM

## 2021-11-17 RX ORDER — NITROFURANTOIN MACROCRYSTALS 50 MG/1
50 CAPSULE ORAL DAILY
Qty: 30 CAPSULE | Refills: 5 | Status: SHIPPED | OUTPATIENT
Start: 2021-11-17 | End: 2022-12-16

## 2021-11-17 NOTE — PROGRESS NOTES
GYN ONCOLOGY FOLLOW-UP    Roxie Carter  [id number]  1965    Subjective   Chief Complaint: No chief complaint on file.        History of present illness:     Roxie Carter is a 56 y.o. year old female who is here today for complaint of pelvic pressure, low back pain, and vaginal discharge.  She has a personal history of recurrent granulosa cell tumor, see cancer history below.  She is on Arimidex maintenance for hormone blockade.    Patient reports having a UTI last month with culture showing E. Coli, completed antibiotics as prescribed by PCP.  She describes having UTIs every 1 to 2 months over the last year.  Current symptoms have been present for several weeks.  She describes the vaginal discharge as a light yellow to brown, increasingly heavy requiring her to wear a pad daily, comes and goes.  Last CT scan performed in May 2021 is negative for disease.      Oncology History:    Oncology/Hematology History   Granulosa cell tumor of ovary, right   8/13/2013 Cancer Staged    Staging form: Ovary, Fallopian Tube, And Primary Peritoneal Carcinoma, AJCC 8th Edition  - Clinical stage from 8/13/2013: FIGO Stage IC (cT1c, cN0, cM0) - Signed by Jamee Mcguire MD on 3/26/2020     8/13/2013 Surgery    Total abdominal hysterectomy, bilateral salpingo-oophorectomy with pathology showing 5 cm granulosa cell tumor of the right ovary and a left fallopian tube intraepithelial serous neoplasm.      - 1/4/2014 Chemotherapy    OP OVARIAN PACLitaxel / CARBOplatin (Q21D)  x6 cycles remarkable for bone marrow suppression and G-CSF support     3/14/2020 Progression    CT Abdomen/Pelvis IMPRESSION:  Large heterogeneous mass identified within the pelvis with  fluid seen scattered throughout the abdomen and pelvis. Findings  concerning for recurrence with some stranding of the mesentery in which  spread to the mesentery cannot be excluded.     4/3/2020 Surgery    Exploratory laparotomy, optimal debulking (R=0),  "cystoscopy with temporary uteteral catheter, ileal resection with side-to-side anastomosis, rectosigmoid resection/LAR, left ureterolysis, and peritoneal stripping.   Pathology consistent with recurrent granulosa cell tumor at the pelvis with rectosigmoid and partial terminal ileum. No LVSI. Mesenteric nodes and other staging negative.      4/6/2020 -  Hormonal Therapy    Arimidex initiated     4/27/2020 Molecular Testing    CARIS testing results:  KS positive, 1+, 75%  ER negative, MSI stable, PD-L1 negative     6/30/2020 Imaging    CT scan for new abdominal pain showed expected postsurgical changes from resection of pelvic mass without evidence for bulky adenopathy or soft tissue nodular recurrence. No mesenteric reticulation or ascites to suggest peritoneal involvement. No acute intraabdominal or intrapelvic abnormality otherwise noted. Significant colonic stool burden noted.            The current medication list and allergy list were reviewed and reconciled.     Past Medical History, Past Surgical History, Social History, Family History have been reviewed and are without significant changes except as mentioned.      Review of Systems   Constitutional: Negative.    Gastrointestinal: Negative.    Genitourinary: Positive for pelvic pain and vaginal discharge.   Musculoskeletal: Positive for back pain.       Objective   Physical Exam  Vital Signs: /77   Pulse 104   Temp 96.4 °F (35.8 °C) (Temporal)   Resp 17   Ht 165.1 cm (65\")   Wt 55.7 kg (122 lb 12.8 oz)   BMI 20.43 kg/m²   Vitals:    11/05/21 1003   PainSc:   5           General Appearance:  alert, cooperative, no apparent distress, appears stated age and normal weight   Neurologic/Psychiatric: A&O x 3, gait steady, appropriate affect   HEENT:  Normocephalic, without obvious abnormality, mucous membranes moist   Breasts:  deferred   Abdomen:   Soft, non-distended, no organomegaly and tenderness in suprapubic   Lymph nodes: No cervical, " supraclavicular, inguinal or axillary adenopathy noted   Extremities: Normal, atraumatic; no clubbing, cyanosis, or edema    Pelvic: External Genitalia  without lesions or skin changes  Vagina  is pink, moist, without lesions.   Vaginal Cuff  Female Vaginal Cuff: smooth, intact and without visible lesions. NuSwab obtained  Uterus  surgically absent and Suprapubic tenderness appreciated also on bimanual exam  Ovaries  surgically absent bilaterally and without palpable masses or fullness  Parametria  smooth  Rectovaginal  Female rectovaginal: deferred     ECOG score: 0               Result Review :   Last imaging study was CT abdomen pelvis 5/4/2021.     Component      Latest Ref Rng & Units 4/14/2021 11/5/2021   Inhibin B      0.0 - 16.9 pg/mL <7.0 <7.0     Component      Latest Ref Rng & Units 4/14/2021 11/5/2021   Inhibin A, Ultrasensitive      pg/mL 0.8 0.7     Procedure Notes:  No notes on file           Assessment and Plan:    Diagnoses and all orders for this visit:    1. Granulosa cell tumor of ovary, right (Primary)  -     Inhibin B; Future  -     Inhibin A, Ultrasensitive; Future    2. Vaginal discharge  -     Urinalysis With Culture If Indicated -; Future  -     NuSwab VG+ - Swab, Vagina; Future  -     NuSwab VG+ - Swab, Vagina    3. Frequent UTI  -     CBC & Differential; Future  -     Comprehensive Metabolic Panel; Future    4. E. coli UTI    5. Pelvic pressure in female    6. Chronic midline low back pain without sciatica          Patient I discussed her symptoms, recurrent UTIs, and physical exam findings.  Suprapubic tenderness noted on abdominal exam and bimanual exam.  There is no significant vaginal discharge noted on exam today, but new swab was obtained.  I will also order inhibin a and B for tumor marker assessment and repeat urinalysis.  If inhibin's elevated, will consider repeat CT scan.  We may also consider antibiotic suppression for recurrent UTIs and/or referral to urology.  I will follow  up with patient once all results return and discuss recommendations and next steps.  She verbalized understanding and agrees with plan.    Pain assessment was performed today as a part of patient’s care.  For patients with pain related to surgery, gynecologic malignancy or cancer treatment, the plan is as noted in the assessment/plan.  For patients with pain not related to these issues, they are to seek any further needed care from a more appropriate provider, such as PCP.      Follow-up:     Return to clinic for routine cancer surveillance as scheduled, pending current work-up.      Electronically signed by CASEY Kelley on 11/17/21 at 15:46 EST

## 2021-11-17 NOTE — TELEPHONE ENCOUNTER
Called patient and reviewed all recent labs.  Inhibin tumor markers low/stable.  Last CT scan negative in May 2021 and no there evidence suspicious for disease.  New swab negative for vaginal infection.  Urine culture showed normal vaginal paul.  CBC and CMP generally normal.  We discussed her recurrent UTIs.  I will start patient on antibiotic suppression with Macrobid 50 mg p.o. daily now, recommend 3 to 6-month course.  I am also recommending referral to urology due to ongoing suprapubic pain and recurrent UTIs.  Patient verbalized understanding and agrees with plan.  Keep scheduled surveillance visit for next month in our office.

## 2021-12-02 RX ORDER — ANASTROZOLE 1 MG/1
1 TABLET ORAL DAILY
Qty: 30 TABLET | Refills: 5 | OUTPATIENT
Start: 2021-12-02

## 2021-12-14 DIAGNOSIS — U07.1 COVID-19 VIRUS INFECTION: Primary | ICD-10-CM

## 2021-12-16 ENCOUNTER — TELEPHONE (OUTPATIENT)
Dept: FAMILY MEDICINE CLINIC | Facility: CLINIC | Age: 56
End: 2021-12-16

## 2021-12-16 DIAGNOSIS — U07.1 COVID-19 VIRUS INFECTION: Primary | ICD-10-CM

## 2021-12-16 NOTE — TELEPHONE ENCOUNTER
I recommend it and her to  a pulse ox. If SOA worsens she should go to er. I will write the infusion order

## 2021-12-16 NOTE — TELEPHONE ENCOUNTER
Patient returned call, She is experiencing fever, chills, loss of taste and smell, fatigue, and SOB.     She is unsure if she should complete the infusion therapy because she was dx 7 days ago and has noticed improvements. I advised her that if she wanted to still continue with the orders we can schedule for today or tomorrow. She verbalized understanding and agreed to continue.

## 2021-12-16 NOTE — TELEPHONE ENCOUNTER
Patient notified, order has been sent and she will contact [facility] to schedule infusion.

## 2021-12-16 NOTE — TELEPHONE ENCOUNTER
----- Message from Chip Ramos MD sent at 12/15/2021  4:40 PM EST -----  Regarding: FW: Symptoms of covid      ----- Message -----  From: Don Kenyon MA  Sent: 12/15/2021   1:17 PM EST  To: Chip Ramos MD  Subject: FW: Symptoms of covid                              ----- Message -----  From: Roxie Carter  Sent: 12/15/2021  12:46 PM EST  To: [facility]  Clinical Pool  Subject: Symptoms of covid                                I would like to know why I would need this infusion therapy? It's been going on 7 days now since I was first diagnosed with covid. I thought the infusion if needed needs to be done within 10 days of being diagnosed.  Please explain to me why the doctor wants me to have this done. Yes I am sick from covid but if it is not necessary for me to have it done within the time line. There is no need correct.  Thank you   Miss Raul.

## 2021-12-17 ENCOUNTER — OFFICE VISIT (OUTPATIENT)
Dept: CARDIOLOGY | Facility: HOSPITAL | Age: 56
End: 2021-12-17

## 2021-12-17 DIAGNOSIS — R07.9 CHEST PAIN, UNSPECIFIED TYPE: ICD-10-CM

## 2021-12-17 DIAGNOSIS — R00.2 PALPITATIONS: Primary | ICD-10-CM

## 2021-12-17 DIAGNOSIS — R93.1 ABNORMAL ECHOCARDIOGRAM: ICD-10-CM

## 2021-12-17 DIAGNOSIS — I47.29 NSVT (NONSUSTAINED VENTRICULAR TACHYCARDIA) (HCC): ICD-10-CM

## 2021-12-17 PROCEDURE — 99442 PR PHYS/QHP TELEPHONE EVALUATION 11-20 MIN: CPT | Performed by: NURSE PRACTITIONER

## 2021-12-17 RX ORDER — METOPROLOL SUCCINATE 25 MG/1
25 TABLET, EXTENDED RELEASE ORAL DAILY
Qty: 90 TABLET | Refills: 1 | Status: SHIPPED | OUTPATIENT
Start: 2021-12-17 | End: 2022-12-16

## 2021-12-17 NOTE — PATIENT INSTRUCTIONS
-Begin low-dose metoprolol for intermittent fast heartbeat    -Cardiology will call for establishing care appointment

## 2021-12-17 NOTE — PROGRESS NOTES
Mode of visit: Telephone   Location of patient: Home   You have chosen to receive care through the use of telemedicine. Telemedicine enables health care providers at different locations to provide safe, effective, and convenient care through the use of technology. As with any health care service, there are risks associated with the use of telemedicine, including equipment failure, poor connections, and  issues.  • Do you understand the risks and benefits of telemedicine as I have explained them to you? Yes  • Have your questions regarding telemedicine been answered? Yes  • Do you consent to the use of telemedicine in your medical care today? Yes    Chief Complaint  Chest Pain and Palpitations (follow-up)    [facility]  Telemedicine note    This was a telephone enabled telemedicine encounter.    Subjective    History of Present Illness {CC  Problem List  Visit  Diagnosis   Encounters  Notes  Medications  Labs  Result Review Imaging  Media :23}     Roxie Carter, 56 y.o. female with palpitations, chest pain, vertigo, anxiety presents to [facility] telemedicine visit for Chest Pain and Palpitations (follow-up).    Patient previously referred to heart valve clinic by PCP Chip Ramos MD for presyncope, chest pain.  During previous visit patient endorsed chest pressure, intermittent palpitations, and lower extremity circulatory complaints.  Since previous clinic visit GXT completed with normal exercise test by EKG criteria, low risk study.  ABIs completed and summarized as a normal study.  TTE completed and revealed mildly reduced RV systolic function, LVEF low normal, no significant valvular abnormalities.  14-day Holter monitor completed, final read pending; low burden PAC/PVC, 4 episodes SVT, 2 brief NSVT episodes with longest 5 beats.  Patient triggered events predominantly unassociated with arrhythmia.    Patient presents  today with continued intermittent chest pain, generally not worsened by exertion. Intermittent skipped heartbeat and racing heart continues.  She has been recently diagnosed with COVID-19 and received antibody treatment.  She denies dyspnea, lightheadedness, and syncope.    Objective     Vital Signs:   Vitals:     There is no height or weight on file to calculate BMI.  Physical Exam  Constitutional:       Appearance: Normal appearance.   HENT:      Head: Normocephalic.   Pulmonary:      Effort: Pulmonary effort is normal. No respiratory distress.   Neurological:      Mental Status: She is alert and oriented to person, place, and time.   Psychiatric:         Mood and Affect: Mood normal.         Behavior: Behavior normal.         Thought Content: Thought content normal.     Data Reviewed:{ Labs  Result Review  Imaging  Med Tab  Media :23}     Adult Transthoracic Echo Complete W/ Cont if Necessary Per Protocol (10/14/2021 12:12)  Doppler Ankle Brachial Index Single Level CAR (10/14/2021 11:36)  Treadmill Stress Test (10/14/2021 07:55)  Holter Monitor - 72 Hour Up To 15 Days (09/24/2021 22:10)  ECG 12 Lead (09/20/2021 11:35)  CBC & Differential (06/24/2021 13:49)  Comprehensive Metabolic Panel (06/24/2021 13:49)  TSH Rfx On Abnormal To Free T4 (06/24/2021 13:49)  Lipid Panel (06/24/2021 13:49)    Assessment and Plan {CC Problem List  Visit Diagnosis  ROS  Review (Popup)  Health Maintenance  Quality  BestPractice  Medications  SmartSets  SnapShot Encounters  Media :23}     This visit has been scheduled as a telephone video visit to comply with patient safety concerns in accordance with CDC recommendations. Total time of discussion was 15 minutes.    1. Palpitations  -Continued intermittent palpitation, described as brief racing heart.  Patient triggered events on recent Holter monitor predominantly unassociated with arrhythmia.  Rare episode SVT/NSVT on Holter monitor.  -Initiate Toprol-XL 25 mg daily  for brief arrhythmia.  Patient was previously on propanolol, but had discontinued medication; will initiate metoprolol succinate as above for arrhythmia/palpitations.    2. NSVT (nonsustained ventricular tachycardia) (HCC)  -2 episodes short duration NSVT on Holter monitor  -Recent treadmill stress test with no evidence of ischemia  -TTE with no significant structural/functional abnormalities.  Mildly reduced RV systolic function, LVEF low normal, normal wall motion.  - metoprolol succinate XL (TOPROL-XL) 25 MG 24 hr tablet; Take 1 tablet by mouth Daily.  Dispense: 90 tablet; Refill: 1    3. Chest pain, unspecified type  -Intermittent chest pain continues, generally nonexertional in nature.  Atypical in nature with normal treadmill stress test.  -Recent treadmill stress test with no evidence of ischemia  -TTE with no significant structural/functional abnormalities.  Mildly reduced RV systolic function, LVEF low normal, normal wall motion.    4. Abnormal echocardiogram  -TTE with no significant structural/functional abnormalities.  Mildly reduced RV systolic function, LVEF low normal, normal wall motion.  - Ambulatory Referral to Cardiology      Follow Up {Instructions Charge Capture  Follow-up Communications :23}   Return if symptoms worsen or fail to improve.    Patient was given instructions and counseling regarding her condition or for health maintenance advice. Please see specific information pulled into the AVS if appropriate.  Patient was instructed to call the [facility] with any questions, concerns, or worsening symptoms.    *Please note that portions of this note were completed with a voice recognition program. Efforts were made to edit the dictations, but occasionally words are mistranscribed.

## 2021-12-30 PROCEDURE — 93248 EXT ECG>7D<15D REV&INTERPJ: CPT | Performed by: INTERNAL MEDICINE

## 2022-01-11 ENCOUNTER — TELEPHONE (OUTPATIENT)
Dept: FAMILY MEDICINE CLINIC | Facility: CLINIC | Age: 57
End: 2022-01-11

## 2022-01-11 NOTE — TELEPHONE ENCOUNTER
----- Message from Roxie Carter sent at 1/11/2022 11:20 AM EST -----  Regarding: Depression  I am needing to set up an appointment with Dr. Ramos about my depression. If I could get an appointment time anywhere between the hours of 9:30 and 11:00am. Monday through Friday would be great.  Thank you  Miss Carter

## 2022-01-12 ENCOUNTER — OFFICE VISIT (OUTPATIENT)
Dept: FAMILY MEDICINE CLINIC | Facility: CLINIC | Age: 57
End: 2022-01-12

## 2022-01-12 VITALS
WEIGHT: 121.2 LBS | BODY MASS INDEX: 20.19 KG/M2 | DIASTOLIC BLOOD PRESSURE: 84 MMHG | HEIGHT: 65 IN | SYSTOLIC BLOOD PRESSURE: 126 MMHG | HEART RATE: 75 BPM | OXYGEN SATURATION: 98 %

## 2022-01-12 DIAGNOSIS — F32.A ANXIETY AND DEPRESSION: Primary | ICD-10-CM

## 2022-01-12 DIAGNOSIS — F41.9 ANXIETY AND DEPRESSION: Primary | ICD-10-CM

## 2022-01-12 PROCEDURE — 99213 OFFICE O/P EST LOW 20 MIN: CPT | Performed by: INTERNAL MEDICINE

## 2022-01-12 RX ORDER — BUSPIRONE HYDROCHLORIDE 10 MG/1
5 TABLET ORAL 3 TIMES DAILY
Qty: 90 TABLET | Refills: 9 | Status: SHIPPED | OUTPATIENT
Start: 2022-01-12

## 2022-01-12 RX ORDER — ESCITALOPRAM OXALATE 20 MG/1
10 TABLET ORAL DAILY
Qty: 30 TABLET | Refills: 9 | Status: SHIPPED | OUTPATIENT
Start: 2022-01-12 | End: 2022-04-27

## 2022-01-12 NOTE — PROGRESS NOTES
"Answers for HPI/ROS submitted by the patient on 1/12/2022  Please describe your symptoms.: Anxiety and depression  Have you had these symptoms before?: Yes  How long have you been having these symptoms?: Greater than 2 weeks  Please describe any probable cause for these symptoms. : Stress  What is the primary reason for your visit?: Other    Roxie Carter  1965  [id number]  Patient Care Team:  Chip Ramos MD as PCP - General (Internal Medicine)  Arturo Brady MD as Consulting Physician (Gastroenterology)  Jamee Mcguire MD as Consulting Physician (Gynecologic Oncology)    Roxie Carter is a 56 y.o. female here today for follow up.     This patient is accompanied by their self who contributes to the history of their care.    Chief Complaint:    Chief Complaint   Patient presents with   • Depression   • Anxiety         History of Present Illness:  I have reviewed and/or updated the patient's past medical, past surgical, family, social history, problem list and allergies as appropriate.     Despite cymbalta, she is experienceing worsening anxiety and depression.  Still requirinf ambien for sleep. Denies HI/SI. Has not met with a counsellor. Having a lot stress with livfe circumstances. Finds serenity with her cats. Reports increase in tearfulness and irritability       Review of Systems   Constitutional: Positive for fatigue.   HENT: Negative.    Respiratory: Negative.    Cardiovascular: Negative.    Neurological: Negative.    Psychiatric/Behavioral: Positive for sleep disturbance and depressed mood. The patient is nervous/anxious.        Vitals:    01/12/22 0934   BP: 126/84   Pulse: 75   SpO2: 98%   Weight: 55 kg (121 lb 3.2 oz)   Height: 165.1 cm (65\")   PainSc: 0-No pain     Body mass index is 20.17 kg/m².    Physical Exam  Vitals and nursing note reviewed.   Constitutional:       General: She is not in acute distress.     Appearance: She is well-developed. She is not diaphoretic. "   HENT:      Head: Normocephalic and atraumatic.      Right Ear: External ear normal.      Left Ear: External ear normal.      Mouth/Throat:      Pharynx: No oropharyngeal exudate.   Eyes:      General: No scleral icterus.        Right eye: No discharge.      Conjunctiva/sclera: Conjunctivae normal.   Neck:      Thyroid: No thyromegaly.      Vascular: No JVD.      Trachea: No tracheal deviation.   Cardiovascular:      Rate and Rhythm: Normal rate and regular rhythm.      Heart sounds: Normal heart sounds.      Comments: PMI nondisplaced  Pulmonary:      Effort: Pulmonary effort is normal.      Breath sounds: Normal breath sounds. No wheezing or rales.   Abdominal:      General: Bowel sounds are normal.      Palpations: Abdomen is soft.      Tenderness: There is no abdominal tenderness. There is no guarding or rebound.   Musculoskeletal:      Cervical back: Normal range of motion and neck supple.      Comments: Normal gait   Lymphadenopathy:      Cervical: No cervical adenopathy.   Skin:     General: Skin is warm and dry.      Capillary Refill: Capillary refill takes less than 2 seconds.      Coloration: Skin is not pale.      Findings: No rash.   Neurological:      Mental Status: She is alert and oriented to person, place, and time.      Motor: No abnormal muscle tone.      Coordination: Coordination normal.   Psychiatric:         Mood and Affect: Mood normal.         Behavior: Behavior normal.         Thought Content: Thought content normal.         Judgment: Judgment normal.         Procedures    Results Review:    None    Assessment/Plan:    Problem List Items Addressed This Visit     None      Visit Diagnoses     Anxiety and depression    -  Primary    Relevant Medications    escitalopram (Lexapro) 20 MG tablet    busPIRone (BUSPAR) 10 MG tablet    Other Relevant Orders    Ambulatory Referral to Behavioral Health      Have referred her to behavioral health for depression and anxiety.  I would like for her to  wean off of the 30 mg of Cymbalta taking 1 capsule every other day for 7 doses.  She will start on Lexapro 10 mg daily for 7 days then increase to full tablet daily.  BuSpar is to be taken 2-3 times a day as needed.    Plan of care reviewed with patient at the conclusion of today's visit. Education was provided regarding diagnosis and management.  Patient verbalizes understanding of and agreement with management plan.    Return in about 6 weeks (around 2/23/2022) for depression.    Chip Ramos MD      Please note than portions of this note were completed wt a Voice Recognition Program

## 2022-01-25 ENCOUNTER — LAB (OUTPATIENT)
Dept: LAB | Facility: HOSPITAL | Age: 57
End: 2022-01-25

## 2022-01-25 ENCOUNTER — OFFICE VISIT (OUTPATIENT)
Dept: GYNECOLOGIC ONCOLOGY | Facility: CLINIC | Age: 57
End: 2022-01-25

## 2022-01-25 VITALS
HEART RATE: 75 BPM | WEIGHT: 116.8 LBS | SYSTOLIC BLOOD PRESSURE: 115 MMHG | BODY MASS INDEX: 19.46 KG/M2 | OXYGEN SATURATION: 98 % | RESPIRATION RATE: 18 BRPM | DIASTOLIC BLOOD PRESSURE: 69 MMHG | TEMPERATURE: 98.4 F | HEIGHT: 65 IN

## 2022-01-25 DIAGNOSIS — Z87.440 HISTORY OF RECURRENT UTIS: ICD-10-CM

## 2022-01-25 DIAGNOSIS — D39.11 GRANULOSA CELL TUMOR OF OVARY, RIGHT: Primary | ICD-10-CM

## 2022-01-25 DIAGNOSIS — D39.11 GRANULOSA CELL TUMOR OF OVARY, RIGHT: ICD-10-CM

## 2022-01-25 DIAGNOSIS — Z79.811 USE OF ANASTROZOLE (ARIMIDEX): ICD-10-CM

## 2022-01-25 PROCEDURE — 83520 IMMUNOASSAY QUANT NOS NONAB: CPT

## 2022-01-25 PROCEDURE — 86336 INHIBIN A: CPT

## 2022-01-25 PROCEDURE — 99213 OFFICE O/P EST LOW 20 MIN: CPT | Performed by: NURSE PRACTITIONER

## 2022-01-25 PROCEDURE — 36415 COLL VENOUS BLD VENIPUNCTURE: CPT

## 2022-01-25 NOTE — PROGRESS NOTES
GYN ONCOLOGY CANCER SURVEILLANCE VISIT    Roxie Carter  [id number]  1965    Subjective   Chief Complaint: Ovarian Cancer (no complaints)        History of present illness:     Roxie Carter is a 56 y.o. year old female who is here today for surveillance for ovarian GCT.  She has a personal history of recurrent granulosa cell tumor, see cancer history below.  She is on Arimidex maintenance for hormone blockade since 4/2020, approaching 2 years since secondary debulking this spring. Last CT scan performed in May 2021 was negative for disease.  She reports she is feeling very well today and has no complaints. She denies vaginal bleeding, pelvic pain, and changes in bowel or bladder function. She was seen in 11/2021 for vaginal discharge, pelvic and back pain. All cultures negative at that time. She was having recurrent UTIs and started on daily antibiotic suppression with Macrodantin 50 mg PO daily. Symptoms have resolved, denies any recurrent UTI symptoms since treatment.       Oncology History:    Oncology/Hematology History   Granulosa cell tumor of ovary, right   8/13/2013 Cancer Staged    Staging form: Ovary, Fallopian Tube, And Primary Peritoneal Carcinoma, AJCC 8th Edition  - Clinical stage from 8/13/2013: FIGO Stage IC (cT1c, cN0, cM0) - Signed by Jamee Mcguire MD on 3/26/2020     8/13/2013 Surgery    Total abdominal hysterectomy, bilateral salpingo-oophorectomy with pathology showing 5 cm granulosa cell tumor of the right ovary and a left fallopian tube intraepithelial serous neoplasm.      - 1/4/2014 Chemotherapy    OP OVARIAN PACLitaxel / CARBOplatin (Q21D)  x6 cycles remarkable for bone marrow suppression and G-CSF support     3/14/2020 Progression    CT Abdomen/Pelvis IMPRESSION:  Large heterogeneous mass identified within the pelvis with  fluid seen scattered throughout the abdomen and pelvis. Findings  concerning for recurrence with some stranding of the mesentery in which  spread  "to the mesentery cannot be excluded.     4/3/2020 Surgery    Exploratory laparotomy, optimal debulking (R=0), cystoscopy with temporary uteteral catheter, ileal resection with side-to-side anastomosis, rectosigmoid resection/LAR, left ureterolysis, and peritoneal stripping.   Pathology consistent with recurrent granulosa cell tumor at the pelvis with rectosigmoid and partial terminal ileum. No LVSI. Mesenteric nodes and other staging negative.      4/6/2020 -  Hormonal Therapy    Arimidex initiated     4/27/2020 Molecular Testing    CARIS testing results:  ME positive, 1+, 75%  ER negative, MSI stable, PD-L1 negative     6/30/2020 Imaging    CT scan for new abdominal pain showed expected postsurgical changes from resection of pelvic mass without evidence for bulky adenopathy or soft tissue nodular recurrence. No mesenteric reticulation or ascites to suggest peritoneal involvement. No acute intraabdominal or intrapelvic abnormality otherwise noted. Significant colonic stool burden noted.      5/4/2021 Imaging    CT abdomen pelvis:  No evidence for acute intra-abdominal or intrapelvic abnormality with persistent moderate to large colonic stool burden and postsurgical changes however no evidence for bulky adenopathy, peritoneal disease process or active metastatic disease present.           The current medication list and allergy list were reviewed and reconciled.     Past Medical History, Past Surgical History, Social History, Family History have been reviewed and are without significant changes except as mentioned.      Review of Systems   Constitutional: Negative.    Gastrointestinal: Negative.    Genitourinary: Negative.    Musculoskeletal: Negative.        Objective   Physical Exam  Vital Signs: /69   Pulse 75   Temp 98.4 °F (36.9 °C) (Temporal)   Resp 18   Ht 165.1 cm (65\")   Wt 53 kg (116 lb 12.8 oz)   SpO2 98%   BMI 19.44 kg/m²   Vitals:    01/25/22 1419   PainSc: 0-No pain           General " Appearance:  alert, cooperative, no apparent distress, appears stated age and normal weight   Neurologic/Psychiatric: A&O x 3, gait steady, appropriate affect   HEENT:  Normocephalic, without obvious abnormality, mucous membranes moist   Breasts:  deferred   Abdomen:   Soft, non-tender, non-distended and no organomegaly   Lymph nodes: No cervical, supraclavicular, inguinal or axillary adenopathy noted   Extremities: Normal, atraumatic; no clubbing, cyanosis, or edema    Pelvic: External Genitalia  without lesions or skin changes  Vagina  is pink, moist, without lesions.   Vaginal Cuff  Female Vaginal Cuff: smooth, intact and without visible lesions.   Uterus  surgically absent   Ovaries  surgically absent bilaterally and without palpable masses or fullness  Parametria  smooth  Rectovaginal  Female rectovaginal: deferred     ECOG score: 0               Result Review :   Last imaging study was CT abdomen pelvis 5/4/2021.     Component      Latest Ref Rng & Units 4/14/2021 11/5/2021   Inhibin B      0.0 - 16.9 pg/mL <7.0 <7.0     Component      Latest Ref Rng & Units 4/14/2021 11/5/2021   Inhibin A, Ultrasensitive      pg/mL 0.8 0.7     Procedure Notes:  No notes on file           Assessment and Plan:    Diagnoses and all orders for this visit:    1. Granulosa cell tumor of ovary, right (Primary)  -     Inhibin A, Ultrasensitive; Future  -     Inhibin B; Future    2. Use of anastrozole (Arimidex)    3. History of recurrent UTIs          There is no evidence of disease upon today's exam. She will be notified of inihibin tumor marker results upon their return. Pending stable labs, we will plan for one more 3 month visit to reach 2 years since last surgery. If doing well at that time, may go to 6 month surveillance visits. Continue anastrozole maintenance as prescribed. She is understanding to call with any changes in pelvic symptoms or general GYN concerns at any time between regularly scheduled visits.     Continue  antibiotic suppression therapy with Macrodantin 50 mg PO daily for now.     Pain assessment was performed today as a part of patient’s care.  For patients with pain related to surgery, gynecologic malignancy or cancer treatment, the plan is as noted in the assessment/plan.  For patients with pain not related to these issues, they are to seek any further needed care from a more appropriate provider, such as PCP.      Follow-up:     Return to clinic in 3 months for ongoing cancer surveillance.       Electronically signed by CASEY Kelley on 01/25/22 at 14:47 EST

## 2022-02-01 LAB
INHIBIN A SERPL-MCNC: 0.6 PG/ML
INHIBIN B SERPL-MCNC: <7 PG/ML (ref 0–16.9)

## 2022-02-02 ENCOUNTER — TELEPHONE (OUTPATIENT)
Dept: GYNECOLOGIC ONCOLOGY | Facility: CLINIC | Age: 57
End: 2022-02-02

## 2022-02-02 NOTE — TELEPHONE ENCOUNTER
----- Message from CASEY Kelley sent at 2/2/2022  8:12 AM EST -----  Please notify patient inhibins low/stable. Thanks!

## 2022-02-06 DIAGNOSIS — Z79.899 MEDICATION MANAGEMENT: ICD-10-CM

## 2022-02-07 RX ORDER — ZOLPIDEM TARTRATE 10 MG/1
10 TABLET ORAL NIGHTLY PRN
Qty: 30 TABLET | Refills: 2 | Status: SHIPPED | OUTPATIENT
Start: 2022-02-07 | End: 2022-04-27 | Stop reason: SDUPTHER

## 2022-02-07 NOTE — TELEPHONE ENCOUNTER
Rx Refill Note  Requested Prescriptions     Pending Prescriptions Disp Refills   • zolpidem (AMBIEN) 10 MG tablet 30 tablet 2     Sig: Take 1 tablet by mouth At Night As Needed for Sleep.      Last office visit with prescribing clinician: 1/12/2022      Next office visit with prescribing clinician: 2/9/2022     Csa:1/12/22  Uds:2/9/22       Ely Bass MA  02/07/22, 13:48 EST

## 2022-02-08 ENCOUNTER — PRIOR AUTHORIZATION (OUTPATIENT)
Dept: FAMILY MEDICINE CLINIC | Facility: CLINIC | Age: 57
End: 2022-02-08

## 2022-02-08 PROBLEM — R00.2 PALPITATIONS: Status: ACTIVE | Noted: 2022-02-08

## 2022-04-04 RX ORDER — ANASTROZOLE 1 MG/1
1 TABLET ORAL DAILY
Qty: 30 TABLET | Refills: 5 | Status: SHIPPED | OUTPATIENT
Start: 2022-04-04 | End: 2022-10-04 | Stop reason: SDUPTHER

## 2022-04-25 ENCOUNTER — LAB (OUTPATIENT)
Dept: LAB | Facility: HOSPITAL | Age: 57
End: 2022-04-25

## 2022-04-25 ENCOUNTER — OFFICE VISIT (OUTPATIENT)
Dept: GYNECOLOGIC ONCOLOGY | Facility: CLINIC | Age: 57
End: 2022-04-25

## 2022-04-25 VITALS
OXYGEN SATURATION: 98 % | HEART RATE: 74 BPM | SYSTOLIC BLOOD PRESSURE: 114 MMHG | DIASTOLIC BLOOD PRESSURE: 71 MMHG | RESPIRATION RATE: 18 BRPM | WEIGHT: 117 LBS | TEMPERATURE: 98 F | HEIGHT: 65 IN | BODY MASS INDEX: 19.49 KG/M2

## 2022-04-25 DIAGNOSIS — D39.11 GRANULOSA CELL TUMOR OF OVARY, RIGHT: Primary | ICD-10-CM

## 2022-04-25 DIAGNOSIS — R42 INTERMITTENT LIGHTHEADEDNESS: ICD-10-CM

## 2022-04-25 DIAGNOSIS — D39.11 GRANULOSA CELL TUMOR OF OVARY, RIGHT: ICD-10-CM

## 2022-04-25 LAB
ERYTHROCYTE [DISTWIDTH] IN BLOOD BY AUTOMATED COUNT: 12.9 % (ref 12.3–15.4)
HCT VFR BLD AUTO: 45.3 % (ref 34–46.6)
HGB BLD-MCNC: 14.4 G/DL (ref 12–15.9)
LYMPHOCYTES # BLD AUTO: 2.2 10*3/MM3 (ref 0.7–3.1)
LYMPHOCYTES NFR BLD AUTO: 46.4 % (ref 19.6–45.3)
MCH RBC QN AUTO: 29.6 PG (ref 26.6–33)
MCHC RBC AUTO-ENTMCNC: 31.8 G/DL (ref 31.5–35.7)
MCV RBC AUTO: 92.9 FL (ref 79–97)
MONOCYTES # BLD AUTO: 0.2 10*3/MM3 (ref 0.1–0.9)
MONOCYTES NFR BLD AUTO: 4 % (ref 5–12)
NEUTROPHILS NFR BLD AUTO: 2.3 10*3/MM3 (ref 1.7–7)
NEUTROPHILS NFR BLD AUTO: 49.6 % (ref 42.7–76)
PLATELET # BLD AUTO: 240 10*3/MM3 (ref 140–450)
PMV BLD AUTO: 8.1 FL (ref 6–12)
RBC # BLD AUTO: 4.87 10*6/MM3 (ref 3.77–5.28)
WBC NRBC COR # BLD: 4.7 10*3/MM3 (ref 3.4–10.8)

## 2022-04-25 PROCEDURE — 85025 COMPLETE CBC W/AUTO DIFF WBC: CPT

## 2022-04-25 PROCEDURE — 83520 IMMUNOASSAY QUANT NOS NONAB: CPT

## 2022-04-25 PROCEDURE — 36415 COLL VENOUS BLD VENIPUNCTURE: CPT

## 2022-04-25 PROCEDURE — 86336 INHIBIN A: CPT

## 2022-04-25 PROCEDURE — 99213 OFFICE O/P EST LOW 20 MIN: CPT | Performed by: NURSE PRACTITIONER

## 2022-04-25 RX ORDER — CYCLOBENZAPRINE HCL 10 MG
TABLET ORAL
COMMUNITY
Start: 2022-02-25

## 2022-04-25 NOTE — PROGRESS NOTES
GYN ONCOLOGY CANCER SURVEILLANCE VISIT    Roxie Carter  [id number]  1965    Subjective   Chief Complaint: Granulosa cell tumor of ovary (No GYN Complaints)        History of present illness:     Roxie Carter is a 57 y.o. year old female who is here today for surveillance for ovarian GCT.  She has a personal history of recurrent granulosa cell tumor, see cancer history below.  She is on Arimidex maintenance for hormone blockade since 4/2020, now 2 years since secondary debulking. Last CT scan performed in May 2021 was negative for disease.    She reports she is feeling very well today and has no gyn complaints. She reports occ lightheadedness with a history of anemia. She denies vaginal bleeding, pelvic pain, and changes in bowel or bladder function. She continues her Macrodantin PO daily for antibiotic suppression d/t history of recurrent UTIs. No new infections or symptoms since last visit.       Oncology History:    Oncology/Hematology History   Granulosa cell tumor of ovary, right   8/13/2013 Cancer Staged    Staging form: Ovary, Fallopian Tube, And Primary Peritoneal Carcinoma, AJCC 8th Edition  - Clinical stage from 8/13/2013: FIGO Stage IC (cT1c, cN0, cM0) - Signed by Jamee Mcguire MD on 3/26/2020     8/13/2013 Surgery    Total abdominal hysterectomy, bilateral salpingo-oophorectomy with pathology showing 5 cm granulosa cell tumor of the right ovary and a left fallopian tube intraepithelial serous neoplasm.      - 1/4/2014 Chemotherapy    OP OVARIAN PACLitaxel / CARBOplatin (Q21D)  x6 cycles remarkable for bone marrow suppression and G-CSF support     3/14/2020 Progression    CT Abdomen/Pelvis IMPRESSION:  Large heterogeneous mass identified within the pelvis with  fluid seen scattered throughout the abdomen and pelvis. Findings  concerning for recurrence with some stranding of the mesentery in which  spread to the mesentery cannot be excluded.     4/3/2020 Surgery    Exploratory  "laparotomy, optimal debulking (R=0), cystoscopy with temporary uteteral catheter, ileal resection with side-to-side anastomosis, rectosigmoid resection/LAR, left ureterolysis, and peritoneal stripping.   Pathology consistent with recurrent granulosa cell tumor at the pelvis with rectosigmoid and partial terminal ileum. No LVSI. Mesenteric nodes and other staging negative.      4/6/2020 -  Hormonal Therapy    Arimidex initiated     4/27/2020 Molecular Testing    CARIS testing results:  CT positive, 1+, 75%  ER negative, MSI stable, PD-L1 negative     6/30/2020 Imaging    CT scan for new abdominal pain showed expected postsurgical changes from resection of pelvic mass without evidence for bulky adenopathy or soft tissue nodular recurrence. No mesenteric reticulation or ascites to suggest peritoneal involvement. No acute intraabdominal or intrapelvic abnormality otherwise noted. Significant colonic stool burden noted.      5/4/2021 Imaging    CT abdomen pelvis:  No evidence for acute intra-abdominal or intrapelvic abnormality with persistent moderate to large colonic stool burden and postsurgical changes however no evidence for bulky adenopathy, peritoneal disease process or active metastatic disease present.           The current medication list and allergy list were reviewed and reconciled.     Past Medical History, Past Surgical History, Social History, Family History have been reviewed and are without significant changes except as mentioned.      Review of Systems   Constitutional: Negative.    Gastrointestinal: Negative.    Genitourinary: Negative.    Musculoskeletal: Negative.    Neurological: Positive for light-headedness.       Objective   Physical Exam  Vital Signs: /71 Comment: LUE  Pulse 74   Temp 98 °F (36.7 °C) (Infrared)   Resp 18   Ht 165.1 cm (65\")   Wt 53.1 kg (117 lb)   SpO2 98% Comment: RA  BMI 19.47 kg/m²   Vitals:    04/25/22 0913   PainSc: 0-No pain           General Appearance:  " alert, cooperative, no apparent distress, appears stated age and normal weight   Neurologic/Psychiatric: A&O x 3, gait steady, appropriate affect   HEENT:  Normocephalic, without obvious abnormality, mucous membranes moist   Breasts:  deferred   Abdomen:   Soft, non-tender, non-distended and no organomegaly   Lymph nodes: No cervical, supraclavicular, inguinal or axillary adenopathy noted   Extremities: Normal, atraumatic; no clubbing, cyanosis, or edema    Pelvic: External Genitalia  without lesions or skin changes  Vagina  is pink, moist, without lesions.   Vaginal Cuff  Female Vaginal Cuff: smooth, intact and without visible lesions.   Uterus  surgically absent   Ovaries  surgically absent bilaterally and without palpable masses or fullness  Parametria  smooth  Rectovaginal  Female rectovaginal: deferred     ECOG score: 0               Result Review :   Last imaging study was CT abdomen pelvis 5/4/2021.   Component      Latest Ref Rng & Units 4/14/2021 11/5/2021 1/25/2022   Inhibin A, Ultrasensitive      pg/mL 0.8 0.7 0.6     Component      Latest Ref Rng & Units 4/14/2021 11/5/2021 1/25/2022   Inhibin B      0.0 - 16.9 pg/mL <7.0 <7.0 <7.0       Procedure Notes:              Assessment and Plan:    Diagnoses and all orders for this visit:    1. Granulosa cell tumor of ovary, right (Primary)  -     Inhibin A, Ultrasensitive; Future  -     Inhibin B; Future    2. Intermittent lightheadedness  -     CBC & Differential; Future          There is no evidence of disease upon today's exam. She will be notified of inihibin tumor marker results upon their return. Pending stable labs, she may now go to every 6 month surveillance visits. I will also add CBC to today's lab due to patient c/o occasional lightheadedness. Continue anastrozole maintenance as prescribed. She is understanding to call with any changes in pelvic symptoms or general GYN concerns at any time between regularly scheduled visits.     Continue antibiotic  suppression therapy with Macrodantin 50 mg PO daily for now.     Pain assessment was performed today as a part of patient’s care.  For patients with pain related to surgery, gynecologic malignancy or cancer treatment, the plan is as noted in the assessment/plan.  For patients with pain not related to these issues, they are to seek any further needed care from a more appropriate provider, such as PCP.      Follow-up:     Return to clinic in 6 months for ongoing cancer surveillance.       Electronically signed by CASEY Kelley on 04/25/22 at 09:50 EDT

## 2022-04-27 ENCOUNTER — OFFICE VISIT (OUTPATIENT)
Dept: FAMILY MEDICINE CLINIC | Facility: CLINIC | Age: 57
End: 2022-04-27

## 2022-04-27 VITALS
HEIGHT: 65 IN | BODY MASS INDEX: 19.49 KG/M2 | TEMPERATURE: 97.1 F | WEIGHT: 117 LBS | OXYGEN SATURATION: 96 % | SYSTOLIC BLOOD PRESSURE: 110 MMHG | DIASTOLIC BLOOD PRESSURE: 80 MMHG | RESPIRATION RATE: 14 BRPM | HEART RATE: 98 BPM

## 2022-04-27 DIAGNOSIS — Z79.899 MEDICATION MANAGEMENT: ICD-10-CM

## 2022-04-27 DIAGNOSIS — F41.9 ANXIETY: ICD-10-CM

## 2022-04-27 DIAGNOSIS — Z23 COVID-19 VACCINE SERIES STARTED: ICD-10-CM

## 2022-04-27 DIAGNOSIS — Z51.81 ENCOUNTER FOR THERAPEUTIC DRUG MONITORING: ICD-10-CM

## 2022-04-27 DIAGNOSIS — Z79.899 MEDICATION MANAGEMENT: Primary | ICD-10-CM

## 2022-04-27 PROCEDURE — 99213 OFFICE O/P EST LOW 20 MIN: CPT | Performed by: NURSE PRACTITIONER

## 2022-04-27 PROCEDURE — 91300 COVID-19 (PFIZER): CPT | Performed by: NURSE PRACTITIONER

## 2022-04-27 PROCEDURE — 0001A COVID-19 (PFIZER): CPT | Performed by: NURSE PRACTITIONER

## 2022-04-27 RX ORDER — ZOLPIDEM TARTRATE 10 MG/1
10 TABLET ORAL NIGHTLY PRN
Qty: 30 TABLET | Refills: 2 | Status: SHIPPED | OUTPATIENT
Start: 2022-04-27 | End: 2022-05-06 | Stop reason: SDUPTHER

## 2022-04-27 RX ORDER — ESCITALOPRAM OXALATE 20 MG/1
20 TABLET ORAL DAILY
Qty: 30 TABLET | Refills: 8 | Status: SHIPPED | OUTPATIENT
Start: 2022-04-27 | End: 2023-02-21 | Stop reason: SDUPTHER

## 2022-04-27 RX ORDER — ZOLPIDEM TARTRATE 10 MG/1
10 TABLET ORAL NIGHTLY PRN
Qty: 30 TABLET | Refills: 2 | Status: CANCELLED | OUTPATIENT
Start: 2022-04-27

## 2022-04-27 NOTE — PROGRESS NOTES
"Chief Complaint  Anxiety (Lexapro 20 mg. Pt states that she is taking 0.5mg. She wants to know if she should take 1mg ? She is running out of her medication early. ) and Depression (Medication refill)    Subjective          Roxie Leahtyra Carter presents to [facility]  Pt has been taking Lexapro for depression and anxiety. She states she started on 10 Mg, but has since increased to 20 Mg dose. She states the 20 Mg dose has helped her. It was her understanding that this was how Dr. Ramos wanted her to take the medicine (start with 1/2 pill and increase to 1 pill). She states she filled her prescription at the beginning of this month, but is out of medication.     She also needs a refill on her Ambien. She states Ambien is the only thing that has helped her with her insomnia. Pt is leaving out of town for 1-2 months and needs to get refills sent in before she leaves.     Anxiety  Presents for follow-up visit. Symptoms occur rarely. Nighttime awakenings: none (Pt is taking Ambien for sleep).           Objective   Vital Signs:   /80   Pulse 98   Temp 97.1 °F (36.2 °C) (Infrared)   Resp 14   Ht 165.1 cm (65\")   Wt 53.1 kg (117 lb)   SpO2 96%   BMI 19.47 kg/m²     Physical Exam  Constitutional:       Appearance: Normal appearance.   HENT:      Mouth/Throat:      Mouth: Mucous membranes are moist.   Cardiovascular:      Rate and Rhythm: Normal rate and regular rhythm.      Heart sounds: Normal heart sounds.   Pulmonary:      Effort: Pulmonary effort is normal.      Breath sounds: Normal breath sounds.   Skin:     General: Skin is warm.   Neurological:      Mental Status: She is alert and oriented to person, place, and time.   Psychiatric:         Mood and Affect: Mood normal.         Behavior: Behavior normal.         Thought Content: Thought content normal.         Judgment: Judgment normal.        Result Review :                 Assessment and Plan    Diagnoses and all orders for " this visit:    1. Medication management (Primary)    2. Encounter for therapeutic drug monitoring  -     Compliance Drug Analysis, Ur - Urine, Clean Catch; Future  -     Compliance Drug Analysis, Ur - Urine, Clean Catch    3. COVID-19 vaccine series started  -     COVID-19 Vaccine (Pfizer) Purple Cap    4. Anxiety  -     escitalopram (Lexapro) 20 MG tablet; Take 1 tablet by mouth Daily.  Dispense: 30 tablet; Refill: 8      BMI is within normal parameters. No follow-up required.         Follow Up   No follow-ups on file.  Patient was given instructions and counseling regarding her condition or for health maintenance advice. Please see specific information pulled into the AVS if appropriate.       Answers for HPI/ROS submitted by the patient on 4/27/2022  Please describe your symptoms.: Follow-up visit  Have you had these symptoms before?: Yes  How long have you been having these symptoms?: Greater than 2 weeks  Please list any medications you are currently taking for this condition.: Zolpidem 10MG  Please describe any probable cause for these symptoms. : Insomnia  What is the primary reason for your visit?: Other

## 2022-04-27 NOTE — TELEPHONE ENCOUNTER
PT IS LEAVING A UDS NOW AND CSA       Rx Refill Note  Requested Prescriptions     Pending Prescriptions Disp Refills   • zolpidem (AMBIEN) 10 MG tablet 30 tablet 2     Sig: Take 1 tablet by mouth At Night As Needed for Sleep.      Last office visit with prescribing clinician: 1/12/2022      Next office visit with prescribing clinician: 4-27-22    UDS NOT UP TO DATE  CSA NOT UP TO DATE        Nita Pulido MA  04/27/22, 12:10 EDT

## 2022-04-28 LAB
INHIBIN A SERPL-MCNC: 0.7 PG/ML
INHIBIN B SERPL-MCNC: <7 PG/ML (ref 0–16.9)

## 2022-05-02 ENCOUNTER — TELEPHONE (OUTPATIENT)
Dept: GYNECOLOGIC ONCOLOGY | Facility: CLINIC | Age: 57
End: 2022-05-02

## 2022-05-02 NOTE — TELEPHONE ENCOUNTER
----- Message from CASEY Kelley sent at 5/2/2022  8:20 AM EDT -----  Please notify patient inhibin tumor markers and CBC all normal. Thanks!

## 2022-05-04 LAB — DRUGS UR: NORMAL

## 2022-05-05 DIAGNOSIS — Z79.899 MEDICATION MANAGEMENT: ICD-10-CM

## 2022-05-06 DIAGNOSIS — Z79.899 MEDICATION MANAGEMENT: ICD-10-CM

## 2022-05-06 RX ORDER — ZOLPIDEM TARTRATE 10 MG/1
10 TABLET ORAL NIGHTLY PRN
Qty: 30 TABLET | Refills: 0 | Status: SHIPPED | OUTPATIENT
Start: 2022-05-06 | End: 2022-06-05 | Stop reason: SDUPTHER

## 2022-05-06 RX ORDER — ZOLPIDEM TARTRATE 10 MG/1
10 TABLET ORAL NIGHTLY PRN
Qty: 30 TABLET | Refills: 2 | Status: CANCELLED | OUTPATIENT
Start: 2022-05-06

## 2022-05-06 NOTE — TELEPHONE ENCOUNTER
Patient comment: I'm needing this prescription transferred to the [facility] at this address. [address] [place] [address]. Due to I am out of state visiting my mother this month.      Rx Refill Note  Requested Prescriptions     Pending Prescriptions Disp Refills   • zolpidem (AMBIEN) 10 MG tablet 30 tablet 2     Sig: Take 1 tablet by mouth At Night As Needed for Sleep.      Last office visit with prescribing clinician: 4/27/2022      Next office visit with prescribing clinician: Visit date not found            Melanie Suarez MA  05/06/22, 10:34 EDT

## 2022-05-06 NOTE — TELEPHONE ENCOUNTER
I sent in a prescription of Ambien to his [facility] in [facility]..  It is a controlled substance so I do not know if the prescription can be filled over state lines, however I did attempt to send

## 2022-06-05 DIAGNOSIS — Z79.899 MEDICATION MANAGEMENT: ICD-10-CM

## 2022-06-06 RX ORDER — ZOLPIDEM TARTRATE 10 MG/1
10 TABLET ORAL NIGHTLY PRN
Qty: 30 TABLET | Refills: 0 | Status: SHIPPED | OUTPATIENT
Start: 2022-06-06 | End: 2022-06-30 | Stop reason: SDUPTHER

## 2022-06-30 DIAGNOSIS — Z79.899 MEDICATION MANAGEMENT: ICD-10-CM

## 2022-06-30 RX ORDER — ZOLPIDEM TARTRATE 10 MG/1
10 TABLET ORAL NIGHTLY PRN
Qty: 30 TABLET | Refills: 0 | Status: SHIPPED | OUTPATIENT
Start: 2022-06-30 | End: 2022-08-12 | Stop reason: SDUPTHER

## 2022-06-30 NOTE — TELEPHONE ENCOUNTER
Rx Refill Note  Requested Prescriptions     Pending Prescriptions Disp Refills   • zolpidem (AMBIEN) 10 MG tablet 30 tablet 0     Sig: Take 1 tablet by mouth At Night As Needed for Sleep.      Last office visit with prescribing clinician: 04/27/2022  Next office visit with prescribing clinician: Visit date not found  Indiana Ortiz MA  06/30/22, 11:31 EDT     Last fill: 06/06/2022  UDS:Up to date   CSA:Up to date

## 2022-08-08 RX ORDER — BACLOFEN 10 MG/1
10 TABLET ORAL 3 TIMES DAILY PRN
Qty: 90 TABLET | Refills: 1 | Status: SHIPPED | OUTPATIENT
Start: 2022-08-08 | End: 2022-11-11 | Stop reason: SDUPTHER

## 2022-08-08 NOTE — TELEPHONE ENCOUNTER
Rx Refill Note  Requested Prescriptions     Pending Prescriptions Disp Refills   • baclofen (LIORESAL) 10 MG tablet 90 tablet 1     Sig: Take 1 tablet by mouth 3 (Three) Times a Day As Needed for Muscle Spasms.      Last office visit with prescribing clinician: 1/12/2022      Next office visit with prescribing clinician: 8/7/2022 }  Indiana Ortiz MA  08/08/22, 09:13 EDT     Last fill: 06/10/2021

## 2022-08-10 ENCOUNTER — TELEPHONE (OUTPATIENT)
Dept: FAMILY MEDICINE CLINIC | Facility: CLINIC | Age: 57
End: 2022-08-10

## 2022-08-10 NOTE — TELEPHONE ENCOUNTER
Caller: Roxie Carter    Relationship to patient: Self    Best call back number: [phone]    Patient is needing: PATIENT NEEDS HER EMOTIONAL SUPPORT LETTER FOR THE CAT FAXED [phone] ATTENTION TO PATRICK. PATIENT NEEDS THIS FOR AN APARTMENT

## 2022-08-12 ENCOUNTER — TELEPHONE (OUTPATIENT)
Dept: FAMILY MEDICINE CLINIC | Facility: CLINIC | Age: 57
End: 2022-08-12

## 2022-08-12 ENCOUNTER — OFFICE VISIT (OUTPATIENT)
Dept: FAMILY MEDICINE CLINIC | Facility: CLINIC | Age: 57
End: 2022-08-12

## 2022-08-12 VITALS
WEIGHT: 114.8 LBS | HEIGHT: 65 IN | HEART RATE: 113 BPM | DIASTOLIC BLOOD PRESSURE: 80 MMHG | TEMPERATURE: 97.8 F | OXYGEN SATURATION: 98 % | BODY MASS INDEX: 19.13 KG/M2 | SYSTOLIC BLOOD PRESSURE: 114 MMHG

## 2022-08-12 DIAGNOSIS — F51.01 PRIMARY INSOMNIA: Primary | ICD-10-CM

## 2022-08-12 PROCEDURE — 99213 OFFICE O/P EST LOW 20 MIN: CPT | Performed by: PHYSICIAN ASSISTANT

## 2022-08-12 RX ORDER — ZOLPIDEM TARTRATE 10 MG/1
10 TABLET ORAL NIGHTLY PRN
Qty: 30 TABLET | Refills: 2 | Status: SHIPPED | OUTPATIENT
Start: 2022-08-12 | End: 2022-11-11 | Stop reason: SDUPTHER

## 2022-08-12 NOTE — PROGRESS NOTES
Chief Complaint   Patient presents with   • Med Refill     Ambien       HPI     Roxie Carter is a pleasant 57 y.o. female who is here for routine follow-up of insomnia.  Patient is currently taking Ambien 10 mg nightly for her insomnia.  She reports that the medication works well to help her sleep.  She denies any adverse effects.  She has been taking this for a long time.  Her sister is present at appointment.    Past Medical History:   Diagnosis Date   • Allergic     Can not remember the date when diagnosed for allergy. Allergic to penicillin   • Anxiety 01/2022   • Arthritis    • Cancer (HCC) 2013    OVARIAN; s/p hysterectomy with BSO and chemo therapy that ended in 2014   • Constipation    • Depression     Not sure of date   • Headache     Not for sure of the date. But been having them for countless years.   • Heartburn    • History of pneumonia 02/2019    no hospitalization    • Low back pain    • Murmur     detected as an adult around age 30 and 40; requires prophylactic antibiotics before dental work; pt not sure if she has ever had an echo or when it might have been; Dr Stephen came down and listened to heart sounds on 4/2/20 and did not detect a murmur   • Ovarian cancer (HCC)    • Ovarian cyst    • Pelvic mass    • Pneumonia     Don't remember the exact date. But it was in 2019 or 2020   • Urinary tract infection 2020 and 2021   • Vitamin D deficiency        Past Surgical History:   Procedure Laterality Date   • BACK SURGERY      fusion    • COLON SURGERY  04/03/2020   • COLONOSCOPY  2016   • ENDOSCOPY     • EXPLORATORY LAPAROTOMY N/A 04/03/2020    Procedure: LAPAROTOMY EXPLORATORY,  OPTIMAL DEBULKING, CYSTOSCOPY WITH URETERAL CATHTER INSERTION AND REMOVAL, ILEO RESECTION, SIDE TO SIDE ANASTOMOSIS, RECTAL SIGMOID RESECTION, LEFT URETERA LYSIS, AND PERITONEAL STRIPPING;  Surgeon: Jamee Mcguire MD;  Location: On license of [facility];  Service: Gynecology Oncology;  Laterality: N/A;   • HYSTERECTOMY  2013     with BSO   • SPINE SURGERY  2004/2006/2008    Fusion of the spine in 2008   • TUBAL ABDOMINAL LIGATION  1993       Family History   Problem Relation Age of Onset   • Diabetes type II Father    • Heart disease Father    • Heart attack Father    • Cancer Father    • Diabetes Father    • Hypertension Father    • Hyperlipidemia Father    • Liver disease Father    • Diabetes type II Mother    • Diabetes Mother    • Hypertension Mother    • COPD Mother    • Depression Mother    • Arthritis Mother    • Hyperlipidemia Mother    • Vision loss Mother    • Lung cancer Maternal Uncle    • Migraines Sister    • Asthma Sister    • Migraines Brother         dec from Overdose   • Depression Brother    • Anxiety disorder Brother    • Birth defects Brother    • No Known Problems Maternal Grandmother    • No Known Problems Maternal Grandfather    • No Known Problems Paternal Grandmother    • No Known Problems Paternal Grandfather    • Anxiety disorder Sister    • Miscarriages / Stillbirths Sister    • Depression Sister    • Mental illness Sister    • Anxiety disorder Sister    • Depression Sister    • Cancer Maternal Aunt    • Cancer Maternal Uncle    • Mental illness Son        Social History     Socioeconomic History   • Marital status: Single   Tobacco Use   • Smoking status: Former Smoker     Packs/day: 0.25     Years: 30.00     Pack years: 7.50     Types: Cigarettes     Quit date: 8/28/2018     Years since quitting: 3.9   • Smokeless tobacco: Never Used   • Tobacco comment: I dont remember the date I started smoking. But I was 16 when I started.   Vaping Use   • Vaping Use: Former   • Quit date: 4/14/2019   • Substances: Nicotine   • Devices: Pre-filled or refillable cartridge   Substance and Sexual Activity   • Alcohol use: Never   • Drug use: Never   • Sexual activity: Not Currently     Partners: Male     Birth control/protection: None     Comment: Have not be sexually active in 9 years       Allergies   Allergen Reactions   •  "Penicillins Hives       ROS  Review of Systems   Psychiatric/Behavioral: Positive for sleep disturbance.       Vitals:    08/12/22 0906   BP: 114/80   BP Location: Left arm   Patient Position: Sitting   Cuff Size: Adult   Pulse: 113   Temp: 97.8 °F (36.6 °C)   SpO2: 98%   Weight: 52.1 kg (114 lb 12.8 oz)   Height: 165.1 cm (65\")   PainSc: 0-No pain     Body mass index is 19.1 kg/m².    Current Outpatient Medications on File Prior to Visit   Medication Sig Dispense Refill   • anastrozole (ARIMIDEX) 1 MG tablet Take 1 tablet by mouth Daily. 30 tablet 5   • baclofen (LIORESAL) 10 MG tablet Take 1 tablet by mouth 3 (Three) Times a Day As Needed for Muscle Spasms. 90 tablet 1   • busPIRone (BUSPAR) 10 MG tablet Take 0.5 tablets by mouth 3 (Three) Times a Day. 90 tablet 9   • cyclobenzaprine (FLEXERIL) 10 MG tablet TAKE 1 TABLET BY MOUTH IN THE EVENING AS NEEDED FOR MUSCLE SPASMS     • diclofenac (VOLTAREN) 75 MG EC tablet Take 1 tablet by mouth 2 (Two) Times a Day. (Patient taking differently: Take 75 mg by mouth As Needed.) 60 tablet 5   • diphenhydrAMINE (BENADRYL) 25 MG tablet Take 25 mg by mouth At Night As Needed.     • escitalopram (Lexapro) 20 MG tablet Take 1 tablet by mouth Daily. 30 tablet 8   • fluticasone (Flonase) 50 MCG/ACT nasal spray 2 sprays into the nostril(s) as directed by provider Daily. 5 mL 3   • ibuprofen (ADVIL,MOTRIN) 200 MG tablet Take  by mouth Every 6 (Six) Hours As Needed for Mild Pain .     • magnesium hydroxide (MILK OF MAGNESIA) 400 MG/5ML suspension Take  by mouth Daily As Needed for Constipation.     • meclizine (ANTIVERT) 50 MG tablet Take 1 tablet by mouth 3 (Three) Times a Day As Needed for Dizziness. 60 tablet 0   • metoprolol succinate XL (TOPROL-XL) 25 MG 24 hr tablet Take 1 tablet by mouth Daily. 90 tablet 1   • nitrofurantoin (MACRODANTIN) 50 MG capsule Take 1 capsule by mouth Daily. 30 capsule 5   • traMADol (Ultram) 50 MG tablet Take 1 tablet by mouth 2 (Two) Times a Day. 60 " tablet 1   • [DISCONTINUED] zolpidem (AMBIEN) 10 MG tablet Take 1 tablet by mouth At Night As Needed for Sleep. 30 tablet 0     No current facility-administered medications on file prior to visit.       Results for orders placed or performed in visit on 04/27/22   Compliance Drug Analysis, Ur -   Result Value Ref Range    Report Summary FINAL        PE    Physical Exam  Vitals reviewed.   Constitutional:       General: She is not in acute distress.     Appearance: Normal appearance. She is well-developed and normal weight. She is not ill-appearing or diaphoretic.   HENT:      Head: Normocephalic and atraumatic.   Eyes:      Extraocular Movements: Extraocular movements intact.      Conjunctiva/sclera: Conjunctivae normal.   Pulmonary:      Effort: No respiratory distress.   Musculoskeletal:         General: Normal range of motion.      Cervical back: Normal range of motion.   Neurological:      General: No focal deficit present.      Mental Status: She is alert.   Psychiatric:         Attention and Perception: She is attentive.         Mood and Affect: Mood normal.         Speech: Speech normal.         Behavior: Behavior normal. Behavior is cooperative.         Thought Content: Thought content normal.         Judgment: Judgment normal.         A/P    Diagnoses and all orders for this visit:    1. Primary insomnia (Primary)  -     zolpidem (AMBIEN) 10 MG tablet; Take 1 tablet by mouth At Night As Needed for Sleep.  Dispense: 30 tablet; Refill: 2  S, [facility] and noe up-to-date.  Patient has been taking Ambien 10 mg nightly for sleep for several years.  She denies any adverse effects with the medication.  She reports that the medication works very well for her.  Return in 3 months.           Plan of care reviewed with patient at the conclusion of today's visit. Education was provided regarding diagnosis, management and any prescribed or recommended OTC medications.  Patient verbalizes understanding of and agreement  with management plan.    Return in about 3 months (around 11/12/2022) for Recheck, CM.     Kirstin Elizondo PA-C  Answers for HPI/ROS submitted by the patient on 8/10/2022  Please describe your symptoms.: I am still not able to sleep at night. And I'm coming in for my 3 months follow-up for my prescription for Ambien to help me sleep at night.  Have you had these symptoms before?: Yes  How long have you been having these symptoms?: Greater than 2 weeks  Please list any medications you are currently taking for this condition.: Mariana  Please describe any probable cause for these symptoms. : I have had insomnia since I went through chemotherapy.  What is the primary reason for your visit?: Other

## 2022-08-12 NOTE — TELEPHONE ENCOUNTER
Approved today  The request has been approved. The authorization is effective for a maximum of 6 fills from 08/12/2022 to 02/11/2023, as long as the member is enrolled in their current health plan. The request was approved as submitted. A written notification letter will follow with additional details.

## 2022-08-15 ENCOUNTER — PRIOR AUTHORIZATION (OUTPATIENT)
Dept: FAMILY MEDICINE CLINIC | Facility: CLINIC | Age: 57
End: 2022-08-15

## 2022-10-04 RX ORDER — ANASTROZOLE 1 MG/1
1 TABLET ORAL DAILY
Qty: 30 TABLET | Refills: 5 | Status: SHIPPED | OUTPATIENT
Start: 2022-10-04 | End: 2023-03-01 | Stop reason: SDUPTHER

## 2022-10-17 ENCOUNTER — TELEPHONE (OUTPATIENT)
Dept: GYNECOLOGIC ONCOLOGY | Facility: CLINIC | Age: 57
End: 2022-10-17

## 2022-10-17 NOTE — TELEPHONE ENCOUNTER
Caller: Roxie Carter    Relationship: Self    Best call back number: [phone]    What is the best time to reach you: ANYTIME    Who are you requesting to speak with (clinical staff, provider,  specific staff member): SCHEDULING    What was the call regarding: PLEASE CALL PT TO R/S HER 10/27 APPT - WOULD LIKE IT MOVED TO 11/9 IF POSSIBLE.     Do you require a callback: YES

## 2022-10-25 ENCOUNTER — TELEPHONE (OUTPATIENT)
Dept: GYNECOLOGIC ONCOLOGY | Facility: CLINIC | Age: 57
End: 2022-10-25

## 2022-10-25 NOTE — TELEPHONE ENCOUNTER
Caller: Roxie Carter    Relationship to patient: Self    Best call back number: [phone]    Type of visit: F/ U 2    Requested date: MON, TUES OR WED IN THE MORNING. CALL TO [facility]      If rescheduling, when is the original appointment: 11/9

## 2022-11-11 ENCOUNTER — OFFICE VISIT (OUTPATIENT)
Dept: FAMILY MEDICINE CLINIC | Facility: CLINIC | Age: 57
End: 2022-11-11

## 2022-11-11 VITALS
WEIGHT: 114.4 LBS | SYSTOLIC BLOOD PRESSURE: 112 MMHG | DIASTOLIC BLOOD PRESSURE: 68 MMHG | TEMPERATURE: 97.8 F | BODY MASS INDEX: 19.06 KG/M2 | HEIGHT: 65 IN | OXYGEN SATURATION: 96 % | HEART RATE: 105 BPM

## 2022-11-11 DIAGNOSIS — F41.9 ANXIETY: Primary | ICD-10-CM

## 2022-11-11 DIAGNOSIS — Z12.31 ENCOUNTER FOR SCREENING MAMMOGRAM FOR MALIGNANT NEOPLASM OF BREAST: ICD-10-CM

## 2022-11-11 DIAGNOSIS — F51.01 PRIMARY INSOMNIA: ICD-10-CM

## 2022-11-11 DIAGNOSIS — F51.04 PSYCHOPHYSIOLOGICAL INSOMNIA: ICD-10-CM

## 2022-11-11 DIAGNOSIS — M54.50 ACUTE LEFT-SIDED LOW BACK PAIN WITHOUT SCIATICA: ICD-10-CM

## 2022-11-11 DIAGNOSIS — M54.16 LEFT LUMBAR RADICULITIS: ICD-10-CM

## 2022-11-11 PROBLEM — I10 PRIMARY HYPERTENSION: Status: ACTIVE | Noted: 2022-11-11

## 2022-11-11 PROCEDURE — 99213 OFFICE O/P EST LOW 20 MIN: CPT | Performed by: INTERNAL MEDICINE

## 2022-11-11 RX ORDER — ZOLPIDEM TARTRATE 10 MG/1
10 TABLET ORAL NIGHTLY PRN
Qty: 30 TABLET | Refills: 2 | Status: SHIPPED | OUTPATIENT
Start: 2022-11-11 | End: 2023-02-02 | Stop reason: SDUPTHER

## 2022-11-11 RX ORDER — TRAMADOL HYDROCHLORIDE 50 MG/1
50 TABLET ORAL 2 TIMES DAILY
Qty: 60 TABLET | Refills: 1 | Status: SHIPPED | OUTPATIENT
Start: 2022-11-11 | End: 2023-03-22

## 2022-11-11 RX ORDER — BACLOFEN 10 MG/1
10 TABLET ORAL 3 TIMES DAILY PRN
Qty: 90 TABLET | Refills: 1 | Status: SHIPPED | OUTPATIENT
Start: 2022-11-11

## 2022-11-11 RX ORDER — DICLOFENAC SODIUM 75 MG/1
75 TABLET, DELAYED RELEASE ORAL 2 TIMES DAILY
Qty: 60 TABLET | Refills: 5 | Status: SHIPPED | OUTPATIENT
Start: 2022-11-11 | End: 2022-12-16

## 2022-11-11 NOTE — ASSESSMENT & PLAN NOTE
This is muscular in nature.  I have refilled her baclofen she can use 3 times daily instead of once daily.  Ice 20 minutes on 20 minutes off with a heat alternating.  Exercises provided.  Of also refilled diclofenac 75 mg daily.  We will reevaluate at her Medicare wellness in 1 month

## 2022-11-11 NOTE — PROGRESS NOTES
"Roxie Carter  1965  [id number]  Patient Care Team:  Chip Ramos MD as PCP - General (Internal Medicine)  Arturo Brady MD as Consulting Physician (Gastroenterology)  Jamee Mcguire MD as Consulting Physician (Gynecologic Oncology)    Roxie Carter is a 57 y.o. female here today for follow up.     This patient is accompanied by their self who contributes to the history of their care.    Chief Complaint:    Chief Complaint   Patient presents with   • Follow-up     3 MON F/U         History of Present Illness:  I have reviewed and/or updated the patient's past medical, past surgical, family, social history, problem list and allergies as appropriate.      She has been having muscle spasms left back .  Denies any lower extremity weakness numbness or tingling.  Has not tried ice or heat.  She cannot go to physical therapy as she does have a car.  History of extensive back surgeries.    She is otherwise doing well.  Still using Ambien for sleep.  She continues to take Lexapro for anxiety which is improved.  Blood pressures have been good on metoprolol XL.  No palpitations.    Review of Systems   Constitutional: Negative.    HENT: Negative.    Eyes: Negative.    Respiratory: Negative.    Cardiovascular: Negative.    Genitourinary: Negative.    Musculoskeletal: Positive for back pain and myalgias.   Neurological: Negative.        Vitals:    11/11/22 0855   BP: 112/68   BP Location: Right arm   Patient Position: Sitting   Cuff Size: Adult   Pulse: 105   Temp: 97.8 °F (36.6 °C)   TempSrc: Infrared   SpO2: 96%  Comment: Sitting room air   Weight: 51.9 kg (114 lb 6.4 oz)   Height: 165.1 cm (65\")     Body mass index is 19.04 kg/m².    Physical Exam  Vitals and nursing note reviewed.   Constitutional:       General: She is not in acute distress.     Appearance: Normal appearance. She is well-developed. She is not diaphoretic.   HENT:      Head: Normocephalic and atraumatic.      Right Ear: " External ear normal.      Left Ear: External ear normal.      Mouth/Throat:      Pharynx: No oropharyngeal exudate.   Eyes:      General: No scleral icterus.        Right eye: No discharge.      Conjunctiva/sclera: Conjunctivae normal.   Neck:      Thyroid: No thyromegaly.      Vascular: No JVD.      Trachea: No tracheal deviation.   Cardiovascular:      Rate and Rhythm: Normal rate and regular rhythm.      Heart sounds: Normal heart sounds.      Comments: PMI nondisplaced  Pulmonary:      Effort: Pulmonary effort is normal.      Breath sounds: Normal breath sounds. No wheezing or rales.   Abdominal:      General: Bowel sounds are normal.      Palpations: Abdomen is soft.      Tenderness: There is no abdominal tenderness. There is no guarding or rebound.   Musculoskeletal:      Cervical back: Normal range of motion and neck supple.      Comments: Normal gait.  Marked limited range of motion with AB flexion extension and rotation of the lumbar spine.  Straight leg raise negative negative.  She is point tenderness in the left paraspinous musculature about T12-L1.  Significant muscle guarding.   Lymphadenopathy:      Cervical: No cervical adenopathy.   Skin:     General: Skin is warm and dry.      Capillary Refill: Capillary refill takes less than 2 seconds.      Coloration: Skin is not pale.      Findings: No rash.   Neurological:      Mental Status: She is alert and oriented to person, place, and time.      Motor: No abnormal muscle tone.      Coordination: Coordination normal.   Psychiatric:         Judgment: Judgment normal.         Procedures    Results Review:    None    Assessment/Plan:     Problem List Items Addressed This Visit        Mental Health    Anxiety - Primary    Relevant Medications    escitalopram (Lexapro) 20 MG tablet       Musculoskeletal and Injuries    Acute left-sided low back pain without sciatica    Current Assessment & Plan     This is muscular in nature.  I have refilled her baclofen she  can use 3 times daily instead of once daily.  Ice 20 minutes on 20 minutes off with a heat alternating.  Exercises provided.  Of also refilled diclofenac 75 mg daily.  We will reevaluate at her Medicare wellness in 1 month            Neuro    Left lumbar radiculitis    Relevant Medications    traMADol (Ultram) 50 MG tablet       Sleep    Psychophysiological insomnia    Relevant Medications    busPIRone (BUSPAR) 10 MG tablet    escitalopram (Lexapro) 20 MG tablet    zolpidem (AMBIEN) 10 MG tablet    Primary insomnia    Relevant Medications    zolpidem (AMBIEN) 10 MG tablet   Other Visit Diagnoses     Encounter for screening mammogram for malignant neoplasm of breast        Relevant Orders    Mammo Screening Digital Tomosynthesis Bilateral With CAD      CSA on file Miah interrogated    Plan of care reviewed with patient at the conclusion of today's visit. Education was provided regarding diagnosis and management.  Patient verbalizes understanding of and agreement with management plan.    Return in about 1 month (around 12/11/2022) for Medicare Wellness.    Chip Ramos MD      Please note than portions of this note were completed wth a Voice Recognition Program          Answers for HPI/ROS submitted by the patient on 11/10/2022  Please describe your symptoms.: Follow-up  Have you had these symptoms before?: Yes  How long have you been having these symptoms?: Greater than 2 weeks  Please describe any probable cause for these symptoms. : Can not sleep due to severe insomnia  What is the primary reason for your visit?: Other

## 2022-11-29 ENCOUNTER — OFFICE VISIT (OUTPATIENT)
Dept: GYNECOLOGIC ONCOLOGY | Facility: CLINIC | Age: 57
End: 2022-11-29

## 2022-11-29 ENCOUNTER — LAB (OUTPATIENT)
Dept: LAB | Facility: HOSPITAL | Age: 57
End: 2022-11-29

## 2022-11-29 VITALS
DIASTOLIC BLOOD PRESSURE: 83 MMHG | WEIGHT: 113.6 LBS | BODY MASS INDEX: 18.93 KG/M2 | TEMPERATURE: 97.7 F | OXYGEN SATURATION: 98 % | HEART RATE: 88 BPM | SYSTOLIC BLOOD PRESSURE: 111 MMHG | RESPIRATION RATE: 16 BRPM | HEIGHT: 65 IN

## 2022-11-29 DIAGNOSIS — Z51.81 ENCOUNTER FOR MONITORING ANASTROZOLE THERAPY: ICD-10-CM

## 2022-11-29 DIAGNOSIS — D39.11 GRANULOSA CELL TUMOR OF OVARY, RIGHT: ICD-10-CM

## 2022-11-29 DIAGNOSIS — Z79.811 ENCOUNTER FOR MONITORING ANASTROZOLE THERAPY: ICD-10-CM

## 2022-11-29 DIAGNOSIS — D39.11 GRANULOSA CELL TUMOR OF OVARY, RIGHT: Primary | ICD-10-CM

## 2022-11-29 PROCEDURE — 83520 IMMUNOASSAY QUANT NOS NONAB: CPT

## 2022-11-29 PROCEDURE — 99213 OFFICE O/P EST LOW 20 MIN: CPT | Performed by: NURSE PRACTITIONER

## 2022-11-29 PROCEDURE — 36415 COLL VENOUS BLD VENIPUNCTURE: CPT

## 2022-11-29 PROCEDURE — 86336 INHIBIN A: CPT

## 2022-11-29 NOTE — PROGRESS NOTES
GYN ONCOLOGY CANCER SURVEILLANCE VISIT    Roxie Carter  [id number]  1965    Subjective   Chief Complaint: Ovarian Cancer (GCT, no new complaints)        History of present illness:     Roxie Carter is a 57 y.o. year old female who is here today for surveillance for ovarian GCT.  She has a personal history of recurrent granulosa cell tumor, see cancer history below.  She is on Arimidex maintenance for hormone blockade since 4/2020, now 2.5 years since secondary debulking. Last CT scan performed in May 2021 was negative for disease.    She reports she is feeling very well today and has no new complaints. She has ongoing intermittent LLQ discomfort, but this is unchanged since her last surgery. She denies vaginal bleeding, pelvic pain, new abdominal pain, and changes in bowel or bladder function. She recently met another woman with a history of GCT and connecting with her has been very beneficial for Roxie.       Oncology History:    Oncology/Hematology History   Granulosa cell tumor of ovary, right   8/13/2013 Cancer Staged    Staging form: Ovary, Fallopian Tube, And Primary Peritoneal Carcinoma, AJCC 8th Edition  - Clinical stage from 8/13/2013: FIGO Stage IC (cT1c, cN0, cM0) - Signed by Jamee Mcguire MD on 3/26/2020     8/13/2013 Surgery    Total abdominal hysterectomy, bilateral salpingo-oophorectomy with pathology showing 5 cm granulosa cell tumor of the right ovary and a left fallopian tube intraepithelial serous neoplasm.      - 1/4/2014 Chemotherapy    OP OVARIAN PACLitaxel / CARBOplatin (Q21D)  x6 cycles remarkable for bone marrow suppression and G-CSF support     3/14/2020 Progression    CT Abdomen/Pelvis IMPRESSION:  Large heterogeneous mass identified within the pelvis with  fluid seen scattered throughout the abdomen and pelvis. Findings  concerning for recurrence with some stranding of the mesentery in which  spread to the mesentery cannot be excluded.     4/3/2020 Surgery     "Exploratory laparotomy, optimal debulking (R=0), cystoscopy with temporary uteteral catheter, ileal resection with side-to-side anastomosis, rectosigmoid resection/LAR, left ureterolysis, and peritoneal stripping.   Pathology consistent with recurrent granulosa cell tumor at the pelvis with rectosigmoid and partial terminal ileum. No LVSI. Mesenteric nodes and other staging negative.      4/6/2020 -  Hormonal Therapy    Arimidex initiated     4/27/2020 Molecular Testing    CARIS testing results:  RI positive, 1+, 75%  ER negative, MSI stable, PD-L1 negative     6/30/2020 Imaging    CT scan for new abdominal pain showed expected postsurgical changes from resection of pelvic mass without evidence for bulky adenopathy or soft tissue nodular recurrence. No mesenteric reticulation or ascites to suggest peritoneal involvement. No acute intraabdominal or intrapelvic abnormality otherwise noted. Significant colonic stool burden noted.      5/4/2021 Imaging    CT abdomen pelvis:  No evidence for acute intra-abdominal or intrapelvic abnormality with persistent moderate to large colonic stool burden and postsurgical changes however no evidence for bulky adenopathy, peritoneal disease process or active metastatic disease present.           The current medication list and allergy list were reviewed and reconciled.     Past Medical History, Past Surgical History, Social History, Family History have been reviewed and are without significant changes except as mentioned.      Review of Systems   Constitutional: Negative.    Gastrointestinal: Positive for abdominal pain (LLQ, intermittent, mild, unchanged).   Genitourinary: Negative.    Musculoskeletal: Negative.        Objective   Physical Exam  Vital Signs: /83   Pulse 88   Temp 97.7 °F (36.5 °C) (Temporal)   Resp 16   Ht 165.1 cm (65\")   Wt 51.5 kg (113 lb 9.6 oz)   SpO2 98%   BMI 18.90 kg/m²   Vitals:    11/29/22 0851   PainSc: 0-No pain           General Appearance: "  alert, cooperative, no apparent distress, appears stated age    Neurologic/Psychiatric: A&O x 3, gait steady, appropriate affect   HEENT:  Normocephalic, without obvious abnormality, mucous membranes moist   Breasts:  deferred   Abdomen:   Soft, non-tender, non-distended and no organomegaly   Lymph nodes: No cervical, supraclavicular, inguinal adenopathy noted   Extremities: Normal, atraumatic; no clubbing, cyanosis, or edema    Pelvic: External Genitalia  without lesions or skin changes  Vagina  is pink, moist, without lesions.   Vaginal Cuff  Female Vaginal Cuff: smooth, intact and without visible lesions.   Uterus  surgically absent   Ovaries  surgically absent bilaterally and without palpable masses or fullness  Parametria  smooth  Rectovaginal  Female rectovaginal: deferred     ECOG score: 0               Result Review :   The following data was reviewed by: CASEY Kelley on 11/29/2022:    Last imaging study was CT abdomen pelvis 5/4/2021.     Tumor markers:  Component      Latest Ref Rng & Units 4/14/2021 11/5/2021 1/25/2022 4/25/2022   Inhibin A, Ultrasensitive      pg/mL 0.8 0.7 0.6 0.7     Component      Latest Ref Rng & Units 4/14/2021 11/5/2021 1/25/2022 4/25/2022   Inhibin B      0.0 - 16.9 pg/mL <7.0 <7.0 <7.0 <7.0         Procedure Notes:              Assessment and Plan:    Diagnoses and all orders for this visit:    1. Granulosa cell tumor of ovary, right (Primary)  -     Inhibin A, Ultrasensitive; Future  -     Inhibin B; Future    2. Encounter for monitoring anastrozole therapy          There is no evidence of disease upon today's exam. She will be notified of inihibin tumor marker results upon their return. Pending stable labs, continue every 6 month surveillance visits. Continue anastrozole maintenance as prescribed. She is understanding to call with any changes in pelvic symptoms or general GYN concerns at any time between regularly scheduled visits.       Pain assessment was  performed today as a part of patient’s care.  For patients with pain related to surgery, gynecologic malignancy or cancer treatment, the plan is as noted in the assessment/plan.  For patients with pain not related to these issues, they are to seek any further needed care from a more appropriate provider, such as PCP.      Follow-up:     Return to clinic in 6 months for ongoing cancer surveillance.       Electronically signed by CASEY Kelley on 11/29/22 at 09:31 EST

## 2022-12-01 LAB — INHIBIN A SERPL-MCNC: 1.1 PG/ML

## 2022-12-02 LAB — INHIBIN B SERPL-MCNC: <7 PG/ML (ref 0–16.9)

## 2022-12-05 ENCOUNTER — TELEPHONE (OUTPATIENT)
Dept: GYNECOLOGIC ONCOLOGY | Facility: CLINIC | Age: 57
End: 2022-12-05

## 2022-12-05 NOTE — TELEPHONE ENCOUNTER
----- Message from CASEY Kelley sent at 12/5/2022  1:09 PM EST -----  Please notify tumor makers low/stable. Thanks!      Called patient and left a message stating that her tumor markers came back normal and to call us if she has any questions.

## 2022-12-16 ENCOUNTER — LAB (OUTPATIENT)
Dept: LAB | Facility: HOSPITAL | Age: 57
End: 2022-12-16

## 2022-12-16 ENCOUNTER — OFFICE VISIT (OUTPATIENT)
Dept: FAMILY MEDICINE CLINIC | Facility: CLINIC | Age: 57
End: 2022-12-16

## 2022-12-16 VITALS
WEIGHT: 113.8 LBS | HEART RATE: 75 BPM | BODY MASS INDEX: 18.96 KG/M2 | OXYGEN SATURATION: 96 % | HEIGHT: 65 IN | DIASTOLIC BLOOD PRESSURE: 84 MMHG | SYSTOLIC BLOOD PRESSURE: 120 MMHG

## 2022-12-16 DIAGNOSIS — Z00.00 MEDICARE ANNUAL WELLNESS VISIT, SUBSEQUENT: ICD-10-CM

## 2022-12-16 DIAGNOSIS — I10 PRIMARY HYPERTENSION: ICD-10-CM

## 2022-12-16 DIAGNOSIS — F51.01 PRIMARY INSOMNIA: Primary | ICD-10-CM

## 2022-12-16 LAB
ALBUMIN SERPL-MCNC: 4.7 G/DL (ref 3.5–5.2)
ALBUMIN/GLOB SERPL: 2.2 G/DL
ALP SERPL-CCNC: 77 U/L (ref 39–117)
ALT SERPL W P-5'-P-CCNC: 15 U/L (ref 1–33)
ANION GAP SERPL CALCULATED.3IONS-SCNC: 8 MMOL/L (ref 5–15)
AST SERPL-CCNC: 19 U/L (ref 1–32)
BASOPHILS # BLD AUTO: 0.03 10*3/MM3 (ref 0–0.2)
BASOPHILS NFR BLD AUTO: 0.7 % (ref 0–1.5)
BILIRUB SERPL-MCNC: 0.4 MG/DL (ref 0–1.2)
BUN SERPL-MCNC: 17 MG/DL (ref 6–20)
BUN/CREAT SERPL: 21.8 (ref 7–25)
CALCIUM SPEC-SCNC: 9.5 MG/DL (ref 8.6–10.5)
CHLORIDE SERPL-SCNC: 102 MMOL/L (ref 98–107)
CHOLEST SERPL-MCNC: 222 MG/DL (ref 0–200)
CO2 SERPL-SCNC: 30 MMOL/L (ref 22–29)
CREAT SERPL-MCNC: 0.78 MG/DL (ref 0.57–1)
DEPRECATED RDW RBC AUTO: 41 FL (ref 37–54)
EGFRCR SERPLBLD CKD-EPI 2021: 88.7 ML/MIN/1.73
EOSINOPHIL # BLD AUTO: 0.08 10*3/MM3 (ref 0–0.4)
EOSINOPHIL NFR BLD AUTO: 1.9 % (ref 0.3–6.2)
ERYTHROCYTE [DISTWIDTH] IN BLOOD BY AUTOMATED COUNT: 11.9 % (ref 12.3–15.4)
GLOBULIN UR ELPH-MCNC: 2.1 GM/DL
GLUCOSE SERPL-MCNC: 95 MG/DL (ref 65–99)
HCT VFR BLD AUTO: 41.8 % (ref 34–46.6)
HDLC SERPL-MCNC: 49 MG/DL (ref 40–60)
HGB BLD-MCNC: 13.6 G/DL (ref 12–15.9)
IMM GRANULOCYTES # BLD AUTO: 0.01 10*3/MM3 (ref 0–0.05)
IMM GRANULOCYTES NFR BLD AUTO: 0.2 % (ref 0–0.5)
LDLC SERPL CALC-MCNC: 142 MG/DL (ref 0–100)
LDLC/HDLC SERPL: 2.82 {RATIO}
LYMPHOCYTES # BLD AUTO: 1.51 10*3/MM3 (ref 0.7–3.1)
LYMPHOCYTES NFR BLD AUTO: 35.6 % (ref 19.6–45.3)
MCH RBC QN AUTO: 30.3 PG (ref 26.6–33)
MCHC RBC AUTO-ENTMCNC: 32.5 G/DL (ref 31.5–35.7)
MCV RBC AUTO: 93.1 FL (ref 79–97)
MONOCYTES # BLD AUTO: 0.28 10*3/MM3 (ref 0.1–0.9)
MONOCYTES NFR BLD AUTO: 6.6 % (ref 5–12)
NEUTROPHILS NFR BLD AUTO: 2.33 10*3/MM3 (ref 1.7–7)
NEUTROPHILS NFR BLD AUTO: 55 % (ref 42.7–76)
NRBC BLD AUTO-RTO: 0 /100 WBC (ref 0–0.2)
PLATELET # BLD AUTO: 227 10*3/MM3 (ref 140–450)
PMV BLD AUTO: 11.4 FL (ref 6–12)
POTASSIUM SERPL-SCNC: 4.1 MMOL/L (ref 3.5–5.2)
PROT SERPL-MCNC: 6.8 G/DL (ref 6–8.5)
RBC # BLD AUTO: 4.49 10*6/MM3 (ref 3.77–5.28)
SODIUM SERPL-SCNC: 140 MMOL/L (ref 136–145)
TRIGL SERPL-MCNC: 173 MG/DL (ref 0–150)
VLDLC SERPL-MCNC: 31 MG/DL (ref 5–40)
WBC NRBC COR # BLD: 4.24 10*3/MM3 (ref 3.4–10.8)

## 2022-12-16 PROCEDURE — 80053 COMPREHEN METABOLIC PANEL: CPT

## 2022-12-16 PROCEDURE — 80061 LIPID PANEL: CPT

## 2022-12-16 PROCEDURE — 84443 ASSAY THYROID STIM HORMONE: CPT

## 2022-12-16 PROCEDURE — 1170F FXNL STATUS ASSESSED: CPT | Performed by: INTERNAL MEDICINE

## 2022-12-16 PROCEDURE — 85025 COMPLETE CBC W/AUTO DIFF WBC: CPT

## 2022-12-16 PROCEDURE — G0402 INITIAL PREVENTIVE EXAM: HCPCS | Performed by: INTERNAL MEDICINE

## 2022-12-16 PROCEDURE — 1160F RVW MEDS BY RX/DR IN RCRD: CPT | Performed by: INTERNAL MEDICINE

## 2022-12-16 NOTE — ASSESSMENT & PLAN NOTE
Hypertension is improving with lifestyle modifications.  Dietary sodium restriction.  Regular aerobic exercise.  She has no follow-up Toprol-XL.  Continue current behavior modification  Blood pressure will be reassessed at the next regular appointment.

## 2022-12-16 NOTE — PROGRESS NOTES
The ABCs of the Annual Wellness Visit  Subsequent Medicare Wellness Visit    Subjective      Roxie Carter is a 57 y.o. female who presents for a Subsequent Medicare Wellness Visit.    The following portions of the patient's history were reviewed and   updated as appropriate:   She  has a past medical history of Abnormal Pap smear of cervix (2013/2020), Allergic, Anxiety (01/2022), Arthritis, Cancer (HCC) (2013), Constipation, Depression, Headache, Heartburn, History of pneumonia (02/2019), Low back pain, Murmur, Ovarian cancer (HCC), Ovarian cyst, Pelvic mass, Pneumonia, Recurrent pregnancy loss, antepartum condition or complication, Urinary tract infection (2020 and 2021), and Vitamin D deficiency.  She does not have any pertinent problems on file.  She  has a past surgical history that includes Hysterectomy (2013); Colonoscopy (2016); Esophagogastroduodenoscopy; Back surgery; Exploratory Laparotomy (N/A, 04/03/2020); Spine surgery (2004/2006/2008); Tubal ligation (1993); Colon surgery (04/03/2020); Breast biopsy; Total abdominal hysterectomy w/ bilateral salpingoophorectomy (2013); laparoscopic assisted vaginal hysterectomy salpingo oophorectomy (2020); Hysteroscopy (2013); Ovarian cyst surgery; and Fountain tooth extraction.  Her family history includes Anxiety disorder in her brother, sister, and sister; Arthritis in her mother; Asthma in her sister; Birth defects in her brother; COPD in her mother; Cancer in her father, maternal aunt, and maternal uncle; Depression in her brother, mother, sister, and sister; Diabetes in her father and mother; Diabetes type II in her father and mother; Drug abuse in her brother; Heart attack in her father; Heart disease in her father; Hyperlipidemia in her father and mother; Hypertension in her father and mother; Liver disease in her father; Lung cancer in her maternal uncle; Mental illness in her sister and son; Migraines in her brother and sister; Miscarriages /  Stillbirths in her sister and sister; No Known Problems in her maternal grandfather, maternal grandmother, paternal grandfather, and paternal grandmother; Vision loss in her mother.  She  reports that she quit smoking about 4 years ago. Her smoking use included cigarettes. She has a 7.50 pack-year smoking history. She has never used smokeless tobacco. She reports that she does not drink alcohol and does not use drugs.  Current Outpatient Medications   Medication Sig Dispense Refill   • anastrozole (ARIMIDEX) 1 MG tablet Take 1 tablet by mouth Daily. 30 tablet 5   • baclofen (LIORESAL) 10 MG tablet Take 1 tablet by mouth 3 (Three) Times a Day As Needed for Muscle Spasms. 90 tablet 1   • busPIRone (BUSPAR) 10 MG tablet Take 0.5 tablets by mouth 3 (Three) Times a Day. 90 tablet 9   • cyclobenzaprine (FLEXERIL) 10 MG tablet TAKE 1 TABLET BY MOUTH IN THE EVENING AS NEEDED FOR MUSCLE SPASMS     • diphenhydrAMINE (BENADRYL) 25 MG tablet Take 25 mg by mouth At Night As Needed.     • escitalopram (Lexapro) 20 MG tablet Take 1 tablet by mouth Daily. 30 tablet 8   • fluticasone (Flonase) 50 MCG/ACT nasal spray 2 sprays into the nostril(s) as directed by provider Daily. (Patient taking differently: 2 sprays into the nostril(s) as directed by provider As Needed.) 5 mL 3   • ibuprofen (ADVIL,MOTRIN) 200 MG tablet Take  by mouth Every 6 (Six) Hours As Needed for Mild Pain .     • magnesium hydroxide (MILK OF MAGNESIA) 400 MG/5ML suspension Take  by mouth Daily As Needed for Constipation.     • meclizine (ANTIVERT) 50 MG tablet Take 1 tablet by mouth 3 (Three) Times a Day As Needed for Dizziness. 60 tablet 0   • traMADol (Ultram) 50 MG tablet Take 1 tablet by mouth 2 (Two) Times a Day. 60 tablet 1   • zolpidem (AMBIEN) 10 MG tablet Take 1 tablet by mouth At Night As Needed for Sleep. 30 tablet 2     No current facility-administered medications for this visit.     Current Outpatient Medications on File Prior to Visit   Medication  Sig   • anastrozole (ARIMIDEX) 1 MG tablet Take 1 tablet by mouth Daily.   • baclofen (LIORESAL) 10 MG tablet Take 1 tablet by mouth 3 (Three) Times a Day As Needed for Muscle Spasms.   • busPIRone (BUSPAR) 10 MG tablet Take 0.5 tablets by mouth 3 (Three) Times a Day.   • cyclobenzaprine (FLEXERIL) 10 MG tablet TAKE 1 TABLET BY MOUTH IN THE EVENING AS NEEDED FOR MUSCLE SPASMS   • diphenhydrAMINE (BENADRYL) 25 MG tablet Take 25 mg by mouth At Night As Needed.   • escitalopram (Lexapro) 20 MG tablet Take 1 tablet by mouth Daily.   • fluticasone (Flonase) 50 MCG/ACT nasal spray 2 sprays into the nostril(s) as directed by provider Daily. (Patient taking differently: 2 sprays into the nostril(s) as directed by provider As Needed.)   • ibuprofen (ADVIL,MOTRIN) 200 MG tablet Take  by mouth Every 6 (Six) Hours As Needed for Mild Pain .   • magnesium hydroxide (MILK OF MAGNESIA) 400 MG/5ML suspension Take  by mouth Daily As Needed for Constipation.   • meclizine (ANTIVERT) 50 MG tablet Take 1 tablet by mouth 3 (Three) Times a Day As Needed for Dizziness.   • traMADol (Ultram) 50 MG tablet Take 1 tablet by mouth 2 (Two) Times a Day.   • zolpidem (AMBIEN) 10 MG tablet Take 1 tablet by mouth At Night As Needed for Sleep.   • [DISCONTINUED] metoprolol succinate XL (TOPROL-XL) 25 MG 24 hr tablet Take 1 tablet by mouth Daily.   • [DISCONTINUED] diclofenac (VOLTAREN) 75 MG EC tablet Take 1 tablet by mouth 2 (Two) Times a Day.   • [DISCONTINUED] nitrofurantoin (MACRODANTIN) 50 MG capsule Take 1 capsule by mouth Daily.     No current facility-administered medications on file prior to visit.     She is allergic to penicillins..    Compared to one year ago, the patient feels her physical   health is the same.    Compared to one year ago, the patient feels her mental   health is the same.    Recent Hospitalizations:  She was not admitted to the hospital during the last year.       Current Medical Providers:  Patient Care Team:  Richard  Chip LOUIE MD as PCP - General (Internal Medicine)  Arturo Brady MD as Consulting Physician (Gastroenterology)  Jamee Mcguire MD as Consulting Physician (Gynecologic Oncology)    Outpatient Medications Prior to Visit   Medication Sig Dispense Refill   • anastrozole (ARIMIDEX) 1 MG tablet Take 1 tablet by mouth Daily. 30 tablet 5   • baclofen (LIORESAL) 10 MG tablet Take 1 tablet by mouth 3 (Three) Times a Day As Needed for Muscle Spasms. 90 tablet 1   • busPIRone (BUSPAR) 10 MG tablet Take 0.5 tablets by mouth 3 (Three) Times a Day. 90 tablet 9   • cyclobenzaprine (FLEXERIL) 10 MG tablet TAKE 1 TABLET BY MOUTH IN THE EVENING AS NEEDED FOR MUSCLE SPASMS     • diphenhydrAMINE (BENADRYL) 25 MG tablet Take 25 mg by mouth At Night As Needed.     • escitalopram (Lexapro) 20 MG tablet Take 1 tablet by mouth Daily. 30 tablet 8   • fluticasone (Flonase) 50 MCG/ACT nasal spray 2 sprays into the nostril(s) as directed by provider Daily. (Patient taking differently: 2 sprays into the nostril(s) as directed by provider As Needed.) 5 mL 3   • ibuprofen (ADVIL,MOTRIN) 200 MG tablet Take  by mouth Every 6 (Six) Hours As Needed for Mild Pain .     • magnesium hydroxide (MILK OF MAGNESIA) 400 MG/5ML suspension Take  by mouth Daily As Needed for Constipation.     • meclizine (ANTIVERT) 50 MG tablet Take 1 tablet by mouth 3 (Three) Times a Day As Needed for Dizziness. 60 tablet 0   • traMADol (Ultram) 50 MG tablet Take 1 tablet by mouth 2 (Two) Times a Day. 60 tablet 1   • zolpidem (AMBIEN) 10 MG tablet Take 1 tablet by mouth At Night As Needed for Sleep. 30 tablet 2   • metoprolol succinate XL (TOPROL-XL) 25 MG 24 hr tablet Take 1 tablet by mouth Daily. 90 tablet 1   • diclofenac (VOLTAREN) 75 MG EC tablet Take 1 tablet by mouth 2 (Two) Times a Day. 60 tablet 5   • nitrofurantoin (MACRODANTIN) 50 MG capsule Take 1 capsule by mouth Daily. 30 capsule 5     No facility-administered medications prior to visit.       Opioid  "medication/s are on active medication list.  and I have evaluated her active treatment plan and pain score trends (see table).  There were no vitals filed for this visit.  I have reviewed the chart for potential of high risk medication and harmful drug interactions in the elderly.            Aspirin is not on active medication list.  Aspirin use is not indicated based on review of current medical condition/s. Risk of harm outweighs potential benefits.  .    Patient Active Problem List   Diagnosis   • Constipation   • Granulosa cell tumor of ovary, right   • Financial difficulties   • Other social stressor   • Malignant neoplasm of ovary (HCC)   • LLQ pain   • Hemorrhoids   • Rectal pain   • Left eye pain   • Left lumbar radiculitis   • Vaginal discharge   • Anxiety   • Psychophysiological insomnia   • Primary insomnia   • Hypovitaminosis D   • Chest pain in adult   • Postural dizziness with presyncope   • Arm numbness left   • Neck pain   • Vertigo   • Palpitations   • Primary hypertension   • Acute left-sided low back pain without sciatica     Advance Care Planning  Advance Directive is on file.  ACP discussion was held with the patient during this visit. Patient has an advance directive in EMR which is still valid.      Objective    Vitals:    22 1055   BP: 120/84   Pulse: 75   SpO2: 96%   Weight: 51.6 kg (113 lb 12.8 oz)   Height: 165.1 cm (65\")     Estimated body mass index is 18.94 kg/m² as calculated from the following:    Height as of this encounter: 165.1 cm (65\").    Weight as of this encounter: 51.6 kg (113 lb 12.8 oz).    BMI is within normal parameters. No other follow-up for BMI required.      Does the patient have evidence of cognitive impairment?   No            HEALTH RISK ASSESSMENT    Smoking Status:  Social History     Tobacco Use   Smoking Status Former   • Packs/day: 0.25   • Years: 30.00   • Pack years: 7.50   • Types: Cigarettes   • Quit date: 2018   • Years since quittin.3 "   Smokeless Tobacco Never   Tobacco Comments    I dont remember the date I started smoking. But I was 16 when I started.     Alcohol Consumption:  Social History     Substance and Sexual Activity   Alcohol Use Never     Fall Risk Screen:    ALEXEIADI Fall Risk Assessment was completed, and patient is at LOW risk for falls.Assessment completed on:12/16/2022    Depression Screening:  PHQ-2/PHQ-9 Depression Screening 12/16/2022   Retired PHQ-9 Total Score -   Retired Total Score -   Little Interest or Pleasure in Doing Things 0-->not at all   Feeling Down, Depressed or Hopeless 0-->not at all   PHQ-9: Brief Depression Severity Measure Score 0       Health Habits and Functional and Cognitive Screening:  Functional & Cognitive Status 12/16/2022   Do you have difficulty preparing food and eating? No   Do you have difficulty bathing yourself, getting dressed or grooming yourself? No   Do you have difficulty using the toilet? No   Do you have difficulty moving around from place to place? No   Do you have trouble with steps or getting out of a bed or a chair? No   Current Diet Well Balanced Diet   Dental Exam Up to date   Eye Exam Not up to date   Exercise (times per week) 0 times per week   Current Exercises Include No Regular Exercise   Do you need help using the phone?  No   Are you deaf or do you have serious difficulty hearing?  No   Do you need help with transportation? Yes   Do you need help shopping? No   Do you need help preparing meals?  No   Do you need help with housework?  No   Do you need help with laundry? No   Do you need help taking your medications? No   Do you need help managing money? No   Do you ever drive or ride in a car without wearing a seat belt? No   Have you felt unusual stress, anger or loneliness in the last month? No   Who do you live with? Alone   If you need help, do you have trouble finding someone available to you? No   Have you been bothered in the last four weeks by sexual problems? No   Do  you have difficulty concentrating, remembering or making decisions? No       Age-appropriate Screening Schedule:  Refer to the list below for future screening recommendations based on patient's age, sex and/or medical conditions. Orders for these recommended tests are listed in the plan section. The patient has been provided with a written plan.    Health Maintenance   Topic Date Due   • MAMMOGRAM  Never done   • TDAP/TD VACCINES (2 - Td or Tdap) 01/02/2029   • INFLUENZA VACCINE  Completed   • ZOSTER VACCINE  Completed                CMS Preventative Services Quick Reference  Risk Factors Identified During Encounter:    Chronic Pain: Natural history and expected course discussed. Questions answered.  Depression/Dysphoria: Current medication is effective, no change recommended  Dental Screening Recommended  Vision Screening Recommended    The above risks/problems have been discussed with the patient.  Pertinent information has been shared with the patient in the After Visit Summary.    Diagnoses and all orders for this visit:    1. Primary insomnia (Primary)    2. Primary hypertension  Assessment & Plan:  Hypertension is improving with lifestyle modifications.  Dietary sodium restriction.  Regular aerobic exercise.  She has no follow-up Toprol-XL.  Continue current behavior modification  Blood pressure will be reassessed at the next regular appointment.    Orders:  -     CBC & Differential; Future  -     TSH Rfx On Abnormal To Free T4; Future  -     Comprehensive Metabolic Panel; Future    3. Medicare annual wellness visit, subsequent  -     Lipid Panel; Future    Mammogram currently pending.  Follow Up:   Next Medicare Wellness visit to be scheduled in 1 year.      An After Visit Summary and PPPS were made available to the patient.

## 2022-12-17 LAB — TSH SERPL DL<=0.05 MIU/L-ACNC: 0.89 UIU/ML (ref 0.27–4.2)

## 2022-12-19 ENCOUNTER — TELEPHONE (OUTPATIENT)
Dept: FAMILY MEDICINE CLINIC | Facility: CLINIC | Age: 57
End: 2022-12-19

## 2022-12-19 NOTE — PROGRESS NOTES
Except for cholesterol , labs look good. Trial of Mediterranean diet and metamucil.2 tbsp bid.. recheck 6 mon

## 2022-12-19 NOTE — TELEPHONE ENCOUNTER
Attempted to contact, no answer. Left detailed voicemail relaying provider's message. Letter has been sent.      Except for cholesterol , labs look good. Trial of Mediterranean diet and metamucil.2 tbsp bid.. recheck 6 mon  Hub can relay and document

## 2023-01-23 ENCOUNTER — TELEPHONE (OUTPATIENT)
Dept: FAMILY MEDICINE CLINIC | Facility: CLINIC | Age: 58
End: 2023-01-23
Payer: MEDICARE

## 2023-01-23 NOTE — TELEPHONE ENCOUNTER
Pt was going to set up and appointment for a mammogram and was asked if she had been having any pain or tenderness around her breasts, she informed them that she was. Radiology then informed her that before setting up an appointment for her to speak with her PCP to see if he wants her to have a more detailed procedure or if he thinks just the standard mammogram will do.    Please advise   Roxie Carter  [phone]

## 2023-01-23 NOTE — TELEPHONE ENCOUNTER
Spoke with patient.  New onset focal pain along breast.  She will need appointment for exam and new mammogram diagnostic order.  She would like appointment next Thursday/Friday if possible.  Please call to schedule her.

## 2023-02-02 ENCOUNTER — OFFICE VISIT (OUTPATIENT)
Dept: FAMILY MEDICINE CLINIC | Facility: CLINIC | Age: 58
End: 2023-02-02
Payer: MEDICARE

## 2023-02-02 VITALS
WEIGHT: 113.8 LBS | DIASTOLIC BLOOD PRESSURE: 80 MMHG | BODY MASS INDEX: 18.96 KG/M2 | HEART RATE: 75 BPM | OXYGEN SATURATION: 99 % | HEIGHT: 65 IN | TEMPERATURE: 97.8 F | SYSTOLIC BLOOD PRESSURE: 120 MMHG

## 2023-02-02 DIAGNOSIS — F51.01 PRIMARY INSOMNIA: ICD-10-CM

## 2023-02-02 DIAGNOSIS — N64.4 BREAST PAIN, LEFT: Primary | ICD-10-CM

## 2023-02-02 DIAGNOSIS — R00.2 PALPITATIONS: ICD-10-CM

## 2023-02-02 DIAGNOSIS — I10 PRIMARY HYPERTENSION: ICD-10-CM

## 2023-02-02 PROCEDURE — 99214 OFFICE O/P EST MOD 30 MIN: CPT | Performed by: NURSE PRACTITIONER

## 2023-02-02 RX ORDER — METOPROLOL SUCCINATE 25 MG/1
25 TABLET, EXTENDED RELEASE ORAL DAILY
Qty: 30 TABLET | Refills: 2 | Status: SHIPPED | OUTPATIENT
Start: 2023-02-02 | End: 2023-03-22 | Stop reason: SDUPTHER

## 2023-02-02 RX ORDER — ZOLPIDEM TARTRATE 10 MG/1
10 TABLET ORAL NIGHTLY PRN
Qty: 30 TABLET | Refills: 2 | Status: SHIPPED | OUTPATIENT
Start: 2023-02-02 | End: 2023-02-07 | Stop reason: SDUPTHER

## 2023-02-02 RX ORDER — DICLOFENAC SODIUM 75 MG/1
1 TABLET, DELAYED RELEASE ORAL EVERY 12 HOURS SCHEDULED
COMMUNITY
Start: 2023-01-09

## 2023-02-02 NOTE — PROGRESS NOTES
Chief Complaint   Patient presents with   • Mammogram     Patient would like to dicuss mammogram. Patient states she tried to make an appointment to get her mammogram. And was told that she needed to get something done (doesn't remember what it was)       Subjective   Roxie Carter is a 57 y.o. female    History of Present Illness  Patient today with complaints of left lateral breast pain.  She was originally scheduled for a standard mammogram by her primary care and in discussing with the mammogram  noted she had some left lateral breast pain.  And this is been going on for about a week. Left shoulder pain all the time, down left arm to elbow, stabbing and sharp pain, usually when sleeping or relaxing.  She does take diclofenac 75 mg twice a day.  She does note occasional racing heartbeat and at one time was on a beta-blocker.  She also does have insomnia and currently takes Ambien 10 mg at bedtime.  She is getting 7 to 8 hours of sleep per night.  She denies any side effects of this medication.    Answers for HPI/ROS submitted by the patient on 1/26/2023  Please describe your symptoms.: Breast exam  Have you had these symptoms before?: No  How long have you been having these symptoms?: 1-4 days  Please list any medications you are currently taking for this condition.: Ibuprofen  Please describe any probable cause for these symptoms. : Not sure  What is the primary reason for your visit?: Other        Allergies   Allergen Reactions   • Penicillins Hives     Past Medical History:   Diagnosis Date   • Abnormal Pap smear of cervix 2013/2020   • Allergic     Can not remember the date when diagnosed for allergy. Allergic to penicillin   • Anxiety 01/2022   • Arthritis    • Cancer (HCC) 2013    OVARIAN; s/p hysterectomy with BSO and chemo therapy that ended in 2014   • Constipation    • Depression     Not sure of date   • Headache     Not for sure of the date. But been having them for countless years.   •  Heartburn    • History of pneumonia 2019    no hospitalization    • Low back pain    • Murmur     detected as an adult around age 30 and 40; requires prophylactic antibiotics before dental work; pt not sure if she has ever had an echo or when it might have been; Dr Stephen came down and listened to heart sounds on 20 and did not detect a murmur   • Ovarian cancer (HCC)    • Ovarian cyst    • Pelvic mass    • Pneumonia     Don't remember the exact date. But it was in  or    • Recurrent pregnancy loss, antepartum condition or complication    • Urinary tract infection  and    • Vitamin D deficiency       Past Surgical History:   Procedure Laterality Date   • BACK SURGERY      fusion    • BREAST BIOPSY      Dont remember the actural date   • COLON SURGERY  2020   • COLONOSCOPY     • ENDOSCOPY     • EXPLORATORY LAPAROTOMY N/A 2020    Procedure: LAPAROTOMY EXPLORATORY,  OPTIMAL DEBULKING, CYSTOSCOPY WITH URETERAL CATHTER INSERTION AND REMOVAL, ILEO RESECTION, SIDE TO SIDE ANASTOMOSIS, RECTAL SIGMOID RESECTION, LEFT URETERA LYSIS, AND PERITONEAL STRIPPING;  Surgeon: Jamee Mcguire MD;  Location: [facility];  Service: Gynecology Oncology;  Laterality: N/A;   • HYSTERECTOMY      with BSO   • HYSTEROSCOPY     • LAPAROSCOPIC ASSISTED VAGINAL HYSTERECTOMY SALPINGO OOPHORECTOMY     • OVARIAN CYST SURGERY         • SPINE SURGERY  //    Fusion of the spine in    • TOTAL ABDOMINAL HYSTERECTOMY WITH SALPINGO OOPHORECTOMY     • TUBAL ABDOMINAL LIGATION     • WISDOM TOOTH EXTRACTION      Can't remember date they were removed.     Social History     Socioeconomic History   • Marital status: Single   Tobacco Use   • Smoking status: Former     Packs/day: 0.25     Years: 30.00     Pack years: 7.50     Types: Cigarettes     Quit date: 2018     Years since quittin.4   • Smokeless tobacco: Never   • Tobacco comments:     I dont remember the date I  started smoking. But I was 16 when I started.   Vaping Use   • Vaping Use: Former   • Quit date: 4/14/2019   • Substances: Nicotine   • Devices: Pre-filled or refillable cartridge   Substance and Sexual Activity   • Alcohol use: Never   • Drug use: Never   • Sexual activity: Not Currently     Partners: Male     Birth control/protection: None     Comment: Have not be sexually active in 9 years        Current Outpatient Medications:   •  anastrozole (ARIMIDEX) 1 MG tablet, Take 1 tablet by mouth Daily., Disp: 30 tablet, Rfl: 5  •  baclofen (LIORESAL) 10 MG tablet, Take 1 tablet by mouth 3 (Three) Times a Day As Needed for Muscle Spasms., Disp: 90 tablet, Rfl: 1  •  busPIRone (BUSPAR) 10 MG tablet, Take 0.5 tablets by mouth 3 (Three) Times a Day., Disp: 90 tablet, Rfl: 9  •  cyclobenzaprine (FLEXERIL) 10 MG tablet, TAKE 1 TABLET BY MOUTH IN THE EVENING AS NEEDED FOR MUSCLE SPASMS, Disp: , Rfl:   •  diclofenac (VOLTAREN) 75 MG EC tablet, Take 1 tablet by mouth Every 12 (Twelve) Hours., Disp: , Rfl:   •  diphenhydrAMINE (BENADRYL) 25 MG tablet, Take 25 mg by mouth At Night As Needed., Disp: , Rfl:   •  escitalopram (Lexapro) 20 MG tablet, Take 1 tablet by mouth Daily., Disp: 30 tablet, Rfl: 8  •  fluticasone (Flonase) 50 MCG/ACT nasal spray, 2 sprays into the nostril(s) as directed by provider Daily. (Patient taking differently: 2 sprays into the nostril(s) as directed by provider As Needed.), Disp: 5 mL, Rfl: 3  •  ibuprofen (ADVIL,MOTRIN) 200 MG tablet, Take  by mouth Every 6 (Six) Hours As Needed for Mild Pain ., Disp: , Rfl:   •  magnesium hydroxide (MILK OF MAGNESIA) 400 MG/5ML suspension, Take  by mouth Daily As Needed for Constipation., Disp: , Rfl:   •  meclizine (ANTIVERT) 50 MG tablet, Take 1 tablet by mouth 3 (Three) Times a Day As Needed for Dizziness., Disp: 60 tablet, Rfl: 0  •  traMADol (Ultram) 50 MG tablet, Take 1 tablet by mouth 2 (Two) Times a Day., Disp: 60 tablet, Rfl: 1  •  zolpidem (AMBIEN) 10 MG  tablet, Take 1 tablet by mouth At Night As Needed for Sleep., Disp: 30 tablet, Rfl: 2  •  metoprolol succinate XL (Toprol XL) 25 MG 24 hr tablet, Take 1 tablet by mouth Daily., Disp: 30 tablet, Rfl: 2     Review of Systems   Constitutional: Negative for chills, fatigue, fever and unexpected weight change.   Respiratory: Negative for cough, chest tightness, shortness of breath and wheezing.    Cardiovascular: Positive for chest pain. Negative for palpitations and leg swelling.        Left lateral breast pain, 3 oclock, 5 inches from nipple   Gastrointestinal: Negative for constipation, diarrhea, nausea and vomiting.   Genitourinary: Negative for difficulty urinating and dysuria.   Musculoskeletal: Positive for arthralgias (left shoulder).   Skin: Negative for color change and rash.   Neurological: Negative for dizziness, syncope, weakness and headaches.   Psychiatric/Behavioral: Positive for sleep disturbance.       Objective     Vitals:    02/02/23 1114   BP: 120/80   Pulse: 75   Temp: 97.8 °F (36.6 °C)   SpO2: 99%         02/02/23  1114   Weight: 51.6 kg (113 lb 12.8 oz)     Body mass index is 18.94 kg/m².  Results for orders placed or performed in visit on 12/16/22   Lipid Panel    Specimen: Blood   Result Value Ref Range    Total Cholesterol 222 (H) 0 - 200 mg/dL    Triglycerides 173 (H) 0 - 150 mg/dL    HDL Cholesterol 49 40 - 60 mg/dL    LDL Cholesterol  142 (H) 0 - 100 mg/dL    VLDL Cholesterol 31 5 - 40 mg/dL    LDL/HDL Ratio 2.82    TSH Rfx On Abnormal To Free T4    Specimen: Blood   Result Value Ref Range    TSH 0.893 0.270 - 4.200 uIU/mL   Comprehensive Metabolic Panel    Specimen: Blood   Result Value Ref Range    Glucose 95 65 - 99 mg/dL    BUN 17 6 - 20 mg/dL    Creatinine 0.78 0.57 - 1.00 mg/dL    Sodium 140 136 - 145 mmol/L    Potassium 4.1 3.5 - 5.2 mmol/L    Chloride 102 98 - 107 mmol/L    CO2 30.0 (H) 22.0 - 29.0 mmol/L    Calcium 9.5 8.6 - 10.5 mg/dL    Total Protein 6.8 6.0 - 8.5 g/dL    Albumin  4.70 3.50 - 5.20 g/dL    ALT (SGPT) 15 1 - 33 U/L    AST (SGOT) 19 1 - 32 U/L    Alkaline Phosphatase 77 39 - 117 U/L    Total Bilirubin 0.4 0.0 - 1.2 mg/dL    Globulin 2.1 gm/dL    A/G Ratio 2.2 g/dL    BUN/Creatinine Ratio 21.8 7.0 - 25.0    Anion Gap 8.0 5.0 - 15.0 mmol/L    eGFR 88.7 >60.0 mL/min/1.73   CBC Auto Differential    Specimen: Blood   Result Value Ref Range    WBC 4.24 3.40 - 10.80 10*3/mm3    RBC 4.49 3.77 - 5.28 10*6/mm3    Hemoglobin 13.6 12.0 - 15.9 g/dL    Hematocrit 41.8 34.0 - 46.6 %    MCV 93.1 79.0 - 97.0 fL    MCH 30.3 26.6 - 33.0 pg    MCHC 32.5 31.5 - 35.7 g/dL    RDW 11.9 (L) 12.3 - 15.4 %    RDW-SD 41.0 37.0 - 54.0 fl    MPV 11.4 6.0 - 12.0 fL    Platelets 227 140 - 450 10*3/mm3    Neutrophil % 55.0 42.7 - 76.0 %    Lymphocyte % 35.6 19.6 - 45.3 %    Monocyte % 6.6 5.0 - 12.0 %    Eosinophil % 1.9 0.3 - 6.2 %    Basophil % 0.7 0.0 - 1.5 %    Immature Grans % 0.2 0.0 - 0.5 %    Neutrophils, Absolute 2.33 1.70 - 7.00 10*3/mm3    Lymphocytes, Absolute 1.51 0.70 - 3.10 10*3/mm3    Monocytes, Absolute 0.28 0.10 - 0.90 10*3/mm3    Eosinophils, Absolute 0.08 0.00 - 0.40 10*3/mm3    Basophils, Absolute 0.03 0.00 - 0.20 10*3/mm3    Immature Grans, Absolute 0.01 0.00 - 0.05 10*3/mm3    nRBC 0.0 0.0 - 0.2 /100 WBC       Physical Exam  Vitals reviewed.   Constitutional:       Appearance: She is well-developed.   HENT:      Head: Normocephalic and atraumatic.   Cardiovascular:      Rate and Rhythm: Normal rate and regular rhythm.      Heart sounds: Normal heart sounds.   Pulmonary:      Effort: Pulmonary effort is normal.      Breath sounds: Normal breath sounds.   Chest:   Breasts:     Left: Tenderness present.      Comments: left lateral breast pain, 3 oclock, 5 inches from nipple  Skin:     General: Skin is warm and dry.   Neurological:      Mental Status: She is alert and oriented to person, place, and time.   Psychiatric:         Behavior: Behavior normal.         Assessment & Plan   Problems  Addressed this Visit        Cardiac and Vasculature    Palpitations    Relevant Medications    metoprolol succinate XL (Toprol XL) 25 MG 24 hr tablet    Primary hypertension    Relevant Medications    metoprolol succinate XL (Toprol XL) 25 MG 24 hr tablet       Sleep    Primary insomnia    Relevant Medications    zolpidem (AMBIEN) 10 MG tablet   Other Visit Diagnoses     Breast pain, left    -  Primary    Relevant Orders    Mammo Diagnostic Digital Tomosynthesis Bilateral With CAD      Diagnoses       Codes Comments    Breast pain, left    -  Primary ICD-10-CM: N64.4  ICD-9-CM: 611.71     Primary insomnia     ICD-10-CM: F51.01  ICD-9-CM: 307.42     Palpitations     ICD-10-CM: R00.2  ICD-9-CM: 785.1     Primary hypertension     ICD-10-CM: I10  ICD-9-CM: 401.9       For palpitations and high blood pressure history were going to go and put her back on metoprolol XL 25 mg daily. Patient instructed to check blood pressure daily, record, and bring to next visit. If the readings run 140/90 or greater, the patient is to call my office or return sooner for evaluation. Pt advised to eat a heart healthy diet and get regular aerobic exercise.    For left breast pain were going to get a diagnostic mammogram with possible ultrasound.  We will make further recommendations pending outcome of this test.    For insomnia begin to refill Ambien at this time. As part of this patient's treatment plan I am prescribing controlled substances. The patient has been made aware of appropriate use of such medications, including potential risk of somnolence, limited ability to drive and /or work safely, and potential for dependence or overdose. It has also been made clear that these medications are for use by this patient only, without concomitant use of alcohol or other substances unless prescribed.  Patient has completed prescribing agreement detailing terms of continued prescribing of controlled substances, including monitoring PAL reports,  urine drug screening, and pill counts if necessary. The patient is aware that inappropriate use will result in cessation of prescribing such medications.  PLA report has been reviewed and scanned into the patient's chart.  History and physical exam exhibit continued safe and appropriate use of controlled substances at this time. This patient appears to have a low risk of dependence at this time.     Time spent on visit, including counseling, education, reviewing the chart, and any recent test results, was    15    Minutes    Return visit in as scheduled    Counseling provided on [facility].    Francisco Miguel, APRN   2/2/2023   11:58 EST     Please note that portions of this document were completed with a voice recognition program.     At [facility], we believe that sharing information builds trust and better relationships. You are receiving this note because you are receiving care at [facility] or have recently visited. It is possible you will see health information before a provider has talked with you about it. This kind of information can be easy to misunderstand. To help you fully understand what it means for your health, we urge you to discuss this note with your provider.

## 2023-02-07 ENCOUNTER — TELEPHONE (OUTPATIENT)
Dept: FAMILY MEDICINE CLINIC | Facility: CLINIC | Age: 58
End: 2023-02-07
Payer: MEDICARE

## 2023-02-07 DIAGNOSIS — F51.01 PRIMARY INSOMNIA: ICD-10-CM

## 2023-02-07 RX ORDER — ZOLPIDEM TARTRATE 10 MG/1
10 TABLET ORAL NIGHTLY PRN
Qty: 30 TABLET | Refills: 2 | OUTPATIENT
Start: 2023-02-07

## 2023-02-07 NOTE — TELEPHONE ENCOUNTER
Rx Refill Note  Requested Prescriptions     Pending Prescriptions Disp Refills   • zolpidem (AMBIEN) 10 MG tablet 30 tablet 2     Sig: Take 1 tablet by mouth At Night As Needed for Sleep.      Last office visit with prescribing clinician: 12/16/2022   Last telemedicine visit with prescribing clinician: 3/17/2023   Next office visit with prescribing clinician: 3/17/2023                         Would you like a call back once the refill request has been completed: [] Yes [] No    If the office needs to give you a call back, can they leave a voicemail: [] Yes [] No    Aspen Su MA  02/07/23, 13:04 EST

## 2023-02-07 NOTE — TELEPHONE ENCOUNTER
Caller: Roxie Carter    Relationship: Self    Best call back number: [phone]    Requested Prescriptions:   Requested Prescriptions     Pending Prescriptions Disp Refills   • zolpidem (AMBIEN) 10 MG tablet 30 tablet 2     Sig: Take 1 tablet by mouth At Night As Needed for Sleep.        Pharmacy where request should be sent: [id number] - [facility] [address] [facility] [phone] [facility] [phone]      Additional details provided by patient: PATIENT HAS LESS THAN 2 DAYS REMAINING.     PLEASE NOTE, THE PATIENT IS REQUESTING THIS MEDICATION BE SENT TO THE [facility] IN [place], [place]. SHE IS NEEDING A NEW PRESCRIPTION FOR THIS MEDICATION TO BE SENT AS THIS IS UNABLE TO BE TRANSFERRED FROM THE [facility] IN [place].     Does the patient have less than a 3 day supply:  [x] Yes  [] No    Would you like a call back once the refill request has been completed: [x] Yes [] No    If the office needs to give you a call back, can they leave a voicemail: [x] Yes [] No    Vijay Anderson Rep   02/07/23 15:46 EST

## 2023-02-07 NOTE — TELEPHONE ENCOUNTER
Caller: Roxie Carter    Relationship: Self    Best call back number: [phone]    Requested Prescriptions:   Requested Prescriptions     Pending Prescriptions Disp Refills   • zolpidem (AMBIEN) 10 MG tablet 30 tablet 2     Sig: Take 1 tablet by mouth At Night As Needed for Sleep.        Pharmacy where request should be sent:    [facility] [id number] [address], [place], [place] [address] [phone]     Additional details provided by patient: PATIENT HAS 2 PILLS LEFT. PATIENT HAD AN EMERGENCY AND HAD TO GO OUT OF TOWN. SHE IS OUT OF STATE AND NEEDS THIS SENT OVER THE THE PHARMACY THERE. PLEASE CALL IF THERE ARE ANY QUESTIONS OF CONCERNS. PATIENT DOES NOT KNOW WHEN SHE WILL BE BACK. SHE IS ALSO WANTING TO KNOW IF HER OTHER MEDICATION CAN BE TRANSFERRED TO THE TEMPORARY PHARMACY.     Does the patient have less than a 3 day supply:  [x] Yes  [] No    Would you like a call back once the refill request has been completed: [x] Yes [] No    If the office needs to give you a call back, can they leave a voicemail: [x] Yes [] No    Vijay Toney Rep   02/07/23 12:19 EST

## 2023-02-08 RX ORDER — ZOLPIDEM TARTRATE 10 MG/1
10 TABLET ORAL NIGHTLY PRN
Qty: 30 TABLET | Refills: 0 | Status: SHIPPED | OUTPATIENT
Start: 2023-02-08 | End: 2023-03-16 | Stop reason: SDUPTHER

## 2023-02-21 DIAGNOSIS — F41.9 ANXIETY: ICD-10-CM

## 2023-02-21 RX ORDER — ESCITALOPRAM OXALATE 20 MG/1
20 TABLET ORAL DAILY
Qty: 30 TABLET | Refills: 8 | Status: SHIPPED | OUTPATIENT
Start: 2023-02-21

## 2023-02-21 NOTE — TELEPHONE ENCOUNTER
Rx Refill Note  Requested Prescriptions     Pending Prescriptions Disp Refills   • escitalopram (Lexapro) 20 MG tablet 30 tablet 8     Sig: Take 1 tablet by mouth Daily.      Last office visit with prescribing clinician: 4/27/2022   Last telemedicine visit with prescribing clinician: 3/17/2023   Next office visit with prescribing clinician: Visit date not found                         Would you like a call back once the refill request has been completed: [] Yes [] No    If the office needs to give you a call back, can they leave a voicemail: [] Yes [] No    Nita Pulido MA  02/21/23, 13:46 EST

## 2023-03-01 RX ORDER — ANASTROZOLE 1 MG/1
1 TABLET ORAL DAILY
Qty: 30 TABLET | Refills: 5 | Status: SHIPPED | OUTPATIENT
Start: 2023-03-01

## 2023-03-16 ENCOUNTER — TELEPHONE (OUTPATIENT)
Dept: FAMILY MEDICINE CLINIC | Facility: CLINIC | Age: 58
End: 2023-03-16
Payer: MEDICARE

## 2023-03-16 DIAGNOSIS — F51.01 PRIMARY INSOMNIA: ICD-10-CM

## 2023-03-16 RX ORDER — ZOLPIDEM TARTRATE 10 MG/1
10 TABLET ORAL NIGHTLY PRN
Qty: 30 TABLET | Refills: 2 | Status: SHIPPED | OUTPATIENT
Start: 2023-03-16 | End: 2023-03-22 | Stop reason: SDUPTHER

## 2023-03-16 NOTE — TELEPHONE ENCOUNTER
Rx Refill Note  Requested Prescriptions     Pending Prescriptions Disp Refills   • zolpidem (AMBIEN) 10 MG tablet 30 tablet 0     Sig: Take 1 tablet by mouth At Night As Needed for Sleep.      Last office visit with prescribing clinician: 12/16/2022   Last telemedicine visit with prescribing clinician: 3/22/2023   Next office visit with prescribing clinician: 3/22/2023     uds 4-27-22  csa 5-3-22                    Would you like a call back once the refill request has been completed: [] Yes [] No    If the office needs to give you a call back, can they leave a voicemail: [] Yes [] No    Nita Pulido MA  03/16/23, 09:57 EDT

## 2023-03-16 NOTE — TELEPHONE ENCOUNTER
Caller: Raul Roxiedami Lynn    Relationship: Self    Best call back number: [phone]    Requested Prescriptions:   Requested Prescriptions     Pending Prescriptions Disp Refills   • zolpidem (AMBIEN) 10 MG tablet 30 tablet 0     Sig: Take 1 tablet by mouth At Night As Needed for Sleep.      Pharmacy where request should be sent: [facility] [address] [place] [address] [phone] [facility] [phone] FX     Does the patient have less than a 3 day supply:  [x] Yes  [] No    Would you like a call back once the refill request has been completed: [x] Yes [] No    If the office needs to give you a call back, can they leave a voicemail: [x] Yes [] No    Vijay Pritchard Rep   03/16/23 09:11 EDT

## 2023-03-22 ENCOUNTER — OFFICE VISIT (OUTPATIENT)
Dept: FAMILY MEDICINE CLINIC | Facility: CLINIC | Age: 58
End: 2023-03-22
Payer: MEDICARE

## 2023-03-22 VITALS
OXYGEN SATURATION: 98 % | HEIGHT: 65 IN | DIASTOLIC BLOOD PRESSURE: 78 MMHG | SYSTOLIC BLOOD PRESSURE: 118 MMHG | WEIGHT: 115.6 LBS | BODY MASS INDEX: 19.26 KG/M2 | HEART RATE: 83 BPM

## 2023-03-22 DIAGNOSIS — F51.01 PRIMARY INSOMNIA: ICD-10-CM

## 2023-03-22 DIAGNOSIS — R00.2 PALPITATIONS: ICD-10-CM

## 2023-03-22 DIAGNOSIS — R07.9 CHEST PAIN, UNSPECIFIED TYPE: ICD-10-CM

## 2023-03-22 DIAGNOSIS — I10 PRIMARY HYPERTENSION: ICD-10-CM

## 2023-03-22 DIAGNOSIS — N64.4 BREAST PAIN, LEFT: Primary | ICD-10-CM

## 2023-03-22 PROCEDURE — 3074F SYST BP LT 130 MM HG: CPT | Performed by: INTERNAL MEDICINE

## 2023-03-22 PROCEDURE — 3078F DIAST BP <80 MM HG: CPT | Performed by: INTERNAL MEDICINE

## 2023-03-22 PROCEDURE — 99214 OFFICE O/P EST MOD 30 MIN: CPT | Performed by: INTERNAL MEDICINE

## 2023-03-22 RX ORDER — ZOLPIDEM TARTRATE 10 MG/1
10 TABLET ORAL NIGHTLY PRN
Qty: 30 TABLET | Refills: 2 | Status: SHIPPED | OUTPATIENT
Start: 2023-03-22

## 2023-03-22 RX ORDER — METOPROLOL SUCCINATE 25 MG/1
25 TABLET, EXTENDED RELEASE ORAL DAILY
Qty: 30 TABLET | Refills: 2 | Status: SHIPPED | OUTPATIENT
Start: 2023-03-22

## 2023-03-22 NOTE — PROGRESS NOTES
Answers for HPI/ROS submitted by the patient on 3/20/2023  Please describe your symptoms.: Follow-up  Have you had these symptoms before?: Yes  How long have you been having these symptoms?: Greater than 2 weeks  What is the primary reason for your visit?: Other    Roxie Carter  1965  [id number]  Patient Care Team:  Chip Ramos MD as PCP - General (Internal Medicine)  Arturo Brady MD as Consulting Physician (Gastroenterology)  Jamee Mcguire MD as Consulting Physician (Gynecologic Oncology)    Roxie Carter is a 57 y.o. female here today for follow up.     This patient is accompanied by their self who contributes to the history of their care.    Chief Complaint:    Chief Complaint   Patient presents with   • Insomnia     F/u Medication is working         History of Present Illness:  I have reviewed and/or updated the patient's past medical, past surgical, family, social history, problem list and allergies as appropriate.     She was seen by Francisco recently for left sided chest pain.  He apparently was supposed to have chest pain evaluation.  She was also having left breast pain as well.  She did not hear about her diagnostic mammogram.  She reminds me this needs to be ordered.  However since being placed on metoprolol XL her palpitations and chest pain have improved.  Still concerned given her father's family history of angina.  . Was started on 25 mg metop and became fatigued and light headed. She cut her dose to 12.5 mg at night with resolution on side effects.  She is tolerating this, however remains concerned given her father's history.  Denies any syncope.      Continues on ambien with good results for her chronic insomnia.  Anxiety seems to be improved.  Review of Systems   Constitutional: Negative.    Eyes: Negative.    Cardiovascular: Positive for chest pain and palpitations.   Psychiatric/Behavioral: Positive for sleep disturbance.       Vitals:    03/22/23 1013   BP:  "118/78   Pulse: 83   SpO2: 98%   Weight: 52.4 kg (115 lb 9.6 oz)   Height: 165.1 cm (65\")     Body mass index is 19.24 kg/m².    Physical Exam  Vitals and nursing note reviewed.   Constitutional:       General: She is not in acute distress.     Appearance: She is well-developed. She is not diaphoretic.   HENT:      Head: Normocephalic and atraumatic.      Right Ear: External ear normal.      Left Ear: External ear normal.      Mouth/Throat:      Pharynx: No oropharyngeal exudate.   Eyes:      General: No scleral icterus.        Right eye: No discharge.      Conjunctiva/sclera: Conjunctivae normal.   Neck:      Thyroid: No thyromegaly.      Vascular: No JVD.      Trachea: No tracheal deviation.   Cardiovascular:      Rate and Rhythm: Normal rate and regular rhythm.      Heart sounds: Normal heart sounds.      Comments: PMI nondisplaced  Pulmonary:      Effort: Pulmonary effort is normal.      Breath sounds: Normal breath sounds. No wheezing or rales.   Abdominal:      General: Bowel sounds are normal.      Palpations: Abdomen is soft.      Tenderness: There is no abdominal tenderness. There is no guarding or rebound.   Musculoskeletal:      Cervical back: Normal range of motion and neck supple.      Comments: Normal gait   Lymphadenopathy:      Cervical: No cervical adenopathy.   Skin:     General: Skin is warm and dry.      Capillary Refill: Capillary refill takes less than 2 seconds.      Coloration: Skin is not pale.      Findings: No rash.   Neurological:      Mental Status: She is alert and oriented to person, place, and time.      Motor: No abnormal muscle tone.      Coordination: Coordination normal.   Psychiatric:         Judgment: Judgment normal.         Procedures    Results Review:    None    Assessment/Plan:  Overall improved on metoprolol.  She is currently on 12.5 mg daily we will continue this dose.  Given her family history and her concerns have referred her to the heart valve clinic for further " evaluation.  She will continue to monitor symptoms and notify us of further chest discomfort recurs.  I have also reordered her mammogram diagnostic to be performed in [place] per her request.  UDS and CSA up-to-date, we refilled her Ambien 10 mg at bedtime.  Problem List Items Addressed This Visit        Cardiac and Vasculature    Palpitations    Relevant Medications    metoprolol succinate XL (Toprol XL) 25 MG 24 hr tablet    Primary hypertension    Relevant Medications    metoprolol succinate XL (Toprol XL) 25 MG 24 hr tablet       Sleep    Primary insomnia    Relevant Medications    zolpidem (AMBIEN) 10 MG tablet   Other Visit Diagnoses     Breast pain, left    -  Primary    Relevant Orders    Mammo Diagnostic Digital Tomosynthesis Bilateral With CAD          Plan of care reviewed with patient at the conclusion of today's visit. Education was provided regarding diagnosis and management.  Patient verbalizes understanding of and agreement with management plan.    No follow-ups on file.    Chip Ramos MD      Please note than portions of this note were completed wt a Voice Recognition Program

## 2023-04-03 ENCOUNTER — TELEPHONE (OUTPATIENT)
Dept: FAMILY MEDICINE CLINIC | Facility: CLINIC | Age: 58
End: 2023-04-03
Payer: MEDICARE

## 2023-04-03 NOTE — TELEPHONE ENCOUNTER
Pt states she hasn't heard anything regarding referral to [facility] for her mammogram. Referral placed 3/22/23

## 2023-04-07 ENCOUNTER — HOSPITAL ENCOUNTER (OUTPATIENT)
Dept: CARDIOLOGY | Facility: HOSPITAL | Age: 58
Discharge: HOME | End: 2023-04-07
Payer: MEDICARE

## 2023-04-07 ENCOUNTER — OFFICE VISIT (OUTPATIENT)
Dept: CARDIOLOGY | Facility: HOSPITAL | Age: 58
End: 2023-04-07
Payer: MEDICARE

## 2023-04-07 VITALS
HEIGHT: 65 IN | WEIGHT: 118.06 LBS | BODY MASS INDEX: 19.67 KG/M2 | TEMPERATURE: 97.6 F | DIASTOLIC BLOOD PRESSURE: 58 MMHG | HEART RATE: 78 BPM | RESPIRATION RATE: 20 BRPM | OXYGEN SATURATION: 99 % | SYSTOLIC BLOOD PRESSURE: 105 MMHG

## 2023-04-07 DIAGNOSIS — R07.9 CHEST PAIN, UNSPECIFIED TYPE: ICD-10-CM

## 2023-04-07 DIAGNOSIS — R06.02 SHORTNESS OF BREATH: ICD-10-CM

## 2023-04-07 DIAGNOSIS — R00.2 PALPITATIONS: ICD-10-CM

## 2023-04-07 DIAGNOSIS — R07.9 CHEST PAIN, UNSPECIFIED TYPE: Primary | ICD-10-CM

## 2023-04-07 LAB
BH CV ECHO MEAS - AO MAX PG: 7 MMHG
BH CV ECHO MEAS - AO MEAN PG: 3.9 MMHG
BH CV ECHO MEAS - AO ROOT DIAM: 2.44 CM
BH CV ECHO MEAS - AO V2 MAX: 132.3 CM/SEC
BH CV ECHO MEAS - AO V2 VTI: 26.4 CM
BH CV ECHO MEAS - AVA(I,D): 1.6 CM2
BH CV ECHO MEAS - EDV(CUBED): 70.4 ML
BH CV ECHO MEAS - EDV(MOD-SP2): 60.8 ML
BH CV ECHO MEAS - EDV(MOD-SP4): 68.3 ML
BH CV ECHO MEAS - EF(MOD-BP): 63.6 %
BH CV ECHO MEAS - EF(MOD-SP2): 62 %
BH CV ECHO MEAS - EF(MOD-SP4): 65 %
BH CV ECHO MEAS - ESV(CUBED): 23.2 ML
BH CV ECHO MEAS - ESV(MOD-SP2): 23.1 ML
BH CV ECHO MEAS - ESV(MOD-SP4): 23.9 ML
BH CV ECHO MEAS - FS: 31 %
BH CV ECHO MEAS - IVS/LVPW: 1.07 CM
BH CV ECHO MEAS - IVSD: 0.81 CM
BH CV ECHO MEAS - LA DIMENSION: 2.7 CM
BH CV ECHO MEAS - LAT PEAK E' VEL: 9.7 CM/SEC
BH CV ECHO MEAS - LV DIASTOLIC VOL/BSA (35-75): 43.2 CM2
BH CV ECHO MEAS - LV MASS(C)D: 96.2 GRAMS
BH CV ECHO MEAS - LV MAX PG: 2.17 MMHG
BH CV ECHO MEAS - LV MEAN PG: 1.1 MMHG
BH CV ECHO MEAS - LV SYSTOLIC VOL/BSA (12-30): 15.1 CM2
BH CV ECHO MEAS - LV V1 MAX: 73.7 CM/SEC
BH CV ECHO MEAS - LV V1 VTI: 14 CM
BH CV ECHO MEAS - LVIDD: 4.1 CM
BH CV ECHO MEAS - LVIDS: 2.9 CM
BH CV ECHO MEAS - LVOT AREA: 3 CM2
BH CV ECHO MEAS - LVOT DIAM: 1.96 CM
BH CV ECHO MEAS - LVPWD: 0.76 CM
BH CV ECHO MEAS - MED PEAK E' VEL: 7.8 CM/SEC
BH CV ECHO MEAS - MV A MAX VEL: 88.6 CM/SEC
BH CV ECHO MEAS - MV DEC SLOPE: 479.7 CM/SEC2
BH CV ECHO MEAS - MV DEC TIME: 0.19 MSEC
BH CV ECHO MEAS - MV E MAX VEL: 96.8 CM/SEC
BH CV ECHO MEAS - MV E/A: 1.09
BH CV ECHO MEAS - MV MAX PG: 3.5 MMHG
BH CV ECHO MEAS - MV MEAN PG: 1.34 MMHG
BH CV ECHO MEAS - MV P1/2T: 56.4 MSEC
BH CV ECHO MEAS - MV V2 VTI: 18 CM
BH CV ECHO MEAS - MVA(P1/2T): 3.9 CM2
BH CV ECHO MEAS - MVA(VTI): 2.35 CM2
BH CV ECHO MEAS - PA ACC TIME: 0.12 SEC
BH CV ECHO MEAS - PA PR(ACCEL): 23.5 MMHG
BH CV ECHO MEAS - RAP SYSTOLE: 3 MMHG
BH CV ECHO MEAS - RVSP: 12.5 MMHG
BH CV ECHO MEAS - SI(MOD-SP2): 23.8 ML/M2
BH CV ECHO MEAS - SI(MOD-SP4): 28.1 ML/M2
BH CV ECHO MEAS - SV(LVOT): 42.3 ML
BH CV ECHO MEAS - SV(MOD-SP2): 37.7 ML
BH CV ECHO MEAS - SV(MOD-SP4): 44.4 ML
BH CV ECHO MEAS - TAPSE (>1.6): 2.22 CM
BH CV ECHO MEAS - TR MAX PG: 9.5 MMHG
BH CV ECHO MEAS - TR MAX VEL: 154.2 CM/SEC
BH CV ECHO MEASUREMENTS AVERAGE E/E' RATIO: 11.06
BH CV XLRA - RV BASE: 2.9 CM
BH CV XLRA - RV LENGTH: 5.8 CM
BH CV XLRA - RV MID: 2.3 CM
BH CV XLRA - TDI S': 9.9 CM/SEC
MAXIMAL PREDICTED HEART RATE: 163 BPM
STRESS TARGET HR: 139 BPM

## 2023-04-07 PROCEDURE — 3074F SYST BP LT 130 MM HG: CPT | Performed by: NURSE PRACTITIONER

## 2023-04-07 PROCEDURE — 93010 ELECTROCARDIOGRAM REPORT: CPT | Performed by: INTERNAL MEDICINE

## 2023-04-07 PROCEDURE — 1159F MED LIST DOCD IN RCRD: CPT | Performed by: NURSE PRACTITIONER

## 2023-04-07 PROCEDURE — 93306 TTE W/DOPPLER COMPLETE: CPT

## 2023-04-07 PROCEDURE — 93246 EXT ECG>7D<15D RECORDING: CPT

## 2023-04-07 PROCEDURE — 93306 TTE W/DOPPLER COMPLETE: CPT | Performed by: INTERNAL MEDICINE

## 2023-04-07 PROCEDURE — 93005 ELECTROCARDIOGRAM TRACING: CPT | Performed by: NURSE PRACTITIONER

## 2023-04-07 PROCEDURE — 99214 OFFICE O/P EST MOD 30 MIN: CPT | Performed by: NURSE PRACTITIONER

## 2023-04-07 PROCEDURE — 1160F RVW MEDS BY RX/DR IN RCRD: CPT | Performed by: NURSE PRACTITIONER

## 2023-04-07 PROCEDURE — 3078F DIAST BP <80 MM HG: CPT | Performed by: NURSE PRACTITIONER

## 2023-04-07 NOTE — PROGRESS NOTES
"Chief Complaint  Follow-up and Chest Pain    Subjective      History of Present Illness {CC  Problem List  Visit  Diagnosis   Encounters  Notes  Medications  Labs  Result Review Imaging  Media :23}     Roxie Carter, 57 y.o. female with chest pain, anxiety presents to [facility] for Follow-up and Chest Pain.    Patient referred by PCP Dr. Chip Ramos for chest pain.  Previous cardiac work-up included treadmill stress test 2021 with normal stress test by EKG criteria, low risk study.  TTE also completed at that time with low normal LV function.    Patient presents today reporting chest pain symptom onset was approximately 3 months ago, and symptoms have been improved but intermittent still since that time. Symptoms include tightness/squeezing substernal and left chest, with left radiation. Pain generally lasts intermittently for approximately 2-3 hours to days. Associated shortness of breath. Also endorses rapid heartbeat few days per week. Denies syncope. Family history for premature cardiac disease includes: father with CABG in his 40s.  Patient is a former smoker, quit 4-5 years ago.      Objective     Vital Signs:   Vitals:    04/07/23 0859 04/07/23 0927   BP:  105/58   Pulse: 78    Resp: 20    Temp: 97.6 °F (36.4 °C)    TempSrc: Temporal    SpO2: 99%    Weight: 53.6 kg (118 lb 1 oz)    Height: 165.1 cm (65\")      Body mass index is 19.65 kg/m².  Physical Exam  Vitals and nursing note reviewed.   Constitutional:       Appearance: Normal appearance.   HENT:      Head: Normocephalic.   Eyes:      Extraocular Movements: Extraocular movements intact.   Neck:      Vascular: No carotid bruit.   Cardiovascular:      Rate and Rhythm: Normal rate and regular rhythm.      Pulses: Normal pulses.      Heart sounds: Normal heart sounds, S1 normal and S2 normal. No murmur heard.  Pulmonary:      Effort: Pulmonary effort is normal. No respiratory distress.      Breath sounds: Normal " breath sounds.   Musculoskeletal:      Cervical back: Neck supple.      Right lower leg: No edema.      Left lower leg: No edema.   Skin:     General: Skin is warm and dry.   Neurological:      General: No focal deficit present.      Mental Status: She is alert.   Psychiatric:         Mood and Affect: Mood normal.         Behavior: Behavior normal.         Thought Content: Thought content normal.        Data Reviewed:{ Labs  Result Review  Imaging  Med Tab  Media :23}     ECG 12 Lead (04/07/2023 09:04)  Adult Transthoracic Echo Complete W/ Cont if Necessary Per Protocol (10/14/2021 12:12)  Doppler Ankle Brachial Index Single Level CAR (10/14/2021 11:36)  Treadmill Stress Test (10/14/2021 07:55)    Comprehensive Metabolic Panel (12/16/2022 12:07)  TSH Rfx On Abnormal To Free T4 (12/16/2022 12:07)  Lipid Panel (12/16/2022 12:07)  CBC & Differential (12/16/2022 12:07)      Assessment & Plan   Assessment and Plan {CC Problem List  Visit Diagnosis  ROS  Review (Popup)  Health Maintenance  Quality  BestPractice  Medications  SmartSets  SnapShot Encounters  Media :23}       1. Chest pain  - Currently chest pain free. ECG without evidence of acute ischemia.  - CRISTINA score:0  - Stress Test With Myocardial Perfusion One Day; Future  - Adult Transthoracic Echo Complete W/ Cont if Necessary Per Protocol; Future    2. Shortness of breath  -Intermittent dyspnea. Stress test for ischemic evaluation, TTE for structural/functional evaluation, 14-day Holter monitor for arrhythmia surveillance  - Stress Test With Myocardial Perfusion One Day; Future  - Adult Transthoracic Echo Complete W/ Cont if Necessary Per Protocol; Future    3. Palpitations  -ECG today with normal sinus rhythm, isolated PVCs  -TTE for structural/functional evaluation, 14-day Holter monitor for arrhythmia surveillance  - Holter Monitor - 14 Days; Future  - Stress Test With Myocardial Perfusion One Day; Future  - Adult Transthoracic Echo Complete W/  Cont if Necessary Per Protocol; Future  -Continue Toprol-XL nightly      Follow Up {Instructions Charge Capture  Follow-up Communications :23}     Return in about 5 weeks (around 5/12/2023) for Office follow-up, Monitor results, Recheck, Results follow-up.      Patient was given instructions and counseling regarding her condition or for health maintenance advice. Please see specific information pulled into the AVS if appropriate.  Patient was instructed to call the [facility] with any questions, concerns, or worsening symptoms.    Dictated Utilizing Dragon Dictation   Please note that portions of this note were completed with a voice recognition program.   Part of this note may be an electronic transcription/translation of spoken language to printed text using the Dragon Dictation System.

## 2023-04-07 NOTE — PROGRESS NOTES
[facility] Heart Monitor Documentation    Roxie Carter  1965  [id number]  04/07/23      [] ZIO XT Patch  Model [id number] Prescribed for N/A Days    · Serial Number: (N + 9 Digits) N   · Apply-By Date on Box:   · USPS Tracking Number:   · USPS Tracking        [] [employer]ian MINI PLUS Mobile Cardiac Telemetry  Model BGMINIPLUS Prescribed for N/A Days    · Serial Number: (BGM + 7 Digits) BGM  · Shipped-By Date on Box:   · UPS Tracking Number: [id number]  · UPS Tracking      [] [employer] MINI Holter Monitor  Model BGMINIEL Prescribed for 14 Days    · Serial Number: (7 Digits) [id number]  · Shipped-By Date on Box: [id number]  · UPS Tracking Number: [id number]  · UPS Tracking        This monitor was applied to the patient's chest and checked for proper functioning.  Ms. Roxie Carter was instructed in the proper use of this monitor.  She was given the opportunity to ask questions and left the office with the device 's instruction manual.    Niurka Dia MA, 09:49 EDT, 04/07/23                  [facility]

## 2023-04-09 LAB
QT INTERVAL: 384 MS
QTC INTERVAL: 434 MS

## 2023-04-20 ENCOUNTER — HOSPITAL ENCOUNTER (OUTPATIENT)
Dept: CARDIOLOGY | Facility: HOSPITAL | Age: 58
Discharge: HOME | End: 2023-04-20
Payer: MEDICARE

## 2023-04-20 VITALS
HEIGHT: 65 IN | WEIGHT: 118.17 LBS | SYSTOLIC BLOOD PRESSURE: 116 MMHG | HEART RATE: 68 BPM | BODY MASS INDEX: 19.69 KG/M2 | DIASTOLIC BLOOD PRESSURE: 76 MMHG

## 2023-04-20 DIAGNOSIS — R07.9 CHEST PAIN, UNSPECIFIED TYPE: ICD-10-CM

## 2023-04-20 DIAGNOSIS — R00.2 PALPITATIONS: ICD-10-CM

## 2023-04-20 DIAGNOSIS — R06.02 SHORTNESS OF BREATH: ICD-10-CM

## 2023-04-20 LAB
BH CV REST NUCLEAR ISOTOPE DOSE: 9.6 MCI
BH CV STRESS BP STAGE 1: NORMAL
BH CV STRESS BP STAGE 2: NORMAL
BH CV STRESS DURATION MIN STAGE 1: 3
BH CV STRESS DURATION MIN STAGE 2: 3
BH CV STRESS DURATION MIN STAGE 3: 1
BH CV STRESS DURATION SEC STAGE 1: 0
BH CV STRESS DURATION SEC STAGE 2: 0
BH CV STRESS DURATION SEC STAGE 3: 5
BH CV STRESS GRADE STAGE 1: 10
BH CV STRESS GRADE STAGE 2: 12
BH CV STRESS GRADE STAGE 3: 14
BH CV STRESS HR STAGE 1: 134
BH CV STRESS HR STAGE 2: 151
BH CV STRESS HR STAGE 3: 157
BH CV STRESS METS STAGE 1: 5
BH CV STRESS METS STAGE 2: 7.5
BH CV STRESS METS STAGE 3: 10
BH CV STRESS NUCLEAR ISOTOPE DOSE: 31.7 MCI
BH CV STRESS O2 STAGE 1: 99
BH CV STRESS O2 STAGE 2: 98
BH CV STRESS O2 STAGE 3: 98
BH CV STRESS PROTOCOL 1: NORMAL
BH CV STRESS RECOVERY BP: NORMAL MMHG
BH CV STRESS RECOVERY HR: 83 BPM
BH CV STRESS RECOVERY O2: 99 %
BH CV STRESS SPEED STAGE 1: 1.7
BH CV STRESS SPEED STAGE 2: 2.5
BH CV STRESS SPEED STAGE 3: 3.4
BH CV STRESS STAGE 1: 1
BH CV STRESS STAGE 2: 2
BH CV STRESS STAGE 3: 3
LV EF NUC BP: 80 %
MAXIMAL PREDICTED HEART RATE: 162 BPM
PERCENT MAX PREDICTED HR: 96.91 %
STRESS BASELINE BP: NORMAL MMHG
STRESS BASELINE HR: 68 BPM
STRESS O2 SAT REST: 99 %
STRESS PERCENT HR: 114 %
STRESS POST ESTIMATED WORKLOAD: 8.6 METS
STRESS POST EXERCISE DUR MIN: 7 MIN
STRESS POST EXERCISE DUR SEC: 5 SEC
STRESS POST O2 SAT PEAK: 98 %
STRESS POST PEAK BP: NORMAL MMHG
STRESS POST PEAK HR: 157 BPM
STRESS TARGET HR: 138 BPM

## 2023-04-20 PROCEDURE — 93018 CV STRESS TEST I&R ONLY: CPT | Performed by: INTERNAL MEDICINE

## 2023-04-20 PROCEDURE — 78452 HT MUSCLE IMAGE SPECT MULT: CPT

## 2023-04-20 PROCEDURE — 0 TECHNETIUM SESTAMIBI: Performed by: NURSE PRACTITIONER

## 2023-04-20 PROCEDURE — A9500 TC99M SESTAMIBI: HCPCS | Performed by: NURSE PRACTITIONER

## 2023-04-20 PROCEDURE — 78452 HT MUSCLE IMAGE SPECT MULT: CPT | Performed by: INTERNAL MEDICINE

## 2023-04-20 PROCEDURE — 93017 CV STRESS TEST TRACING ONLY: CPT

## 2023-04-20 RX ADMIN — TECHNETIUM TC 99M SESTAMIBI 1 DOSE: 1 INJECTION INTRAVENOUS at 09:25

## 2023-04-20 RX ADMIN — TECHNETIUM TC 99M SESTAMIBI 1 DOSE: 1 INJECTION INTRAVENOUS at 07:35

## 2023-05-08 RX ORDER — BUSPIRONE HYDROCHLORIDE 10 MG/1
5 TABLET ORAL 3 TIMES DAILY
Qty: 90 TABLET | Refills: 9 | Status: SHIPPED | OUTPATIENT
Start: 2023-05-08

## 2023-05-08 NOTE — TELEPHONE ENCOUNTER
Rx Refill Note  Requested Prescriptions     Pending Prescriptions Disp Refills   • busPIRone (BUSPAR) 10 MG tablet 90 tablet 9     Sig: Take 0.5 tablets by mouth 3 (Three) Times a Day.      Last office visit with prescribing clinician: 3/22/2023   Last telemedicine visit with prescribing clinician: Visit date not found   Next office visit with prescribing clinician: Visit date not found                         Would you like a call back once the refill request has been completed: [] Yes [] No    If the office needs to give you a call back, can they leave a voicemail: [] Yes [] No    Nita Pulido MA  05/08/23, 10:40 EDT

## 2023-05-11 ENCOUNTER — OFFICE VISIT (OUTPATIENT)
Dept: CARDIOLOGY | Facility: HOSPITAL | Age: 58
End: 2023-05-11
Payer: MEDICARE

## 2023-05-11 VITALS
BODY MASS INDEX: 19.36 KG/M2 | DIASTOLIC BLOOD PRESSURE: 59 MMHG | TEMPERATURE: 97.5 F | SYSTOLIC BLOOD PRESSURE: 109 MMHG | OXYGEN SATURATION: 96 % | HEIGHT: 65 IN | WEIGHT: 116.2 LBS | RESPIRATION RATE: 16 BRPM | HEART RATE: 68 BPM

## 2023-05-11 DIAGNOSIS — R00.2 PALPITATIONS: ICD-10-CM

## 2023-05-11 DIAGNOSIS — I10 PRIMARY HYPERTENSION: ICD-10-CM

## 2023-05-11 RX ORDER — METOPROLOL SUCCINATE 25 MG/1
25 TABLET, EXTENDED RELEASE ORAL DAILY
Qty: 90 TABLET | Refills: 1 | Status: SHIPPED | OUTPATIENT
Start: 2023-05-11

## 2023-05-11 NOTE — PROGRESS NOTES
Chief Complaint  Palpitations, Chest Pain, and Follow-up    Subjective      History of Present Illness {CC  Problem List  Visit  Diagnosis   Encounters  Notes  Medications  Labs  Result Review Imaging  Media :23}     Roxie Carter, 58 y.o. female with chest pain, anxiety presents to [facility] clinic for Palpitations, Chest Pain, and Follow-up.    Presents today reporting no further chest pain episodes. Palpitations continue but not necessarily worsened, no near syncope/syncope. Taking 1/2 Toprol at nighttime. Recently with increased stress with the passing of her mother.    Since previous evaluation she completed stress test with stress test reported within normal limits, no significant ECG change noted.  TTE with preserved LVEF, no significant valvular abnormality.  Preliminary Holter monitor data with 10-day of analysis duration.  No evidence atrial fibrillation/flutter, AV block, pauses, VT.  7 episode short duration SVT with longest 6 beats.  Heart rate average 80, minimum 62, maximum 155.  PAC burden 0.14%, PVC burden 0.4%.  Patient triggered events appear to correlate with PAC/PVC.      Initial evaluation:  Patient referred by PCP Dr. Chip Ramos for chest pain.  Previous cardiac work-up included treadmill stress test 2021 with normal stress test by EKG criteria, low risk study.  TTE also completed at that time with low normal LV function.    Patient presents today reporting chest pain symptom onset was approximately 3 months ago, and symptoms have been improved but intermittent still since that time. Symptoms include tightness/squeezing substernal and left chest, with left radiation. Pain generally lasts intermittently for approximately 2-3 hours to days. Associated shortness of breath. Also endorses rapid heartbeat few days per week. Denies syncope. Family history for premature cardiac disease includes: father with CABG in his 40s.  Patient is a former smoker, quit 4-5  "years ago.      Objective     Vital Signs:   Vitals:    05/11/23 1024   BP: 109/59   BP Location: Left arm   Patient Position: Sitting   Cuff Size: Small Adult   Pulse: 68   Resp: 16   Temp: 97.5 °F (36.4 °C)   TempSrc: Temporal   SpO2: 96%   Weight: 52.7 kg (116 lb 3.2 oz)   Height: 165.1 cm (65\")     Body mass index is 19.34 kg/m².  Physical Exam  Vitals and nursing note reviewed.   Constitutional:       Appearance: Normal appearance.   HENT:      Head: Normocephalic.   Eyes:      Extraocular Movements: Extraocular movements intact.   Neck:      Vascular: No carotid bruit.   Cardiovascular:      Rate and Rhythm: Normal rate and regular rhythm.      Pulses: Normal pulses.      Heart sounds: Normal heart sounds, S1 normal and S2 normal. No murmur heard.  Pulmonary:      Effort: Pulmonary effort is normal. No respiratory distress.      Breath sounds: Normal breath sounds.   Musculoskeletal:      Cervical back: Neck supple.      Right lower leg: No edema.      Left lower leg: No edema.   Skin:     General: Skin is warm and dry.   Neurological:      General: No focal deficit present.      Mental Status: She is alert.   Psychiatric:         Mood and Affect: Mood normal.         Behavior: Behavior normal.         Thought Content: Thought content normal.        Data Reviewed:{ Labs  Result Review  Imaging  Med Tab  Media :23}     Adult Transthoracic Echo Complete W/ Cont if Necessary Per Protocol (04/07/2023 13:43)  Stress Test With Myocardial Perfusion One Day (04/20/2023 09:16)  Holter Monitor - 72 Hour Up To 15 Days (04/07/2023 21:38)    ECG 12 Lead (04/07/2023 09:04)  Adult Transthoracic Echo Complete W/ Cont if Necessary Per Protocol (10/14/2021 12:12)  Doppler Ankle Brachial Index Single Level CAR (10/14/2021 11:36)  Treadmill Stress Test (10/14/2021 07:55)  Comprehensive Metabolic Panel (12/16/2022 12:07)  TSH Rfx On Abnormal To Free T4 (12/16/2022 12:07)  Lipid Panel (12/16/2022 12:07)  CBC & Differential " (12/16/2022 12:07)      Assessment & Plan   Assessment and Plan {CC Problem List  Visit Diagnosis  ROS  Review (Popup)  Health Maintenance  Quality  BestPractice  Medications  SmartSets  SnapShot Encounters  Media :23}       1. Chest pain  - Currently chest pain free. Recent stress test within normal limits, no significant ECG change noted.  -TTE with preserved LVEF, no significant valvular abnormality.    2. Shortness of breath  -Improved. Overall stable cardiac status/   -Recent myocardial perfusion study as above  -TTE with preserved LVEF, no significant valvular abnormality.    -Preliminary Holter monitor data with 10-day of analysis duration.  No evidence atrial fibrillation/flutter, AV block, pauses, VT.  7 episode short duration SVT with longest 6 beats.  Heart rate average 80, minimum 62, maximum 155.  PAC burden 0.14%, PVC burden 0.4%.  Patient triggered events appear to correlate with PAC/PVC.    3. Palpitations  -Still with occasional palpitation, denies sustained tachypalpitation, near-syncope/syncope.  -TTE with preserved LVEF, no significant valvular abnormality.    -Preliminary Holter monitor data with 10-day of analysis duration.  No evidence atrial fibrillation/flutter, AV block, pauses, VT.  7 episode short duration SVT with longest 6 beats.  Heart rate average 80, minimum 62, maximum 155.  PAC burden 0.14%, PVC burden 0.4%.  Patient triggered events appear to correlate with PAC/PVC.  -Continue Toprol-XL 25mg nightly for palpitation symptoms  -f/u in 3 months for reevaluation.      Follow Up {Instructions Charge Capture  Follow-up Communications :23}     Return in about 3 months (around 8/11/2023) for Office follow-up, Recheck.      Patient was given instructions and counseling regarding her condition or for health maintenance advice. Please see specific information pulled into the AVS if appropriate.  Patient was instructed to call the [facility] with any questions,  concerns, or worsening symptoms.    Dictated Utilizing Dragon Dictation   Please note that portions of this note were completed with a voice recognition program.   Part of this note may be an electronic transcription/translation of spoken language to printed text using the Dragon Dictation System.

## 2023-05-26 ENCOUNTER — OFFICE VISIT (OUTPATIENT)
Dept: GYNECOLOGIC ONCOLOGY | Facility: CLINIC | Age: 58
End: 2023-05-26
Payer: MEDICARE

## 2023-05-26 ENCOUNTER — LAB (OUTPATIENT)
Dept: LAB | Facility: HOSPITAL | Age: 58
End: 2023-05-26
Payer: MEDICARE

## 2023-05-26 VITALS
TEMPERATURE: 98.1 F | RESPIRATION RATE: 18 BRPM | SYSTOLIC BLOOD PRESSURE: 125 MMHG | OXYGEN SATURATION: 97 % | HEIGHT: 66 IN | BODY MASS INDEX: 18.58 KG/M2 | WEIGHT: 115.6 LBS | DIASTOLIC BLOOD PRESSURE: 56 MMHG | HEART RATE: 67 BPM

## 2023-05-26 DIAGNOSIS — R10.2 PELVIC PRESSURE IN FEMALE: ICD-10-CM

## 2023-05-26 DIAGNOSIS — D39.11 GRANULOSA CELL TUMOR OF OVARY, RIGHT: ICD-10-CM

## 2023-05-26 DIAGNOSIS — D39.11 GRANULOSA CELL TUMOR OF OVARY, RIGHT: Primary | ICD-10-CM

## 2023-05-26 DIAGNOSIS — R10.30 LOWER ABDOMINAL PAIN: ICD-10-CM

## 2023-05-26 PROCEDURE — 1125F AMNT PAIN NOTED PAIN PRSNT: CPT | Performed by: NURSE PRACTITIONER

## 2023-05-26 PROCEDURE — 83520 IMMUNOASSAY QUANT NOS NONAB: CPT

## 2023-05-26 PROCEDURE — 99214 OFFICE O/P EST MOD 30 MIN: CPT | Performed by: NURSE PRACTITIONER

## 2023-05-26 PROCEDURE — 3078F DIAST BP <80 MM HG: CPT | Performed by: NURSE PRACTITIONER

## 2023-05-26 PROCEDURE — 3074F SYST BP LT 130 MM HG: CPT | Performed by: NURSE PRACTITIONER

## 2023-05-26 PROCEDURE — 1160F RVW MEDS BY RX/DR IN RCRD: CPT | Performed by: NURSE PRACTITIONER

## 2023-05-26 PROCEDURE — 36415 COLL VENOUS BLD VENIPUNCTURE: CPT

## 2023-05-26 PROCEDURE — 1159F MED LIST DOCD IN RCRD: CPT | Performed by: NURSE PRACTITIONER

## 2023-05-26 PROCEDURE — 86336 INHIBIN A: CPT

## 2023-05-26 NOTE — PROGRESS NOTES
GYN ONCOLOGY CANCER SURVEILLANCE VISIT    Roxie Carter  [id number]  1965    Subjective   Chief Complaint: Ovarian Cancer (Recurrent GCT, c/o low abdominal pain and pelvic pressure)        History of present illness:     Roxie Carter is a 58 y.o. year old female who is here today for surveillance for ovarian GCT.  She has a personal history of recurrent granulosa cell tumor, see cancer history below.  She is on Arimidex maintenance for hormone blockade since 4/2020, now 3 years since secondary debulking. Last CT scan performed in May 2021 was negative for disease.    Upon arrival today patient c/o pain and pressure x 1 month. She states previous stable LLQ discomfort has spread all across her lower abdomen. She has also noticed pressure at the left vaginal wall when urinating that is new. During the same time frame she has had daytime fatigue, needing to nap frequently which is abnormal for her, and decreased appetite. Weight trends are stable. She denies changes in bowel or bladder function.       Oncology History:    Oncology/Hematology History   Granulosa cell tumor of ovary, right   8/13/2013 Cancer Staged    Staging form: Ovary, Fallopian Tube, And Primary Peritoneal Carcinoma, AJCC 8th Edition  - Clinical stage from 8/13/2013: FIGO Stage IC (cT1c, cN0, cM0) - Signed by Jamee Mcguire MD on 3/26/2020     8/13/2013 Surgery    Total abdominal hysterectomy, bilateral salpingo-oophorectomy with pathology showing 5 cm granulosa cell tumor of the right ovary and a left fallopian tube intraepithelial serous neoplasm.      - 1/4/2014 Chemotherapy    OP OVARIAN PACLitaxel / CARBOplatin (Q21D)  x6 cycles remarkable for bone marrow suppression and G-CSF support     3/14/2020 Progression    CT Abdomen/Pelvis IMPRESSION:  Large heterogeneous mass identified within the pelvis with  fluid seen scattered throughout the abdomen and pelvis. Findings  concerning for recurrence with some stranding of the  mesentery in which  spread to the mesentery cannot be excluded.     4/3/2020 Surgery    Exploratory laparotomy, optimal debulking (R=0), cystoscopy with temporary uteteral catheter, ileal resection with side-to-side anastomosis, rectosigmoid resection/LAR, left ureterolysis, and peritoneal stripping.   Pathology consistent with recurrent granulosa cell tumor at the pelvis with rectosigmoid and partial terminal ileum. No LVSI. Mesenteric nodes and other staging negative.      4/6/2020 -  Hormonal Therapy    Arimidex initiated     4/27/2020 Molecular Testing    CARIS testing results:  IN positive, 1+, 75%  ER negative, MSI stable, PD-L1 negative     6/30/2020 Imaging    CT scan for new abdominal pain showed expected postsurgical changes from resection of pelvic mass without evidence for bulky adenopathy or soft tissue nodular recurrence. No mesenteric reticulation or ascites to suggest peritoneal involvement. No acute intraabdominal or intrapelvic abnormality otherwise noted. Significant colonic stool burden noted.      5/4/2021 Imaging    CT abdomen pelvis:  No evidence for acute intra-abdominal or intrapelvic abnormality with persistent moderate to large colonic stool burden and postsurgical changes however no evidence for bulky adenopathy, peritoneal disease process or active metastatic disease present.           The current medication list and allergy list were reviewed and reconciled.     Past Medical History, Past Surgical History, Social History, Family History have been reviewed and are without significant changes except as mentioned.      Review of Systems   Constitutional: Positive for appetite change and fatigue.   Gastrointestinal: Positive for abdominal pain (low abdomen).   Genitourinary: Positive for pelvic pain (pressure at left vaginal wall).   Musculoskeletal: Negative.        Objective   Physical Exam  Vital Signs: /56   Pulse 67   Temp 98.1 °F (36.7 °C) (Temporal)   Resp 18   Ht 167.6 cm  "(66\")   Wt 52.4 kg (115 lb 9.6 oz)   SpO2 97%   BMI 18.66 kg/m²    Wt Readings from Last 10 Encounters:   05/26/23 52.4 kg (115 lb 9.6 oz)   05/11/23 52.7 kg (116 lb 3.2 oz)   04/20/23 53.6 kg (118 lb 2.7 oz)   04/07/23 53.6 kg (118 lb 1 oz)   03/22/23 52.4 kg (115 lb 9.6 oz)   02/02/23 51.6 kg (113 lb 12.8 oz)   12/16/22 51.6 kg (113 lb 12.8 oz)   11/29/22 51.5 kg (113 lb 9.6 oz)   11/11/22 51.9 kg (114 lb 6.4 oz)   08/12/22 52.1 kg (114 lb 12.8 oz)       Vitals:    05/26/23 0902   PainSc:   4   PainLoc: Abdomen  Comment: lower           General Appearance:  alert, cooperative, no apparent distress, appears stated age, thin   Neurologic/Psychiatric: A&O x 3, gait steady, appropriate affect   HEENT:  Normocephalic, without obvious abnormality, mucous membranes moist   Breasts:  deferred   Abdomen:   Soft, non-distended, no organomegaly and tenderness in RLQ, LLQ and suprapubic   Lymph nodes: No cervical, supraclavicular, inguinal adenopathy noted   Extremities: Normal, atraumatic; no clubbing, cyanosis, or edema    Pelvic: External Genitalia  without lesions or skin changes  Vagina  is pink, moist, without lesions.   Vaginal Cuff  Female Vaginal Cuff: smooth, intact and without visible lesions. Tenderness at vaginal cuff upon speculum exam  Uterus  surgically absent   Ovaries  surgically absent bilaterally and without palpable masses or fullness. Tenderness at left pelvis upon bimanual exam  Parametria  smooth  Rectovaginal  Female rectovaginal: deferred     ECOG score: 0               Result Review :   The following data was reviewed by: CASEY Kelley on 05/26/23:    Last imaging study was CT abdomen pelvis 5/4/2021.     Tumor markers:  Component      Latest Ref Rng 1/25/2022 4/25/2022 11/29/2022   Inhibin A, Ultrasensitive      pg/mL 0.6  0.7  1.1        Component      Latest Ref Rng 1/25/2022 4/25/2022 11/29/2022   Inhibin B      0.0 - 16.9 pg/mL <7.0  <7.0  <7.0            Procedure Notes:   "            Assessment and Plan:    Diagnoses and all orders for this visit:    1. Granulosa cell tumor of ovary, right (Primary)  -     Inhibin A, Ultrasensitive; Future  -     Inhibin B; Future  -     CT Abdomen Pelvis With Contrast; Future  -     CT Chest With Contrast Diagnostic; Future    2. Lower abdominal pain  -     CT Abdomen Pelvis With Contrast; Future  -     CT Chest With Contrast Diagnostic; Future    3. Pelvic pressure in female  -     CT Abdomen Pelvis With Contrast; Future  -     CT Chest With Contrast Diagnostic; Future          There is no obvious evidence of disease upon today's exam. However, due to her symptoms and history of recurrent disease, imaging is warranted to rule out progression. CT chest, abdomen, pelvis and tumor markers ordered now. She will be notified of inihibin tumor marker results upon their return. She will be called to schedule CTs. She v/u and agrees with plan. Pending negative imaging, continue every 6 month surveillance visits. Continue anastrozole maintenance as prescribed. She is understanding to call with any changes in pelvic symptoms or general GYN concerns at any time between regularly scheduled visits.       Pain assessment was performed today as a part of patient’s care.  For patients with pain related to surgery, gynecologic malignancy or cancer treatment, the plan is as noted in the assessment/plan.  For patients with pain not related to these issues, they are to seek any further needed care from a more appropriate provider, such as PCP.      Follow-up:     Return to clinic in 6 months for ongoing cancer surveillance, pending negative work-up.       Electronically signed by CASEY Kelley on 05/26/23 at 09:31 EDT

## 2023-05-29 LAB — INHIBIN B SERPL-MCNC: 10.4 PG/ML

## 2023-05-30 LAB — INHIBIN A SERPL-MCNC: 1 PG/ML

## 2023-05-31 ENCOUNTER — TELEPHONE (OUTPATIENT)
Dept: GYNECOLOGIC ONCOLOGY | Facility: CLINIC | Age: 58
End: 2023-05-31

## 2023-05-31 NOTE — TELEPHONE ENCOUNTER
RN called patient and left VM on private line. RN stated test results and future plan for CT scan. RN left office number in case patient had any questions.

## 2023-06-01 ENCOUNTER — TELEPHONE (OUTPATIENT)
Dept: ONCOLOGY | Facility: OTHER | Age: 58
End: 2023-06-01
Payer: MEDICARE

## 2023-06-01 NOTE — TELEPHONE ENCOUNTER
SHARON FROM [facility] SENT A FAX TO DR Demarcus SMITH THEY NEED FOR YOU TO SEND THEM A LIST OF THE MEDS THE PATIENT IS CURRENTLY ON.    HE FAXED IT [phone] TODAY    PHONE NUMBER [phone]

## 2023-06-05 ENCOUNTER — OFFICE VISIT (OUTPATIENT)
Dept: FAMILY MEDICINE CLINIC | Facility: CLINIC | Age: 58
End: 2023-06-05
Payer: MEDICARE

## 2023-06-05 VITALS
OXYGEN SATURATION: 97 % | WEIGHT: 115.2 LBS | SYSTOLIC BLOOD PRESSURE: 122 MMHG | BODY MASS INDEX: 18.51 KG/M2 | HEART RATE: 69 BPM | DIASTOLIC BLOOD PRESSURE: 68 MMHG | HEIGHT: 66 IN

## 2023-06-05 DIAGNOSIS — J30.1 SEASONAL ALLERGIC RHINITIS DUE TO POLLEN: ICD-10-CM

## 2023-06-05 DIAGNOSIS — I10 PRIMARY HYPERTENSION: ICD-10-CM

## 2023-06-05 DIAGNOSIS — Z51.81 MEDICATION MONITORING ENCOUNTER: Primary | ICD-10-CM

## 2023-06-05 DIAGNOSIS — F51.01 PRIMARY INSOMNIA: ICD-10-CM

## 2023-06-05 DIAGNOSIS — R07.9 CHEST PAIN IN ADULT: ICD-10-CM

## 2023-06-05 DIAGNOSIS — F41.9 ANXIETY: ICD-10-CM

## 2023-06-05 RX ORDER — ZOLPIDEM TARTRATE 10 MG/1
10 TABLET ORAL NIGHTLY PRN
Qty: 30 TABLET | Refills: 2 | Status: SHIPPED | OUTPATIENT
Start: 2023-06-05

## 2023-06-05 RX ORDER — LORATADINE 10 MG/1
10 TABLET ORAL DAILY
Qty: 90 TABLET | Refills: 3 | Status: SHIPPED | OUTPATIENT
Start: 2023-06-05

## 2023-06-05 RX ORDER — BACLOFEN 10 MG/1
10 TABLET ORAL 3 TIMES DAILY PRN
Qty: 90 TABLET | Refills: 1 | Status: SHIPPED | OUTPATIENT
Start: 2023-06-05

## 2023-06-05 RX ORDER — FLUTICASONE PROPIONATE 50 MCG
2 SPRAY, SUSPENSION (ML) NASAL DAILY
Qty: 5 ML | Refills: 3 | Status: SHIPPED | OUTPATIENT
Start: 2023-06-05

## 2023-06-05 NOTE — PROGRESS NOTES
Roxie Carter  1965  [id number]  Patient Care Team:  Chip Ramos MD as PCP - General (Internal Medicine)  Arturo Brady MD as Consulting Physician (Gastroenterology)  Jamee Mcguire MD as Consulting Physician (Gynecologic Oncology)    Roxie Carter is a 58 y.o. female here today for follow up.     This patient is accompanied by their self who contributes to the history of their care.    Chief Complaint:    Chief Complaint   Patient presents with    Insomnia        History of Present Illness:  I have reviewed and/or updated the patient's past medical, past surgical, family, social history, problem list and allergies as appropriate.     58-year-old female here to follow-up hypertension, palpitations chest pain insomnia and anxiety.  She has been seen by the heart valve clinic.  Since her last visit, she underwent a stress test that was reportedly normal no EKG significant changes noted.  She had a transthoracic echocardiogram showing preservation of left regular ejection fraction no valvular abnormalities.  She also underwent a extended Holter monitor 10 days no evidence of A-fib or flutter pauses and AV block.  She had a small 6 beat run of SVT.  Triggered events correlated with PACs and PVCs.  Blood pressures been running well at home.  She is typically taking half the dose of her metoprolol unless she is having frequent PACs/palpitations -she will take a full pill.    Continues to take Ambien will also Lexapro for insomnia and anxiety. Buspar has helped as well.    Having significant PND with allergies. Typically Flonase has helped.  Denies any fevers or chills.        Review of Systems   Constitutional:  Negative for chills, fatigue, fever, unexpected weight gain and unexpected weight loss.   HENT:  Positive for postnasal drip and sneezing. Negative for ear pain, sinus pressure and sore throat.    Eyes:  Negative for blurred vision, double vision and visual disturbance.  "  Respiratory:  Negative for cough, shortness of breath and wheezing.    Cardiovascular:  Positive for palpitations. Negative for chest pain and leg swelling.   Gastrointestinal:  Negative for abdominal pain, blood in stool, diarrhea, nausea and vomiting.   Endocrine: Negative for cold intolerance, heat intolerance, polydipsia, polyphagia and polyuria.   Genitourinary:  Negative for dysuria, flank pain and hematuria.   Musculoskeletal:  Negative for arthralgias and joint swelling.   Skin:  Negative for dry skin and rash.   Neurological:  Negative for weakness, numbness and headache.   Psychiatric/Behavioral:  Negative for self-injury, suicidal ideas and depressed mood.      Vitals:    06/05/23 1049   BP: 122/68   Pulse: 69   SpO2: 97%   Weight: 52.3 kg (115 lb 3.2 oz)   Height: 167.5 cm (65.95\")     Body mass index is 18.62 kg/m².    Physical Exam  Vitals and nursing note reviewed.   Constitutional:       General: She is not in acute distress.     Appearance: She is well-developed. She is not diaphoretic.   HENT:      Head: Normocephalic and atraumatic.      Right Ear: External ear normal.      Left Ear: External ear normal.      Mouth/Throat:      Pharynx: No oropharyngeal exudate.   Eyes:      General: No scleral icterus.        Right eye: No discharge.      Conjunctiva/sclera: Conjunctivae normal.   Neck:      Thyroid: No thyromegaly.      Vascular: No JVD.      Trachea: No tracheal deviation.   Cardiovascular:      Rate and Rhythm: Normal rate and regular rhythm.      Heart sounds: Normal heart sounds.      Comments: PMI nondisplaced  Pulmonary:      Effort: Pulmonary effort is normal.      Breath sounds: Normal breath sounds. No wheezing or rales.   Abdominal:      General: Bowel sounds are normal.      Palpations: Abdomen is soft.      Tenderness: There is no abdominal tenderness. There is no guarding or rebound.   Musculoskeletal:      Cervical back: Normal range of motion and neck supple.   Lymphadenopathy: "      Cervical: No cervical adenopathy.   Skin:     General: Skin is warm and dry.      Capillary Refill: Capillary refill takes less than 2 seconds.      Coloration: Skin is not pale.      Findings: No rash.   Neurological:      Mental Status: She is alert and oriented to person, place, and time.      Motor: No abnormal muscle tone.      Coordination: Coordination normal.   Psychiatric:         Judgment: Judgment normal.       Procedures    Results Review:    I reviewed the patient's new clinical results.    Assessment/Plan:    Problem List Items Addressed This Visit          Allergies and Adverse Reactions    Seasonal allergic rhinitis due to pollen    Current Assessment & Plan     She will try Claritin, 10 mg daily and additionally prescribed Flonase 2 puffs daily.         Relevant Medications    ibuprofen (ADVIL,MOTRIN) 200 MG tablet    diclofenac (VOLTAREN) 75 MG EC tablet       Cardiac and Vasculature    Chest pain in adult    Current Assessment & Plan     Reassuring cardiac evaluation.  All tests were normal.  Likely nonischemic.  Continue metoprolol and anxiety treatment         Primary hypertension    Relevant Medications    metoprolol succinate XL (Toprol XL) 25 MG 24 hr tablet       Mental Health    Anxiety    Current Assessment & Plan     Proved and stable on BuSpar and Lexapro.         Relevant Medications    escitalopram (Lexapro) 20 MG tablet       Sleep    Primary insomnia    Relevant Medications    zolpidem (AMBIEN) 10 MG tablet     Other Visit Diagnoses       Medication monitoring encounter    -  Primary    Relevant Orders    Compliance Drug Analysis, Ur - Urine, Clean Catch            Plan of care reviewed with patient at the conclusion of today's visit. Education was provided regarding diagnosis and management.  Patient verbalizes understanding of and agreement with management plan.    Return in about 6 months (around 12/5/2023) for Medicare Wellness 6 mon   f/u insomnia 3 mon.    Chip NEWBERRY  MD Richard      Please note than portions of this note were completed wth a Voice Recognition Program        Answers submitted by the patient for this visit:  Other (Submitted on 5/30/2023)  Please describe your symptoms.: Insomnia  Have you had these symptoms before?: Yes  How long have you been having these symptoms?: Greater than 2 weeks  Please list any medications you are currently taking for this condition.: Ambien  Please describe any probable cause for these symptoms. : Went through chemotherapy and I can not sleep at all during the night.  Primary Reason for Visit (Submitted on 5/30/2023)  What is the primary reason for your visit?: Other

## 2023-06-05 NOTE — ASSESSMENT & PLAN NOTE
Reassuring cardiac evaluation.  All tests were normal.  Likely nonischemic.  Continue metoprolol and anxiety treatment

## 2023-06-09 LAB — DRUGS UR: NORMAL

## 2023-06-09 RX ORDER — ANASTROZOLE 1 MG/1
1 TABLET ORAL DAILY
Qty: 30 TABLET | Refills: 5 | Status: SHIPPED | OUTPATIENT
Start: 2023-06-09

## 2023-06-13 ENCOUNTER — OFFICE VISIT (OUTPATIENT)
Dept: FAMILY MEDICINE CLINIC | Facility: CLINIC | Age: 58
End: 2023-06-13
Payer: MEDICARE

## 2023-06-13 VITALS
SYSTOLIC BLOOD PRESSURE: 118 MMHG | BODY MASS INDEX: 18.96 KG/M2 | HEART RATE: 91 BPM | WEIGHT: 118 LBS | HEIGHT: 66 IN | TEMPERATURE: 97.8 F | OXYGEN SATURATION: 91 % | DIASTOLIC BLOOD PRESSURE: 78 MMHG

## 2023-06-13 DIAGNOSIS — J34.89 NASAL DRAINAGE: Primary | ICD-10-CM

## 2023-06-13 DIAGNOSIS — R92.8 ABNORMAL MAMMOGRAM: ICD-10-CM

## 2023-06-13 DIAGNOSIS — Z85.41 HISTORY OF CERVICAL CANCER: ICD-10-CM

## 2023-06-13 LAB
EXPIRATION DATE: NORMAL
FLUAV AG UPPER RESP QL IA.RAPID: NOT DETECTED
FLUBV AG UPPER RESP QL IA.RAPID: NOT DETECTED
INTERNAL CONTROL: NORMAL
Lab: NORMAL
SARS-COV-2 AG UPPER RESP QL IA.RAPID: NOT DETECTED

## 2023-06-13 NOTE — PROGRESS NOTES
"    Office Note     Name: Roxie Carter    : 1965     MRN: [id number]     Chief Complaint  Establish Care (Pt is here to establish care today. ) and URI (C/o cold sxs onset 4 days. )    Subjective     History of Present Illness:  Roxie Carter is a 58 y.o. female who presents today for 4 days of feeling ill with congestion and runny nose, some sore trhoat. Feels very clogged and congested.    Takes OTC mucous relief and claritin which helps some but not a lot.     Great niece and nephew are comign today and wants to be sure she can  safely be wit hthem today. They are 8 and 3 with no major health issues.    Patient is normally a patient of Dr. Ramos on the [facility] primary care.  Is primarily here for her illness today but is considering transferring care to a PCP closer to home.    Of note Dr. Ramos recently ordered her mammogram screening.  Patient recently received a letter in the mail dated 2023 pertaining to the mammogram done on 2023.  The letter states \"a recent imaging study showed a finding that requires additional imaging studies such as additional mammographic views or ultrasound, for complete evaluation.  Most such findings are benign i.e. not cancer.  Please contact your physician to schedule his test if you have not already done so report of your results was sent to Dr. Chip Ramos MD.\"    I have reviewed both the scanned documents under media as well as the imaging tab and do not see that [facility]/Dr. Ramos/[facility] has received any written copies of any mammogram results on this patient within the last month or so.  Patient states she would prefer not to go back to [facility] for any of her additional imaging.    Review of Systems:   Review of Systems    Past Medical History:   Past Medical History:   Diagnosis Date    Abnormal Pap smear of cervix     Allergic     Can not remember the date when diagnosed for " allergy. Allergic to penicillin    Anxiety 01/2022    Arthritis     Cancer 2013    OVARIAN; s/p hysterectomy with BSO and chemo therapy that ended in 2014    Constipation     Depression     Not sure of date    Headache     Not for sure of the date. But been having them for countless years.    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 But it was in 2019 or 2020    Recurrent pregnancy loss, antepartum condition or complication     Urinary tract infection 2020 and 2021    Vitamin D deficiency        Past Surgical History:   Past Surgical History:   Procedure Laterality Date    BACK SURGERY      fusion     BREAST BIOPSY      Dont remember the actural date    COLON SURGERY  04/03/2020    partial resection due to ovarian tumor invading    COLONOSCOPY  2016    ENDOSCOPY      EXPLORATORY LAPAROTOMY N/A 04/03/2020    Procedure: LAPAROTOMY EXPLORATORY,  OPTIMAL DEBULKING, CYSTOSCOPY WITH URETERAL CATHTER INSERTION AND REMOVAL, ILEO RESECTION, SIDE TO SIDE ANASTOMOSIS, RECTAL SIGMOID RESECTION, LEFT URETERA LYSIS, AND PERITONEAL STRIPPING;  Surgeon: Jamee Mcguire MD;  Location: [facility];  Service: Gynecology Oncology;  Laterality: N/A;    HYSTERECTOMY  2013    with BSO    HYSTEROSCOPY  2013    LAPAROSCOPIC ASSISTED VAGINAL HYSTERECTOMY SALPINGO OOPHORECTOMY  2020    OVARIAN CYST SURGERY      2013/2020    SPINE SURGERY  2004/2006/2008    Fusion of the spine in 2008    TOTAL ABDOMINAL HYSTERECTOMY WITH SALPINGO OOPHORECTOMY  2013    TUBAL ABDOMINAL LIGATION  1993    WISDOM TOOTH EXTRACTION       Can't remember date they were removed.       Family History:   Family History   Problem Relation Age of Onset    Diabetes type II Father     Heart disease Father     Heart attack Father     Cancer Father     Diabetes Father     Hypertension Father     Hyperlipidemia Father     Liver disease Father     Diabetes type II Mother     Diabetes Mother     Hypertension Mother     COPD Mother     Depression Mother     Arthritis Mother     Hyperlipidemia Mother     Vision loss Mother     Lung cancer Maternal Uncle     Migraines Sister     Asthma Sister     Migraines Brother         dec from Overdose    Depression Brother     Anxiety disorder Brother     Birth defects Brother         Had a small hole in his heart    Drug abuse Brother         Baby brother passed away due to drug overdose in     No Known Problems Maternal Grandmother     No Known Problems Maternal Grandfather     No Known Problems Paternal Grandmother     No Known Problems Paternal Grandfather     Anxiety disorder Sister     Miscarriages / Stillbirths Sister         Miscarriage    Depression Sister     Mental illness Sister     Anxiety disorder Sister     Depression Sister     Cancer Maternal Aunt     Cancer Maternal Uncle     Mental illness Son     Miscarriages / Stillbirths Sister         Miscarriage       Social History:   Social History     Socioeconomic History    Marital status: Single   Tobacco Use    Smoking status: Former     Packs/day: 0.25     Years: 30.00     Pack years: 7.50     Types: Cigarettes     Start date:      Quit date: 2018     Years since quittin.7    Smokeless tobacco: Never    Tobacco comments:     I dont remember the date I started smoking. But I was 16 when I started.   Vaping Use    Vaping Use: Former    Quit date: 2019    Substances: Nicotine    Devices: Pre-filled or refillable cartridge   Substance and Sexual Activity    Alcohol use: Never    Drug use: Never    Sexual activity: Not Currently     Partners:  Male     Birth control/protection: None, Hysterectomy     Comment: Have not be sexually active in 10 years.       Immunizations:   Immunization History   Administered Date(s) Administered    COVID-19 (PFIZER) BIVALENT 12+YRS 09/26/2022    COVID-19 (PFIZER) Purple Cap Monovalent 04/27/2022, 06/03/2022    Flu Vaccine Split Quad 09/30/2017, 01/02/2019    FluLaval/Fluzone >6mos 09/30/2017, 01/02/2019, 09/12/2022    Flublock Quad =>18yrs 10/12/2022    Flublok 18+yrs 11/01/2020, 10/04/2021    Hepatitis A 01/17/2019    Influenza Injectable Mdck Pf Quad 09/12/2022    Influenza, Unspecified 11/01/2020, 10/01/2021    Pneumococcal Polysaccharide (PPSV23) 01/02/2019    Shingrix 06/18/2020, 09/12/2022, 03/22/2023    Tdap 01/02/2019        Medications:     Current Outpatient Medications:     anastrozole (ARIMIDEX) 1 MG tablet, Take 1 tablet by mouth Daily., Disp: 30 tablet, Rfl: 5    baclofen (LIORESAL) 10 MG tablet, Take 1 tablet by mouth 3 (Three) Times a Day As Needed for Muscle Spasms., Disp: 90 tablet, Rfl: 1    busPIRone (BUSPAR) 10 MG tablet, Take 0.5 tablets by mouth 3 (Three) Times a Day., Disp: 90 tablet, Rfl: 9    cyclobenzaprine (FLEXERIL) 10 MG tablet, TAKE 1 TABLET BY MOUTH IN THE EVENING AS NEEDED FOR MUSCLE SPASMS, Disp: , Rfl:     diclofenac (VOLTAREN) 75 MG EC tablet, Take 1 tablet by mouth Every 12 (Twelve) Hours., Disp: , Rfl:     diphenhydrAMINE (BENADRYL) 25 MG tablet, Take 1 tablet by mouth At Night As Needed., Disp: , Rfl:     escitalopram (Lexapro) 20 MG tablet, Take 1 tablet by mouth Daily., Disp: 30 tablet, Rfl: 8    fluticasone (Flonase) 50 MCG/ACT nasal spray, 2 sprays into the nostril(s) as directed by provider Daily., Disp: 5 mL, Rfl: 3    ibuprofen (ADVIL,MOTRIN) 200 MG tablet, Take  by mouth Every 6 (Six) Hours As Needed for Mild Pain ., Disp: , Rfl:     loratadine (Claritin) 10 MG tablet, Take 1 tablet by mouth Daily., Disp: 90 tablet, Rfl: 3    magnesium hydroxide (MILK OF MAGNESIA) 400 MG/5ML  "suspension, Take  by mouth Daily As Needed for Constipation., Disp: , Rfl:     metoprolol succinate XL (Toprol XL) 25 MG 24 hr tablet, Take 1 tablet by mouth Daily., Disp: 90 tablet, Rfl: 1    zolpidem (AMBIEN) 10 MG tablet, Take 1 tablet by mouth At Night As Needed for Sleep., Disp: 30 tablet, Rfl: 2    Allergies:   Allergies   Allergen Reactions    Penicillins Hives       Objective     Vital Signs  /78   Pulse 91   Temp 97.8 °F (36.6 °C)   Ht 167.6 cm (66\")   Wt 53.5 kg (118 lb)   SpO2 91%   BMI 19.05 kg/m²   Estimated body mass index is 19.05 kg/m² as calculated from the following:    Height as of this encounter: 167.6 cm (66\").    Weight as of this encounter: 53.5 kg (118 lb).    BMI is within normal parameters. No other follow-up for BMI required.      Physical Exam  Vitals and nursing note reviewed.   Constitutional:       General: She is not in acute distress.     Appearance: Normal appearance.   HENT:      Right Ear: Tympanic membrane normal.      Left Ear: Tympanic membrane normal.      Nose: Rhinorrhea present.      Mouth/Throat:      Pharynx: No oropharyngeal exudate or posterior oropharyngeal erythema.   Eyes:      Conjunctiva/sclera: Conjunctivae normal.   Cardiovascular:      Rate and Rhythm: Normal rate and regular rhythm.      Heart sounds: Normal heart sounds.   Pulmonary:      Effort: Pulmonary effort is normal.      Breath sounds: Normal breath sounds. No wheezing, rhonchi or rales.   Neurological:      Mental Status: She is alert.          Procedures     Assessment and Plan     1. Nasal drainage  - POCT SARS-CoV-2 Antigen RICCO + influenza: NEGATIVE    2. Abnormal mammogram  See Below  - Mammo Diagnostic Digital Tomosynthesis Left With CAD; Future  - US Breast Bilateral Complete; Future    3. History of cervical cancer    During chart review I realized that [facility] has not yet sent to patient's most recent diagnostic imaging report for her recent mammogram to Dr. Ramos, her current/most " recent PCP who is also a [facility] provider using the same epic EMR.  Therefore we called to obtain a copy of the results during this visit.  The results are as follows:    Right medial breast, 2 cm from the nipple, there is a 6 mm focal asymmetry.  Right superior breast 3 cm from the nipple there is a 6 mm calcification.  Left upper outer breast 2 cm from nipple, there is a 12 mm focal asymmetry.  BI-RADS 0, incomplete: Need additional imaging.  Recommendation: Additional diagnostic imaging needed.  Recommend bilateral full ML and CC/MLO spot compression views.  Ultrasound finding persists.    I did advise patient that in order to expedite care that she should go to [facility] and get a copy of her most recent mammogram on a disc so that she can take to the imaging center at [facility] in [place].        Follow Up  Return in about 6 months (around 12/13/2023) for Followup with Dr Arroyo- IN PERSON.    Patient was given instructions and counseling regarding her condition or for health maintenance advice. Please see specific information pulled into the AVS if appropriate.     MD GAURAV [facility]  [address] [place] [id number] [phone]

## 2023-06-13 NOTE — TELEPHONE ENCOUNTER
Called provided number and spoke with Maddison, who states there are no notes from caller or notes indicating need for Medication list from Provider.

## 2023-07-28 RX ORDER — DICLOFENAC SODIUM 75 MG/1
TABLET, DELAYED RELEASE ORAL
Qty: 90 TABLET | Refills: 1 | Status: SHIPPED | OUTPATIENT
Start: 2023-07-28

## 2023-07-28 NOTE — TELEPHONE ENCOUNTER
Rx Refill Note  Requested Prescriptions     Pending Prescriptions Disp Refills    diclofenac (VOLTAREN) 75 MG EC tablet [Pharmacy Med Name: Diclofenac Sodium 75 MG Oral Tablet Delayed Release] 60 tablet 0     Sig: Take 1 tablet by mouth twice daily      Last office visit with prescribing clinician: 6/5/2023   Last telemedicine visit with prescribing clinician: Visit date not found   Next office visit with prescribing clinician: 12/18/2023                         Would you like a call back once the refill request has been completed: [] Yes [] No    If the office needs to give you a call back, can they leave a voicemail: [] Yes [] No    April KORY Clemons  07/28/23, 15:29 EDT

## 2023-08-08 ENCOUNTER — HOSPITAL ENCOUNTER (OUTPATIENT)
Dept: ULTRASOUND IMAGING | Facility: HOSPITAL | Age: 58
Discharge: HOME | End: 2023-08-08
Payer: MEDICARE

## 2023-08-08 ENCOUNTER — TRANSCRIBE ORDERS (OUTPATIENT)
Dept: ADMINISTRATIVE | Facility: HOSPITAL | Age: 58
End: 2023-08-08
Payer: MEDICARE

## 2023-08-08 ENCOUNTER — HOSPITAL ENCOUNTER (OUTPATIENT)
Dept: MAMMOGRAPHY | Facility: HOSPITAL | Age: 58
Discharge: HOME | End: 2023-08-08
Payer: MEDICARE

## 2023-08-08 DIAGNOSIS — R92.8 ABNORMAL MAMMOGRAM: Primary | ICD-10-CM

## 2023-08-08 DIAGNOSIS — R92.8 ABNORMAL MAMMOGRAM: ICD-10-CM

## 2023-08-08 PROCEDURE — 76642 ULTRASOUND BREAST LIMITED: CPT | Performed by: RADIOLOGY

## 2023-08-08 PROCEDURE — G0279 TOMOSYNTHESIS, MAMMO: HCPCS

## 2023-08-08 PROCEDURE — 76642 ULTRASOUND BREAST LIMITED: CPT

## 2023-08-08 PROCEDURE — 77062 BREAST TOMOSYNTHESIS BI: CPT | Performed by: RADIOLOGY

## 2023-08-08 PROCEDURE — 77066 DX MAMMO INCL CAD BI: CPT

## 2023-08-08 PROCEDURE — 77066 DX MAMMO INCL CAD BI: CPT | Performed by: RADIOLOGY

## 2023-08-21 RX ORDER — BACLOFEN 10 MG/1
TABLET ORAL
Qty: 90 TABLET | Refills: 0 | Status: SHIPPED | OUTPATIENT
Start: 2023-08-21 | End: 2023-08-22

## 2023-08-22 ENCOUNTER — OFFICE VISIT (OUTPATIENT)
Dept: FAMILY MEDICINE CLINIC | Facility: CLINIC | Age: 58
End: 2023-08-22
Payer: MEDICARE

## 2023-08-22 VITALS
OXYGEN SATURATION: 99 % | WEIGHT: 120 LBS | HEART RATE: 76 BPM | SYSTOLIC BLOOD PRESSURE: 120 MMHG | HEIGHT: 66 IN | DIASTOLIC BLOOD PRESSURE: 78 MMHG | BODY MASS INDEX: 19.29 KG/M2

## 2023-08-22 DIAGNOSIS — M54.50 LUMBAR BACK PAIN: ICD-10-CM

## 2023-08-22 DIAGNOSIS — G89.29 CHRONIC MIDLINE THORACIC BACK PAIN: ICD-10-CM

## 2023-08-22 DIAGNOSIS — Z98.1 HISTORY OF LUMBAR SPINAL FUSION: Primary | ICD-10-CM

## 2023-08-22 DIAGNOSIS — M54.6 CHRONIC MIDLINE THORACIC BACK PAIN: ICD-10-CM

## 2023-08-22 RX ORDER — DICLOFENAC SODIUM 75 MG/1
75 TABLET, DELAYED RELEASE ORAL 2 TIMES DAILY
Qty: 90 TABLET | Refills: 1 | Status: SHIPPED | OUTPATIENT
Start: 2023-08-22

## 2023-08-22 RX ORDER — TIZANIDINE HYDROCHLORIDE 2 MG/1
2 CAPSULE, GELATIN COATED ORAL 3 TIMES DAILY
Qty: 90 CAPSULE | Refills: 1 | Status: SHIPPED | OUTPATIENT
Start: 2023-08-22

## 2023-08-22 NOTE — PROGRESS NOTES
Office Note     Name: Roxie Carter    : 1965     MRN: [id number]     Chief Complaint  Back Pain (Pain getting worse)    Subjective     History of Present Illness:  Roxie Carter is a 58 y.o. female who presents today for back kip n    Has had history of multiple surgeries, thinks it was spinal fusion in 08, thinks L4 L5. Was done in [place].    Takes baclofen twice a day every day and sometimes takes ibuprofen.  Thinks she has been on it about 1 year    Had done well until about 2 months ago. Started having midline low back pain which has gradually been getting worse.    New recent injury trauma or exercise regimen.    Has tingling in feet for the last few years which is not new.    No weakness in toes, no limp but does feel more clumsy and unstable in her feet    Did PT many years ago. Prior to surgery had injections in the back but has not had or needed any treatment since then.    Had a nerve test done earlier this year    Answers submitted by the patient for this visit:  Primary Reason for Visit (Submitted on 2023)  What is the primary reason for your visit?: Back Pain  Back Pain Questionnaire (Submitted on 2023)  Chief Complaint: Back pain  Chronicity: recurrent  Onset: 1 to 4 weeks ago  Frequency: constantly  Progression since onset: gradually worsening  Pain location: gluteal, lumbar spine, sacro-iliac, thoracic spine  Pain quality: shooting, stabbing  Pain - numeric: 9/10  Pain is: the same all the time  Aggravated by: position, lying down, sitting, standing  Stiffness is present: all day  bowel incontinence: No  headaches: Yes  leg pain: Yes  paresthesias: Yes  perianal numbness: Yes  tingling: Yes  Risk factors: history of cancer      Review of Systems:   Review of Systems    Past Medical History:   Past Medical History:   Diagnosis Date    Abnormal Pap smear of cervix     Allergic     Can not remember the date when diagnosed for allergy. Allergic to  penicillin    Anxiety 01/2022    Arthritis     Cancer 2013    OVARIAN; s/p hysterectomy with BSO and chemo therapy that ended in 2014    Constipation     Depression     Not sure of date    Headache     Not for sure of the date. But been having them for countless years.    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 But it was in 2019 or 2020    Recurrent pregnancy loss, antepartum condition or complication     Urinary tract infection 2020 and 2021    Vitamin D deficiency        Past Surgical History:   Past Surgical History:   Procedure Laterality Date    BACK SURGERY      fusion     BREAST BIOPSY      Dont remember the actural date    COLON SURGERY  04/03/2020    partial resection due to ovarian tumor invading    COLONOSCOPY  2016    ENDOSCOPY      EXPLORATORY LAPAROTOMY N/A 04/03/2020    Procedure: LAPAROTOMY EXPLORATORY,  OPTIMAL DEBULKING, CYSTOSCOPY WITH URETERAL CATHTER INSERTION AND REMOVAL, ILEO RESECTION, SIDE TO SIDE ANASTOMOSIS, RECTAL SIGMOID RESECTION, LEFT URETERA LYSIS, AND PERITONEAL STRIPPING;  Surgeon: Jamee Mcguire MD;  Location: [facility];  Service: Gynecology Oncology;  Laterality: N/A;    HYSTERECTOMY  2013    with BSO    HYSTEROSCOPY  2013    LAPAROSCOPIC ASSISTED VAGINAL HYSTERECTOMY SALPINGO OOPHORECTOMY  2020    OVARIAN CYST SURGERY      2013/2020    SPINE SURGERY  2004/2006/2008    Fusion of the spine in 2008    TOTAL ABDOMINAL HYSTERECTOMY WITH SALPINGO OOPHORECTOMY  2013    TUBAL ABDOMINAL LIGATION  1993    WISDOM TOOTH EXTRACTION      Can't remember date  they were removed.       Family History:   Family History   Problem Relation Age of Onset    Diabetes type II Father     Heart disease Father     Heart attack Father     Cancer Father     Diabetes Father     Hypertension Father     Hyperlipidemia Father     Liver disease Father     Diabetes type II Mother     Diabetes Mother     Hypertension Mother     COPD Mother     Depression Mother     Arthritis Mother     Hyperlipidemia Mother     Vision loss Mother     Lung cancer Maternal Uncle     Migraines Sister     Asthma Sister     Migraines Brother         dec from Overdose    Depression Brother     Anxiety disorder Brother     Birth defects Brother         Had a small hole in his heart    Drug abuse Brother         Baby brother passed away due to drug overdose in     No Known Problems Maternal Grandmother     No Known Problems Maternal Grandfather     No Known Problems Paternal Grandmother     No Known Problems Paternal Grandfather     Anxiety disorder Sister     Miscarriages / Stillbirths Sister         Miscarriage    Depression Sister     Mental illness Sister     Anxiety disorder Sister     Depression Sister     Cancer Maternal Aunt     Cancer Maternal Uncle     Mental illness Son     Miscarriages / Stillbirths Sister         Miscarriage       Social History:   Social History     Socioeconomic History    Marital status: Single   Tobacco Use    Smoking status: Former     Packs/day: 0.25     Years: 30.00     Pack years: 7.50     Types: Cigarettes     Start date:      Quit date: 2018     Years since quittin.0    Smokeless tobacco: Never    Tobacco comments:     I dont remember the date I started smoking. But I was 16 when I started.   Vaping Use    Vaping Use: Former    Quit date: 2019    Substances: Nicotine    Devices: Pre-filled or refillable cartridge   Substance and Sexual Activity    Alcohol use: Never    Drug use: Never    Sexual activity: Not Currently     Partners: Male     Birth  control/protection: None, Hysterectomy     Comment: Have not be sexually active in 10 years.       Immunizations:   Immunization History   Administered Date(s) Administered    COVID-19 (PFIZER) BIVALENT 12+YRS 09/26/2022    COVID-19 (PFIZER) Purple Cap Monovalent 04/27/2022, 06/03/2022    Flu Vaccine Split Quad 09/30/2017, 01/02/2019    Flublock Quad =>18yrs 10/12/2022    Flublok 18+yrs 11/01/2020, 10/04/2021    Fluzone (or Fluarix & Flulaval for VFC) >6mos 09/30/2017, 01/02/2019, 09/12/2022    Hepatitis A 01/17/2019    Influenza Injectable Mdck Pf Quad 09/12/2022    Influenza, Unspecified 11/01/2020, 10/01/2021    Pneumococcal Polysaccharide (PPSV23) 01/02/2019    Shingrix 06/18/2020, 09/12/2022, 03/22/2023    Tdap 01/02/2019        Medications:     Current Outpatient Medications:     anastrozole (ARIMIDEX) 1 MG tablet, Take 1 tablet by mouth Daily., Disp: 30 tablet, Rfl: 5    busPIRone (BUSPAR) 10 MG tablet, Take 0.5 tablets by mouth 3 (Three) Times a Day., Disp: 90 tablet, Rfl: 9    diclofenac (VOLTAREN) 75 MG EC tablet, Take 1 tablet by mouth 2 (Two) Times a Day., Disp: 90 tablet, Rfl: 1    diphenhydrAMINE (BENADRYL) 25 MG tablet, Take 1 tablet by mouth At Night As Needed., Disp: , Rfl:     escitalopram (Lexapro) 20 MG tablet, Take 1 tablet by mouth Daily., Disp: 30 tablet, Rfl: 8    fluticasone (Flonase) 50 MCG/ACT nasal spray, 2 sprays into the nostril(s) as directed by provider Daily., Disp: 5 mL, Rfl: 3    ibuprofen (ADVIL,MOTRIN) 200 MG tablet, Take  by mouth Every 6 (Six) Hours As Needed for Mild Pain ., Disp: , Rfl:     loratadine (Claritin) 10 MG tablet, Take 1 tablet by mouth Daily., Disp: 90 tablet, Rfl: 3    magnesium hydroxide (MILK OF MAGNESIA) 400 MG/5ML suspension, Take  by mouth Daily As Needed for Constipation., Disp: , Rfl:     metoprolol succinate XL (Toprol XL) 25 MG 24 hr tablet, Take 1 tablet by mouth Daily., Disp: 90 tablet, Rfl: 1    TiZANidine (Zanaflex) 2 MG capsule, Take 1 capsule by  "mouth 3 (Three) Times a Day., Disp: 90 capsule, Rfl: 1    zolpidem (AMBIEN) 10 MG tablet, Take 1 tablet by mouth At Night As Needed for Sleep., Disp: 30 tablet, Rfl: 0    Allergies:   Allergies   Allergen Reactions    Penicillins Hives       Objective     Vital Signs  /78   Pulse 76   Ht 167.6 cm (66\")   Wt 54.4 kg (120 lb)   SpO2 99%   BMI 19.37 kg/m²   Estimated body mass index is 19.37 kg/m² as calculated from the following:    Height as of this encounter: 167.6 cm (66\").    Weight as of this encounter: 54.4 kg (120 lb).    BMI is within normal parameters. No other follow-up for BMI required.      Physical Exam  Vitals and nursing note reviewed.   Constitutional:       Appearance: Normal appearance.   Cardiovascular:      Rate and Rhythm: Normal rate.      Heart sounds: Normal heart sounds.   Pulmonary:      Effort: Pulmonary effort is normal.      Breath sounds: Normal breath sounds.   Musculoskeletal:      Cervical back: No bony tenderness.      Thoracic back: No bony tenderness.      Lumbar back: No bony tenderness.      Comments: 5 / 5 strength bilateral lower extremities upon flexion and extension at hips knees and ankles.    Neurological:      Mental Status: She is alert.      Comments: Light touch sensation and sharp dull differentiation intact over bilateral lower extremities        Procedures     Assessment and Plan     1. History of lumbar spinal fusion    - XR Spine Lumbar 2 or 3 View (In Office)  - TiZANidine (Zanaflex) 2 MG capsule; Take 1 capsule by mouth 3 (Three) Times a Day.  Dispense: 90 capsule; Refill: 1  - diclofenac (VOLTAREN) 75 MG EC tablet; Take 1 tablet by mouth 2 (Two) Times a Day.  Dispense: 90 tablet; Refill: 1  - Ambulatory Referral to Pain Management  - XR Spine Thoracic 2 View    2. Lumbar back pain    - XR Spine Lumbar 2 or 3 View (In Office)  - TiZANidine (Zanaflex) 2 MG capsule; Take 1 capsule by mouth 3 (Three) Times a Day.  Dispense: 90 capsule; Refill: 1  - " diclofenac (VOLTAREN) 75 MG EC tablet; Take 1 tablet by mouth 2 (Two) Times a Day.  Dispense: 90 tablet; Refill: 1  - Ambulatory Referral to Pain Management  - XR Spine Thoracic 2 View    3. Chronic midline thoracic back pain  - XR Spine Lumbar 2 or 3 View (In Office)  - TiZANidine (Zanaflex) 2 MG capsule; Take 1 capsule by mouth 3 (Three) Times a Day.  Dispense: 90 capsule; Refill: 1  - diclofenac (VOLTAREN) 75 MG EC tablet; Take 1 tablet by mouth 2 (Two) Times a Day.  Dispense: 90 tablet; Refill: 1  - Ambulatory Referral to Pain Management  - XR Spine Thoracic 2 View       Follow Up  Return in about 3 months (around 11/22/2023) for Annual physical.    Patient was advised to call the office or seek medical care if  any issues discussed during this visit worsen or persist or if new concerns arise        MD GA[facility]  [address] [place] [id number] [phone]

## 2023-08-24 ENCOUNTER — TELEPHONE (OUTPATIENT)
Dept: FAMILY MEDICINE CLINIC | Facility: CLINIC | Age: 58
End: 2023-08-24

## 2023-08-24 DIAGNOSIS — M54.50 LUMBAR BACK PAIN: ICD-10-CM

## 2023-08-24 DIAGNOSIS — Z98.1 HISTORY OF LUMBAR SPINAL FUSION: ICD-10-CM

## 2023-08-24 DIAGNOSIS — M54.2 NECK PAIN: Primary | ICD-10-CM

## 2023-08-24 NOTE — TELEPHONE ENCOUNTER
Caller: Roxie Carter    Relationship: Self    Best call back number: [phone]     What test was performed:   XR SPINE THORACIC 2 VIEW   XR SPINE LUMBAR 2 OR 3 VIEW     When was the test performed: 08/22/2023    Additional notes:   PATIENT WOULD LIKE A CALL BACK REGARDING THE RESULTS OF HER XRAY'S DONE ON 08/22/2023

## 2023-08-30 ENCOUNTER — PATIENT ROUNDING (BHMG ONLY) (OUTPATIENT)
Dept: FAMILY MEDICINE CLINIC | Facility: CLINIC | Age: 58
End: 2023-08-30
Payer: MEDICARE

## 2023-08-30 NOTE — PROGRESS NOTES
August 30, 2023    Hello, may I speak with Roxie Carter?    My name is Nita Arroyo     I am  with [facility]  [address]  [facility] [address] [phone].    Before we get started may I verify your date of birth? 1965    I am calling to officially welcome you to our practice and ask about your recent visit. Is this a good time to talk? Yes    Tell me about your visit with us. What things went well? The visits always go well. Patient loves [facility]. She was not a old an [facility] patient she was a patient of Dr. Ramos in [place] and switched to us since she lives in [place] and we are here. She said the staff is always nice from the phone call of making her appointment , to walking in and out the door. She says she like [employer] but doesn't use it for scheduling she likes to call for that.        We're always looking for ways to make our patients' experiences even better. Do you have recommendations on ways we may improve?  No    Overall were you satisfied with your first visit to our practice? Yes       I appreciate you taking the time to speak with me today. Is there anything else I can do for you? No- Always very happy with Ismael and all her providers with [facility].       Thank you, and have a great day.

## 2023-09-15 DIAGNOSIS — F51.01 PRIMARY INSOMNIA: ICD-10-CM

## 2023-09-15 NOTE — TELEPHONE ENCOUNTER
Caller: Roxie Carter    Relationship: Self    Best call back number: [phone]     Requested Prescriptions:   Requested Prescriptions     Pending Prescriptions Disp Refills    zolpidem (AMBIEN) 10 MG tablet 30 tablet 2     Sig: Take 1 tablet by mouth At Night As Needed for Sleep.        Pharmacy where request should be sent: [facility] [address] [place] [address] [phone] [facility] [phone] FX     Last office visit with prescribing clinician: 8/22/2023   Last telemedicine visit with prescribing clinician: Visit date not found   Next office visit with prescribing clinician: 11/22/2023     Additional details provided by patient: OUT OF MEDICATION, SWITCHED TO DR COLMENARES RECENTLY, CALL IF APPOINTMENT NEEDED     Does the patient have less than a 3 day supply:  [x] Yes  [] No    Would you like a call back once the refill request has been completed: [] Yes [x] No    If the office needs to give you a call back, can they leave a voicemail: [] Yes [x] No    Vijay Harry   09/15/23 11:37 EDT

## 2023-09-19 RX ORDER — ZOLPIDEM TARTRATE 10 MG/1
10 TABLET ORAL NIGHTLY PRN
Qty: 30 TABLET | Refills: 0 | Status: SHIPPED | OUTPATIENT
Start: 2023-09-19

## 2023-09-26 ENCOUNTER — HOSPITAL ENCOUNTER (OUTPATIENT)
Dept: MAMMOGRAPHY | Facility: HOSPITAL | Age: 58
Discharge: HOME | End: 2023-09-26
Payer: MEDICARE

## 2023-09-26 DIAGNOSIS — R92.8 ABNORMAL MAMMOGRAM: ICD-10-CM

## 2023-09-26 PROCEDURE — 88305 TISSUE EXAM BY PATHOLOGIST: CPT | Performed by: INTERNAL MEDICINE

## 2023-09-26 PROCEDURE — A4648 IMPLANTABLE TISSUE MARKER: HCPCS

## 2023-09-26 PROCEDURE — 0 LIDOCAINE 1 % SOLUTION: Performed by: RADIOLOGY

## 2023-09-26 PROCEDURE — 76098 X-RAY EXAM SURGICAL SPECIMEN: CPT

## 2023-09-26 RX ORDER — LIDOCAINE HYDROCHLORIDE AND EPINEPHRINE 10; 10 MG/ML; UG/ML
10 INJECTION, SOLUTION INFILTRATION; PERINEURAL ONCE
Status: COMPLETED | OUTPATIENT
Start: 2023-09-26 | End: 2023-09-26

## 2023-09-26 RX ORDER — LIDOCAINE HYDROCHLORIDE 10 MG/ML
5 INJECTION, SOLUTION INFILTRATION; PERINEURAL ONCE
Status: COMPLETED | OUTPATIENT
Start: 2023-09-26 | End: 2023-09-26

## 2023-09-26 RX ADMIN — LIDOCAINE HYDROCHLORIDE,EPINEPHRINE BITARTRATE 10 ML: 10; .01 INJECTION, SOLUTION INFILTRATION; PERINEURAL at 10:42

## 2023-09-26 RX ADMIN — Medication 8 ML: at 10:42

## 2023-09-27 ENCOUNTER — TELEPHONE (OUTPATIENT)
Dept: MAMMOGRAPHY | Facility: HOSPITAL | Age: 58
End: 2023-09-27
Payer: MEDICARE

## 2023-09-27 LAB
CYTO UR: NORMAL
LAB AP CASE REPORT: NORMAL
LAB AP CLINICAL INFORMATION: NORMAL
PATH REPORT.FINAL DX SPEC: NORMAL
PATH REPORT.GROSS SPEC: NORMAL

## 2023-09-27 NOTE — TELEPHONE ENCOUNTER
Patient notified of pathology results and recommendation. Verbalizes understanding. States having some discomfort, denies needing analgesics.  Denies signs and symptoms of infection. Questions answered, support given, verbalized understanding. Patient transferred to [facility] schedulers for an appointment.

## 2023-10-10 RX ORDER — BACLOFEN 10 MG/1
10 TABLET ORAL 3 TIMES DAILY PRN
Qty: 90 TABLET | Refills: 0 | Status: SHIPPED | OUTPATIENT
Start: 2023-10-10

## 2023-10-11 DIAGNOSIS — R92.8 ABNORMAL MAMMOGRAM: Primary | ICD-10-CM

## 2023-10-26 ENCOUNTER — TELEPHONE (OUTPATIENT)
Dept: FAMILY MEDICINE CLINIC | Facility: CLINIC | Age: 58
End: 2023-10-26
Payer: MEDICARE

## 2023-10-26 DIAGNOSIS — G89.29 CHRONIC MIDLINE THORACIC BACK PAIN: ICD-10-CM

## 2023-10-26 DIAGNOSIS — Z98.1 HISTORY OF LUMBAR SPINAL FUSION: ICD-10-CM

## 2023-10-26 DIAGNOSIS — M54.50 LUMBAR BACK PAIN: ICD-10-CM

## 2023-10-26 DIAGNOSIS — M54.6 CHRONIC MIDLINE THORACIC BACK PAIN: ICD-10-CM

## 2023-10-26 NOTE — TELEPHONE ENCOUNTER
Pharmacy Name: [facility] [address] [place] [address] [phone] [facility] [phone]      Pharmacy representative phone number: [phone]    What medication are you calling in regards to: DICLOFENAC     What question does the pharmacy have: NEEDS CLARIFICATION ON QUANTITY     Who is the provider that prescribed the medication: DR. CARYL COLMENARES    Additional notes:

## 2023-10-30 RX ORDER — DICLOFENAC SODIUM 75 MG/1
75 TABLET, DELAYED RELEASE ORAL 2 TIMES DAILY
Qty: 180 TABLET | Refills: 1 | Status: SHIPPED | OUTPATIENT
Start: 2023-10-30

## 2023-11-03 ENCOUNTER — TELEPHONE (OUTPATIENT)
Dept: FAMILY MEDICINE CLINIC | Facility: CLINIC | Age: 58
End: 2023-11-03
Payer: MEDICARE

## 2023-11-03 NOTE — TELEPHONE ENCOUNTER
Caller: [facility] [address] [place] [address] - [phone] [facility] [phone] FX    Relationship: Pharmacy    Best call back number:  [phone]     What is the best time to reach you: ANY    Who are you requesting to speak with (clinical staff, provider,  specific staff member): NURSE    Do you know the name of the person who called: PARMINDER    What was the call regarding: PHONE CALL TO CONFIRM QUANTITY.  SENT OVER AS 90 BUT TO TAKE ONE TABLET TWICE DAILY FOR 90 DAYS.      Is it okay if the provider responds through [facility]: PHONE CALL PLEASE.

## 2023-12-13 RX ORDER — ANASTROZOLE 1 MG/1
TABLET ORAL
Qty: 90 TABLET | Refills: 1 | Status: SHIPPED | OUTPATIENT
Start: 2023-12-13

## 2024-01-24 ENCOUNTER — OFFICE VISIT (OUTPATIENT)
Dept: GYNECOLOGIC ONCOLOGY | Facility: CLINIC | Age: 59
End: 2024-01-24
Payer: MEDICARE

## 2024-01-24 ENCOUNTER — LAB (OUTPATIENT)
Dept: LAB | Facility: HOSPITAL | Age: 59
End: 2024-01-24
Payer: MEDICARE

## 2024-01-24 VITALS
DIASTOLIC BLOOD PRESSURE: 86 MMHG | OXYGEN SATURATION: 95 % | RESPIRATION RATE: 18 BRPM | TEMPERATURE: 97.3 F | WEIGHT: 121.9 LBS | HEIGHT: 66 IN | BODY MASS INDEX: 19.59 KG/M2 | SYSTOLIC BLOOD PRESSURE: 138 MMHG | HEART RATE: 73 BPM

## 2024-01-24 DIAGNOSIS — D39.11 GRANULOSA CELL TUMOR OF OVARY, RIGHT: Primary | ICD-10-CM

## 2024-01-24 DIAGNOSIS — R39.89 URETHRAL PAIN: ICD-10-CM

## 2024-01-24 DIAGNOSIS — D39.11 GRANULOSA CELL TUMOR OF OVARY, RIGHT: ICD-10-CM

## 2024-01-24 DIAGNOSIS — R32 URINARY INCONTINENCE, UNSPECIFIED TYPE: ICD-10-CM

## 2024-01-24 DIAGNOSIS — Z87.440 HISTORY OF RECURRENT UTIS: ICD-10-CM

## 2024-01-24 DIAGNOSIS — K59.00 CONSTIPATION, UNSPECIFIED CONSTIPATION TYPE: ICD-10-CM

## 2024-01-24 PROCEDURE — 83520 IMMUNOASSAY QUANT NOS NONAB: CPT

## 2024-01-24 PROCEDURE — 3079F DIAST BP 80-89 MM HG: CPT | Performed by: NURSE PRACTITIONER

## 2024-01-24 PROCEDURE — 36415 COLL VENOUS BLD VENIPUNCTURE: CPT

## 2024-01-24 PROCEDURE — 99214 OFFICE O/P EST MOD 30 MIN: CPT | Performed by: NURSE PRACTITIONER

## 2024-01-24 PROCEDURE — 1126F AMNT PAIN NOTED NONE PRSNT: CPT | Performed by: NURSE PRACTITIONER

## 2024-01-24 PROCEDURE — 86336 INHIBIN A: CPT

## 2024-01-24 PROCEDURE — 3075F SYST BP GE 130 - 139MM HG: CPT | Performed by: NURSE PRACTITIONER

## 2024-01-24 RX ORDER — NITROFURANTOIN 25; 75 MG/1; MG/1
1 CAPSULE ORAL EVERY 12 HOURS SCHEDULED
COMMUNITY
Start: 2023-11-08

## 2024-01-24 NOTE — PROGRESS NOTES
GYN ONCOLOGY CANCER SURVEILLANCE VISIT    Roxie Carter  [id number]  1965    Subjective   Chief Complaint: Ovarian Cancer (GCT surveillance)        History of present illness:     Roxie Carter is a 58 y.o. year old female who is here today for surveillance for ovarian GCT.  She has a personal history of recurrent granulosa cell tumor, see cancer history below.  She is on Arimidex maintenance for hormone blockade since 4/2020, approaching 4 years since secondary debulking. Last CT scan performed in July 2023 was negative for disease.     Upon arrival today patient c/o ongoing pelvic pressure and discomfort. Last CT scan showed significant stool burden. She reports ongoing constipation and bloating, mostly unchanged since that time. She reports she is using stool softeners daily and milk of magnesia once or twice a month when needed. She also c/o bladder leaking on and off over the last 2-4 months. She reports recurrent UTIs in this time frame and occasional feelings of pain near the urethra. PCP has been treating UTIs with antibiotics and has mentioned possible referral to urology. Otherwise, patient denies vaginal bleeding, concerning lesions, abdominal bloating that does not resolve with BMs.         Oncology History:    Oncology/Hematology History   Granulosa cell tumor of ovary, right   8/13/2013 Cancer Staged    Staging form: Ovary, Fallopian Tube, And Primary Peritoneal Carcinoma, AJCC 8th Edition  - Clinical stage from 8/13/2013: FIGO Stage IC (cT1c, cN0, cM0) - Signed by Jamee Mcguire MD on 3/26/2020     8/13/2013 Surgery    Total abdominal hysterectomy, bilateral salpingo-oophorectomy with pathology showing 5 cm granulosa cell tumor of the right ovary and a left fallopian tube intraepithelial serous neoplasm.      - 1/4/2014 Chemotherapy    OP OVARIAN PACLitaxel / CARBOplatin (Q21D)  x6 cycles remarkable for bone marrow suppression and G-CSF support     3/14/2020 Progression    CT  Abdomen/Pelvis IMPRESSION:  Large heterogeneous mass identified within the pelvis with  fluid seen scattered throughout the abdomen and pelvis. Findings  concerning for recurrence with some stranding of the mesentery in which  spread to the mesentery cannot be excluded.     4/3/2020 Surgery    Exploratory laparotomy, optimal debulking (R=0), cystoscopy with temporary uteteral catheter, ileal resection with side-to-side anastomosis, rectosigmoid resection/LAR, left ureterolysis, and peritoneal stripping.   Pathology consistent with recurrent granulosa cell tumor at the pelvis with rectosigmoid and partial terminal ileum. No LVSI. Mesenteric nodes and other staging negative.      4/6/2020 -  Hormonal Therapy    Arimidex initiated     4/27/2020 Molecular Testing    CARIS testing results:  AL positive, 1+, 75%  ER negative, MSI stable, PD-L1 negative     6/30/2020 Imaging    CT scan for new abdominal pain showed expected postsurgical changes from resection of pelvic mass without evidence for bulky adenopathy or soft tissue nodular recurrence. No mesenteric reticulation or ascites to suggest peritoneal involvement. No acute intraabdominal or intrapelvic abnormality otherwise noted. Significant colonic stool burden noted.      5/4/2021 Imaging    CT abdomen pelvis:  No evidence for acute intra-abdominal or intrapelvic abnormality with persistent moderate to large colonic stool burden and postsurgical changes however no evidence for bulky adenopathy, peritoneal disease process or active metastatic disease present.     7/5/2023 Imaging    CT chest, abdomen, pelvis d/t c/o low abdominal pain and pelvic pressure:  1. No evidence of metastatic disease within the chest, abdomen or pelvis. No acute process identified.  2. Moderate to large stool burden consistent with constipation. This appears similar as compared to the previous study.  3. Ancillary findings as described above.           The current medication list and allergy  "list were reviewed and reconciled.     Past Medical History, Past Surgical History, Social History, Family History have been reviewed and are without significant changes except as mentioned.      Review of Systems   Constitutional:  Positive for fatigue.   Gastrointestinal:  Positive for constipation.   Genitourinary:  Positive for pelvic pressure and urinary incontinence.   Psychiatric/Behavioral: Negative.           Objective   Physical Exam  Vital Signs: /86   Pulse 73   Temp 97.3 °F (36.3 °C) (Temporal)   Resp 18   Ht 167.6 cm (66\")   Wt 55.3 kg (121 lb 14.4 oz)   SpO2 95%   BMI 19.68 kg/m²    Wt Readings from Last 10 Encounters:   01/24/24 55.3 kg (121 lb 14.4 oz)   08/22/23 54.4 kg (120 lb)   06/13/23 53.5 kg (118 lb)   06/05/23 52.3 kg (115 lb 3.2 oz)   05/26/23 52.4 kg (115 lb 9.6 oz)   05/11/23 52.7 kg (116 lb 3.2 oz)   04/20/23 53.6 kg (118 lb 2.7 oz)   04/07/23 53.6 kg (118 lb 1 oz)   03/22/23 52.4 kg (115 lb 9.6 oz)   02/02/23 51.6 kg (113 lb 12.8 oz)       Vitals:    01/24/24 1038   PainSc: 0-No pain             General Appearance:  alert, cooperative, no apparent distress, appears stated age, thin   Neurologic/Psych: A&O x 3, gait steady, appropriate affect   HEENT:  Normocephalic, without obvious abnormality, mucous membranes moist   Breasts:  deferred   Abdomen:   Soft, non-distended, no organomegaly and tenderness in RLQ, LLQ and suprapubic (unchanged since last exam)   Lymph nodes: No cervical, supraclavicular, inguinal adenopathy noted   Extremities: Normal, atraumatic; no clubbing, cyanosis, or edema    Pelvic: External Genitalia  without lesions or skin changes  Vagina  is pink, moist, without lesions.   Vaginal Cuff  Female Vaginal Cuff: smooth, intact and without visible lesions. Tenderness at vaginal cuff improved since last exam  Uterus  surgically absent   Ovaries  surgically absent bilaterally and without palpable masses or fullness.   Parametria  smooth  Rectovaginal  Female " rectovaginal: deferred     ECOG score: 0               Result Review :   The following data was reviewed by: CASEY Kelley on 01/24/24:  Last imaging study was CT abdomen pelvis 7/5/2023.     Tumor markers:  Component      Latest Ref Rng 4/25/2022 11/29/2022 5/26/2023   Inhibin A, Ultrasensitive      pg/mL 0.7  1.1  1.0        Component      Latest Ref Rng 4/25/2022 11/29/2022 5/26/2023   Inhibin B      pg/mL <7.0  <7.0  10.4          Procedure Notes:              Assessment and Plan:    Diagnoses and all orders for this visit:    1. Granulosa cell tumor of ovary, right (Primary)  -     Inhibin A, Ultrasensitive; Future  -     Inhibin B; Future    2. Urinary incontinence, unspecified type  -     Ambulatory Referral to Urology    3. History of recurrent UTIs  -     Ambulatory Referral to Urology    4. Urethral pain  -     Ambulatory Referral to Urology    5. Constipation, unspecified constipation type          There is no evidence of disease upon today's exam. Symptoms are largely unchanged since last visit. Last CT showed significant stool burden and patient endorses ongoing constipation. Discussed home bowel regimen. I recommended she add Miralax daily in addition to her stool softeners until bowels improve. She will be notified of inihibin tumor marker results upon their return. Consider repeat CT if tumor markers elevate. She v/u and agrees with plan. Continue every 6 month cancer surveillance visits. Continue anastrozole maintenance as prescribed. She is understanding to call with any changes in pelvic symptoms or general GYN concerns at any time between regularly scheduled visits.     Patient and I also discussed her bladder changes over the last several months. There may be a component of her leaking related to increased pelvic pressure from chronic constipation. Due to several urologic concerns, I am recommending referral to urology for further evaluation and management. Referral placed today.  She will be called to schedule.     Pain assessment was performed today as a part of patient’s care.  For patients with pain related to surgery, gynecologic malignancy or cancer treatment, the plan is as noted in the assessment/plan.  For patients with pain not related to these issues, they are to seek any further needed care from a more appropriate provider, such as PCP.      Follow-up:     Return to clinic in 6 months for ongoing cancer surveillance.      Electronically signed by CASEY Kelley on 01/24/24 at 11:17 EST

## 2024-01-25 ENCOUNTER — TELEPHONE (OUTPATIENT)
Dept: GYNECOLOGIC ONCOLOGY | Facility: CLINIC | Age: 59
End: 2024-01-25
Payer: MEDICARE

## 2024-01-25 NOTE — TELEPHONE ENCOUNTER
Caller: Roxie Carter    Relationship: Self    Best call back number: [phone]     What is the best time to reach you: ASAP    Who are you requesting to speak with (clinical staff, provider,  specific staff member): CLINICAL      What was the call regarding: PT WAS REFERRED TO UROLOGY, SHE SPOKE WITH SEBASTIAN AND NOW SHE NEEDS TO SPEAK WITH GABRIEL OR SOMEONE ON YONATHAN IRAHETA'S TEAM TO DISCUSS WHAT SHE FOUND OUT WHEN SPEAKING TO SEBASTIAN.  PLEASE CALL PT BACK TO DISCUSS.

## 2024-01-25 NOTE — TELEPHONE ENCOUNTER
Phoned patient. She is confused about referral to urology. Advised if insurance required referral that would come from her PCP. Noted that we have sent the referral to Urology at [facility], Referral notes that they are going to offer the patient a referral in 3-4 weeks. If referral is needed they will let her know.

## 2024-01-26 LAB — INHIBIN B SERPL-MCNC: 10.8 PG/ML

## 2024-01-30 LAB — INHIBIN A SERPL-MCNC: 0.8 PG/ML

## 2024-01-31 ENCOUNTER — TELEPHONE (OUTPATIENT)
Dept: GYNECOLOGIC ONCOLOGY | Facility: CLINIC | Age: 59
End: 2024-01-31
Payer: MEDICARE

## 2024-01-31 NOTE — TELEPHONE ENCOUNTER
RN called patient to report low and stable inhibin tumor marker per APRN request. Patient verbalized understanding.     ----- Message from CASEY Kelley sent at 1/30/2024  6:01 PM EST -----  Please notify patient inhibin tumor markers low/stable. Thanks!

## 2024-02-08 ENCOUNTER — TELEPHONE (OUTPATIENT)
Dept: GYNECOLOGIC ONCOLOGY | Facility: CLINIC | Age: 59
End: 2024-02-08
Payer: MEDICARE

## 2024-02-08 NOTE — TELEPHONE ENCOUNTER
Caller: Roxie Carter    Relationship: Self    Best call back number: [phone]      What was the call regarding: PT DROPPED OFF THE CONT OF CARE FORM IN NOV OR DEC AND INSURANCE HAS NOT RECEIVED IT YET, PATIENT NEEDS TO HAVE IT SENT AS SOON AS POSSIBLE     PLEASE CALL PATIENT TO ADVISE

## 2024-02-08 NOTE — TELEPHONE ENCOUNTER
Returned patient call, located document in question in media section of chart with fax report included.  Printed and sent via [place] mail to verified Patient address.  Patient verbalized understanding and offers thank you/

## 2024-02-08 NOTE — TELEPHONE ENCOUNTER
Called to let pt know I re-faxed the continuity of care form to her insurance    Pt verbalized an understanding

## 2024-03-12 ENCOUNTER — TELEPHONE (OUTPATIENT)
Dept: FAMILY MEDICINE CLINIC | Facility: CLINIC | Age: 59
End: 2024-03-12

## 2024-03-12 NOTE — TELEPHONE ENCOUNTER
HUB TO RELAY: You have an order to have another mammogram 6 months from your previous. This is now overdue. It looks like they made 3 attempts to contact, but could not get ahold of you. Would you still like us to get you scheduled for the recheck dandy?

## 2024-03-22 ENCOUNTER — TELEPHONE (OUTPATIENT)
Dept: FAMILY MEDICINE CLINIC | Facility: CLINIC | Age: 59
End: 2024-03-22

## 2024-03-22 NOTE — TELEPHONE ENCOUNTER
Called patient and lvm.   HUB to relay:    Patient had a diagnostic dandy and biopsy in September of 2023. Report states she is needing another diagnostic mammogram in march 2024(6 months). We don't see that she has an appointment for that dandy. Has she had it done? If not, can we work on getting this scheduled?

## 2024-03-29 NOTE — TELEPHONE ENCOUNTER
Name: Roxie Carter      Relationship: Self      Best Callback Number: [phone]       HUB PROVIDED THE RELAY MESSAGE FROM THE OFFICE      PATIENT: VOICED UNDERSTANDING AND HAS NO FURTHER QUESTIONS AT THIS TIME    ADDITIONAL INFORMATION: PATIENT ADVISED THAT SHE HASN'T HAD A DIAGNOSTIC MAMMOGRAM. PLEASE SCHEDULE HER FOR ONE IN APRIL AT [facility] [place]. SHE STATED SHE'S AVAILABLE ON TUESDAYS, THURSDAYS AND FRIDAYS.  THANK YOU

## 2024-04-02 NOTE — TELEPHONE ENCOUNTER
"HUB TO RELAY: \"Please schedule appt with provider, to get referral for Mammogram.\"   Lvm for pt to call back and be put on Cruz's schedule for referral for diagnostic mammogram.   "

## 2024-04-22 DIAGNOSIS — M54.6 CHRONIC MIDLINE THORACIC BACK PAIN: ICD-10-CM

## 2024-04-22 DIAGNOSIS — G89.29 CHRONIC MIDLINE THORACIC BACK PAIN: ICD-10-CM

## 2024-04-22 DIAGNOSIS — Z98.1 HISTORY OF LUMBAR SPINAL FUSION: ICD-10-CM

## 2024-04-22 DIAGNOSIS — M54.50 LUMBAR BACK PAIN: ICD-10-CM

## 2024-04-23 RX ORDER — DICLOFENAC SODIUM 75 MG/1
75 TABLET, DELAYED RELEASE ORAL 2 TIMES DAILY
Qty: 180 TABLET | Refills: 0 | Status: SHIPPED | OUTPATIENT
Start: 2024-04-23

## 2024-06-11 DIAGNOSIS — C56.1 MALIGNANT NEOPLASM OF RIGHT OVARY: Primary | ICD-10-CM

## 2024-06-11 RX ORDER — ANASTROZOLE 1 MG/1
1 TABLET ORAL DAILY
Qty: 90 TABLET | Refills: 3 | Status: SHIPPED | OUTPATIENT
Start: 2024-06-11

## 2024-06-19 RX ORDER — LORATADINE 10 MG/1
TABLET ORAL
Qty: 300 TABLET | Refills: 0 | Status: SHIPPED | OUTPATIENT
Start: 2024-06-19

## 2024-06-19 NOTE — TELEPHONE ENCOUNTER
Rx Refill Note  Requested Prescriptions     Pending Prescriptions Disp Refills    loratadine (CLARITIN) 10 MG tablet [Pharmacy Med Name: Loratadine 10 MG Oral Tablet] 300 tablet 0     Sig: TAKE 1 TABLET BY MOUTH ONCE DAILY AT 9:00 AM      Last office visit with prescribing clinician: 6/5/2023   Last telemedicine visit with prescribing clinician: Visit date not found   Next office visit with prescribing clinician: Visit date not found                         Would you like a call back once the refill request has been completed: [] Yes [] No    If the office needs to give you a call back, can they leave a voicemail: [] Yes [] No    Aspen Su MA  06/19/24, 08:33 EDT

## 2024-07-13 DIAGNOSIS — F41.9 ANXIETY: ICD-10-CM

## 2024-07-15 RX ORDER — ESCITALOPRAM OXALATE 20 MG/1
20 TABLET ORAL DAILY
Qty: 30 TABLET | Refills: 0 | Status: SHIPPED | OUTPATIENT
Start: 2024-07-15

## 2024-07-15 RX ORDER — BUSPIRONE HYDROCHLORIDE 10 MG/1
TABLET ORAL
Qty: 45 TABLET | Refills: 0 | Status: SHIPPED | OUTPATIENT
Start: 2024-07-15

## 2024-07-15 NOTE — TELEPHONE ENCOUNTER
Rx Refill Note  Requested Prescriptions     Pending Prescriptions Disp Refills    escitalopram (LEXAPRO) 20 MG tablet [Pharmacy Med Name: Escitalopram Oxalate 20 MG Oral Tablet] 30 tablet 0     Sig: Take 1 tablet by mouth once daily    busPIRone (BUSPAR) 10 MG tablet [Pharmacy Med Name: busPIRone HCl 10 MG Oral Tablet] 45 tablet 0     Sig: TAKE 1/2 (ONE-HALF) TABLET BY MOUTH THREE TIMES DAILY AT 9:00 AM, 1:00 PM, AND 5:00 PM      Last office visit with prescribing clinician: 6/5/2023   Last telemedicine visit with prescribing clinician: Visit date not found   Next office visit with prescribing clinician: Visit date not found                         Would you like a call back once the refill request has been completed: [] Yes [] No    If the office needs to give you a call back, can they leave a voicemail: [] Yes [] No    Bianca Yang MA  07/15/24, 07:39 EDT

## 2024-07-19 DIAGNOSIS — Z98.1 HISTORY OF LUMBAR SPINAL FUSION: ICD-10-CM

## 2024-07-19 DIAGNOSIS — M54.6 CHRONIC MIDLINE THORACIC BACK PAIN: ICD-10-CM

## 2024-07-19 DIAGNOSIS — G89.29 CHRONIC MIDLINE THORACIC BACK PAIN: ICD-10-CM

## 2024-07-19 DIAGNOSIS — M54.50 LUMBAR BACK PAIN: ICD-10-CM

## 2024-07-24 ENCOUNTER — LAB (OUTPATIENT)
Dept: LAB | Facility: HOSPITAL | Age: 59
End: 2024-07-24
Payer: MEDICARE

## 2024-07-24 ENCOUNTER — OFFICE VISIT (OUTPATIENT)
Dept: GYNECOLOGIC ONCOLOGY | Facility: CLINIC | Age: 59
End: 2024-07-24
Payer: MEDICARE

## 2024-07-24 VITALS
RESPIRATION RATE: 18 BRPM | BODY MASS INDEX: 22.93 KG/M2 | HEIGHT: 66 IN | HEART RATE: 78 BPM | WEIGHT: 142.7 LBS | DIASTOLIC BLOOD PRESSURE: 88 MMHG | SYSTOLIC BLOOD PRESSURE: 156 MMHG | OXYGEN SATURATION: 98 % | TEMPERATURE: 97.8 F

## 2024-07-24 DIAGNOSIS — R10.30 LOWER ABDOMINAL PAIN: ICD-10-CM

## 2024-07-24 DIAGNOSIS — D39.11 GRANULOSA CELL TUMOR OF OVARY, RIGHT: Primary | ICD-10-CM

## 2024-07-24 DIAGNOSIS — C56.1 MALIGNANT NEOPLASM OF RIGHT OVARY: ICD-10-CM

## 2024-07-24 DIAGNOSIS — D39.11 GRANULOSA CELL TUMOR OF OVARY, RIGHT: ICD-10-CM

## 2024-07-24 PROCEDURE — 83520 IMMUNOASSAY QUANT NOS NONAB: CPT

## 2024-07-24 PROCEDURE — 1160F RVW MEDS BY RX/DR IN RCRD: CPT | Performed by: NURSE PRACTITIONER

## 2024-07-24 PROCEDURE — 36415 COLL VENOUS BLD VENIPUNCTURE: CPT

## 2024-07-24 PROCEDURE — 86336 INHIBIN A: CPT

## 2024-07-24 PROCEDURE — 3077F SYST BP >= 140 MM HG: CPT | Performed by: NURSE PRACTITIONER

## 2024-07-24 PROCEDURE — 99214 OFFICE O/P EST MOD 30 MIN: CPT | Performed by: NURSE PRACTITIONER

## 2024-07-24 PROCEDURE — 1159F MED LIST DOCD IN RCRD: CPT | Performed by: NURSE PRACTITIONER

## 2024-07-24 PROCEDURE — 1125F AMNT PAIN NOTED PAIN PRSNT: CPT | Performed by: NURSE PRACTITIONER

## 2024-07-24 PROCEDURE — 3079F DIAST BP 80-89 MM HG: CPT | Performed by: NURSE PRACTITIONER

## 2024-07-24 RX ORDER — ANASTROZOLE 1 MG/1
1 TABLET ORAL DAILY
Qty: 90 TABLET | Refills: 3 | Status: SHIPPED | OUTPATIENT
Start: 2024-07-24

## 2024-07-24 RX ORDER — TRAMADOL HYDROCHLORIDE 50 MG/1
50 TABLET ORAL EVERY 8 HOURS PRN
COMMUNITY
Start: 2024-06-07

## 2024-07-24 NOTE — TELEPHONE ENCOUNTER
Left message hub to relay  Looks like pt has upcoming apt with another family practice provider? Is she switching??

## 2024-07-24 NOTE — PROGRESS NOTES
GYN ONCOLOGY CANCER SURVEILLANCE FOLLOW-UP    Roxie Carter  [id number]  1965    Subjective   Chief Complaint: Ovarian Cancer (GCT surveillance)      History of present illness:   Roxie Carter is a 59 y.o. year old female who is here today for ongoing surveillance of Ovarian Cancer, see Cancer History. She has a personal history of recurrent granulosa cell tumor, see cancer history below.  She is on Arimidex maintenance for hormone blockade since 4/2020, now over 4 years since secondary debulking. Last CT scan performed in July 2023 was negative for disease.      Last visit she had reported ongoing recurrent UTIs and constipation. She was referred to urology and did see specialist in [place]. They performed a cystoscopy which was reportedly negative and she just stopped her prophylactic macrobid last week. Today she reports ongoing pain across low abdomen that causes subsequent pain into low back and even in knees. This seems to be progressively worsening. Last colonoscopy 2021 ([facility]) with recommendation to repeat in 5 years.     Upon completion of today's visit she plans to travel to [place] to stay with her sister and help with recovery process as her brother-in-law had knee replacement earlier this morning.  She will be back in town on August 9.          Cancer History:   Oncology/Hematology History   Granulosa cell tumor of ovary, right   8/13/2013 Cancer Staged    Staging form: Ovary, Fallopian Tube, And Primary Peritoneal Carcinoma, AJCC 8th Edition  - Clinical stage from 8/13/2013: FIGO Stage IC (cT1c, cN0, cM0) - Signed by Jamee Mcguire MD on 3/26/2020     8/13/2013 Surgery    Total abdominal hysterectomy, bilateral salpingo-oophorectomy with pathology showing 5 cm granulosa cell tumor of the right ovary and a left fallopian tube intraepithelial serous neoplasm.      - 1/4/2014 Chemotherapy    OP OVARIAN PACLitaxel / CARBOplatin (Q21D)  x6 cycles remarkable for bone marrow  suppression and G-CSF support     3/14/2020 Progression    CT Abdomen/Pelvis IMPRESSION:  Large heterogeneous mass identified within the pelvis with  fluid seen scattered throughout the abdomen and pelvis. Findings  concerning for recurrence with some stranding of the mesentery in which  spread to the mesentery cannot be excluded.     4/3/2020 Surgery    Exploratory laparotomy, optimal debulking (R=0), cystoscopy with temporary uteteral catheter, ileal resection with side-to-side anastomosis, rectosigmoid resection/LAR, left ureterolysis, and peritoneal stripping.   Pathology consistent with recurrent granulosa cell tumor at the pelvis with rectosigmoid and partial terminal ileum. No LVSI. Mesenteric nodes and other staging negative.      4/6/2020 -  Hormonal Therapy    Arimidex initiated     4/27/2020 Molecular Testing    CARIS testing results:  IA positive, 1+, 75%  ER negative, MSI stable, PD-L1 negative     6/30/2020 Imaging    CT scan for new abdominal pain showed expected postsurgical changes from resection of pelvic mass without evidence for bulky adenopathy or soft tissue nodular recurrence. No mesenteric reticulation or ascites to suggest peritoneal involvement. No acute intraabdominal or intrapelvic abnormality otherwise noted. Significant colonic stool burden noted.      5/4/2021 Imaging    CT abdomen pelvis:  No evidence for acute intra-abdominal or intrapelvic abnormality with persistent moderate to large colonic stool burden and postsurgical changes however no evidence for bulky adenopathy, peritoneal disease process or active metastatic disease present.     7/5/2023 Imaging    CT chest, abdomen, pelvis d/t c/o low abdominal pain and pelvic pressure:  1. No evidence of metastatic disease within the chest, abdomen or pelvis. No acute process identified.  2. Moderate to large stool burden consistent with constipation. This appears similar as compared to the previous study.  3. Ancillary findings as  "described above.           The current medication list and allergy list were reviewed and reconciled.     Past Medical History, Past Surgical History, Social History, Family History have been reviewed and are without significant changes except as mentioned.      Review of Systems   Constitutional: Negative.    Gastrointestinal:  Positive for abdominal pain. Negative for abdominal distention, blood in stool, constipation, diarrhea, nausea, rectal pain, vomiting and GERD.        Occasional constipation that always resolves with miralax.   Genitourinary:  Positive for urinary incontinence. Negative for dysuria, flank pain, frequency, genital sores, hematuria, pelvic pain, pelvic pressure, urgency, vaginal bleeding, vaginal discharge and vaginal pain.   Psychiatric/Behavioral: Negative.             Objective   Physical Exam  Vital Signs: /88   Pulse 78   Temp 97.8 °F (36.6 °C) (Temporal)   Resp 18   Ht 167.6 cm (66\")   Wt 64.7 kg (142 lb 11.2 oz)   SpO2 98%   BMI 23.03 kg/m²   Vitals:    07/24/24 0929   PainSc:   6   PainLoc: Abdomen  Comment: left abd and hips down legs           General Appearance:  alert, cooperative, no apparent distress, appears stated age, and normal weight   Neurologic/Psych: A&O x 3, gait steady, appropriate affect   HEENT:  Normocephalic, without obvious abnormality, mucous membranes moist   Abdomen:   Soft, non-tender, non-distended, and no organomegaly   Lymph nodes: No cervical, supraclavicular, inguinal adenopathy noted   Pelvic: External Genitalia  without lesions or skin changes  Vagina  is pink, moist, without lesions.   Vaginal Cuff  Female Vaginal Cuff: smooth, intact, without visible lesions, and some tenderness, left sided. Slight discoloration noted as well.  Uterus  surgically absent and no palpable masses  Ovaries  surgically absent bilaterally and without palpable masses or fullness  Parametria  smooth  Rectovaginal  Female rectovaginal: deferred     ECOG score: 0 " "            Result Review :    Last imaging study was 7/2023.   Tumor marker:  No results found for: \"\"    PHQ-9 Total Score:      Procedure Note:            Assessment and Plan:   There is no evidence of disease upon today's exam, although concerning symptoms with worsening abdominal pain.  Repeat CT abdomen and pelvis has been ordered. Last CT showed significant stool burden and patient endorses ongoing constipation. Discussed home bowel regimen. I recommended she continue Miralax daily in addition to her stool softeners & fiber supplementation until bowels improve. She will be notified of inihibin tumor marker results upon their return.  She v/u and agrees with plan. Continue every 6 month cancer surveillance visits. Continue anastrozole maintenance as prescribed. She is understanding to call with any changes in pelvic symptoms or general GYN concerns at any time between regularly scheduled visits.  Follow-up with all specialist as scheduled.      Diagnoses and all orders for this visit:    1. Granulosa cell tumor of ovary, right (Primary)  -     Inhibin A, Ultrasensitive; Future  -     Inhibin B; Future  -     CT Abdomen Pelvis With Contrast; Future    2. Malignant neoplasm of right ovary  -     anastrozole (ARIMIDEX) 1 MG tablet; Take 1 tablet by mouth Daily.  Dispense: 90 tablet; Refill: 3  -     CT Abdomen Pelvis With Contrast; Future    3. Lower abdominal pain  -     CT Abdomen Pelvis With Contrast; Future    Pain assessment was performed today as a part of patient’s care.  For patients with pain related to surgery, gynecologic malignancy or cancer treatment, the plan is as noted in the assessment/plan.  For patients with pain not related to these issues, they are to seek any further needed care from a more appropriate provider, such as PCP.           Follow-up:    Return to clinic in 6 months for ongoing cancer surveillance.      Electronically signed by CASEY Sams on 07/24/2024        "

## 2024-07-25 RX ORDER — DICLOFENAC SODIUM 75 MG/1
75 TABLET, DELAYED RELEASE ORAL 2 TIMES DAILY
Qty: 180 TABLET | Refills: 0 | OUTPATIENT
Start: 2024-07-25

## 2024-07-29 LAB
INHIBIN A SERPL-MCNC: 1.1 PG/ML
INHIBIN B SERPL-MCNC: <7 PG/ML

## 2024-08-01 ENCOUNTER — TELEPHONE (OUTPATIENT)
Dept: GYNECOLOGIC ONCOLOGY | Facility: CLINIC | Age: 59
End: 2024-08-01
Payer: MEDICARE

## 2024-08-01 NOTE — TELEPHONE ENCOUNTER
----- Message from Dory Beck sent at 7/31/2024 10:31 PM EDT -----  Please notify patient inhibin tumor markers remain low/stable. I see her CT has been scheduled for 8/20. We will be in touch again once radiology report is available.

## 2024-08-07 DIAGNOSIS — D39.11 GRANULOSA CELL TUMOR OF OVARY, RIGHT: Primary | ICD-10-CM

## 2024-08-10 DIAGNOSIS — F41.9 ANXIETY: ICD-10-CM

## 2024-08-12 ENCOUNTER — OFFICE VISIT (OUTPATIENT)
Dept: FAMILY MEDICINE CLINIC | Facility: CLINIC | Age: 59
End: 2024-08-12
Payer: MEDICARE

## 2024-08-12 VITALS
DIASTOLIC BLOOD PRESSURE: 78 MMHG | WEIGHT: 135.7 LBS | HEART RATE: 90 BPM | OXYGEN SATURATION: 98 % | SYSTOLIC BLOOD PRESSURE: 126 MMHG | BODY MASS INDEX: 21.81 KG/M2 | HEIGHT: 66 IN

## 2024-08-12 DIAGNOSIS — F51.01 PRIMARY INSOMNIA: ICD-10-CM

## 2024-08-12 DIAGNOSIS — M25.561 CHRONIC PAIN OF BOTH KNEES: ICD-10-CM

## 2024-08-12 DIAGNOSIS — G89.29 CHRONIC PAIN OF BOTH KNEES: ICD-10-CM

## 2024-08-12 DIAGNOSIS — Z51.81 THERAPEUTIC DRUG MONITORING: Primary | ICD-10-CM

## 2024-08-12 DIAGNOSIS — I10 PRIMARY HYPERTENSION: ICD-10-CM

## 2024-08-12 DIAGNOSIS — M25.551 CHRONIC HIP PAIN, RIGHT: ICD-10-CM

## 2024-08-12 DIAGNOSIS — Z98.1 HISTORY OF LUMBAR SPINAL FUSION: ICD-10-CM

## 2024-08-12 DIAGNOSIS — F41.9 ANXIETY: ICD-10-CM

## 2024-08-12 DIAGNOSIS — Z00.00 MEDICARE ANNUAL WELLNESS VISIT, SUBSEQUENT: ICD-10-CM

## 2024-08-12 DIAGNOSIS — M25.562 CHRONIC PAIN OF BOTH KNEES: ICD-10-CM

## 2024-08-12 DIAGNOSIS — M54.50 LUMBAR BACK PAIN: ICD-10-CM

## 2024-08-12 DIAGNOSIS — M54.6 CHRONIC MIDLINE THORACIC BACK PAIN: ICD-10-CM

## 2024-08-12 DIAGNOSIS — G89.29 CHRONIC MIDLINE THORACIC BACK PAIN: ICD-10-CM

## 2024-08-12 DIAGNOSIS — G89.29 CHRONIC HIP PAIN, RIGHT: ICD-10-CM

## 2024-08-12 DIAGNOSIS — R00.2 PALPITATIONS: ICD-10-CM

## 2024-08-12 PROCEDURE — 96160 PT-FOCUSED HLTH RISK ASSMT: CPT | Performed by: INTERNAL MEDICINE

## 2024-08-12 PROCEDURE — 3074F SYST BP LT 130 MM HG: CPT | Performed by: INTERNAL MEDICINE

## 2024-08-12 PROCEDURE — 1125F AMNT PAIN NOTED PAIN PRSNT: CPT | Performed by: INTERNAL MEDICINE

## 2024-08-12 PROCEDURE — 99214 OFFICE O/P EST MOD 30 MIN: CPT | Performed by: INTERNAL MEDICINE

## 2024-08-12 PROCEDURE — 1170F FXNL STATUS ASSESSED: CPT | Performed by: INTERNAL MEDICINE

## 2024-08-12 PROCEDURE — 3078F DIAST BP <80 MM HG: CPT | Performed by: INTERNAL MEDICINE

## 2024-08-12 PROCEDURE — G0439 PPPS, SUBSEQ VISIT: HCPCS | Performed by: INTERNAL MEDICINE

## 2024-08-12 RX ORDER — ESCITALOPRAM OXALATE 20 MG/1
20 TABLET ORAL DAILY
Qty: 30 TABLET | Refills: 0 | Status: SHIPPED | OUTPATIENT
Start: 2024-08-12

## 2024-08-12 RX ORDER — METOPROLOL SUCCINATE 25 MG/1
25 TABLET, EXTENDED RELEASE ORAL DAILY
Qty: 90 TABLET | Refills: 1 | Status: SHIPPED | OUTPATIENT
Start: 2024-08-12

## 2024-08-12 RX ORDER — ESCITALOPRAM OXALATE 20 MG/1
20 TABLET ORAL DAILY
Qty: 30 TABLET | Refills: 0 | Status: SHIPPED | OUTPATIENT
Start: 2024-08-12 | End: 2024-08-12 | Stop reason: SDUPTHER

## 2024-08-12 RX ORDER — TRAMADOL HYDROCHLORIDE 50 MG/1
50 TABLET ORAL EVERY 8 HOURS PRN
Qty: 90 TABLET | Refills: 0 | Status: SHIPPED | OUTPATIENT
Start: 2024-08-12

## 2024-08-12 RX ORDER — BUSPIRONE HYDROCHLORIDE 10 MG/1
TABLET ORAL
Qty: 45 TABLET | Refills: 0 | Status: SHIPPED | OUTPATIENT
Start: 2024-08-12 | End: 2024-08-12 | Stop reason: SDUPTHER

## 2024-08-12 RX ORDER — BACLOFEN 10 MG/1
10 TABLET ORAL 3 TIMES DAILY PRN
Qty: 90 TABLET | Refills: 6 | Status: SHIPPED | OUTPATIENT
Start: 2024-08-12

## 2024-08-12 RX ORDER — BUSPIRONE HYDROCHLORIDE 10 MG/1
10 TABLET ORAL 3 TIMES DAILY
Qty: 135 TABLET | Refills: 2 | Status: SHIPPED | OUTPATIENT
Start: 2024-08-12

## 2024-08-12 RX ORDER — DICLOFENAC SODIUM 75 MG/1
75 TABLET, DELAYED RELEASE ORAL 2 TIMES DAILY
Qty: 180 TABLET | Refills: 0 | Status: SHIPPED | OUTPATIENT
Start: 2024-08-12 | End: 2024-08-12 | Stop reason: SDUPTHER

## 2024-08-12 RX ORDER — LORATADINE 10 MG/1
10 TABLET ORAL DAILY
Qty: 90 TABLET | Refills: 2 | Status: SHIPPED | OUTPATIENT
Start: 2024-08-12

## 2024-08-12 RX ORDER — ZOLPIDEM TARTRATE 10 MG/1
10 TABLET ORAL NIGHTLY PRN
Qty: 30 TABLET | Refills: 0 | Status: SHIPPED | OUTPATIENT
Start: 2024-08-12

## 2024-08-12 RX ORDER — DICLOFENAC SODIUM 75 MG/1
75 TABLET, DELAYED RELEASE ORAL 2 TIMES DAILY
Qty: 180 TABLET | Refills: 2 | Status: SHIPPED | OUTPATIENT
Start: 2024-08-12

## 2024-08-12 NOTE — TELEPHONE ENCOUNTER
Rx Refill Note  Requested Prescriptions     Pending Prescriptions Disp Refills    busPIRone (BUSPAR) 10 MG tablet [Pharmacy Med Name: busPIRone HCl 10 MG Oral Tablet] 45 tablet 0     Sig: TAKE 1/2 (ONE-HALF) TABLET BY MOUTH THREE TIMES DAILY AT 9AM, 1PM, AND 5PM      Last office visit with prescribing clinician: 6/5/2023   Last telemedicine visit with prescribing clinician: Visit date not found   Next office visit with prescribing clinician: 8/10/2024                         Would you like a call back once the refill request has been completed: [] Yes [] No    If the office needs to give you a call back, can they leave a voicemail: [] Yes [] No    Aspen Su MA  08/12/24, 13:05 EDT

## 2024-08-12 NOTE — TELEPHONE ENCOUNTER
Rx Refill Note  Requested Prescriptions     Pending Prescriptions Disp Refills    escitalopram (LEXAPRO) 20 MG tablet [Pharmacy Med Name: Escitalopram Oxalate 20 MG Oral Tablet] 30 tablet 0     Sig: Take 1 tablet by mouth once daily      Last office visit with prescribing clinician: 6/5/2023   Last telemedicine visit with prescribing clinician: Visit date not found   Next office visit with prescribing clinician: 8/12/2024                         Would you like a call back once the refill request has been completed: [] Yes [] No    If the office needs to give you a call back, can they leave a voicemail: [] Yes [] No    Aspen Su MA  08/12/24, 13:10 EDT

## 2024-08-12 NOTE — PROGRESS NOTES
Roxie Carter  1965  [id number]  Patient Care Team:  Chip Ramos MD as PCP - General (Internal Medicine)  Arturo Brady MD as Consulting Physician (Gastroenterology)  Jamee Mcguire MD as Consulting Physician (Gynecologic Oncology)    Roxie Carter is a 59 y.o. female here today for annual exam.  This patient is accompanied by their self who contributes to the history of their care.    Chief Complaint:    Chief Complaint   Patient presents with    Annual Exam       History of Present Illness:     Past Medical History:   Diagnosis Date    Abnormal Pap smear of cervix 2013/2020    Allergic     Can not remember the date when diagnosed for allergy. Allergic to penicillin    Anxiety 01/2022    Arthritis     Cancer 2013    OVARIAN; s/p hysterectomy with BSO and chemo therapy that ended in 2014    Constipation     Depression     Not sure of date    Headache     Not for sure of the date. But been having them for countless years.    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 But it was in 2019 or 2020    Primary hypertension 11/11/2022    Recurrent pregnancy loss, antepartum condition or complication     Urinary tract infection 2020 and 2021    Vitamin D deficiency        Past Surgical History:   Procedure Laterality Date    BACK SURGERY      fusion     BREAST BIOPSY      Dont remember the actural date    COLON SURGERY  04/03/2020    partial resection due to ovarian tumor invading    COLONOSCOPY  2016    ENDOSCOPY      EXPLORATORY LAPAROTOMY N/A 04/03/2020     Procedure: LAPAROTOMY EXPLORATORY,  OPTIMAL DEBULKING, CYSTOSCOPY WITH URETERAL CATHTER INSERTION AND REMOVAL, ILEO RESECTION, SIDE TO SIDE ANASTOMOSIS, RECTAL SIGMOID RESECTION, LEFT URETERA LYSIS, AND PERITONEAL STRIPPING;  Surgeon: Jamee Mcguire MD;  Location: [facility];  Service: Gynecology Oncology;  Laterality: N/A;    HYSTERECTOMY  2013    with BSO    HYSTEROSCOPY  2013    LAPAROSCOPIC ASSISTED VAGINAL HYSTERECTOMY SALPINGO OOPHORECTOMY  2020    OVARIAN CYST SURGERY      2013/2020    SPINE SURGERY  2004/2006/2008    Fusion of the spine in 2008    TOTAL ABDOMINAL HYSTERECTOMY WITH SALPINGO OOPHORECTOMY  2013    TUBAL ABDOMINAL LIGATION  1993    WISDOM TOOTH EXTRACTION      Can't remember date they were removed.        Family History   Problem Relation Age of Onset    Diabetes type II Father     Heart disease Father     Heart attack Father     Cancer Father     Diabetes Father     Hypertension Father     Hyperlipidemia Father     Liver disease Father     Diabetes type II Mother     Diabetes Mother     Hypertension Mother     COPD Mother     Depression Mother     Arthritis Mother     Hyperlipidemia Mother     Vision loss Mother     Lung cancer Maternal Uncle     Migraines Sister     Asthma Sister     Migraines Brother         dec from Overdose    Depression Brother     Anxiety disorder Brother     Birth defects Brother         Had a small hole in his heart    Drug abuse Brother         Baby brother passed away due to drug overdose in 2005    No Known Problems Maternal Grandmother     No Known Problems Maternal Grandfather     No Known Problems Paternal Grandmother     No Known Problems Paternal Grandfather     Anxiety disorder Sister     Miscarriages / Stillbirths Sister         Miscarriage    Depression Sister     Mental illness Sister     Anxiety disorder Sister     Depression Sister     Cancer Maternal Aunt     Cancer Maternal Uncle     Mental illness Son     Miscarriages / Stillbirths Sister          Miscarriage       Social History     Socioeconomic History    Marital status: Single   Tobacco Use    Smoking status: Former     Current packs/day: 0.00     Average packs/day: 0.3 packs/day for 30.7 years (7.7 ttl pk-yrs)     Types: Cigarettes     Start date:      Quit date: 2018     Years since quittin.9    Smokeless tobacco: Never    Tobacco comments:     I dont remember the date I started smoking. But I was 16 when I started.   Vaping Use    Vaping status: Former    Quit date: 2019    Substances: Nicotine    Devices: Pre-filled or refillable cartridge   Substance and Sexual Activity    Alcohol use: Never    Drug use: Never    Sexual activity: Not Currently     Partners: Male     Birth control/protection: None, Hysterectomy     Comment: Have not be sexually active in 10 years.       Allergies   Allergen Reactions    Penicillins Hives       Depression: PHQ-2 Depression Screening  Little interest or pleasure in doing things?     Feeling down, depressed, or hopeless?     PHQ-2 Total Score        Immunization History   Administered Date(s) Administered    COVID-19 (PFIZER) BIVALENT 12+YRS 2022    COVID-19 (PFIZER) Purple Cap Monovalent 2022, 2022    COVID-19 F23 (MODERNA) 12YRS+ (SPIKEVAX) 10/11/2023, 2024    Flu Vaccine Split Quad 2017, 2019    Flublock Quad =>18yrs 10/12/2022    Flublok 18+yrs 2020, 10/04/2021, 2023    Fluzone (or Fluarix & Flulaval for VFC) >6mos 2017, 2019, 2022    Hepatitis A 2019    Influenza Injectable Mdck Pf Quad 2022    Influenza, Unspecified 2020, 10/01/2021    Pneumococcal Polysaccharide (PPSV23) 2019    Shingrix 2020, 2022, 2023    Tdap 2019, 2023       Intimate partner violence ( Screen on initial visit, pregnant women, women with injuries, older adult with injury or evidence of neglect):  -Violence can be a problem in many people's lives, so I now ask  "every patient about trauma or abuse they may have experienced in a relationship.   Stress/Safety - Do you feel safe in your relationship?   Afraid/Abused - Have you ever been in a relationship where you were threatened, hurt, or afraid?   Friend/Family - Are your friends aware you have been hurt?   Emergency Plan - Do you have a safe place to go and the resources you need in an emergency?    Review of Systems:    Review of Systems    Vitals:    08/12/24 1529   BP: 126/78   Pulse: 90   SpO2: 98%   Weight: 61.6 kg (135 lb 11.2 oz)   Height: 167.6 cm (65.98\")     Body mass index is 21.91 kg/m².      Current Outpatient Medications:     anastrozole (ARIMIDEX) 1 MG tablet, Take 1 tablet by mouth Daily., Disp: 90 tablet, Rfl: 3    baclofen (LIORESAL) 10 MG tablet, Take 1 tablet by mouth three times daily as needed for muscle spasm, Disp: 90 tablet, Rfl: 0    busPIRone (BUSPAR) 10 MG tablet, TAKE 1/2 (ONE-HALF) TABLET BY MOUTH THREE TIMES DAILY AT 9AM, 1PM, AND 5PM, Disp: 45 tablet, Rfl: 0    diclofenac (VOLTAREN) 75 MG EC tablet, Take 1 tablet by mouth twice daily, Disp: 180 tablet, Rfl: 0    escitalopram (LEXAPRO) 20 MG tablet, Take 1 tablet by mouth once daily, Disp: 30 tablet, Rfl: 0    fluticasone (Flonase) 50 MCG/ACT nasal spray, 2 sprays into the nostril(s) as directed by provider Daily., Disp: 5 mL, Rfl: 3    loratadine (CLARITIN) 10 MG tablet, TAKE 1 TABLET BY MOUTH ONCE DAILY AT 9:00 AM, Disp: 300 tablet, Rfl: 0    magnesium hydroxide (MILK OF MAGNESIA) 400 MG/5ML suspension, Take  by mouth Daily As Needed for Constipation., Disp: , Rfl:     metoprolol succinate XL (Toprol XL) 25 MG 24 hr tablet, Take 1 tablet by mouth Daily., Disp: 90 tablet, Rfl: 1    TiZANidine (Zanaflex) 2 MG capsule, Take 1 capsule by mouth 3 (Three) Times a Day., Disp: 90 capsule, Rfl: 1    traMADol (ULTRAM) 50 MG tablet, Take 1 tablet by mouth Every 8 (Eight) Hours As Needed., Disp: , Rfl:     zolpidem (AMBIEN) 10 MG tablet, Take 1 tablet " "by mouth At Night As Needed for Sleep., Disp: 30 tablet, Rfl: 0    diphenhydrAMINE (BENADRYL) 25 MG tablet, Take 1 tablet by mouth At Night As Needed., Disp: , Rfl:     nitrofurantoin, macrocrystal-monohydrate, (MACROBID) 100 MG capsule, Take 1 capsule by mouth Every 12 (Twelve) Hours., Disp: , Rfl:     Physical Exam:    Physical Exam    Procedures    Results Review:    {Results Review:55477::\"I reviewed the patient's new clinical results.\"}    Assessment/Plan:    Problem List Items Addressed This Visit    None  Visit Diagnoses       Therapeutic drug monitoring    -  Primary    Relevant Orders    Compliance Drug Analysis, Ur - Urine, Clean Catch            Plan of care was reviewed with patient at the conclusion of today's visit. Counseled patient with regards to good nutrition and diet. Maintaining a healthy lifestyle including exercise and physical activities. Spoke with patient on ways to reduce stress, getting adequate sleep and injury prevention.  Discussed mammogram, colon cancer screening, osteoporosis and pap smear including benefit of early detection and potential need for follow-up. Patient agrees to screening mammogram, colonoscopy, bone density and gyn referral today. Annual dental and eye exams were encouraged. Encouraged patient to continue to follow up with annual immunizations.     No follow-ups on file.    Chip Ramos MD    Please note than portions of this note were completed wt a Voice Recognition Program  "

## 2024-08-12 NOTE — PROGRESS NOTES
Subjective   The ABCs of the Annual Wellness Visit  Medicare Wellness Visit      Roxie Carter is a 59 y.o. patient who presents for a Medicare Wellness Visit.    The following portions of the patient's history were reviewed and   updated as appropriate: She  has a past medical history of Abnormal Pap smear of cervix (2013/2020), Allergic, Anxiety (01/2022), Arthritis, Cancer (2013), Constipation, Depression, Headache, Heartburn, History of pneumonia (02/2019), Low back pain, Murmur, Ovarian cancer (2013), Ovarian cyst, Pelvic mass, Pneumonia, Primary hypertension (11/11/2022), Recurrent pregnancy loss, antepartum condition or complication, Urinary tract infection (2020 and 2021), and Vitamin D deficiency.  She does not have any pertinent problems on file.  She  has a past surgical history that includes Hysterectomy (2013); Colonoscopy (2016); Esophagogastroduodenoscopy; Back surgery; Exploratory Laparotomy (N/A, 04/03/2020); Spine surgery (2004/2006/2008); Tubal ligation (1993); Colon surgery (04/03/2020); Breast biopsy; Total abdominal hysterectomy w/ bilateral salpingoophorectomy (2013); laparoscopic assisted vaginal hysterectomy salpingo oophorectomy (2020); Hysteroscopy (2013); Ovarian cyst surgery; and [place] tooth extraction.  Her family history includes Anxiety disorder in her brother, sister, and sister; Arthritis in her mother; Asthma in her sister; Birth defects in her brother; COPD in her mother; Cancer in her father, maternal aunt, and maternal uncle; Depression in her brother, mother, sister, and sister; Diabetes in her father and mother; Diabetes type II in her father and mother; Drug abuse in her brother; Heart attack in her father; Heart disease in her father; Hyperlipidemia in her father and mother; Hypertension in her father and mother; Liver disease in her father; Lung cancer in her maternal uncle; Mental illness in her sister and son; Migraines in her brother and sister; Miscarriages /  Stillbirths in her sister and sister; No Known Problems in her maternal grandfather, maternal grandmother, paternal grandfather, and paternal grandmother; Vision loss in her mother.  She  reports that she quit smoking about 5 years ago. Her smoking use included cigarettes. She started smoking about 36 years ago. She has a 7.7 pack-year smoking history. She has never used smokeless tobacco. She reports that she does not drink alcohol and does not use drugs.  Current Outpatient Medications   Medication Sig Dispense Refill    anastrozole (ARIMIDEX) 1 MG tablet Take 1 tablet by mouth Daily. 90 tablet 3    baclofen (LIORESAL) 10 MG tablet Take 1 tablet by mouth 3 (Three) Times a Day As Needed for Muscle Spasms. for muscle spams 90 tablet 6    busPIRone (BUSPAR) 10 MG tablet Take 1 tablet by mouth 3 (Three) Times a Day. 135 tablet 2    diclofenac (VOLTAREN) 75 MG EC tablet Take 1 tablet by mouth 2 (Two) Times a Day. 180 tablet 2    escitalopram (LEXAPRO) 20 MG tablet Take 1 tablet by mouth Daily. 30 tablet 0    fluticasone (Flonase) 50 MCG/ACT nasal spray 2 sprays into the nostril(s) as directed by provider Daily. 5 mL 3    loratadine (CLARITIN) 10 MG tablet Take 1 tablet by mouth Daily. 90 tablet 2    magnesium hydroxide (MILK OF MAGNESIA) 400 MG/5ML suspension Take  by mouth Daily As Needed for Constipation.      metoprolol succinate XL (Toprol XL) 25 MG 24 hr tablet Take 1 tablet by mouth Daily. 90 tablet 1    traMADol (ULTRAM) 50 MG tablet Take 1 tablet by mouth Every 8 (Eight) Hours As Needed for Moderate Pain. 90 tablet 0    zolpidem (AMBIEN) 10 MG tablet Take 1 tablet by mouth At Night As Needed for Sleep. 30 tablet 0     No current facility-administered medications for this visit.     Current Outpatient Medications on File Prior to Visit   Medication Sig    anastrozole (ARIMIDEX) 1 MG tablet Take 1 tablet by mouth Daily.    fluticasone (Flonase) 50 MCG/ACT nasal spray 2 sprays into the nostril(s) as directed by  provider Daily.    magnesium hydroxide (MILK OF MAGNESIA) 400 MG/5ML suspension Take  by mouth Daily As Needed for Constipation.    [DISCONTINUED] baclofen (LIORESAL) 10 MG tablet Take 1 tablet by mouth three times daily as needed for muscle spasm    [DISCONTINUED] busPIRone (BUSPAR) 10 MG tablet TAKE 1/2 (ONE-HALF) TABLET BY MOUTH THREE TIMES DAILY AT 9AM, 1PM, AND 5PM    [DISCONTINUED] diclofenac (VOLTAREN) 75 MG EC tablet Take 1 tablet by mouth twice daily    [DISCONTINUED] escitalopram (LEXAPRO) 20 MG tablet Take 1 tablet by mouth once daily    [DISCONTINUED] loratadine (CLARITIN) 10 MG tablet TAKE 1 TABLET BY MOUTH ONCE DAILY AT 9:00 AM    [DISCONTINUED] metoprolol succinate XL (Toprol XL) 25 MG 24 hr tablet Take 1 tablet by mouth Daily.    [DISCONTINUED] TiZANidine (Zanaflex) 2 MG capsule Take 1 capsule by mouth 3 (Three) Times a Day.    [DISCONTINUED] traMADol (ULTRAM) 50 MG tablet Take 1 tablet by mouth Every 8 (Eight) Hours As Needed.    [DISCONTINUED] zolpidem (AMBIEN) 10 MG tablet Take 1 tablet by mouth At Night As Needed for Sleep.    [DISCONTINUED] busPIRone (BUSPAR) 10 MG tablet TAKE 1/2 (ONE-HALF) TABLET BY MOUTH THREE TIMES DAILY AT 9:00 AM, 1:00 PM, AND 5:00 PM    [DISCONTINUED] diphenhydrAMINE (BENADRYL) 25 MG tablet Take 1 tablet by mouth At Night As Needed.    [DISCONTINUED] escitalopram (LEXAPRO) 20 MG tablet Take 1 tablet by mouth once daily    [DISCONTINUED] nitrofurantoin, macrocrystal-monohydrate, (MACROBID) 100 MG capsule Take 1 capsule by mouth Every 12 (Twelve) Hours.     No current facility-administered medications on file prior to visit.     She is allergic to penicillins..    Compared to one year ago, the patient's physical   health is the same.  Compared to one year ago, the patient's mental   health is the same.    Recent Hospitalizations:  She was not admitted to the hospital during the last year.     Current Medical Providers:  Patient Care Team:  Chip Ramos MD as PCP -  General (Internal Medicine)  Arturo Brady MD as Consulting Physician (Gastroenterology)  Jamee Mcguire MD as Consulting Physician (Gynecologic Oncology)    Outpatient Medications Prior to Visit   Medication Sig Dispense Refill    anastrozole (ARIMIDEX) 1 MG tablet Take 1 tablet by mouth Daily. 90 tablet 3    fluticasone (Flonase) 50 MCG/ACT nasal spray 2 sprays into the nostril(s) as directed by provider Daily. 5 mL 3    magnesium hydroxide (MILK OF MAGNESIA) 400 MG/5ML suspension Take  by mouth Daily As Needed for Constipation.      baclofen (LIORESAL) 10 MG tablet Take 1 tablet by mouth three times daily as needed for muscle spasm 90 tablet 0    busPIRone (BUSPAR) 10 MG tablet TAKE 1/2 (ONE-HALF) TABLET BY MOUTH THREE TIMES DAILY AT 9AM, 1PM, AND 5PM 45 tablet 0    diclofenac (VOLTAREN) 75 MG EC tablet Take 1 tablet by mouth twice daily 180 tablet 0    escitalopram (LEXAPRO) 20 MG tablet Take 1 tablet by mouth once daily 30 tablet 0    loratadine (CLARITIN) 10 MG tablet TAKE 1 TABLET BY MOUTH ONCE DAILY AT 9:00  tablet 0    metoprolol succinate XL (Toprol XL) 25 MG 24 hr tablet Take 1 tablet by mouth Daily. 90 tablet 1    TiZANidine (Zanaflex) 2 MG capsule Take 1 capsule by mouth 3 (Three) Times a Day. 90 capsule 1    traMADol (ULTRAM) 50 MG tablet Take 1 tablet by mouth Every 8 (Eight) Hours As Needed.      zolpidem (AMBIEN) 10 MG tablet Take 1 tablet by mouth At Night As Needed for Sleep. 30 tablet 0    diphenhydrAMINE (BENADRYL) 25 MG tablet Take 1 tablet by mouth At Night As Needed.      nitrofurantoin, macrocrystal-monohydrate, (MACROBID) 100 MG capsule Take 1 capsule by mouth Every 12 (Twelve) Hours.       No facility-administered medications prior to visit.     Opioid medication/s are on active medication list.  and I have evaluated her active treatment plan and pain score trends (see table).  There were no vitals filed for this visit.  I have reviewed the chart for potential of high risk  "medication and harmful drug interactions in the elderly.        Aspirin is not on active medication list.  Aspirin use is not indicated based on review of current medical condition/s. Risk of harm outweighs potential benefits.  .    Patient Active Problem List   Diagnosis    Constipation    Granulosa cell tumor of ovary, right    Financial difficulties    Other social stressor    Malignant neoplasm of ovary    LLQ pain    Hemorrhoids    Rectal pain    Left eye pain    Left lumbar radiculitis    Vaginal discharge    Anxiety    Psychophysiological insomnia    Primary insomnia    Hypovitaminosis D    Chest pain in adult    Postural dizziness with presyncope    Arm numbness left    Neck pain    Vertigo    Palpitations    Primary hypertension    Acute left-sided low back pain without sciatica    Seasonal allergic rhinitis due to pollen     Advance Care Planning Advance Directive is on file.  ACP discussion was held with the patient during this visit. Patient has an advance directive in EMR which is still valid.             Objective   Vitals:    24 1529   BP: 126/78   Pulse: 90   SpO2: 98%   Weight: 61.6 kg (135 lb 11.2 oz)   Height: 167.6 cm (65.98\")       Estimated body mass index is 21.91 kg/m² as calculated from the following:    Height as of this encounter: 167.6 cm (65.98\").    Weight as of this encounter: 61.6 kg (135 lb 11.2 oz).    BMI is within normal parameters. No other follow-up for BMI required.       Does the patient have evidence of cognitive impairment? No                                                                                                Health  Risk Assessment    Smoking Status:  Social History     Tobacco Use   Smoking Status Former    Current packs/day: 0.00    Average packs/day: 0.3 packs/day for 30.7 years (7.7 ttl pk-yrs)    Types: Cigarettes    Start date:     Quit date: 2018    Years since quittin.9   Smokeless Tobacco Never   Tobacco Comments    I dont remember the " date I started smoking. But I was 16 when I started.     Alcohol Consumption:  Social History     Substance and Sexual Activity   Alcohol Use Never       Fall Risk Screen  STEADI Fall Risk Assessment was completed, and patient is at MODERATE risk for falls. Assessment completed on:2024    Depression Screenin/12/2024     4:15 PM   PHQ-2/PHQ-9 Depression Screening   Little Interest or Pleasure in Doing Things 0-->not at all   Feeling Down, Depressed or Hopeless 0-->not at all   PHQ-9: Brief Depression Severity Measure Score 0     Health Habits and Functional and Cognitive Screenin/12/2024     4:14 PM   Functional & Cognitive Status   Do you have difficulty preparing food and eating? No   Do you have difficulty bathing yourself, getting dressed or grooming yourself? No   Do you have difficulty using the toilet? No   Do you have difficulty moving around from place to place? No   Do you have trouble with steps or getting out of a bed or a chair? No   Current Diet Unhealthy Diet   Dental Exam Not up to date   Eye Exam Not up to date   Exercise (times per week) 5 times per week   Current Exercises Include Walking   Do you need help using the phone?  No   Are you deaf or do you have serious difficulty hearing?  No   Do you need help to go to places out of walking distance? No   Do you need help shopping? No   Do you need help preparing meals?  No   Do you need help with housework?  No   Do you need help with laundry? No   Do you need help taking your medications? No   Do you need help managing money? No   Do you ever drive or ride in a car without wearing a seat belt? No   Have you felt unusual stress, anger or loneliness in the last month? No   Who do you live with? Alone   If you need help, do you have trouble finding someone available to you? No   Have you been bothered in the last four weeks by sexual problems? No   Do you have difficulty concentrating, remembering or making decisions? No            Age-appropriate Screening Schedule:  Refer to the list below for future screening recommendations based on patient's age, sex and/or medical conditions. Orders for these recommended tests are listed in the plan section. The patient has been provided with a written plan.    Health Maintenance List  Health Maintenance   Topic Date Due    INFLUENZA VACCINE  08/01/2024    MAMMOGRAM  08/08/2025    ANNUAL WELLNESS VISIT  08/12/2025    COLORECTAL CANCER SCREENING  08/18/2031    TDAP/TD VACCINES (3 - Td or Tdap) 06/05/2033    HEPATITIS C SCREENING  Completed    COVID-19 Vaccine  Completed    ZOSTER VACCINE  Completed    Pneumococcal Vaccine 0-64  Aged Out                                                                                                                                                CMS Preventative Services Quick Reference  Risk Factors Identified During Encounter  Immunizations Discussed/Encouraged: Influenza  Dental Screening Recommended  Vision Screening Recommended    The above risks/problems have been discussed with the patient.  Pertinent information has been shared with the patient in the After Visit Summary.  An After Visit Summary and PPPS were made available to the patient.    Follow Up:   Next Medicare Wellness visit to be scheduled in 1 year.         Additional E&M Note during same encounter follows:  Patient has additional, significant, and separately identifiable condition(s)/problem(s) that require work above and beyond the Medicare Wellness Visit     Chief Complaint  Annual Exam    Subjective   HPI  Roxie is also being seen today for additional medical problem/s.  She has a history of back surgery, last being a fusion ( done I [place] ( 2008). Pain goes across upper hips radiating into legs. Sh efeels it is not her sciatic. Pain is constant- dull at times sharp-. She takes ibuprofen with minimal improvement. She does get numbness in her feet. She does not trip or leg weakness.  "Stairs cause knee and back pain. Her knees pop- occasional instability. She previously took tramadol since 2023.   She continues on metoprolol for blood pressure.  She has had no further palpitations.  Anxiety is improved on Lexapro however she ran out of her BuSpar and would like a refill.  Still requiring Ambien for sleep.  She is due for UDS CSA  Has remained current with her screenings. Has up coming appointment for ct of abd/pelvis. Has not had an MRI since 2021.   Review of Systems   HENT: Negative.     Respiratory: Negative.     Gastrointestinal: Negative.    Genitourinary: Negative.    Musculoskeletal:  Positive for arthralgias and back pain. Negative for neck pain and neck stiffness.   Skin: Negative.    Neurological:  Negative for weakness and light-headedness.      Need zamarripa up dated emotional support animal letter        Objective   Vital Signs:  /78   Pulse 90   Ht 167.6 cm (65.98\")   Wt 61.6 kg (135 lb 11.2 oz)   SpO2 98%   BMI 21.91 kg/m²   Physical Exam  Vitals and nursing note reviewed.   Constitutional:       General: She is not in acute distress.     Appearance: She is well-developed. She is not diaphoretic.   HENT:      Head: Normocephalic and atraumatic.      Right Ear: External ear normal.      Left Ear: External ear normal.      Mouth/Throat:      Pharynx: No oropharyngeal exudate.   Eyes:      General: No scleral icterus.        Right eye: No discharge.      Conjunctiva/sclera: Conjunctivae normal.   Neck:      Thyroid: No thyromegaly.      Vascular: No JVD.      Trachea: No tracheal deviation.   Cardiovascular:      Rate and Rhythm: Normal rate and regular rhythm.      Heart sounds: Normal heart sounds.      Comments: PMI nondisplaced  Pulmonary:      Effort: Pulmonary effort is normal.      Breath sounds: Normal breath sounds. No wheezing or rales.   Abdominal:      General: Bowel sounds are normal.      Palpations: Abdomen is soft.      Tenderness: There is no abdominal " tenderness. There is no guarding or rebound.   Musculoskeletal:      Cervical back: Normal range of motion and neck supple.      Comments: Right sided BRANDEN ER revealed inguinal pain on extreme.  Sharp.  Left side was unremarkable.  Knee exam revealed no medial lateral joint line tenderness no effusion.  Person sign and drawer sign negative.  There was pain with patellar percussion extension.  Back exam revealed SI tenderness lower lumbar tenderness but.  Paraspinous guarding noted.  No midline tenderness.  Range of motion is limited with AB flexion extension rotation.  Straight leg raise negative.  Strength is normal bilaterally lower extremity   Lymphadenopathy:      Cervical: No cervical adenopathy.   Skin:     General: Skin is warm and dry.      Capillary Refill: Capillary refill takes less than 2 seconds.      Coloration: Skin is not pale.      Findings: No rash.   Neurological:      Mental Status: She is alert and oriented to person, place, and time.      Sensory: No sensory deficit.      Motor: No weakness or abnormal muscle tone.      Coordination: Coordination normal.      Gait: Gait normal.      Deep Tendon Reflexes: Reflexes normal.      Comments: Tug<10 sec   Psychiatric:         Mood and Affect: Mood normal.         Behavior: Behavior normal.         Judgment: Judgment normal.                 Assessment and Plan               Therapeutic drug monitoring    Primary insomnia  Refill Ambien.  PDMP/UDS/CSA up-to-date  Primary hypertension  Hypertension is stable and controlled  Continue current treatment regimen.  Dietary sodium restriction.  Regular aerobic exercise.  Ambulatory blood pressure monitoring.  Blood pressure will be reassessed in 6 months.  Palpitations    Anxiety  Impression stable on Lexapro BuSpar, continue.  History of lumbar spinal fusion    Lumbar back pain  Multiple back surgeries.  I suspect he may be a candidate for ultimate rhizotomies.  She does not have any radicular component  however pain is very limiting.  I requested updated MRI.  Continue tramadol and diclofenac.  Chronic midline thoracic back pain  Continue tramadol diclofenac.  Chronic pain of both knees  I suspect more patellofemoral syndrome.  X-ray requested.  Consider physical therapy.  Medicare annual wellness visit, subsequent    Chronic hip pain, right    He may have an element of degenerative disease.  Resume diclofenac.  Consider physical therapy after x-rays resulted  Orders Placed This Encounter   Procedures    XR Knee 1 or 2 View Bilateral     Standing Status:   Future     Standing Expiration Date:   8/12/2025     Order Specific Question:   Reason for Exam:     Answer:   bilaterla knee pain     Order Specific Question:   Release to patient     Answer:   Routine Release [[id number]]    MRI Lumbar Spine Without Contrast     Standing Status:   Future     Standing Expiration Date:   8/12/2025     Order Specific Question:   Reason for Exam:     Answer:   back pain history of lumbar surgery x 2     Order Specific Question:   Release to patient     Answer:   Routine Release [[id number]]    XR Hip With or Without Pelvis 2 - 3 View Right     Standing Status:   Future     Standing Expiration Date:   8/12/2025     Order Specific Question:   Reason for Exam:     Answer:   right hip pain     Order Specific Question:   Release to patient     Answer:   Routine Release [[id number]]    Compliance Drug Analysis, Ur - Urine, Clean Catch     Standing Status:   Future     Number of Occurrences:   1     Standing Expiration Date:   8/12/2025     Order Specific Question:   Release to patient     Answer:   Routine Release [[id number]]    Comprehensive Metabolic Panel     Standing Status:   Future     Standing Expiration Date:   8/12/2025     Order Specific Question:   Release to patient     Answer:   Routine Release [[id number]]    Lipid Panel     Standing Status:   Future     Standing Expiration Date:   8/12/2025     Order Specific Question:    Release to patient     Answer:   Routine Release [[id number]]    TSH Rfx On Abnormal To Free T4     Standing Status:   Future     Standing Expiration Date:   8/12/2025     Order Specific Question:   Release to patient     Answer:   Routine Release [[id number]]    CBC (No Diff)     Standing Status:   Future     Standing Expiration Date:   8/12/2025     Order Specific Question:   Release to patient     Answer:   Routine Release [[id number]]     New Medications Ordered This Visit   Medications    metoprolol succinate XL (Toprol XL) 25 MG 24 hr tablet     Sig: Take 1 tablet by mouth Daily.     Dispense:  90 tablet     Refill:  1    zolpidem (AMBIEN) 10 MG tablet     Sig: Take 1 tablet by mouth At Night As Needed for Sleep.     Dispense:  30 tablet     Refill:  0    escitalopram (LEXAPRO) 20 MG tablet     Sig: Take 1 tablet by mouth Daily.     Dispense:  30 tablet     Refill:  0    baclofen (LIORESAL) 10 MG tablet     Sig: Take 1 tablet by mouth 3 (Three) Times a Day As Needed for Muscle Spasms. for muscle spams     Dispense:  90 tablet     Refill:  6    busPIRone (BUSPAR) 10 MG tablet     Sig: Take 1 tablet by mouth 3 (Three) Times a Day.     Dispense:  135 tablet     Refill:  2    traMADol (ULTRAM) 50 MG tablet     Sig: Take 1 tablet by mouth Every 8 (Eight) Hours As Needed for Moderate Pain.     Dispense:  90 tablet     Refill:  0    loratadine (CLARITIN) 10 MG tablet     Sig: Take 1 tablet by mouth Daily.     Dispense:  90 tablet     Refill:  2    diclofenac (VOLTAREN) 75 MG EC tablet     Sig: Take 1 tablet by mouth 2 (Two) Times a Day.     Dispense:  180 tablet     Refill:  2          Follow Up   Return in about 3 months (around 11/12/2024) for controlled.  Patient was given instructions and counseling regarding her condition or for health maintenance advice. Please see specific information pulled into the AVS if appropriate.

## 2024-08-18 LAB — DRUGS UR: NORMAL

## 2024-09-05 ENCOUNTER — LAB (OUTPATIENT)
Dept: LAB | Facility: HOSPITAL | Age: 59
End: 2024-09-05
Payer: MEDICARE

## 2024-09-05 ENCOUNTER — HOSPITAL ENCOUNTER (OUTPATIENT)
Dept: MRI IMAGING | Facility: HOSPITAL | Age: 59
Discharge: HOME | End: 2024-09-05
Payer: MEDICARE

## 2024-09-05 DIAGNOSIS — Z00.00 MEDICARE ANNUAL WELLNESS VISIT, SUBSEQUENT: ICD-10-CM

## 2024-09-05 DIAGNOSIS — M54.50 LUMBAR BACK PAIN: ICD-10-CM

## 2024-09-05 DIAGNOSIS — I10 PRIMARY HYPERTENSION: ICD-10-CM

## 2024-09-05 PROCEDURE — 84443 ASSAY THYROID STIM HORMONE: CPT

## 2024-09-05 PROCEDURE — 85027 COMPLETE CBC AUTOMATED: CPT

## 2024-09-05 PROCEDURE — 72148 MRI LUMBAR SPINE W/O DYE: CPT

## 2024-09-05 PROCEDURE — 80061 LIPID PANEL: CPT

## 2024-09-05 PROCEDURE — 80053 COMPREHEN METABOLIC PANEL: CPT

## 2024-09-06 DIAGNOSIS — G89.29 CHRONIC MIDLINE THORACIC BACK PAIN: ICD-10-CM

## 2024-09-06 DIAGNOSIS — M54.6 CHRONIC MIDLINE THORACIC BACK PAIN: ICD-10-CM

## 2024-09-06 DIAGNOSIS — M54.50 LUMBAR BACK PAIN: ICD-10-CM

## 2024-09-06 DIAGNOSIS — F41.9 ANXIETY: ICD-10-CM

## 2024-09-06 DIAGNOSIS — Z98.1 HISTORY OF LUMBAR SPINAL FUSION: Primary | ICD-10-CM

## 2024-09-06 LAB
ALBUMIN SERPL-MCNC: 4.5 G/DL (ref 3.5–5.2)
ALBUMIN/GLOB SERPL: 1.8 G/DL
ALP SERPL-CCNC: 96 U/L (ref 39–117)
ALT SERPL W P-5'-P-CCNC: 23 U/L (ref 1–33)
ANION GAP SERPL CALCULATED.3IONS-SCNC: 9.4 MMOL/L (ref 5–15)
AST SERPL-CCNC: 31 U/L (ref 1–32)
BILIRUB SERPL-MCNC: 0.3 MG/DL (ref 0–1.2)
BUN SERPL-MCNC: 21 MG/DL (ref 6–20)
BUN/CREAT SERPL: 23.1 (ref 7–25)
CALCIUM SPEC-SCNC: 10 MG/DL (ref 8.6–10.5)
CHLORIDE SERPL-SCNC: 105 MMOL/L (ref 98–107)
CHOLEST SERPL-MCNC: 171 MG/DL (ref 0–200)
CO2 SERPL-SCNC: 26.6 MMOL/L (ref 22–29)
CREAT SERPL-MCNC: 0.91 MG/DL (ref 0.57–1)
DEPRECATED RDW RBC AUTO: 41.9 FL (ref 37–54)
EGFRCR SERPLBLD CKD-EPI 2021: 72.8 ML/MIN/1.73
ERYTHROCYTE [DISTWIDTH] IN BLOOD BY AUTOMATED COUNT: 12.2 % (ref 12.3–15.4)
GLOBULIN UR ELPH-MCNC: 2.5 GM/DL
GLUCOSE SERPL-MCNC: 89 MG/DL (ref 65–99)
HCT VFR BLD AUTO: 42.1 % (ref 34–46.6)
HDLC SERPL-MCNC: 47 MG/DL (ref 40–60)
HGB BLD-MCNC: 14 G/DL (ref 12–15.9)
LDLC SERPL CALC-MCNC: 101 MG/DL (ref 0–100)
LDLC/HDLC SERPL: 2.1 {RATIO}
MCH RBC QN AUTO: 31.5 PG (ref 26.6–33)
MCHC RBC AUTO-ENTMCNC: 33.3 G/DL (ref 31.5–35.7)
MCV RBC AUTO: 94.8 FL (ref 79–97)
PLATELET # BLD AUTO: 242 10*3/MM3 (ref 140–450)
PMV BLD AUTO: 11.6 FL (ref 6–12)
POTASSIUM SERPL-SCNC: 4.9 MMOL/L (ref 3.5–5.2)
PROT SERPL-MCNC: 7 G/DL (ref 6–8.5)
RBC # BLD AUTO: 4.44 10*6/MM3 (ref 3.77–5.28)
SODIUM SERPL-SCNC: 141 MMOL/L (ref 136–145)
TRIGL SERPL-MCNC: 127 MG/DL (ref 0–150)
TSH SERPL DL<=0.05 MIU/L-ACNC: 0.67 UIU/ML (ref 0.27–4.2)
VLDLC SERPL-MCNC: 23 MG/DL (ref 5–40)
WBC NRBC COR # BLD AUTO: 4.82 10*3/MM3 (ref 3.4–10.8)

## 2024-09-06 RX ORDER — BUSPIRONE HYDROCHLORIDE 10 MG/1
TABLET ORAL
Qty: 45 TABLET | Refills: 0 | Status: SHIPPED | OUTPATIENT
Start: 2024-09-06

## 2024-09-06 NOTE — PROGRESS NOTES
No significant nerve involvement noted on her MRI.  Postoperative changes and arthritis noted.  I can refer her to pain management to see if the local injections would afford her some pain relief in her back.

## 2024-09-06 NOTE — TELEPHONE ENCOUNTER
Rx Refill Note  Requested Prescriptions     Pending Prescriptions Disp Refills    busPIRone (BUSPAR) 10 MG tablet [Pharmacy Med Name: busPIRone HCl 10 MG Oral Tablet] 45 tablet 0     Sig: TAKE 1/2 (ONE-HALF) TABLET BY MOUTH THREE TIMES DAILY AT 9AM, 1PM, AND 5PM      Last office visit with prescribing clinician: 8/12/2024   Last telemedicine visit with prescribing clinician: Visit date not found   Next office visit with prescribing clinician: 11/18/2024     Shikha Shepherd MA  09/06/24, 14:05 EDT

## 2024-09-06 NOTE — PROGRESS NOTES
Roxie's labs all look good.  She could benefit from drinking a little bit more fluid daily as her BUN is minimally elevated.  No new recommendations.

## 2024-09-10 ENCOUNTER — HOSPITAL ENCOUNTER (OUTPATIENT)
Dept: CT IMAGING | Facility: HOSPITAL | Age: 59
Discharge: HOME | End: 2024-09-10
Admitting: NURSE PRACTITIONER
Payer: MEDICARE

## 2024-09-10 DIAGNOSIS — D39.11 GRANULOSA CELL TUMOR OF OVARY, RIGHT: ICD-10-CM

## 2024-09-10 DIAGNOSIS — R10.30 LOWER ABDOMINAL PAIN: ICD-10-CM

## 2024-09-10 DIAGNOSIS — C56.1 MALIGNANT NEOPLASM OF RIGHT OVARY: ICD-10-CM

## 2024-09-10 PROCEDURE — 71260 CT THORAX DX C+: CPT

## 2024-09-10 PROCEDURE — 25510000001 IOPAMIDOL 61 % SOLUTION: Performed by: NURSE PRACTITIONER

## 2024-09-10 PROCEDURE — 74177 CT ABD & PELVIS W/CONTRAST: CPT

## 2024-09-10 RX ORDER — IOPAMIDOL 612 MG/ML
100 INJECTION, SOLUTION INTRAVASCULAR
Status: COMPLETED | OUTPATIENT
Start: 2024-09-10 | End: 2024-09-10

## 2024-09-10 RX ADMIN — IOPAMIDOL 85 ML: 612 INJECTION, SOLUTION INTRAVENOUS at 09:06

## 2024-09-13 ENCOUNTER — TELEPHONE (OUTPATIENT)
Dept: GYNECOLOGIC ONCOLOGY | Facility: CLINIC | Age: 59
End: 2024-09-13
Payer: MEDICARE

## 2024-09-13 NOTE — TELEPHONE ENCOUNTER
I have attempted to call pt twice. Left one VM asking that she call me back at the office to review CT results.

## 2024-09-16 ENCOUNTER — TELEPHONE (OUTPATIENT)
Dept: GYNECOLOGIC ONCOLOGY | Facility: CLINIC | Age: 59
End: 2024-09-16

## 2024-09-16 ENCOUNTER — TELEPHONE (OUTPATIENT)
Dept: GYNECOLOGIC ONCOLOGY | Facility: CLINIC | Age: 59
End: 2024-09-16
Payer: MEDICARE

## 2024-09-16 NOTE — TELEPHONE ENCOUNTER
Hub staff attempted to follow warm transfer process and was unsuccessful     Caller: Roxie Carter    Relationship to patient: Self    Best call back number:     [phone]     Patient is needing: PT IS RETURNING CALL.    SPOKE WITH ERICKSON AND PUT A TE IN.

## 2024-09-20 ENCOUNTER — TELEPHONE (OUTPATIENT)
Dept: GYNECOLOGIC ONCOLOGY | Facility: CLINIC | Age: 59
End: 2024-09-20
Payer: MEDICARE

## 2024-09-24 ENCOUNTER — OFFICE VISIT (OUTPATIENT)
Dept: GYNECOLOGIC ONCOLOGY | Facility: CLINIC | Age: 59
End: 2024-09-24
Payer: MEDICARE

## 2024-09-24 VITALS
TEMPERATURE: 97.3 F | DIASTOLIC BLOOD PRESSURE: 57 MMHG | HEIGHT: 66 IN | HEART RATE: 73 BPM | RESPIRATION RATE: 18 BRPM | OXYGEN SATURATION: 96 % | WEIGHT: 139.8 LBS | BODY MASS INDEX: 22.47 KG/M2 | SYSTOLIC BLOOD PRESSURE: 123 MMHG

## 2024-09-24 DIAGNOSIS — D39.11 GRANULOSA CELL TUMOR OF OVARY, RIGHT: Primary | ICD-10-CM

## 2024-09-24 PROCEDURE — 3074F SYST BP LT 130 MM HG: CPT | Performed by: OBSTETRICS & GYNECOLOGY

## 2024-09-24 PROCEDURE — 1160F RVW MEDS BY RX/DR IN RCRD: CPT | Performed by: OBSTETRICS & GYNECOLOGY

## 2024-09-24 PROCEDURE — 3078F DIAST BP <80 MM HG: CPT | Performed by: OBSTETRICS & GYNECOLOGY

## 2024-09-24 PROCEDURE — 1159F MED LIST DOCD IN RCRD: CPT | Performed by: OBSTETRICS & GYNECOLOGY

## 2024-09-24 PROCEDURE — 1125F AMNT PAIN NOTED PAIN PRSNT: CPT | Performed by: OBSTETRICS & GYNECOLOGY

## 2024-09-24 PROCEDURE — 99214 OFFICE O/P EST MOD 30 MIN: CPT | Performed by: OBSTETRICS & GYNECOLOGY

## 2024-09-24 RX ORDER — LETROZOLE 2.5 MG/1
2.5 TABLET, FILM COATED ORAL DAILY
Qty: 30 TABLET | Refills: 3 | Status: SHIPPED | OUTPATIENT
Start: 2024-09-24

## 2024-10-04 DIAGNOSIS — F41.9 ANXIETY: ICD-10-CM

## 2024-10-04 RX ORDER — BUSPIRONE HYDROCHLORIDE 10 MG/1
TABLET ORAL
Qty: 270 TABLET | Refills: 0 | Status: SHIPPED | OUTPATIENT
Start: 2024-10-04

## 2024-10-05 DIAGNOSIS — F41.9 ANXIETY: ICD-10-CM

## 2024-10-07 DIAGNOSIS — G89.29 CHRONIC MIDLINE THORACIC BACK PAIN: ICD-10-CM

## 2024-10-07 DIAGNOSIS — M54.6 CHRONIC MIDLINE THORACIC BACK PAIN: ICD-10-CM

## 2024-10-07 DIAGNOSIS — M54.50 LUMBAR BACK PAIN: ICD-10-CM

## 2024-10-08 RX ORDER — ESCITALOPRAM OXALATE 20 MG/1
20 TABLET ORAL DAILY
Qty: 90 TABLET | Refills: 0 | Status: SHIPPED | OUTPATIENT
Start: 2024-10-08

## 2024-10-08 RX ORDER — TRAMADOL HYDROCHLORIDE 50 MG/1
50 TABLET ORAL EVERY 8 HOURS PRN
Qty: 90 TABLET | Refills: 0 | Status: SHIPPED | OUTPATIENT
Start: 2024-10-08

## 2024-10-08 NOTE — TELEPHONE ENCOUNTER
Rx Refill Note  Requested Prescriptions     Pending Prescriptions Disp Refills    traMADol (ULTRAM) 50 MG tablet [Pharmacy Med Name: traMADol HCl 50 MG Oral Tablet] 90 tablet 0     Sig: TAKE 1 TABLET BY MOUTH EVERY 8 HOURS AS NEEDED FOR MODERATE PAIN      Last office visit with prescribing clinician: 8/12/2024   Last telemedicine visit with prescribing clinician: Visit date not found   Next office visit with prescribing clinician: 11/18/2024                         Would you like a call back once the refill request has been completed: [] Yes [] No    If the office needs to give you a call back, can they leave a voicemail: [] Yes [] No    Bianca Yang MA  10/08/24, 10:01 EDT

## 2024-12-03 ENCOUNTER — HOSPITAL ENCOUNTER (OUTPATIENT)
Dept: CT IMAGING | Facility: HOSPITAL | Age: 59
Discharge: HOME | End: 2024-12-03
Admitting: OBSTETRICS & GYNECOLOGY
Payer: MEDICARE

## 2024-12-03 DIAGNOSIS — D39.11 GRANULOSA CELL TUMOR OF OVARY, RIGHT: ICD-10-CM

## 2024-12-03 PROCEDURE — 25510000001 IOPAMIDOL 61 % SOLUTION: Performed by: OBSTETRICS & GYNECOLOGY

## 2024-12-03 PROCEDURE — 74177 CT ABD & PELVIS W/CONTRAST: CPT

## 2024-12-03 RX ORDER — IOPAMIDOL 612 MG/ML
100 INJECTION, SOLUTION INTRAVASCULAR
Status: COMPLETED | OUTPATIENT
Start: 2024-12-03 | End: 2024-12-03

## 2024-12-03 RX ADMIN — IOPAMIDOL 80 ML: 612 INJECTION, SOLUTION INTRAVENOUS at 14:43

## 2024-12-05 NOTE — PROGRESS NOTES
Roxie Carter  [id number]  1965      Reason for visit: Granulosa cell tumor of the ovary, recurrent    History of present illness:  The patient is a 59 y.o. year old female who presents today for treatment and evaluation of the above issues.    She has a personal history of recurrent granulosa cell tumor, see cancer history below.  She is now on letrozole (Femara) maintenance has been on hormone blockade since 4/2020, now over 4 years since secondary debulking.     Taking miralax daily still - BM normal with that.  No urinary complaints.  Energy a little low, but fine when she gets up.  Hot flashes, but not as bad.  Had exacerbation of hot flashes with letrazole. Pelvic pain, RT>LT stable.  Shooting and intermittent.  Takes ibuprofen when needed and it helps.  She notes poor appetite but states that she continues to gain weight.  Reviewed CT images with patient.  Areas of concern pointed out.  Oncologic History:  Oncology/Hematology History   Granulosa cell tumor of ovary, right   8/13/2013 Cancer Staged    Staging form: Ovary, Fallopian Tube, And Primary Peritoneal Carcinoma, AJCC 8th Edition  - Clinical stage from 8/13/2013: FIGO Stage IC (cT1c, cN0, cM0) - Signed by Jamee Mcguire MD on 3/26/2020     8/13/2013 Surgery    Total abdominal hysterectomy, bilateral salpingo-oophorectomy with pathology showing 5 cm granulosa cell tumor of the right ovary and a left fallopian tube intraepithelial serous neoplasm.      - 1/4/2014 Chemotherapy    OP OVARIAN PACLitaxel / CARBOplatin (Q21D)  x6 cycles remarkable for bone marrow suppression and G-CSF support     3/14/2020 Progression    CT Abdomen/Pelvis IMPRESSION:  Large heterogeneous mass identified within the pelvis with  fluid seen scattered throughout the abdomen and pelvis. Findings  concerning for recurrence with some stranding of the mesentery in which  spread to the mesentery cannot be excluded.     4/3/2020 Surgery    Exploratory laparotomy, optimal  debulking (R=0), cystoscopy with temporary uteteral catheter, ileal resection with side-to-side anastomosis, rectosigmoid resection/LAR, left ureterolysis, and peritoneal stripping.   Pathology consistent with recurrent granulosa cell tumor at the pelvis with rectosigmoid and partial terminal ileum. No LVSI. Mesenteric nodes and other staging negative.      4/6/2020 - 9/24/2024 Hormonal Therapy    Arimidex      4/27/2020 Molecular Testing    CARIS testing results:  MS positive, 1+, 75%  ER negative, MSI stable, PD-L1 negative     9/10/2024 Progression    Annual CT scans from 2020 post op untill 2024 showed no evidence of disease.    CT C/A/P:  Impression:  1. Small nodules in the region of the right adnexa not clearly seen on prior comparisons, potentially representing disease recurrence in patient with history of resected granulosa cell tumor. Correlate with relevant tumor markers. Consider short-term   follow-up CT for reassessment. These nodules would unlikely be amenable to percutaneous sampling at this time.  2. No other signs of disease progression in the chest or abdomen. No suspicious adenopathy.  3. No acute infectious/inflammatory process in the chest, abdomen or pelvis. Other ancillary findings detailed above.     9/24/2024 -  Hormonal Therapy    Letrozole initiated           Past Medical History:   Diagnosis Date    Abnormal Pap smear of cervix 2013/2020    Allergic     Can not remember the date when diagnosed for allergy. Allergic to penicillin    Anxiety 01/2022    Arthritis     Cancer 2013    OVARIAN; s/p hysterectomy with BSO and chemo therapy that ended in 2014    Constipation     Depression     Not sure of date    Headache     Not for sure of the date. But been having them for countless years.    Heartburn     History of pneumonia 02/2019    no hospitalization     Low back pain     Murmur     detected as an adult around age 30 and 40; requires prophylactic antibiotics before dental work; pt not  sure if she has ever had an echo or when it might have been; Dr Stephen came down and listened to heart sounds on 4/2/20 and did not detect a murmur    Ovarian cancer 2013    granulosa cell tumor    Ovarian cyst     Pelvic mass     Pneumonia     Don't remember the exact date. But it was in 2019 or 2020    Primary hypertension 11/11/2022    Recurrent pregnancy loss, antepartum condition or complication     Urinary tract infection 2020 and 2021    Vitamin D deficiency        Past Surgical History:   Procedure Laterality Date    BACK SURGERY      fusion     BREAST BIOPSY      Dont remember the actural date    COLON SURGERY  04/03/2020    partial resection due to ovarian tumor invading    COLONOSCOPY  2016    ENDOSCOPY      EXPLORATORY LAPAROTOMY N/A 04/03/2020    Procedure: LAPAROTOMY EXPLORATORY,  OPTIMAL DEBULKING, CYSTOSCOPY WITH URETERAL CATHTER INSERTION AND REMOVAL, ILEO RESECTION, SIDE TO SIDE ANASTOMOSIS, RECTAL SIGMOID RESECTION, LEFT URETERA LYSIS, AND PERITONEAL STRIPPING;  Surgeon: Jamee Mcguire MD;  Location: [facility];  Service: Gynecology Oncology;  Laterality: N/A;    HYSTERECTOMY  2013    with BSO    HYSTEROSCOPY  2013    LAPAROSCOPIC ASSISTED VAGINAL HYSTERECTOMY SALPINGO OOPHORECTOMY  2020    OVARIAN CYST SURGERY      2013/2020    SPINE SURGERY  2004/2006/2008    Fusion of the spine in 2008    TOTAL ABDOMINAL HYSTERECTOMY WITH SALPINGO OOPHORECTOMY  2013    TUBAL ABDOMINAL LIGATION  1993    WISDOM TOOTH EXTRACTION      Can't remember date they were removed.       MEDICATIONS:    Current Outpatient Medications:     baclofen (LIORESAL) 10 MG tablet, Take 1 tablet by mouth 3 (Three) Times a Day As Needed for Muscle Spasms. for muscle spams, Disp: 90 tablet, Rfl: 6    busPIRone (BUSPAR) 10 MG tablet, TAKE 1/2 (ONE-HALF) TABLET BY MOUTH THREE TIMES DAILY AT 9 AM, 1 PM, AND 5 PM, Disp: 270 tablet, Rfl: 0    diclofenac (VOLTAREN) 75 MG EC tablet, Take 1 tablet by mouth 2 (Two) Times a Day., Disp: 180  tablet, Rfl: 2    escitalopram (LEXAPRO) 20 MG tablet, Take 1 tablet by mouth once daily, Disp: 90 tablet, Rfl: 0    fluticasone (Flonase) 50 MCG/ACT nasal spray, 2 sprays into the nostril(s) as directed by provider Daily., Disp: 5 mL, Rfl: 3    letrozole (FEMARA) 2.5 MG tablet, Take 1 tablet by mouth Daily., Disp: 30 tablet, Rfl: 3    loratadine (CLARITIN) 10 MG tablet, Take 1 tablet by mouth Daily., Disp: 90 tablet, Rfl: 2    magnesium hydroxide (MILK OF MAGNESIA) 400 MG/5ML suspension, Take  by mouth Daily As Needed for Constipation., Disp: , Rfl:     metoprolol succinate XL (Toprol XL) 25 MG 24 hr tablet, Take 1 tablet by mouth Daily., Disp: 90 tablet, Rfl: 1    traMADol (ULTRAM) 50 MG tablet, TAKE 1 TABLET BY MOUTH EVERY 8 HOURS AS NEEDED FOR MODERATE PAIN, Disp: 90 tablet, Rfl: 0    zolpidem (AMBIEN) 10 MG tablet, Take 1 tablet by mouth At Night As Needed for Sleep., Disp: 30 tablet, Rfl: 0     Allergies:  is allergic to penicillins.    Social History:   Social History     Socioeconomic History    Marital status: Single   Tobacco Use    Smoking status: Former     Current packs/day: 0.00     Average packs/day: 0.3 packs/day for 30.7 years (7.7 ttl pk-yrs)     Types: Cigarettes     Start date:      Quit date: 2018     Years since quittin.2    Smokeless tobacco: Never    Tobacco comments:     I dont remember the date I started smoking. But I was 16 when I started.   Vaping Use    Vaping status: Former    Quit date: 2019    Substances: Nicotine    Devices: Pre-filled or refillable cartridge   Substance and Sexual Activity    Alcohol use: Never    Drug use: Never    Sexual activity: Not Currently     Partners: Male     Birth control/protection: None, Hysterectomy     Comment: Have not be sexually active in 10 years.       Family History:    Family History   Problem Relation Age of Onset    Diabetes type II Father     Heart disease Father     Heart attack Father     Cancer Father     Diabetes  "Father     Hypertension Father     Hyperlipidemia Father     Liver disease Father     Diabetes type II Mother     Diabetes Mother     Hypertension Mother     COPD Mother     Depression Mother     Arthritis Mother     Hyperlipidemia Mother     Vision loss Mother     Lung cancer Maternal Uncle     Migraines Sister     Asthma Sister     Migraines Brother         dec from Overdose    Depression Brother     Anxiety disorder Brother     Birth defects Brother         Had a small hole in his heart    Drug abuse Brother         Baby brother passed away due to drug overdose in 2005    No Known Problems Maternal Grandmother     No Known Problems Maternal Grandfather     No Known Problems Paternal Grandmother     No Known Problems Paternal Grandfather     Anxiety disorder Sister     Miscarriages / Stillbirths Sister         Miscarriage    Depression Sister     Mental illness Sister     Anxiety disorder Sister     Depression Sister     Cancer Maternal Aunt     Cancer Maternal Uncle     Mental illness Son     Miscarriages / Stillbirths Sister         Miscarriage       Health Maintenance:    Health Maintenance   Topic Date Due    MAMMOGRAM  08/08/2025    ANNUAL WELLNESS VISIT  08/12/2025    COLORECTAL CANCER SCREENING  08/18/2031    TDAP/TD VACCINES (3 - Td or Tdap) 06/05/2033    HEPATITIS C SCREENING  Completed    COVID-19 Vaccine  Completed    INFLUENZA VACCINE  Completed    ZOSTER VACCINE  Completed    Pneumococcal Vaccine 0-64  Aged Out       Review of Systems:  Please refer to history of present illness.  Review of systems otherwise negative.  Physical Exam:  Vitals:    12/06/24 1255   BP: 121/59   Pulse: 67   Resp: 17   Temp: 97.5 °F (36.4 °C)   TempSrc: Temporal   SpO2: 97%   Weight: 63.3 kg (139 lb 9.6 oz)   Height: 167.6 cm (65.98\")   PainSc: 0-No pain       Body mass index is 22.54 kg/m².  Wt Readings from Last 3 Encounters:   12/06/24 63.3 kg (139 lb 9.6 oz)   09/24/24 63.4 kg (139 lb 12.8 oz)   09/05/24 62.6 kg (138 " lb)     GENERAL: Alert, well-appearing female appearing her stated age who is in no apparent distress.   HEENT: Sclera anicteric. Head normocephalic, atraumatic. Mucus membranes moist.   NECK: Trachea midline, supple, without masses.  No thyromegaly.   BREASTS: Deferred  CARDIOVASCULAR: Normal rate, regular rhythm, no murmurs, rubs, or gallops.  Trace nonpitting peripheral edema.  RESPIRATORY: Clear to auscultation bilaterally, normal respiratory effort  BACK:  No CVA tenderness, no vertebral tenderness on palpation  GASTROINTESTINAL:  Abdomen is soft, non-tender, non-distended, no rebound or guarding, no masses, or hernias. No HSM.    SKIN:  Warm, dry, well-perfused.  All visible areas intact.  No rashes, lesions, ulcers.  PSYCHIATRIC: AO x3, with appropriate affect, normal thought processes.  NEUROLOGIC: No focal deficits.  Moves extremities well.  MUSCULOSKELETAL: Normal gait and station.   EXTREMITIES:   No cyanosis, clubbing, symmetric.  LYMPHATICS:  No cervical or inguinal adenopathy noted.     PELVIC exam: External genitalia are free from lesion.  On bimanual examination no pain at levator ani muscles, bladder, or rectum.  Apical discomfort consistent with scarring.  No mass appreciated.  Uterus cervix and adnexa surgically absent.  Rectovaginal exam deferred.    ECOG PS 0    PROCEDURES:  None    Diagnostic Data:    CT Abdomen Pelvis With Contrast    Result Date: 9/11/2024  Impression: 1. Small nodules in the region of the right adnexa not clearly seen on prior comparisons, potentially representing disease recurrence in patient with history of resected granulosa cell tumor. Correlate with relevant tumor markers. Consider short-term follow-up CT for reassessment. These nodules would unlikely be amenable to percutaneous sampling at this time. 2. No other signs of disease progression in the chest or abdomen. No suspicious adenopathy. 3. No acute infectious/inflammatory process in the chest, abdomen or pelvis.  "Other ancillary findings detailed above. Electronically Signed: Williams Trujillo MD  9/11/2024 11:02 AM EDT  Workstation ID: [id number]    CT Chest With Contrast Diagnostic    Result Date: 9/11/2024  Impression: 1. Small nodules in the region of the right adnexa not clearly seen on prior comparisons, potentially representing disease recurrence in patient with history of resected granulosa cell tumor. Correlate with relevant tumor markers. Consider short-term follow-up CT for reassessment. These nodules would unlikely be amenable to percutaneous sampling at this time. 2. No other signs of disease progression in the chest or abdomen. No suspicious adenopathy. 3. No acute infectious/inflammatory process in the chest, abdomen or pelvis. Other ancillary findings detailed above. Electronically Signed: Williams Trujillo MD  9/11/2024 11:02 AM EDT  Workstation ID: [id number]    MRI Lumbar Spine Without Contrast    Result Date: 9/5/2024  Postop with mild degenerative changes of the lumbar spine. Electronically Signed: Saulo Maloney MD  9/5/2024 9:36 PM EDT  Workstation ID: [id number]      Lab Results   Component Value Date    WBC 4.82 09/05/2024    HGB 14.0 09/05/2024    HCT 42.1 09/05/2024    MCV 94.8 09/05/2024     09/05/2024    NEUTROABS 2.33 12/16/2022    GLUCOSE 89 09/05/2024    BUN 21 (H) 09/05/2024    CREATININE 0.91 09/05/2024    EGFRIFNONA 59 (L) 11/05/2021     09/05/2024    K 4.9 09/05/2024     09/05/2024    CO2 26.6 09/05/2024    CALCIUM 10.0 09/05/2024    ALBUMIN 4.5 09/05/2024    AST 31 09/05/2024    ALT 23 09/05/2024    BILITOT 0.3 09/05/2024     Lab Results   Component Value Date    TSH 0.665 09/05/2024    TSH 0.893 12/16/2022    TSH 0.404 06/24/2021     No results found for: \"FT4\"  No results found for: \"\"    Assessment & Plan   This is a 59 y.o. woman with history of recurrent granulosa cell tumor now with very small recurrence at the right pelvis  Encounter Diagnosis   Name Primary?    " Granulosa cell tumor of ovary, right Yes     Patient tolerating letrozole.  Continue current treatment.  Follow-up in 6 months time.  Imaging can either be performed on a scheduled basis or as needed.  I had really prefer not to do any interventions unless patient has significant symptoms.  We discussed possible symptoms associated with recurrence.  Inhibin A and B ordered.    Pain assessment was performed today as a part of patient’s care.  For patients with pain related to surgery, gynecologic malignancy or cancer treatment, the plan is as noted in the assessment/plan.  For patients with pain not related to these issues, they are to seek any further needed care from a more appropriate provider, such as PCP.      No orders of the defined types were placed in this encounter.      FOLLOW UP: 6 months    I spent 38 minutes caring for Roxie on this date of service. This time includes time spent by me in the following activities: preparing for the visit, reviewing tests, performing a medically appropriate examination and/or evaluation, counseling and educating the patient/family/caregiver, referring and communicating with other health care professionals, documenting information in the medical record, independently interpreting results and communicating that information with the patient/family/caregiver, and care coordination    Electronically Signed by: Jamee Mcguire MD  Date: 12/6/2024

## 2024-12-06 ENCOUNTER — LAB (OUTPATIENT)
Dept: LAB | Facility: HOSPITAL | Age: 59
End: 2024-12-06
Payer: MEDICARE

## 2024-12-06 ENCOUNTER — OFFICE VISIT (OUTPATIENT)
Dept: GYNECOLOGIC ONCOLOGY | Facility: CLINIC | Age: 59
End: 2024-12-06
Payer: MEDICARE

## 2024-12-06 VITALS
RESPIRATION RATE: 17 BRPM | HEART RATE: 67 BPM | OXYGEN SATURATION: 97 % | SYSTOLIC BLOOD PRESSURE: 121 MMHG | TEMPERATURE: 97.5 F | BODY MASS INDEX: 22.43 KG/M2 | HEIGHT: 66 IN | DIASTOLIC BLOOD PRESSURE: 59 MMHG | WEIGHT: 139.6 LBS

## 2024-12-06 DIAGNOSIS — D39.11 GRANULOSA CELL TUMOR OF OVARY, RIGHT: Primary | ICD-10-CM

## 2024-12-06 DIAGNOSIS — D39.11 GRANULOSA CELL TUMOR OF OVARY, RIGHT: ICD-10-CM

## 2024-12-06 PROCEDURE — 1159F MED LIST DOCD IN RCRD: CPT | Performed by: OBSTETRICS & GYNECOLOGY

## 2024-12-06 PROCEDURE — 3074F SYST BP LT 130 MM HG: CPT | Performed by: OBSTETRICS & GYNECOLOGY

## 2024-12-06 PROCEDURE — 3078F DIAST BP <80 MM HG: CPT | Performed by: OBSTETRICS & GYNECOLOGY

## 2024-12-06 PROCEDURE — 36415 COLL VENOUS BLD VENIPUNCTURE: CPT

## 2024-12-06 PROCEDURE — 1160F RVW MEDS BY RX/DR IN RCRD: CPT | Performed by: OBSTETRICS & GYNECOLOGY

## 2024-12-06 PROCEDURE — 83520 IMMUNOASSAY QUANT NOS NONAB: CPT

## 2024-12-06 PROCEDURE — 99214 OFFICE O/P EST MOD 30 MIN: CPT | Performed by: OBSTETRICS & GYNECOLOGY

## 2024-12-06 PROCEDURE — 1126F AMNT PAIN NOTED NONE PRSNT: CPT | Performed by: OBSTETRICS & GYNECOLOGY

## 2024-12-06 PROCEDURE — 86336 INHIBIN A: CPT

## 2024-12-09 LAB
INHIBIN A SERPL-MCNC: <1 PG/ML
INHIBIN B SERPL-MCNC: <7 PG/ML

## 2024-12-10 ENCOUNTER — TELEPHONE (OUTPATIENT)
Dept: ONCOLOGY | Facility: CLINIC | Age: 59
End: 2024-12-10
Payer: MEDICARE

## 2024-12-10 NOTE — TELEPHONE ENCOUNTER
Patient notified of Providers comments for normal Lab results on self identifying vm with office number provided for any questions/concerns.

## 2024-12-10 NOTE — TELEPHONE ENCOUNTER
----- Message from Jamee Mcguire sent at 12/10/2024  8:46 AM EST -----  Please notify patient of normal inhibin tumor marker levels.  Thank you  ----- Message -----  From: Lab, Background User  Sent: 12/9/2024   3:10 PM EST  To: Jamee Mcguire MD

## 2025-01-02 DIAGNOSIS — F41.9 ANXIETY: ICD-10-CM

## 2025-01-02 RX ORDER — ESCITALOPRAM OXALATE 20 MG/1
20 TABLET ORAL DAILY
Qty: 90 TABLET | Refills: 0 | Status: SHIPPED | OUTPATIENT
Start: 2025-01-02

## 2025-01-02 NOTE — TELEPHONE ENCOUNTER
Rx Refill Note  Requested Prescriptions     Pending Prescriptions Disp Refills    escitalopram (LEXAPRO) 20 MG tablet [Pharmacy Med Name: Escitalopram Oxalate 20 MG Oral Tablet] 90 tablet 0     Sig: Take 1 tablet by mouth once daily      Last office visit with prescribing clinician: 8/12/2024   Last telemedicine visit with prescribing clinician: Visit date not found   Next office visit with prescribing clinician: Visit date not found       Chip Khan MA  01/02/25, 09:06 EST

## 2025-01-20 RX ORDER — LETROZOLE 2.5 MG/1
2.5 TABLET, FILM COATED ORAL DAILY
Qty: 30 TABLET | Refills: 11 | Status: SHIPPED | OUTPATIENT
Start: 2025-01-20

## 2025-02-02 DIAGNOSIS — R00.2 PALPITATIONS: ICD-10-CM

## 2025-02-02 DIAGNOSIS — I10 PRIMARY HYPERTENSION: ICD-10-CM

## 2025-02-04 RX ORDER — METOPROLOL SUCCINATE 25 MG/1
25 TABLET, EXTENDED RELEASE ORAL DAILY
Qty: 90 TABLET | Refills: 0 | Status: SHIPPED | OUTPATIENT
Start: 2025-02-04

## 2025-02-25 DIAGNOSIS — Z98.1 HISTORY OF LUMBAR SPINAL FUSION: ICD-10-CM

## 2025-02-25 DIAGNOSIS — M54.6 CHRONIC MIDLINE THORACIC BACK PAIN: ICD-10-CM

## 2025-02-25 DIAGNOSIS — M54.50 LUMBAR BACK PAIN: ICD-10-CM

## 2025-02-25 DIAGNOSIS — G89.29 CHRONIC MIDLINE THORACIC BACK PAIN: ICD-10-CM

## 2025-02-26 RX ORDER — BACLOFEN 10 MG/1
10 TABLET ORAL 3 TIMES DAILY PRN
Qty: 90 TABLET | Refills: 0 | Status: SHIPPED | OUTPATIENT
Start: 2025-02-26

## 2025-03-24 DIAGNOSIS — Z98.1 HISTORY OF LUMBAR SPINAL FUSION: ICD-10-CM

## 2025-03-24 DIAGNOSIS — M54.50 LUMBAR BACK PAIN: ICD-10-CM

## 2025-03-24 DIAGNOSIS — M54.6 CHRONIC MIDLINE THORACIC BACK PAIN: ICD-10-CM

## 2025-03-24 DIAGNOSIS — G89.29 CHRONIC MIDLINE THORACIC BACK PAIN: ICD-10-CM

## 2025-03-25 RX ORDER — BACLOFEN 10 MG/1
10 TABLET ORAL 3 TIMES DAILY PRN
Qty: 90 TABLET | Refills: 0 | Status: SHIPPED | OUTPATIENT
Start: 2025-03-25

## 2025-03-25 NOTE — TELEPHONE ENCOUNTER
Rx Refill Note  Requested Prescriptions     Pending Prescriptions Disp Refills    baclofen (LIORESAL) 10 MG tablet [Pharmacy Med Name: Baclofen 10 MG Oral Tablet] 90 tablet 0     Sig: Take 1 tablet by mouth three times daily as needed for muscle spasm      Last office visit with prescribing clinician: 8/12/2024   Last telemedicine visit with prescribing clinician: Visit date not found   Next office visit with prescribing clinician: Visit date not found       Odalys Fried MA  03/25/25, 14:58 EDT

## 2025-03-27 DIAGNOSIS — D39.11 GRANULOSA CELL TUMOR OF OVARY, RIGHT: Primary | ICD-10-CM

## 2025-03-29 DIAGNOSIS — F41.9 ANXIETY: ICD-10-CM

## 2025-03-31 RX ORDER — ESCITALOPRAM OXALATE 20 MG/1
20 TABLET ORAL DAILY
Qty: 90 TABLET | Refills: 0 | Status: SHIPPED | OUTPATIENT
Start: 2025-03-31

## 2025-04-20 DIAGNOSIS — M54.6 CHRONIC MIDLINE THORACIC BACK PAIN: ICD-10-CM

## 2025-04-20 DIAGNOSIS — G89.29 CHRONIC MIDLINE THORACIC BACK PAIN: ICD-10-CM

## 2025-04-20 DIAGNOSIS — M54.50 LUMBAR BACK PAIN: ICD-10-CM

## 2025-04-20 DIAGNOSIS — Z98.1 HISTORY OF LUMBAR SPINAL FUSION: ICD-10-CM

## 2025-04-21 RX ORDER — BACLOFEN 10 MG/1
10 TABLET ORAL 3 TIMES DAILY PRN
Qty: 90 TABLET | Refills: 0 | Status: SHIPPED | OUTPATIENT
Start: 2025-04-21

## 2025-04-21 NOTE — TELEPHONE ENCOUNTER
Rx Refill Note  Requested Prescriptions     Pending Prescriptions Disp Refills    baclofen (LIORESAL) 10 MG tablet [Pharmacy Med Name: Baclofen 10 MG Oral Tablet] 90 tablet 0     Sig: Take 1 tablet by mouth three times daily as needed for muscle spasm      Last office visit with prescribing clinician: 8/12/2024   Last telemedicine visit with prescribing clinician: Visit date not found   Next office visit with prescribing clinician: Visit date not found                         Would you like a call back once the refill request has been completed: [] Yes [] No    If the office needs to give you a call back, can they leave a voicemail: [] Yes [] No    Aspen Su MA  04/21/25, 10:23 EDT

## 2025-04-30 DIAGNOSIS — I10 PRIMARY HYPERTENSION: ICD-10-CM

## 2025-04-30 DIAGNOSIS — R00.2 PALPITATIONS: ICD-10-CM

## 2025-05-07 RX ORDER — METOPROLOL SUCCINATE 25 MG/1
25 TABLET, EXTENDED RELEASE ORAL DAILY
Qty: 90 TABLET | Refills: 0 | OUTPATIENT
Start: 2025-05-07

## 2025-05-20 DIAGNOSIS — M54.6 CHRONIC MIDLINE THORACIC BACK PAIN: ICD-10-CM

## 2025-05-20 DIAGNOSIS — Z98.1 HISTORY OF LUMBAR SPINAL FUSION: ICD-10-CM

## 2025-05-20 DIAGNOSIS — M54.50 LUMBAR BACK PAIN: ICD-10-CM

## 2025-05-20 DIAGNOSIS — G89.29 CHRONIC MIDLINE THORACIC BACK PAIN: ICD-10-CM

## 2025-05-20 RX ORDER — BACLOFEN 10 MG/1
10 TABLET ORAL 3 TIMES DAILY PRN
Qty: 90 TABLET | Refills: 0 | Status: SHIPPED | OUTPATIENT
Start: 2025-05-20

## 2025-05-20 NOTE — TELEPHONE ENCOUNTER
Rx Refill Note  Requested Prescriptions     Pending Prescriptions Disp Refills    baclofen (LIORESAL) 10 MG tablet [Pharmacy Med Name: Baclofen 10 MG Oral Tablet] 90 tablet 0     Sig: Take 1 tablet by mouth three times daily as needed for muscle spasm      Last office visit with prescribing clinician: 8/12/2024   Last telemedicine visit with prescribing clinician: Visit date not found   Next office visit with prescribing clinician: Visit date not found                         Would you like a call back once the refill request has been completed: [] Yes [] No    If the office needs to give you a call back, can they leave a voicemail: [] Yes [] No    Nichelle Rapp MA  05/20/25, 16:11 EDT

## 2025-06-06 ENCOUNTER — LAB (OUTPATIENT)
Dept: LAB | Facility: HOSPITAL | Age: 60
End: 2025-06-06
Payer: MEDICARE

## 2025-06-06 ENCOUNTER — OFFICE VISIT (OUTPATIENT)
Dept: GYNECOLOGIC ONCOLOGY | Facility: CLINIC | Age: 60
End: 2025-06-06
Payer: MEDICARE

## 2025-06-06 VITALS
HEART RATE: 84 BPM | RESPIRATION RATE: 16 BRPM | HEIGHT: 66 IN | OXYGEN SATURATION: 96 % | TEMPERATURE: 98.1 F | DIASTOLIC BLOOD PRESSURE: 86 MMHG | WEIGHT: 143.4 LBS | BODY MASS INDEX: 23.05 KG/M2 | SYSTOLIC BLOOD PRESSURE: 146 MMHG

## 2025-06-06 DIAGNOSIS — D39.11 GRANULOSA CELL TUMOR OF OVARY, RIGHT: Primary | ICD-10-CM

## 2025-06-06 DIAGNOSIS — D39.11 GRANULOSA CELL TUMOR OF OVARY, RIGHT: ICD-10-CM

## 2025-06-06 PROCEDURE — 3079F DIAST BP 80-89 MM HG: CPT | Performed by: NURSE PRACTITIONER

## 2025-06-06 PROCEDURE — 83520 IMMUNOASSAY QUANT NOS NONAB: CPT

## 2025-06-06 PROCEDURE — 3077F SYST BP >= 140 MM HG: CPT | Performed by: NURSE PRACTITIONER

## 2025-06-06 PROCEDURE — 86336 INHIBIN A: CPT

## 2025-06-06 PROCEDURE — 1125F AMNT PAIN NOTED PAIN PRSNT: CPT | Performed by: NURSE PRACTITIONER

## 2025-06-06 PROCEDURE — 36415 COLL VENOUS BLD VENIPUNCTURE: CPT

## 2025-06-06 PROCEDURE — 99214 OFFICE O/P EST MOD 30 MIN: CPT | Performed by: NURSE PRACTITIONER

## 2025-06-06 NOTE — PROGRESS NOTES
GYN ONCOLOGY CANCER SURVEILLANCE FOLLOW-UP    Roxie Carter  [id number]  1965    Subjective   Chief Complaint:   Granulosa cell tumor of the ovary, recurrent    History of present illness:   Roxie Carter is a 60 y.o. year old female who presents today for the above issues.     She has a personal history of recurrent granulosa cell tumor, see cancer history below.  She is now on letrozole (Femara) maintenance has been on hormone blockade since 4/2020, now over 4 years since secondary debulking. Last imaging was completed 12/2024 showing small recurrence in right pelvis. Today she c/o pelvic pain and also having arthralgias.       Cancer History:   Oncology/Hematology History   Granulosa cell tumor of ovary, right   8/13/2013 Cancer Staged    Staging form: Ovary, Fallopian Tube, And Primary Peritoneal Carcinoma, AJCC 8th Edition  - Clinical stage from 8/13/2013: FIGO Stage IC (cT1c, cN0, cM0) - Signed by Jamee Mcguire MD on 3/26/2020     8/13/2013 Surgery    Total abdominal hysterectomy, bilateral salpingo-oophorectomy with pathology showing 5 cm granulosa cell tumor of the right ovary and a left fallopian tube intraepithelial serous neoplasm.      - 1/4/2014 Chemotherapy    OP OVARIAN PACLitaxel / CARBOplatin (Q21D)  x6 cycles remarkable for bone marrow suppression and G-CSF support     3/14/2020 Progression    CT Abdomen/Pelvis IMPRESSION:  Large heterogeneous mass identified within the pelvis with  fluid seen scattered throughout the abdomen and pelvis. Findings  concerning for recurrence with some stranding of the mesentery in which  spread to the mesentery cannot be excluded.     4/3/2020 Surgery    Exploratory laparotomy, optimal debulking (R=0), cystoscopy with temporary uteteral catheter, ileal resection with side-to-side anastomosis, rectosigmoid resection/LAR, left ureterolysis, and peritoneal stripping.   Pathology consistent with recurrent granulosa cell tumor at the pelvis with  "rectosigmoid and partial terminal ileum. No LVSI. Mesenteric nodes and other staging negative.      4/6/2020 - 9/24/2024 Hormonal Therapy    Arimidex      4/27/2020 Molecular Testing    CARIS testing results:  OK positive, 1+, 75%  ER negative, MSI stable, PD-L1 negative     9/10/2024 Progression    Annual CT scans from 2020 post op untill 2024 showed no evidence of disease.    CT C/A/P:  Impression:  1. Small nodules in the region of the right adnexa not clearly seen on prior comparisons, potentially representing disease recurrence in patient with history of resected granulosa cell tumor. Correlate with relevant tumor markers. Consider short-term   follow-up CT for reassessment. These nodules would unlikely be amenable to percutaneous sampling at this time.  2. No other signs of disease progression in the chest or abdomen. No suspicious adenopathy.  3. No acute infectious/inflammatory process in the chest, abdomen or pelvis. Other ancillary findings detailed above.     9/24/2024 -  Hormonal Therapy    Letrozole initiated           The current medication list and allergy list were reviewed and reconciled.     Past Medical History, Past Surgical History, Social History, Family History have been reviewed and are without significant changes except as mentioned.      Review of Systems        Objective   Physical Exam  Vital Signs: /86   Pulse 84   Temp 98.1 °F (36.7 °C) (Temporal)   Resp 16   Ht 167.6 cm (66\")   Wt 65 kg (143 lb 6.4 oz)   SpO2 96%   BMI 23.15 kg/m²   Vitals:    06/06/25 1259   PainSc: 8    PainLoc: Generalized  Comment: kidney area down, flat hurts.           General Appearance:  alert, cooperative, no apparent distress and appears stated age   Neurologic/Psych: A&O x 3, gait steady, appropriate affect   HEENT:  Normocephalic, without obvious abnormality, mucous membranes moist   Abdomen:   Soft, non-tender, non-distended, and no organomegaly   Lymph nodes: No cervical, supraclavicular, " inguinal adenopathy noted   Pelvic: External genitalia are free from lesion.  On speculum examination, the vaginal cuff was intact and no lesions were appreciated.  On bimanual examination, no fullness was appreciated.  Uterus, cervix, and adnexa were absent.  There was no significant tenderness.  The rectovaginal exam was deferred.       ECOG score: 1                         Assessment and Plan:   This is a 59 y.o. woman with history of recurrent granulosa cell tumor now with recent small recurrence at the right pelvis   -Continue letrozole, unsure if joint pain is related to this or not. Will repeat inhibin A & B. Initally, plan was to continue current tx and hold off on changing plan but as she is now symptomatic we will repeat imaging and regroup. CT ordered. F/u to discuss results/ recommendations    Diagnoses and all orders for this visit:    1. Granulosa cell tumor of ovary, right (Primary)  -     CT Abdomen Pelvis With Contrast; Future    Pain assessment was performed today as a part of patient’s care.  For patients with pain related to surgery, gynecologic malignancy or cancer treatment, the plan is as noted in the assessment/plan.  For patients with pain not related to these issues, they are to seek any further needed care from a more appropriate provider, such as PCP.    Follow-up:     1 week      Electronically signed by CASEY Sams on 06/06/2025

## 2025-06-09 LAB — INHIBIN A SERPL-MCNC: 1.4 PG/ML

## 2025-06-10 LAB — INHIBIN B SERPL-MCNC: <7 PG/ML

## 2025-06-16 DIAGNOSIS — G89.29 CHRONIC MIDLINE THORACIC BACK PAIN: ICD-10-CM

## 2025-06-16 DIAGNOSIS — Z98.1 HISTORY OF LUMBAR SPINAL FUSION: ICD-10-CM

## 2025-06-16 DIAGNOSIS — M54.50 LUMBAR BACK PAIN: ICD-10-CM

## 2025-06-16 DIAGNOSIS — M54.6 CHRONIC MIDLINE THORACIC BACK PAIN: ICD-10-CM

## 2025-06-16 RX ORDER — BACLOFEN 10 MG/1
10 TABLET ORAL 3 TIMES DAILY PRN
Qty: 90 TABLET | Refills: 0 | Status: SHIPPED | OUTPATIENT
Start: 2025-06-16

## 2025-06-16 NOTE — TELEPHONE ENCOUNTER
Rx Refill Note  Requested Prescriptions     Pending Prescriptions Disp Refills    baclofen (LIORESAL) 10 MG tablet [Pharmacy Med Name: Baclofen 10 MG Oral Tablet] 90 tablet 0     Sig: Take 1 tablet by mouth three times daily as needed for muscle spasm      Last office visit with prescribing clinician: 8/12/2024   Last telemedicine visit with prescribing clinician: Visit date not found   Next office visit with prescribing clinician: Visit date not found   {    Odalys Fried MA  06/16/25, 10:50 EDT

## 2025-06-25 DIAGNOSIS — F41.9 ANXIETY: ICD-10-CM

## 2025-06-25 RX ORDER — ESCITALOPRAM OXALATE 20 MG/1
20 TABLET ORAL DAILY
Qty: 90 TABLET | Refills: 0 | Status: SHIPPED | OUTPATIENT
Start: 2025-06-25

## 2025-06-26 ENCOUNTER — HOSPITAL ENCOUNTER (OUTPATIENT)
Dept: CT IMAGING | Facility: HOSPITAL | Age: 60
Discharge: HOME | End: 2025-06-26
Admitting: NURSE PRACTITIONER
Payer: MEDICARE

## 2025-06-26 DIAGNOSIS — D39.11 GRANULOSA CELL TUMOR OF OVARY, RIGHT: ICD-10-CM

## 2025-06-26 PROCEDURE — 25510000001 IOPAMIDOL 61 % SOLUTION: Performed by: NURSE PRACTITIONER

## 2025-06-26 PROCEDURE — 74177 CT ABD & PELVIS W/CONTRAST: CPT

## 2025-06-26 RX ORDER — IOPAMIDOL 612 MG/ML
100 INJECTION, SOLUTION INTRAVASCULAR
Status: COMPLETED | OUTPATIENT
Start: 2025-06-26 | End: 2025-06-26

## 2025-06-26 RX ADMIN — IOPAMIDOL 85 ML: 612 INJECTION, SOLUTION INTRAVENOUS at 15:42

## 2025-07-02 ENCOUNTER — TELEMEDICINE (OUTPATIENT)
Dept: GYNECOLOGIC ONCOLOGY | Facility: CLINIC | Age: 60
End: 2025-07-02
Payer: MEDICARE

## 2025-07-02 DIAGNOSIS — D39.11 GRANULOSA CELL TUMOR OF OVARY, RIGHT: Primary | ICD-10-CM

## 2025-07-02 PROCEDURE — 1125F AMNT PAIN NOTED PAIN PRSNT: CPT | Performed by: NURSE PRACTITIONER

## 2025-07-02 PROCEDURE — 99213 OFFICE O/P EST LOW 20 MIN: CPT | Performed by: NURSE PRACTITIONER

## 2025-07-04 NOTE — PROGRESS NOTES
Telehealth E-Visit      Patient Name: Roxie Carter  : 1965   MRN: [id number]     Chief Complaint:  Follow up, review CT results     I have reviewed the E-Visit questionnaire and the patient's answers, my assessment and plan are listed below.     This provider is located at the [facility] (through [facility] [place] [facility]), [address] [facility] [address] using a secure [employer] Video Visit through [employer]. Patient is being seen remotely via telehealth at their home address in [place], and stated they are in a secure environment for this session. The patient's condition being diagnosed/treated is appropriate for telemedicine. The provider identified herself as well as her credentials. The patient, and/or patients guardian, consent to be seen remotely, and when consent is given they understand that the consent allows for patient identifiable information to be sent to a third party as needed. They may refuse to be seen remotely at any time. The electronic data is encrypted and password protected, and the patient and/or guardian has been advised of the potential risks to privacy not withstanding such measures.    You have chosen to receive care through a telehealth visit. Do you consent to use a video/audio connection for your medical care today? Yes    History of Present Illness: Roxie Carter is a 60 y.o. female who is here today to review results of recent CT abd/ pelvis which was completed 25. Repeat imaging completed in light of concerning sxs for recurrence/ progression.       Subjective      Review of Systems:   Review of Systems    I have reviewed and the following portions of the patient's history were updated as appropriate: past family history, past medical history, past social history, past surgical history and problem list.    Medications:     Current Outpatient Medications:     baclofen (LIORESAL) 10 MG tablet, Take 1 tablet by mouth three times daily  as needed for muscle spasm, Disp: 90 tablet, Rfl: 0    busPIRone (BUSPAR) 10 MG tablet, TAKE 1/2 (ONE-HALF) TABLET BY MOUTH THREE TIMES DAILY AT 9 AM, 1 PM, AND 5 PM, Disp: 270 tablet, Rfl: 0    diclofenac (VOLTAREN) 75 MG EC tablet, Take 1 tablet by mouth 2 (Two) Times a Day., Disp: 180 tablet, Rfl: 2    escitalopram (LEXAPRO) 20 MG tablet, Take 1 tablet by mouth once daily, Disp: 90 tablet, Rfl: 0    fluticasone (Flonase) 50 MCG/ACT nasal spray, 2 sprays into the nostril(s) as directed by provider Daily., Disp: 5 mL, Rfl: 3    letrozole (FEMARA) 2.5 MG tablet, Take 1 tablet by mouth once daily, Disp: 30 tablet, Rfl: 11    loratadine (CLARITIN) 10 MG tablet, Take 1 tablet by mouth Daily., Disp: 90 tablet, Rfl: 2    magnesium hydroxide (MILK OF MAGNESIA) 400 MG/5ML suspension, Take  by mouth Daily As Needed for Constipation., Disp: , Rfl:     metoprolol succinate XL (TOPROL-XL) 25 MG 24 hr tablet, Take 1 tablet by mouth once daily, Disp: 90 tablet, Rfl: 0    traMADol (ULTRAM) 50 MG tablet, TAKE 1 TABLET BY MOUTH EVERY 8 HOURS AS NEEDED FOR MODERATE PAIN, Disp: 90 tablet, Rfl: 0    zolpidem (AMBIEN) 10 MG tablet, Take 1 tablet by mouth At Night As Needed for Sleep., Disp: 30 tablet, Rfl: 0    Allergies:   Allergies   Allergen Reactions    Penicillins Hives       Objective     Physical Exam:  Vital Signs: There were no vitals filed for this visit.  There is no height or weight on file to calculate BMI.    Physical Exam  Constitutional:       General: She is not in acute distress.  Musculoskeletal:      Right lower leg: No swelling or tenderness.      Left lower leg: No swelling or tenderness.   Neurological:      Mental Status: She is alert and oriented to person, place, and time.      Motor: No tremor.      Gait: Gait is intact.       Narrative & Impression   CT ABDOMEN PELVIS W CONTRAST     Date of Exam: 6/26/2025 3:24 PM EDT     Indication: h/o ovarian cancer, severe pelvic pain.     Comparison: CT abdomen and  pelvis 12/3/2024.     Technique: Axial CT images were obtained of the abdomen and pelvis following the uneventful intravenous administration of 80 cc Isovue-300. Reconstructed coronal and sagittal images were also obtained. Automated exposure control and iterative   construction methods were used.        Findings:        Three previously described pelvic sidewall nodules, or lymph nodes along the right external iliac chain, are again seen. One of these located more medially measures 1.2 x 1.0 cm (2/99), is stable. Another nodule located more posteriorly measures 1.2 x   1.0 cm (2/99), and appears very slightly larger, previously measuring 0.9 x 0.9 cm. A third lymph node has a more significantly enlarged, now measuring 2.1 x 1.7 cm (2/104), compared to 1.1 x 0.7 cm on prior.     No suspicious adenopathy is seen elsewhere within the abdomen or pelvis. No ascites is evident.     The liver, gallbladder, spleen, pancreas, adrenals, and kidneys are within normal limits.     There are surgical changes of pelvic small bowel loops. Surgical changes of the lower sigmoid colon. No bowel mass is identified. No active bowel inflammatory change is evident.     The urinary bladder is normal. Hysterectomy changes are present.     The heart size is normal. Benign calcified nodule is present in the right lower lobe. No suspicious pulmonary nodules or acute basilar airspace disease.     L5-S1 posterior spinal fusion hardware appears intact. There are no suspicious osseous abnormalities.     IMPRESSION:  Impression:     1. Two of three previously described right pelvic sidewall nodules, or external iliac chain nodes, have enlarged since 12/3/2024, suggesting modest lung disease progression.  2. There is no evidence of new or progressive disease elsewhere within the abdomen or pelvis.           Electronically Signed: Kat George MD    6/30/2025 8:18 PM EDT    Workstation ID: [id number]       Assessment / Plan      Assessment/Plan:    Diagnoses and all orders for this visit:    1. Granulosa cell tumor of ovary, right (Primary)     -Unfortunately, repeat imaging is concerning for progression since December. We will get her back in the office to see Dr Mcguire and discuss treatment plan. Pt v/u.    Follow Up:   Next available apt with MD    Any medications prescribed have been sent electronically to   [facility] [address] [place] [address] - [address] [facility] [phone]  - [phone]   [address] [facility] [address]  Phone: [phone] Fax: [phone]      25 minutes were spent reviewing the patient's questionnaire, formulating a treatment plan, and relaying information to the patient via [employer].    CASEY Tobin  [facility]   07/20/25  09:16 EDT

## 2025-07-09 ENCOUNTER — PREP FOR SURGERY (OUTPATIENT)
Dept: OTHER | Facility: HOSPITAL | Age: 60
End: 2025-07-09
Payer: MEDICARE

## 2025-07-09 ENCOUNTER — OFFICE VISIT (OUTPATIENT)
Dept: GYNECOLOGIC ONCOLOGY | Facility: CLINIC | Age: 60
End: 2025-07-09
Payer: MEDICARE

## 2025-07-09 VITALS
TEMPERATURE: 97.3 F | HEART RATE: 92 BPM | SYSTOLIC BLOOD PRESSURE: 119 MMHG | RESPIRATION RATE: 17 BRPM | HEIGHT: 66 IN | DIASTOLIC BLOOD PRESSURE: 85 MMHG | BODY MASS INDEX: 22.61 KG/M2 | WEIGHT: 140.7 LBS | OXYGEN SATURATION: 98 %

## 2025-07-09 DIAGNOSIS — D39.11 GRANULOSA CELL TUMOR OF OVARY, RIGHT: Primary | ICD-10-CM

## 2025-07-09 PROBLEM — D39.10 GRANULOSA CELL TUMOR OF OVARY: Status: ACTIVE | Noted: 2025-07-09

## 2025-07-09 RX ORDER — SODIUM CHLORIDE 9 MG/ML
40 INJECTION, SOLUTION INTRAVENOUS AS NEEDED
OUTPATIENT
Start: 2025-07-09

## 2025-07-09 RX ORDER — CELECOXIB 200 MG/1
200 CAPSULE ORAL ONCE
OUTPATIENT
Start: 2025-07-09 | End: 2025-07-09

## 2025-07-09 RX ORDER — PREGABALIN 150 MG/1
150 CAPSULE ORAL ONCE
OUTPATIENT
Start: 2025-07-09 | End: 2025-07-09

## 2025-07-09 RX ORDER — SCOPOLAMINE 1 MG/3D
1 PATCH, EXTENDED RELEASE TRANSDERMAL CONTINUOUS
OUTPATIENT
Start: 2025-07-09 | End: 2025-07-12

## 2025-07-09 RX ORDER — HEPARIN SODIUM 5000 [USP'U]/ML
5000 INJECTION, SOLUTION INTRAVENOUS; SUBCUTANEOUS ONCE
OUTPATIENT
Start: 2025-07-09 | End: 2025-07-09

## 2025-07-09 RX ORDER — SODIUM CHLORIDE 0.9 % (FLUSH) 0.9 %
10 SYRINGE (ML) INJECTION EVERY 12 HOURS SCHEDULED
OUTPATIENT
Start: 2025-07-09

## 2025-07-09 RX ORDER — ACETAMINOPHEN 500 MG
1000 TABLET ORAL ONCE
OUTPATIENT
Start: 2025-07-09 | End: 2025-07-09

## 2025-07-09 RX ORDER — SODIUM CHLORIDE 0.9 % (FLUSH) 0.9 %
10 SYRINGE (ML) INJECTION AS NEEDED
OUTPATIENT
Start: 2025-07-09

## 2025-07-09 NOTE — PROGRESS NOTES
Roxie Carter  [id number]  1965      Reason for visit: Granulosa cell tumor of the ovary, recurrent    History of present illness:  The patient is a 60 y.o. year old female who presents today for treatment and evaluation of the above issues.    She has a personal history of recurrent granulosa cell tumor, see cancer history below.  She is now on letrozole (Femara) maintenance has been on hormone blockade since 4/2020.  Prior treatments have included chemotherapy and Depo-Lupron.  Noted right pelvic recurrence with slow progression over time.  Presents for discussion of surgical options.    Complains of numbness and tingling bilateral hands and feet, pain radiating down bilateral legs, knee pain.  Difficulty walking up and down stairs.    Oncologic History:  Oncology/Hematology History   Granulosa cell tumor of ovary, right   8/13/2013 Cancer Staged    Staging form: Ovary, Fallopian Tube, And Primary Peritoneal Carcinoma, AJCC 8th Edition  - Clinical stage from 8/13/2013: FIGO Stage IC (cT1c, cN0, cM0) - Signed by Jamee Mcguire MD on 3/26/2020     8/13/2013 Surgery    Total abdominal hysterectomy, bilateral salpingo-oophorectomy with pathology showing 5 cm granulosa cell tumor of the right ovary and a left fallopian tube intraepithelial serous neoplasm.      - 1/4/2014 Chemotherapy    OP OVARIAN PACLitaxel / CARBOplatin (Q21D)  x6 cycles remarkable for bone marrow suppression and G-CSF support     3/14/2020 Progression    CT Abdomen/Pelvis IMPRESSION:  Large heterogeneous mass identified within the pelvis with  fluid seen scattered throughout the abdomen and pelvis. Findings  concerning for recurrence with some stranding of the mesentery in which  spread to the mesentery cannot be excluded.     4/3/2020 Surgery    Exploratory laparotomy, optimal debulking (R=0), cystoscopy with temporary uteteral catheter, ileal resection with side-to-side anastomosis, rectosigmoid resection/LAR, left ureterolysis,  and peritoneal stripping.   Pathology consistent with recurrent granulosa cell tumor at the pelvis with rectosigmoid and partial terminal ileum. No LVSI. Mesenteric nodes and other staging negative.      4/6/2020 - 9/24/2024 Hormonal Therapy    Arimidex      4/27/2020 Molecular Testing    CARIS testing results:  ME positive, 1+, 75%  ER negative, MSI stable, PD-L1 negative     9/10/2024 Progression    Annual CT scans from 2020 post op untill 2024 showed no evidence of disease.    CT C/A/P:  Impression:  1. Small nodules in the region of the right adnexa not clearly seen on prior comparisons, potentially representing disease recurrence in patient with history of resected granulosa cell tumor. Correlate with relevant tumor markers. Consider short-term   follow-up CT for reassessment. These nodules would unlikely be amenable to percutaneous sampling at this time.  2. No other signs of disease progression in the chest or abdomen. No suspicious adenopathy.  3. No acute infectious/inflammatory process in the chest, abdomen or pelvis. Other ancillary findings detailed above.     9/24/2024 -  Hormonal Therapy    Letrozole initiated           Past Medical History:   Diagnosis Date    Abnormal Pap smear of cervix 2013/2020    Allergic     Can not remember the date when diagnosed for allergy. Allergic to penicillin    Anxiety 01/2022    Arthritis     Cancer 2013    OVARIAN; s/p hysterectomy with BSO and chemo therapy that ended in 2014    Constipation     Depression     Not sure of date    Headache     Not for sure of the date. But been having them for countless years.    Heartburn     History of pneumonia 02/2019    no hospitalization     Low back pain     Murmur     detected as an adult around age 30 and 40; requires prophylactic antibiotics before dental work; pt not sure if she has ever had an echo or when it might have been; Dr Stephen came down and listened to heart sounds on 4/2/20 and did not detect a murmur    Ovarian  cancer 2013    granulosa cell tumor    Ovarian cyst     Pelvic mass     Pneumonia     Don't remember the exact date. But it was in 2019 or 2020    Primary hypertension 11/11/2022    Recurrent pregnancy loss, antepartum condition or complication     Urinary tract infection 2020 and 2021    Vitamin D deficiency        Past Surgical History:   Procedure Laterality Date    BACK SURGERY      fusion     BREAST BIOPSY      Dont remember the actural date    COLON SURGERY  04/03/2020    partial resection due to ovarian tumor invading    COLONOSCOPY  2016    ENDOSCOPY      EXPLORATORY LAPAROTOMY N/A 04/03/2020    Procedure: LAPAROTOMY EXPLORATORY,  OPTIMAL DEBULKING, CYSTOSCOPY WITH URETERAL CATHTER INSERTION AND REMOVAL, ILEO RESECTION, SIDE TO SIDE ANASTOMOSIS, RECTAL SIGMOID RESECTION, LEFT URETERA LYSIS, AND PERITONEAL STRIPPING;  Surgeon: Jamee Mcguire MD;  Location: [facility];  Service: Gynecology Oncology;  Laterality: N/A;    HYSTERECTOMY  2013    with BSO    HYSTEROSCOPY  2013    LAPAROSCOPIC ASSISTED VAGINAL HYSTERECTOMY SALPINGO OOPHORECTOMY  2020    OVARIAN CYST SURGERY      2013/2020    SPINE SURGERY  2004/2006/2008    Fusion of the spine in 2008    TOTAL ABDOMINAL HYSTERECTOMY WITH SALPINGO OOPHORECTOMY  2013    TUBAL ABDOMINAL LIGATION  1993    WISDOM TOOTH EXTRACTION      Can't remember date they were removed.       MEDICATIONS:    Current Outpatient Medications:     baclofen (LIORESAL) 10 MG tablet, Take 1 tablet by mouth three times daily as needed for muscle spasm, Disp: 90 tablet, Rfl: 0    busPIRone (BUSPAR) 10 MG tablet, TAKE 1/2 (ONE-HALF) TABLET BY MOUTH THREE TIMES DAILY AT 9 AM, 1 PM, AND 5 PM, Disp: 270 tablet, Rfl: 0    diclofenac (VOLTAREN) 75 MG EC tablet, Take 1 tablet by mouth 2 (Two) Times a Day., Disp: 180 tablet, Rfl: 2    escitalopram (LEXAPRO) 20 MG tablet, Take 1 tablet by mouth once daily, Disp: 90 tablet, Rfl: 0    fluticasone (Flonase) 50 MCG/ACT nasal spray, 2 sprays into the  nostril(s) as directed by provider Daily., Disp: 5 mL, Rfl: 3    letrozole (FEMARA) 2.5 MG tablet, Take 1 tablet by mouth once daily, Disp: 30 tablet, Rfl: 11    loratadine (CLARITIN) 10 MG tablet, Take 1 tablet by mouth Daily., Disp: 90 tablet, Rfl: 2    magnesium hydroxide (MILK OF MAGNESIA) 400 MG/5ML suspension, Take  by mouth Daily As Needed for Constipation., Disp: , Rfl:     metoprolol succinate XL (TOPROL-XL) 25 MG 24 hr tablet, Take 1 tablet by mouth once daily, Disp: 90 tablet, Rfl: 0    traMADol (ULTRAM) 50 MG tablet, TAKE 1 TABLET BY MOUTH EVERY 8 HOURS AS NEEDED FOR MODERATE PAIN, Disp: 90 tablet, Rfl: 0    zolpidem (AMBIEN) 10 MG tablet, Take 1 tablet by mouth At Night As Needed for Sleep., Disp: 30 tablet, Rfl: 0     Allergies:  is allergic to penicillins.    Social History:   Social History     Socioeconomic History    Marital status: Single   Tobacco Use    Smoking status: Former     Current packs/day: 0.00     Average packs/day: 0.3 packs/day for 30.7 years (7.7 ttl pk-yrs)     Types: Cigarettes     Start date:      Quit date: 2018     Years since quittin.8    Smokeless tobacco: Never    Tobacco comments:     I dont remember the date I started smoking. But I was 16 when I started.   Vaping Use    Vaping status: Former    Quit date: 2019    Substances: Nicotine    Devices: Pre-filled or refillable cartridge   Substance and Sexual Activity    Alcohol use: Never    Drug use: Never    Sexual activity: Not Currently     Partners: Male     Birth control/protection: None, Hysterectomy     Comment: Have not be sexually active in 10 years.       Family History:    Family History   Problem Relation Age of Onset    Diabetes type II Father     Heart disease Father     Heart attack Father     Cancer Father     Diabetes Father     Hypertension Father     Hyperlipidemia Father     Liver disease Father     Diabetes type II Mother     Diabetes Mother     Hypertension Mother     COPD Mother      "Depression Mother     Arthritis Mother     Hyperlipidemia Mother     Vision loss Mother     Lung cancer Maternal Uncle     Migraines Sister     Asthma Sister     Migraines Brother         dec from Overdose    Depression Brother     Anxiety disorder Brother     Birth defects Brother         Had a small hole in his heart    Drug abuse Brother         Baby brother passed away due to drug overdose in 2005    No Known Problems Maternal Grandmother     No Known Problems Maternal Grandfather     No Known Problems Paternal Grandmother     No Known Problems Paternal Grandfather     Anxiety disorder Sister     Miscarriages / Stillbirths Sister         Miscarriage    Depression Sister     Mental illness Sister     Anxiety disorder Sister     Depression Sister     Cancer Maternal Aunt     Cancer Maternal Uncle     Mental illness Son     Miscarriages / Stillbirths Sister         Miscarriage       Health Maintenance:    Health Maintenance   Topic Date Due    MAMMOGRAM  08/08/2025    ANNUAL WELLNESS VISIT  08/12/2025    INFLUENZA VACCINE  10/01/2025    COLORECTAL CANCER SCREENING  08/18/2031    TDAP/TD VACCINES (3 - Td or Tdap) 06/05/2033    HEPATITIS C SCREENING  Completed    COVID-19 Vaccine  Completed    Pneumococcal Vaccine 50+  Completed    ZOSTER VACCINE  Completed       Review of Systems:  Please refer to history of present illness.  Review of systems otherwise negative.  Physical Exam:  Vitals:    07/09/25 1056   BP: 119/85   Pulse: 92   Resp: 17   Temp: 97.3 °F (36.3 °C)   TempSrc: Temporal   SpO2: 98%   Weight: 63.8 kg (140 lb 11.2 oz)   Height: 167.6 cm (65.98\")   PainSc: 5    PainLoc: Abdomen  Comment: From the abdomen lower         Body mass index is 22.72 kg/m².  Wt Readings from Last 3 Encounters:   07/09/25 63.8 kg (140 lb 11.2 oz)   06/06/25 65 kg (143 lb 6.4 oz)   12/06/24 63.3 kg (139 lb 9.6 oz)     GENERAL: Alert, well-appearing female appearing her stated age who is in no apparent distress.   HEENT: Sclera " anicteric. Head normocephalic, atraumatic. Mucus membranes moist.   NECK: Trachea midline, supple, without masses.  No thyromegaly.   BREASTS: Deferred  CARDIOVASCULAR: Normal rate, regular rhythm, no murmurs, rubs, or gallops.  Trace nonpitting peripheral edema.  RESPIRATORY: Clear to auscultation bilaterally, normal respiratory effort  BACK:  No CVA tenderness, no vertebral tenderness on palpation  GASTROINTESTINAL:  Abdomen is soft, non-distended, no rebound or guarding, no masses, or hernias. No HSM.  Midline incision.  Right lower quadrant mild tenderness on palpation.  SKIN:  Warm, dry, well-perfused.  All visible areas intact.  No rashes, lesions, ulcers.  PSYCHIATRIC: AO x3, with appropriate affect, normal thought processes.  NEUROLOGIC: No focal deficits.  Moves extremities well.  MUSCULOSKELETAL: Normal gait and station.   EXTREMITIES:   No cyanosis, clubbing, symmetric.  LYMPHATICS:  No cervical or inguinal adenopathy noted.     PELVIC exam: deferred.    ECOG PS 0    PROCEDURES:  None    Diagnostic Data:    CT Abdomen Pelvis With Contrast  Result Date: 6/30/2025  Impression: 1. Two of three previously described right pelvic sidewall nodules, or external iliac chain nodes, have enlarged since 12/3/2024, suggesting modest lung disease progression. 2. There is no evidence of new or progressive disease elsewhere within the abdomen or pelvis. Electronically Signed: Kat George MD  6/30/2025 8:18 PM EDT  Workstation ID: [id number]          Lab Results   Component Value Date    WBC 4.82 09/05/2024    HGB 14.0 09/05/2024    HCT 42.1 09/05/2024    MCV 94.8 09/05/2024     09/05/2024    NEUTROABS 2.33 12/16/2022    GLUCOSE 89 09/05/2024    BUN 21 (H) 09/05/2024    CREATININE 0.91 09/05/2024    EGFRIFNONA 59 (L) 11/05/2021     09/05/2024    K 4.9 09/05/2024     09/05/2024    CO2 26.6 09/05/2024    CALCIUM 10.0 09/05/2024    ALBUMIN 4.5 09/05/2024    AST 31 09/05/2024    ALT 23 09/05/2024     "BILITOT 0.3 09/05/2024     Lab Results   Component Value Date    TSH 0.665 09/05/2024    TSH 0.893 12/16/2022    TSH 0.404 06/24/2021     No results found for: \"FT4\"  No results found for: \"\"    Assessment & Plan   This is a 60 y.o. woman with history of recurrent granulosa cell tumor now with recurrence at the right pelvis  Encounter Diagnosis   Name Primary?    Granulosa cell tumor of ovary, right Yes       I reviewed CT scan with patient and her neighbor who came in as a supportive person today.  I agree with the report.  Discussed the critical location of recurrence adjacent to femoral vein, obturator nerve, medial small bowel.  Discussed possibility of intestinal resection, laparotomy, and damage to adjacent structures and sequela such as difficulty ambulating and vascular injury.  Discussed anticipated adhesive disease related to prior surgeries.  Discussed the possibility of a trial of Depo-Lupron prior to surgery in an attempt to get the masses to shrink.  Patient strongly prefers surgical intervention at this point.    Patient was consented for DIAGNOSTIC LAPAROSCOPY WITH DAVINCI ROBOT, TUMOR DEBULKING, POSSIBLE LAPAROTOMY    Risks and benefits of surgery were discussed.  This included, but was not limited to, infection and bleeding like when the skin is cut; damage to surrounding structures; and incisional complications.  Risk of DVT was addressed for major surgeries.  Standard of care efforts to minimize these risks were reviewed.  Typical hospital stay and recovery were discussed as well as post-procedure precautions.  Surgical implications of chronic illnesses on recovery and surgical outcome were reviewed.     Pain medication regimen for postoperative care was discussed.  Typical regimen and avoidance of narcotics was discussed.  Patient was educated that other factors, such as existing narcotic use, can impact postoperative pain management.      Risks and benefits of pelvic sidewall dissection " were further discussed.  This included lymphocyst, hematoma, lymphedema, vascular injury, and nerve injury.    Patient verbalized understanding of the plan including the risks and benefits.  Appropriate perioperative testing including laboratory evaluation, EKG as clinically indicated, chest x-ray as clinically indicated, and preadmission evaluation were all ordered as a part of this patient's care.    Patient had questions about her neuropathy which has been present and worsening for about the past year and radiating pain about the lower extremities.  I advised her that this is not not likely related to her cancer recurrence.  She has a history of multiple back surgeries.  I recommended she address this with her primary care provider to determine if pain management, physical therapy, and/or neurosurgery referral are appropriate in her case.      Pain assessment was performed today as a part of patient’s care.  For patients with pain related to surgery, gynecologic malignancy or cancer treatment, the plan is as noted in the assessment/plan.  For patients with pain not related to these issues, they are to seek any further needed care from a more appropriate provider, such as PCP.      No orders of the defined types were placed in this encounter.      FOLLOW UP: 6 months    I spent 51 minutes caring for Roxie on this date of service. This time includes time spent by me in the following activities: preparing for the visit, reviewing tests, performing a medically appropriate examination and/or evaluation, counseling and educating the patient/family/caregiver, referring and communicating with other health care professionals, documenting information in the medical record, independently interpreting results and communicating that information with the patient/family/caregiver, care coordination, ordering medications, ordering test(s), and ordering procedure(s)      Electronically Signed by: Jamee Mcguire MD  Date:  7/9/2025

## 2025-07-09 NOTE — PATIENT INSTRUCTIONS
Surgery Instructions            Roxie Carter  [id number]  1965      SURGEON:  Jamee Mcguire MD    Surgery Coordinator: aCtalina VARELA    Gynecological Oncology  [address] [facility] [address] [place] [address], [address]  Phone: [phone]                   Fax: [phone]      Pre-Admission Testing Appointment    You have a Pre-Admission Testing (PAT) appointment on 8/7/2025  at 930  You will need to be at hospital registration 10 minutes before that time. Directions to PAT and Registration will be listed below.    We understand your time is valuable. To prevent delays, please bring the following to your PAT apt. if it applies to you:  Written physician orders (if given to you by your physician)  All medications in the original bottles including over-the-counter medications (not a list)  Copy of living will or power of  documents   Copy of recent test results (EKG, stress test, echo, heart cath, etc.)  Copy of pacemaker or ICD cards and date of last interrogation   Copy of cardiac clearance letter from your cardiologist or primary care physician if history of heart problems  Name and phone number of your pharmacy, primary care physician and/or cardiologist  CPAP or BiPAP settings    Surgery Appointment      Your surgery has been scheduled on 8/14/2025.  You will need to go to Main Registration to check in at 715. Then you will be sent to the [address] [place] [address] second floor surgery registration desk to check in.    Nothing by mouth after midnight on 8/13/2025.    If you are feeling sick, have a fever or cough and have seen your PCP let our office know 48 hours prior to surgery. It may be subject to rescheduling.       The Day of Surgery:    Do not chew gum or tobacco, smoke, or eat mints or hard candy. Shower and wash your hair. You may brush your teeth but do not swallow water. Use any wipes that Pre-admission testing has given you.      Please arrive for surgery as instructed by the pre-op nurse, often one to two hours before your surgery.  Once you are called to go to your pre-op room, no one will be allowed in the pre op room.   Please note no one under age 12 is permitted to stay in the waiting area without supervision.  Remove all jewelry, including rings and piercings. Do not bring valuables to the hospital.  Wear loose-fitting clothing.  Avoid wearing eye makeup or contact lenses  We make every effort to begin surgery at your scheduled start time but delays do occur. We will keep you and your family updated about any delays  Please note: you MUST have a  over the age of 18 to drive you home from the hospital. You may not use Uber, Lyft or a taxi.    Please remember to bring:    Photo ID and current medical insurance card  Advanced directives, living will or power of  (if applicable)  Current list of all medications, including over-the- counter and herbal supplements  List of allergies  CPAP device if you have sleep apnea  Any assistive devices or equipment needed after surgery    While You are In the Pre-Op Room:  The nurse will review your health history and will place an IV (into the vein) in your hand or arm for fluids and medicines.  An anesthesia provider will talk with you about anesthesia and pain control during and after surgery.  A member of the surgical team can answer your questions.    Directions to [facility]  [address] [facility] ? [place] [address] ? [phone]    From [address] and [address] [facility]:  Take [address] to the [address] [place] exit. Go right on [address] to [address]. Right on [address] to [place].   Left on [address] to [facility] which is on the left.    From [address] [place]:  Get off [address] at the [place] exit. Go left on [address] to [address]. Right on [address] to [place]. Left on   [address]  to [facility] which is on the left.     From the South ([place]):  Follow [address] to approximately one mile inside [address]. [facility] is on the right at [address] and   [facility].     Parking:  Free  Parking - Take Entrance 2 off of [address] and go straight ahead to [address] [place] [address].  Self Parking - Take Entrance 1 off of [address], bear left and follow the road to [facility].    Directions to Registration:  If entering through front of [address] [place] [address] ( parking), take a right and proceed up the hallway connecting [address] [place] [address] to   [facility]. Registration is on the left about half-way up the ross.    If entering from [facility], take garage elevator to first floor (1), exit to the right and proceed through the doors to outside, follow the covered sidewalk to entrance of [facility], follow signs to [address] [place] [address], this leads to the [facility] lobby and information desk. Proceed past the information desk to the hallway that connects [address] [place] [address] to the [facility]. Registration is on the left about half-way up the ross.    Directions to Pre-admission Testing:  Follow directions to Registration and Pre-admission Testing is next door to Registration             PREPARING FOR SURGERY  **Disability or Work Release Forms     Work: The amount of time you will be off work after surgery depends on both your surgery and your job. Discuss this with your doctor before surgery. If you have any questions about this, call your doctor.  You must provide all forms completed and signed to the GYN ONCOLOGY office.    FORM FOR AUHTHORIZATION FOR USE AND/OR DISCLOSURE OF PROCTED HEALTH INFORMATION CAN BE PROVIDED UPON REQUEST.    Preoperative Evaluation and Optimization  If your doctor tells you to get a preoperative evaluation from your primary care provider, cardiologist, or other specialist, it is your responsibility to make sure to complete these well before  "your surgery. We want you to get evaluated to make sure you are as healthy as possible when you have your surgery. If the evaluation, including all recommended testing, is not done in time, your surgery will be postponed.    If you take diabetic medications please consult with the prescriber.  Continue antidepressants, Beta Blockers \"olol\", anti-seizure medication, GERD medication (heartburn), Opioids and Parkinson's medication.  Let us know if you have a history of blood clots or are taking a blood thinner before your surgery, this will need to be held and you will need to discuss this with staff.   If you are taking any weight loss medications please let our staff now. Ideally they will need to be held 2 weeks prior to your procedure.  You are allowed 1 visitor that may remain in the waiting room at both locations.  Visitors cannot come back to pre-op or post-op areas.    Please note: you MUST have a  over the age of 18 to drive you home from the hospital. You may not use Uber, Lyft or a taxi.    Physical Fitness  Research shows that getting more physical activity before surgery can lower your risk for problems after surgery. Walking is a great way to improve your fitness level before surgery. Even if you start walking just a few weeks before surgery, it can make a big difference.     Quit Smoking  If you smoke, your risk of having a lung problem is at least twice that of a non-smoker.    Surgical incisions will not heal as well and you have a higher risk of infection  The heart has to work harder.  It is best to quit smoking 6 to 8 weeks before surgery. This gives your lungs more time to recover.    Outpatient Surgery  You will need to have someone bring you to the hospital, stay in the waiting room during your procedure and take you home at discharge. It is recommended that someone stay with you 24 hours after your procedure.     If you live more than a 4-hour drive away from the hospital, or live in an " area without easy access to an emergency department, we recommend you plan to spend another night or two close to the hospital before you go home. For assistance with hotel, prices and vouchers let our office know and we can let you talk with our Oncology Social worker, Tracie Waggoner.     Post-Operative Visit  You will be scheduled a post-operative appointment for 3 weeks after your surgical procedure. If you do not have an appointment please call the office and have that scheduled.     How to prevent nausea  The best way to prevent nausea is to eat frequent small meals. It is especially important to eat something before taking pain medication. Take your ondansetron if you are feeling nauseous do not wait.    Pain Management after Surgery    If you have kidney disease or liver disease and are not to take ibuprofen or Tylenol please let your doctor or nurse know.     Driving: Do not drive while you are taking prescription pain medications.     It is normal to have some pain after surgery. The goal of managing your acute pain after surgery is to minimize your pain so you feel comfortable enough to get up, take deep breaths, wash, get dressed, and do simple tasks in your home. Some discomfort is likely. We do not expect you to be completely free of pain.   Pain is usually worst the first 24-48 hours after surgery.    What can I do to relieve pain without medications?   Apply heat with a warm compress, hot water bottle, or heating pad. Do not put anything hot directly on your skin or lie on top of it.   Apply cooling with a cold gel pack, bag of peas, or crushed ice. Wrap in a soft cloth or towel.   Do not push or press on your incision. It is normal for your incision to be sore for up to 6 weeks if you push on it.   Unless your doctor gives you a different plan, ibuprofen and acetaminophen are the main medicines you will use to manage your pain.   You may also get a prescription for an opioid such as oxycodone or  hydrocodone. Opioids should only be added as needed to reduce pain that is not adequately relieved by ibuprofen and acetaminophen.                                                                                         Typical Pain Medication Schedule  6 am Ibuprofen 600 mg   9 am acetaminophen 650 mg   12:00 pm Noon Ibuprofen 600 mg   3:00 pm Acetaminophen 650 mg   6:00 pm Ibuprofen 600 mg   9:00 pm Acetaminophen 650 mg   12:00 am Midnight  Ibuprofen 600 mg.      What if this schedule does not control my pain?   Please call the office and let us know at [phone]  Reduce the number and frequency of opioids as soon as you can. Do not take more opioid medication than your doctor has prescribed.   Common side effects and risks of opioids include drowsiness, mental confusion, dizziness, nausea, constipation, itching, dry mouth, and slowed breathing.   Never mix opioids with alcohol, sleep aids or anti-anxiety medications. These are dangerous combinations that increase the harmful effects of opioid pain medication. Many overdose deaths from opioids also involve at least one other drug or alcohol.   It is illegal to sell or share an opioid without a prescription properly issued by a licensed health care prescriber.    What is the best way to stop taking pain medications?  1. Stop opioid use.  2. Stop acetaminophen.  3. Gradually decrease how often you take ibuprofen. It is a good idea to take a 600 mg pill before you start a more tiring activity such as going shopping or for a long walk.  Once you get more active, you may have a day when your pain gets a little worse. If this happens, take ibuprofen. If ibuprofen does not relieve the pain, add acetaminophen.    What do I need to know about bowel movements?   Starting as soon as you get home, take 17 grams of Miralax (one capful) twice a day to keep your stool soft and prevent constipation. It is important to prevent constipation because straining can damage your  stitches. Your stool should be as soft as toothpaste. If your stool gets too loose, cut back to using Miralax only once a day.   If you used a bowel prep before surgery, it is common not to have a bowel movement on the first and second day after surgery.   If you have not had a bowel movement by 7 p.m. on the third day after surgery, do one of the following at bedtime:  Drink 1 ounce (2 tablespoons) of Milk of Magnesia (MOM). If you have used MOM before and know you need to take 2 ounces for it to work for you, it is OK to do this, or Take 2 Senekot tablets.   Go for short walks. Walking and being active will help you have a bowel movement.   If you have not had a bowel movement by noon on the fourth day after surgery, call the clinic where you were seen and ask to speak with a nurse.    What kind of vaginal bleeding is normal?  Spotting of pink or red blood from the vagina is normal. Brown-colored discharge that gradually changes to a light yellow or cream color is also normal and can last up to 6 weeks. The brownish discharge is old blood and often has a strong odor, this is okay. Call us if it becomes heavier or foul smelling or you are saturating a maxi pad within an hour.     At Home after Surgery: If you experience a medical emergency call 911 or have someone drive you to your nearest emergency department.     When should I call my doctor?  Call your doctor right away, any time of the day or night, including on weekends and holidays, if you have any of the following signs or symptoms:   A temperature over 100.4°F (38°C) If you don't have one, please buy a thermometer before your surgery.   Heavy bleeding (soaking a regular pad in an hour or less)   Severe pain in your abdomen or pelvis that the pain medication is not helping   Chest pain or difficulty breathing   Swelling, redness, or pain in your legs   An incision that opens   An incision that is red or hot   Fluid or blood leaking from an incision   New  bruising after leaving the hospital that is large or spreading. A little bit of bruising around an incision is normal.   Nausea and vomiting    Skin rash   Unable to urinate at all   Pain or stinging when you pass urine   Blood or cloudiness in your urine   Non-stop urge to pass urine, but only dribbling when you try to go   A sense that something is wrong.    Caring for post-surgical incisions     Do not have vaginal intercourse until your doctor evaluates you at a postop visit and tells you OK.     Showers: You may shower starting 24 hours after your surgery.    NO BATHS: do not take a tub bath up to 6 weeks after surgery.   Do not put any lotion, oil, gel, or powder on or near your incisions.     For incisions inside your vagina: Incisions inside the vagina are closed with dissolvable stitches. When they dissolve you may see little bits of suture material that look like thin pieces of string on your underwear or on toilet tissue after wiping. This is normal. Do not put anything inside the vagina until your doctor evaluates you at a postop visit and tells you when it will be OK.      For incisions on your skin: If there is a dressing over the incision, remove it before your first shower. Leave the slim adhesive strips that are under the dressing in place. During the week after surgery, they will usually curl up at the edges and then come off on their own. If they are still there a week after surgery, gently remove them.  To clean the incisions, first wash your hands, and then get your hands sudsy with soap and gently wash or let the sudsy water run down over the incisions. Dry the incisions well after washing by gently patting with a towel. You may use a blow dryer, but it must be on a low-heat setting.    When will my bladder function get back to normal?   You received extra fluid through your I.V. while you were in the hospital, so it is normal to urinate (pee) more than usual when you first get home.   It is  normal for your bladder function to be different after surgery. You may notice a pause before your urine stream starts or that your urine stream is slower. This will gradually get better, but it may take up to 6 months before you are back to normal. Be patient, relax, and sit on the toilet a little longer.   Drinking more water than usual will not help the bladder recover faster.    What is a normal energy level?  It is normal to have a decreased energy level after surgery. Listen to your body. If you need to rest, do it. Give yourself permission to take it easy. Once you settle into a normal routine at home, you will find that you slowly begin to feel better. Walking around the house and taking short walks outside will help you get back to normal.    What kind of exercise/activities can I do?   Exercise is important for a healthy recovery. We encourage you to begin normal physical activity, like walking, within hours of surgery. Start with short walks and gradually increase the distance and length of time that you walk.   Allow your body time to heal. Do not restart a difficult exercise routine until you have had your post-op exam and your doctor says it is OK.   Lifting: Unless you are given other instructions, for 6 weeks after your surgery do not lift anything over 15 pounds.   Travel: It is best if you do not go far away from home before your postop visit with your doctor. If you have travel plans, talk to your doctor about this before your surgery.      Financial Assistance:    If you have any questions or need assistance, contact your [facility] financial counseling office from 8:30 a.m.-4:30  p.m. Monday through Friday. Closed weekends.   [place]: [phone] or, or visit at [address] [facility], [address], near the entrance.  Financial Assistance Application available upon request      Patient Payments and Correspondence  Customer service representatives are available to assist you from 8:00 a.m.  to 6:00 p.m. Eastern Standard Time by calling [phone] Monday through Friday. You can also contact us through [employer].    [facility]  [address]  [place] [address] [phone]

## 2025-07-24 DIAGNOSIS — Z98.1 HISTORY OF LUMBAR SPINAL FUSION: ICD-10-CM

## 2025-07-24 DIAGNOSIS — M54.50 LUMBAR BACK PAIN: ICD-10-CM

## 2025-07-24 DIAGNOSIS — M54.6 CHRONIC MIDLINE THORACIC BACK PAIN: ICD-10-CM

## 2025-07-24 DIAGNOSIS — G89.29 CHRONIC MIDLINE THORACIC BACK PAIN: ICD-10-CM

## 2025-07-24 RX ORDER — DICLOFENAC SODIUM 75 MG/1
75 TABLET, DELAYED RELEASE ORAL 2 TIMES DAILY
Qty: 180 TABLET | Refills: 0 | OUTPATIENT
Start: 2025-07-24

## 2025-08-04 RX ORDER — LORATADINE 10 MG/1
10 TABLET ORAL EVERY MORNING
Qty: 90 TABLET | Refills: 0 | Status: SHIPPED | OUTPATIENT
Start: 2025-08-04

## 2025-08-07 ENCOUNTER — HOSPITAL ENCOUNTER (OUTPATIENT)
Dept: GENERAL RADIOLOGY | Facility: HOSPITAL | Age: 60
Discharge: HOME | End: 2025-08-07
Payer: MEDICARE

## 2025-08-07 ENCOUNTER — PRE-ADMISSION TESTING (OUTPATIENT)
Dept: PREADMISSION TESTING | Facility: HOSPITAL | Age: 60
End: 2025-08-07
Payer: MEDICARE

## 2025-08-07 VITALS — BODY MASS INDEX: 22.68 KG/M2 | HEIGHT: 66 IN | WEIGHT: 141.09 LBS

## 2025-08-07 DIAGNOSIS — D39.11 GRANULOSA CELL TUMOR OF OVARY, RIGHT: ICD-10-CM

## 2025-08-07 LAB
ALBUMIN SERPL-MCNC: 4.4 G/DL (ref 3.5–5.2)
ALBUMIN/GLOB SERPL: 2.2 G/DL
ALP SERPL-CCNC: 93 U/L (ref 39–117)
ALT SERPL W P-5'-P-CCNC: 27 U/L (ref 1–33)
ANION GAP SERPL CALCULATED.3IONS-SCNC: 13.4 MMOL/L (ref 5–15)
AST SERPL-CCNC: 30 U/L (ref 1–32)
BASOPHILS # BLD AUTO: 0.03 10*3/MM3 (ref 0–0.2)
BASOPHILS NFR BLD AUTO: 0.7 % (ref 0–1.5)
BILIRUB SERPL-MCNC: 0.3 MG/DL (ref 0–1.2)
BUN SERPL-MCNC: 20.8 MG/DL (ref 8–23)
BUN/CREAT SERPL: 25.1 (ref 7–25)
CALCIUM SPEC-SCNC: 9.4 MG/DL (ref 8.6–10.5)
CHLORIDE SERPL-SCNC: 106 MMOL/L (ref 98–107)
CO2 SERPL-SCNC: 23.6 MMOL/L (ref 22–29)
CREAT SERPL-MCNC: 0.83 MG/DL (ref 0.57–1)
DEPRECATED RDW RBC AUTO: 44.4 FL (ref 37–54)
EGFRCR SERPLBLD CKD-EPI 2021: 80.8 ML/MIN/1.73
EOSINOPHIL # BLD AUTO: 0.16 10*3/MM3 (ref 0–0.4)
EOSINOPHIL NFR BLD AUTO: 4 % (ref 0.3–6.2)
ERYTHROCYTE [DISTWIDTH] IN BLOOD BY AUTOMATED COUNT: 12.4 % (ref 12.3–15.4)
GLOBULIN UR ELPH-MCNC: 2 GM/DL
GLUCOSE SERPL-MCNC: 81 MG/DL (ref 65–99)
HBA1C MFR BLD: 5.04 % (ref 4.8–5.6)
HCT VFR BLD AUTO: 41.3 % (ref 34–46.6)
HGB BLD-MCNC: 13.5 G/DL (ref 12–15.9)
IMM GRANULOCYTES # BLD AUTO: 0.01 10*3/MM3 (ref 0–0.05)
IMM GRANULOCYTES NFR BLD AUTO: 0.2 % (ref 0–0.5)
LYMPHOCYTES # BLD AUTO: 1.6 10*3/MM3 (ref 0.7–3.1)
LYMPHOCYTES NFR BLD AUTO: 39.5 % (ref 19.6–45.3)
MCH RBC QN AUTO: 31.8 PG (ref 26.6–33)
MCHC RBC AUTO-ENTMCNC: 32.7 G/DL (ref 31.5–35.7)
MCV RBC AUTO: 97.4 FL (ref 79–97)
MONOCYTES # BLD AUTO: 0.38 10*3/MM3 (ref 0.1–0.9)
MONOCYTES NFR BLD AUTO: 9.4 % (ref 5–12)
NEUTROPHILS NFR BLD AUTO: 1.87 10*3/MM3 (ref 1.7–7)
NEUTROPHILS NFR BLD AUTO: 46.2 % (ref 42.7–76)
NRBC BLD AUTO-RTO: 0 /100 WBC (ref 0–0.2)
PLATELET # BLD AUTO: 199 10*3/MM3 (ref 140–450)
PMV BLD AUTO: 10.9 FL (ref 6–12)
POTASSIUM SERPL-SCNC: 3.9 MMOL/L (ref 3.5–5.2)
PROT SERPL-MCNC: 6.4 G/DL (ref 6–8.5)
RBC # BLD AUTO: 4.24 10*6/MM3 (ref 3.77–5.28)
SODIUM SERPL-SCNC: 143 MMOL/L (ref 136–145)
WBC NRBC COR # BLD AUTO: 4.05 10*3/MM3 (ref 3.4–10.8)

## 2025-08-07 PROCEDURE — 71045 X-RAY EXAM CHEST 1 VIEW: CPT

## 2025-08-07 PROCEDURE — 93005 ELECTROCARDIOGRAM TRACING: CPT

## 2025-08-07 PROCEDURE — 83036 HEMOGLOBIN GLYCOSYLATED A1C: CPT

## 2025-08-07 PROCEDURE — 80053 COMPREHEN METABOLIC PANEL: CPT

## 2025-08-07 PROCEDURE — 93010 ELECTROCARDIOGRAM REPORT: CPT | Performed by: INTERNAL MEDICINE

## 2025-08-07 PROCEDURE — 36415 COLL VENOUS BLD VENIPUNCTURE: CPT

## 2025-08-07 PROCEDURE — 85025 COMPLETE CBC W/AUTO DIFF WBC: CPT

## 2025-08-07 RX ORDER — ROSUVASTATIN CALCIUM 5 MG/1
5 TABLET, COATED ORAL DAILY
COMMUNITY

## 2025-08-07 RX ORDER — CETIRIZINE HYDROCHLORIDE 10 MG/1
10 TABLET ORAL DAILY
COMMUNITY

## 2025-08-08 LAB
QT INTERVAL: 404 MS
QTC INTERVAL: 445 MS

## 2025-08-13 ENCOUNTER — TELEPHONE (OUTPATIENT)
Dept: ONCOLOGY | Facility: CLINIC | Age: 60
End: 2025-08-13
Payer: MEDICARE

## 2025-08-14 ENCOUNTER — HOSPITAL ENCOUNTER (OUTPATIENT)
Facility: HOSPITAL | Age: 60
Setting detail: OBSERVATION
Discharge: HOME | End: 2025-08-14
Attending: OBSTETRICS & GYNECOLOGY | Admitting: OBSTETRICS & GYNECOLOGY
Payer: MEDICARE

## 2025-08-14 ENCOUNTER — ANESTHESIA (OUTPATIENT)
Dept: PERIOP | Facility: HOSPITAL | Age: 60
End: 2025-08-14
Payer: MEDICARE

## 2025-08-14 ENCOUNTER — ANESTHESIA EVENT (OUTPATIENT)
Dept: PERIOP | Facility: HOSPITAL | Age: 60
End: 2025-08-14
Payer: MEDICARE

## 2025-08-14 VITALS
RESPIRATION RATE: 16 BRPM | TEMPERATURE: 97.4 F | HEART RATE: 90 BPM | SYSTOLIC BLOOD PRESSURE: 117 MMHG | DIASTOLIC BLOOD PRESSURE: 78 MMHG | OXYGEN SATURATION: 96 %

## 2025-08-14 DIAGNOSIS — D39.11 GRANULOSA CELL TUMOR OF OVARY, RIGHT: ICD-10-CM

## 2025-08-14 PROCEDURE — 88307 TISSUE EXAM BY PATHOLOGIST: CPT | Performed by: OBSTETRICS & GYNECOLOGY

## 2025-08-14 PROCEDURE — 25810000003 SODIUM CHLORIDE PER 500 ML: Performed by: OBSTETRICS & GYNECOLOGY

## 2025-08-14 PROCEDURE — 25810000003 LACTATED RINGERS PER 1000 ML: Performed by: ANESTHESIOLOGY

## 2025-08-14 PROCEDURE — 25010000002 CEFOXITIN PER 1 G: Performed by: OBSTETRICS & GYNECOLOGY

## 2025-08-14 PROCEDURE — 25010000002 PROPOFOL 10 MG/ML EMULSION: Performed by: NURSE ANESTHETIST, CERTIFIED REGISTERED

## 2025-08-14 PROCEDURE — 25010000002 HEPARIN (PORCINE) PER 1000 UNITS: Performed by: OBSTETRICS & GYNECOLOGY

## 2025-08-14 PROCEDURE — 49329 UNLSTD LAPS PX ABD PERTM&OMN: CPT | Performed by: PHYSICIAN ASSISTANT

## 2025-08-14 PROCEDURE — 25010000002 LIDOCAINE PF 1% 1 % SOLUTION: Performed by: NURSE ANESTHETIST, CERTIFIED REGISTERED

## 2025-08-14 PROCEDURE — 25010000002 HYDROMORPHONE 1 MG/ML SOLUTION

## 2025-08-14 PROCEDURE — 25010000002 SUGAMMADEX 200 MG/2ML SOLUTION: Performed by: NURSE ANESTHETIST, CERTIFIED REGISTERED

## 2025-08-14 PROCEDURE — S0260 H&P FOR SURGERY: HCPCS | Performed by: OBSTETRICS & GYNECOLOGY

## 2025-08-14 PROCEDURE — 25010000002 ONDANSETRON PER 1 MG: Performed by: NURSE ANESTHETIST, CERTIFIED REGISTERED

## 2025-08-14 PROCEDURE — 49329 UNLSTD LAPS PX ABD PERTM&OMN: CPT | Performed by: OBSTETRICS & GYNECOLOGY

## 2025-08-14 PROCEDURE — G0378 HOSPITAL OBSERVATION PER HR: HCPCS

## 2025-08-14 PROCEDURE — 25010000002 FENTANYL CITRATE (PF) 50 MCG/ML SOLUTION

## 2025-08-14 PROCEDURE — 25010000002 FENTANYL CITRATE (PF) 100 MCG/2ML SOLUTION: Performed by: NURSE ANESTHETIST, CERTIFIED REGISTERED

## 2025-08-14 RX ORDER — HEPARIN SODIUM 5000 [USP'U]/ML
INJECTION, SOLUTION INTRAVENOUS; SUBCUTANEOUS AS NEEDED
Status: DISCONTINUED | OUTPATIENT
Start: 2025-08-14 | End: 2025-08-14 | Stop reason: HOSPADM

## 2025-08-14 RX ORDER — BUPIVACAINE HCL/EPINEPHRINE 0.5-1:200K
VIAL (ML) INJECTION AS NEEDED
Status: DISCONTINUED | OUTPATIENT
Start: 2025-08-14 | End: 2025-08-14 | Stop reason: HOSPADM

## 2025-08-14 RX ORDER — FAMOTIDINE 20 MG/1
20 TABLET, FILM COATED ORAL ONCE
Status: COMPLETED | OUTPATIENT
Start: 2025-08-14 | End: 2025-08-14

## 2025-08-14 RX ORDER — FENTANYL CITRATE 50 UG/ML
50 INJECTION, SOLUTION INTRAMUSCULAR; INTRAVENOUS
Status: DISCONTINUED | OUTPATIENT
Start: 2025-08-14 | End: 2025-08-14 | Stop reason: HOSPADM

## 2025-08-14 RX ORDER — OXYCODONE HYDROCHLORIDE 5 MG/1
5 TABLET ORAL EVERY 6 HOURS PRN
Qty: 7 TABLET | Refills: 0 | Status: SHIPPED | OUTPATIENT
Start: 2025-08-14

## 2025-08-14 RX ORDER — ONDANSETRON 2 MG/ML
4 INJECTION INTRAMUSCULAR; INTRAVENOUS ONCE AS NEEDED
Status: DISCONTINUED | OUTPATIENT
Start: 2025-08-14 | End: 2025-08-14 | Stop reason: HOSPADM

## 2025-08-14 RX ORDER — HEPARIN SODIUM 5000 [USP'U]/ML
5000 INJECTION, SOLUTION INTRAVENOUS; SUBCUTANEOUS ONCE
Status: DISCONTINUED | OUTPATIENT
Start: 2025-08-14 | End: 2025-08-14 | Stop reason: HOSPADM

## 2025-08-14 RX ORDER — FENTANYL CITRATE 50 UG/ML
INJECTION, SOLUTION INTRAMUSCULAR; INTRAVENOUS AS NEEDED
Status: DISCONTINUED | OUTPATIENT
Start: 2025-08-14 | End: 2025-08-14 | Stop reason: SURG

## 2025-08-14 RX ORDER — ONDANSETRON 2 MG/ML
INJECTION INTRAMUSCULAR; INTRAVENOUS AS NEEDED
Status: DISCONTINUED | OUTPATIENT
Start: 2025-08-14 | End: 2025-08-14 | Stop reason: SURG

## 2025-08-14 RX ORDER — FAMOTIDINE 10 MG/ML
20 INJECTION, SOLUTION INTRAVENOUS ONCE
Status: DISCONTINUED | OUTPATIENT
Start: 2025-08-14 | End: 2025-08-14 | Stop reason: HOSPADM

## 2025-08-14 RX ORDER — SODIUM CHLORIDE 0.9 % (FLUSH) 0.9 %
10 SYRINGE (ML) INJECTION AS NEEDED
Status: DISCONTINUED | OUTPATIENT
Start: 2025-08-14 | End: 2025-08-14 | Stop reason: HOSPADM

## 2025-08-14 RX ORDER — ONDANSETRON 4 MG/1
4 TABLET, FILM COATED ORAL EVERY 6 HOURS PRN
Qty: 5 TABLET | Refills: 0 | Status: SHIPPED | OUTPATIENT
Start: 2025-08-14

## 2025-08-14 RX ORDER — SCOPOLAMINE 1 MG/3D
1 PATCH, EXTENDED RELEASE TRANSDERMAL CONTINUOUS
Status: DISCONTINUED | OUTPATIENT
Start: 2025-08-14 | End: 2025-08-14 | Stop reason: HOSPADM

## 2025-08-14 RX ORDER — SODIUM CHLORIDE, SODIUM LACTATE, POTASSIUM CHLORIDE, CALCIUM CHLORIDE 600; 310; 30; 20 MG/100ML; MG/100ML; MG/100ML; MG/100ML
9 INJECTION, SOLUTION INTRAVENOUS CONTINUOUS
Status: DISCONTINUED | OUTPATIENT
Start: 2025-08-15 | End: 2025-08-14 | Stop reason: HOSPADM

## 2025-08-14 RX ORDER — SODIUM CHLORIDE 9 MG/ML
INJECTION, SOLUTION INTRAVENOUS AS NEEDED
Status: DISCONTINUED | OUTPATIENT
Start: 2025-08-14 | End: 2025-08-14 | Stop reason: HOSPADM

## 2025-08-14 RX ORDER — SODIUM CHLORIDE 0.9 % (FLUSH) 0.9 %
10 SYRINGE (ML) INJECTION EVERY 12 HOURS SCHEDULED
Status: DISCONTINUED | OUTPATIENT
Start: 2025-08-14 | End: 2025-08-14 | Stop reason: HOSPADM

## 2025-08-14 RX ORDER — PREGABALIN 150 MG/1
150 CAPSULE ORAL ONCE
Status: COMPLETED | OUTPATIENT
Start: 2025-08-14 | End: 2025-08-14

## 2025-08-14 RX ORDER — HYDROMORPHONE HYDROCHLORIDE 1 MG/ML
0.5 INJECTION, SOLUTION INTRAMUSCULAR; INTRAVENOUS; SUBCUTANEOUS
Status: DISCONTINUED | OUTPATIENT
Start: 2025-08-14 | End: 2025-08-14 | Stop reason: HOSPADM

## 2025-08-14 RX ORDER — SODIUM CHLORIDE 9 MG/ML
40 INJECTION, SOLUTION INTRAVENOUS AS NEEDED
Status: DISCONTINUED | OUTPATIENT
Start: 2025-08-14 | End: 2025-08-14 | Stop reason: HOSPADM

## 2025-08-14 RX ORDER — LIDOCAINE HYDROCHLORIDE 10 MG/ML
INJECTION, SOLUTION EPIDURAL; INFILTRATION; INTRACAUDAL; PERINEURAL AS NEEDED
Status: DISCONTINUED | OUTPATIENT
Start: 2025-08-14 | End: 2025-08-14 | Stop reason: SURG

## 2025-08-14 RX ORDER — IBUPROFEN 600 MG/1
600 TABLET, FILM COATED ORAL EVERY 6 HOURS PRN
Qty: 30 TABLET | Refills: 0 | Status: SHIPPED | OUTPATIENT
Start: 2025-08-14

## 2025-08-14 RX ORDER — ACETAMINOPHEN 325 MG/1
650 TABLET ORAL EVERY 6 HOURS PRN
Qty: 30 TABLET | Refills: 0 | Status: SHIPPED | OUTPATIENT
Start: 2025-08-14

## 2025-08-14 RX ORDER — CELECOXIB 200 MG/1
200 CAPSULE ORAL ONCE
Status: COMPLETED | OUTPATIENT
Start: 2025-08-14 | End: 2025-08-14

## 2025-08-14 RX ORDER — ACETAMINOPHEN 500 MG
1000 TABLET ORAL ONCE
Status: COMPLETED | OUTPATIENT
Start: 2025-08-14 | End: 2025-08-14

## 2025-08-14 RX ORDER — FENTANYL CITRATE 50 UG/ML
INJECTION, SOLUTION INTRAMUSCULAR; INTRAVENOUS
Status: COMPLETED
Start: 2025-08-14 | End: 2025-08-14

## 2025-08-14 RX ORDER — PROPOFOL 10 MG/ML
VIAL (ML) INTRAVENOUS AS NEEDED
Status: DISCONTINUED | OUTPATIENT
Start: 2025-08-14 | End: 2025-08-14 | Stop reason: SURG

## 2025-08-14 RX ORDER — ROCURONIUM BROMIDE 10 MG/ML
INJECTION, SOLUTION INTRAVENOUS AS NEEDED
Status: DISCONTINUED | OUTPATIENT
Start: 2025-08-14 | End: 2025-08-14 | Stop reason: SURG

## 2025-08-14 RX ORDER — DOCUSATE SODIUM 250 MG
250 CAPSULE ORAL 2 TIMES DAILY PRN
Qty: 60 CAPSULE | Refills: 3 | Status: SHIPPED | OUTPATIENT
Start: 2025-08-14

## 2025-08-14 RX ORDER — LIDOCAINE HYDROCHLORIDE 10 MG/ML
0.5 INJECTION, SOLUTION EPIDURAL; INFILTRATION; INTRACAUDAL; PERINEURAL ONCE AS NEEDED
Status: DISCONTINUED | OUTPATIENT
Start: 2025-08-14 | End: 2025-08-14 | Stop reason: HOSPADM

## 2025-08-14 RX ORDER — MIDAZOLAM HYDROCHLORIDE 1 MG/ML
1 INJECTION, SOLUTION INTRAMUSCULAR; INTRAVENOUS
Status: DISCONTINUED | OUTPATIENT
Start: 2025-08-14 | End: 2025-08-14 | Stop reason: HOSPADM

## 2025-08-14 RX ADMIN — SODIUM CHLORIDE, SODIUM LACTATE, POTASSIUM CHLORIDE, CALCIUM CHLORIDE 9 ML/HR: 20; 30; 600; 310 INJECTION, SOLUTION INTRAVENOUS at 08:27

## 2025-08-14 RX ADMIN — PREGABALIN 150 MG: 150 CAPSULE ORAL at 08:26

## 2025-08-14 RX ADMIN — FAMOTIDINE 20 MG: 20 TABLET, FILM COATED ORAL at 08:26

## 2025-08-14 RX ADMIN — SUGAMMADEX 200 MG: 100 INJECTION, SOLUTION INTRAVENOUS at 10:00

## 2025-08-14 RX ADMIN — FENTANYL CITRATE 50 MCG: 50 INJECTION, SOLUTION INTRAMUSCULAR; INTRAVENOUS at 10:24

## 2025-08-14 RX ADMIN — HYDROMORPHONE HYDROCHLORIDE 0.5 MG: 1 INJECTION, SOLUTION INTRAMUSCULAR; INTRAVENOUS; SUBCUTANEOUS at 12:39

## 2025-08-14 RX ADMIN — CEFOXITIN SODIUM 2000 MG: 2 POWDER, FOR SOLUTION INTRAVENOUS at 10:10

## 2025-08-14 RX ADMIN — PROPOFOL 200 MG: 10 INJECTION, EMULSION INTRAVENOUS at 10:05

## 2025-08-14 RX ADMIN — FENTANYL CITRATE 50 MCG: 50 INJECTION, SOLUTION INTRAMUSCULAR; INTRAVENOUS at 12:30

## 2025-08-14 RX ADMIN — ONDANSETRON 4 MG: 2 INJECTION INTRAMUSCULAR; INTRAVENOUS at 11:23

## 2025-08-14 RX ADMIN — LIDOCAINE HYDROCHLORIDE 50 MG: 10 INJECTION, SOLUTION EPIDURAL; INFILTRATION; INTRACAUDAL; PERINEURAL at 10:05

## 2025-08-14 RX ADMIN — FENTANYL CITRATE 50 MCG: 50 INJECTION, SOLUTION INTRAMUSCULAR; INTRAVENOUS at 11:36

## 2025-08-14 RX ADMIN — ACETAMINOPHEN 1000 MG: 500 TABLET, FILM COATED ORAL at 08:26

## 2025-08-14 RX ADMIN — CELECOXIB 200 MG: 200 CAPSULE ORAL at 08:26

## 2025-08-14 RX ADMIN — ROCURONIUM BROMIDE 50 MG: 10 INJECTION INTRAVENOUS at 10:05

## 2025-08-14 RX ADMIN — SCOPOLAMINE 1 PATCH: 1.5 PATCH, EXTENDED RELEASE TRANSDERMAL at 08:26

## 2025-08-15 ENCOUNTER — TELEPHONE (OUTPATIENT)
Dept: GYNECOLOGIC ONCOLOGY | Facility: CLINIC | Age: 60
End: 2025-08-15
Payer: MEDICARE

## 2025-08-18 ENCOUNTER — TELEPHONE (OUTPATIENT)
Dept: GYNECOLOGIC ONCOLOGY | Facility: CLINIC | Age: 60
End: 2025-08-18
Payer: MEDICARE

## 2025-08-18 DIAGNOSIS — D39.11 GRANULOSA CELL TUMOR OF OVARY, RIGHT: Primary | ICD-10-CM

## 2025-08-18 RX ORDER — TRAMADOL HYDROCHLORIDE 50 MG/1
50 TABLET ORAL EVERY 6 HOURS PRN
Qty: 12 TABLET | Refills: 0 | Status: SHIPPED | OUTPATIENT
Start: 2025-08-18

## 2025-08-26 ENCOUNTER — OFFICE VISIT (OUTPATIENT)
Dept: GYNECOLOGIC ONCOLOGY | Facility: CLINIC | Age: 60
End: 2025-08-26
Payer: MEDICARE

## 2025-08-26 ENCOUNTER — HOSPITAL ENCOUNTER (OUTPATIENT)
Dept: CT IMAGING | Facility: HOSPITAL | Age: 60
Discharge: HOME | End: 2025-08-26
Admitting: OBSTETRICS & GYNECOLOGY
Payer: MEDICARE

## 2025-08-26 VITALS
HEART RATE: 93 BPM | OXYGEN SATURATION: 98 % | SYSTOLIC BLOOD PRESSURE: 120 MMHG | BODY MASS INDEX: 22.23 KG/M2 | TEMPERATURE: 97.9 F | DIASTOLIC BLOOD PRESSURE: 75 MMHG | WEIGHT: 138.3 LBS | HEIGHT: 66 IN | RESPIRATION RATE: 17 BRPM

## 2025-08-26 DIAGNOSIS — D39.11 GRANULOSA CELL TUMOR OF OVARY, RIGHT: ICD-10-CM

## 2025-08-26 DIAGNOSIS — T78.40XA RASH DUE TO ALLERGY: ICD-10-CM

## 2025-08-26 DIAGNOSIS — K43.2 INCISIONAL HERNIA, WITHOUT OBSTRUCTION OR GANGRENE: Primary | ICD-10-CM

## 2025-08-26 DIAGNOSIS — R21 RASH DUE TO ALLERGY: ICD-10-CM

## 2025-08-26 DIAGNOSIS — C56.1 MALIGNANT NEOPLASM OF RIGHT OVARY: ICD-10-CM

## 2025-08-26 DIAGNOSIS — K43.2 INCISIONAL HERNIA, WITHOUT OBSTRUCTION OR GANGRENE: ICD-10-CM

## 2025-08-26 PROCEDURE — 74177 CT ABD & PELVIS W/CONTRAST: CPT

## 2025-08-26 PROCEDURE — 25510000001 IOPAMIDOL 61 % SOLUTION: Performed by: OBSTETRICS & GYNECOLOGY

## 2025-08-26 RX ORDER — TRIAMCINOLONE ACETONIDE 5 MG/G
1 OINTMENT TOPICAL 2 TIMES DAILY PRN
Qty: 30 G | Refills: 2 | Status: SHIPPED | OUTPATIENT
Start: 2025-08-26

## 2025-08-26 RX ORDER — CEPHALEXIN 500 MG/1
500 CAPSULE ORAL 2 TIMES DAILY
Qty: 14 CAPSULE | Refills: 0 | Status: SHIPPED | OUTPATIENT
Start: 2025-08-26

## 2025-08-26 RX ORDER — IOPAMIDOL 612 MG/ML
100 INJECTION, SOLUTION INTRAVASCULAR
Status: COMPLETED | OUTPATIENT
Start: 2025-08-26 | End: 2025-08-26

## 2025-08-26 RX ADMIN — IOPAMIDOL 95 ML: 612 INJECTION, SOLUTION INTRAVENOUS at 14:58

## (undated) DEVICE — CATH URETRL WHSTL TP 5F70CM RT

## (undated) DEVICE — BNDR ABD PREMIUM/UNIV 10IN 27TO48IN

## (undated) DEVICE — LEX BASIC NO DRAPE: Brand: MEDLINE INDUSTRIES, INC.

## (undated) DEVICE — PREMIUM DRY TRAY LF: Brand: MEDLINE INDUSTRIES, INC.

## (undated) DEVICE — PATIENT RETURN ELECTRODE, SINGLE-USE, CONTACT QUALITY MONITORING, ADULT, WITH 9FT CORD, FOR PATIENTS WEIGING OVER 33LBS. (15KG): Brand: MEGADYNE

## (undated) DEVICE — TROC BLADLES ANCHORPORT/OPTI LP 8X120MM 1P/U

## (undated) DEVICE — Device

## (undated) DEVICE — PENCL SMOKE/EVAC MEGADYNE TELESCP 10FT

## (undated) DEVICE — COLUMN DRAPE

## (undated) DEVICE — BOWL UTIL STRL 32OZ

## (undated) DEVICE — GLV SURG DERMASSURE GRN LF PF SZ 6.5

## (undated) DEVICE — GOWN,NON-REINFORCED,SIRUS,SET IN SLV,XL: Brand: MEDLINE

## (undated) DEVICE — SCRB SURG BACTOSHIELD CHG 4PCT 4OZ

## (undated) DEVICE — APPL CHLORAPREP TINTED 26ML TEAL

## (undated) DEVICE — JACKSON-PRATT 100CC BULB RESERVOIR: Brand: CARDINAL HEALTH

## (undated) DEVICE — 3M™ WARMING BLANKET, UPPER BODY, 10 PER CASE, 42268: Brand: BAIR HUGGER™

## (undated) DEVICE — UNDERGLV SURG BIOGEL INDICAT PF 61/2 GRN

## (undated) DEVICE — ENDOCUT SCISSOR TIP, DISPOSABLE: Brand: RENEW

## (undated) DEVICE — LAPAROVUE VISIBILITY SYSTEM LAPAROSCOPIC SOLUTIONS: Brand: LAPAROVUE

## (undated) DEVICE — MICRO HVTSA, 0.5G AND HVTSA SOURCEMARK PRODUCT CODE M1206 AND M1206-01: Brand: EXOFIN MICRO HVTSA, 0.5G

## (undated) DEVICE — ENDOPATH PNEUMONEEDLE INSUFFLATION NEEDLES WITH LUER LOCK CONNECTORS 150MM: Brand: ENDOPATH

## (undated) DEVICE — DRAPE,UNDERBUTTOCKS,STERILE: Brand: MEDLINE

## (undated) DEVICE — ST TBG AIRSEAL FLTR TRI LUM

## (undated) DEVICE — GLV SURG PREMIERPRO MIC LTX PF SZ7.5 BRN

## (undated) DEVICE — COVER,LIGHT HANDLE,FLX,1/PK: Brand: MEDLINE INDUSTRIES, INC.

## (undated) DEVICE — SUT PDS LP 1 TP1 96IN VIO PDP880GA

## (undated) DEVICE — TRENDELENBURG WINGPAD POSITIONING KIT DELUXE - WITHOUT BODY STRAP: Brand: SOULE MEDICAL

## (undated) DEVICE — GLV SURG SENSICARE PI MIC PF SZ6 LF STRL

## (undated) DEVICE — SUT VIC 12X27 D8116 BX/12

## (undated) DEVICE — MEDI-VAC YANKAUER SUCTION HANDLE W/BULBOUS TIP: Brand: CARDINAL HEALTH

## (undated) DEVICE — SEAL

## (undated) DEVICE — SUT SILK 2/0 PS 18IN 1588H

## (undated) DEVICE — PK MAJ GYN DAVINCI 10

## (undated) DEVICE — SUT MNCRYL PLS ANTIB UD 3/0 PS2 27IN

## (undated) DEVICE — STPLR SKIN SUBCUTICULAR INSORB 2030

## (undated) DEVICE — GOWN,SIRUS,NONRNF,SETINSLV,XL,20/CS: Brand: MEDLINE

## (undated) DEVICE — GOWN,SIRUS,POLYRNF,BRTHSLV,XL,30/CS: Brand: MEDLINE

## (undated) DEVICE — GOWN,REINF,POLY,ECL,PP SLV,XL: Brand: MEDLINE

## (undated) DEVICE — BLADELESS OBTURATOR: Brand: WECK VISTA

## (undated) DEVICE — TUBING, SUCTION, 1/4" X 10', STRAIGHT: Brand: MEDLINE

## (undated) DEVICE — ANTIBACTERIAL UNDYED BRAIDED (POLYGLACTIN 910), SYNTHETIC ABSORBABLE SURGICAL SUTURE: Brand: COATED VICRYL

## (undated) DEVICE — TRAP FLD MINIVAC MEGADYNE 100ML

## (undated) DEVICE — SAFESECURE,SECUREMENT,FOLEY CATH,STERILE: Brand: MEDLINE

## (undated) DEVICE — DRAIN JACKSON PRATT ROUND 15FR: Brand: CARDINAL HEALTH

## (undated) DEVICE — LEX D AND C: Brand: MEDLINE INDUSTRIES, INC.

## (undated) DEVICE — TIP COVER ACCESSORY

## (undated) DEVICE — CYSTO/BLADDER IRRIGATION SET, REGULATING CLAMP

## (undated) DEVICE — 3M™ STERI-DRAPE™ INSTRUMENT POUCH 1018: Brand: STERI-DRAPE™

## (undated) DEVICE — ANTIBACTERIAL UNDYED BRAIDED (POLYGLACTIN 910), SYNTHETIC ABSORBABLE SUTURE: Brand: COATED VICRYL

## (undated) DEVICE — DRAPE, LAVH, STERILE: Brand: MEDLINE

## (undated) DEVICE — SUT VIC 0 TIES 54IN J608H

## (undated) DEVICE — SKIN AFFIX SURG ADHESIVE 72/CS 0.55ML: Brand: MEDLINE

## (undated) DEVICE — ENDOPOUCH RETRIEVER SPECIMEN RETRIEVAL BAGS: Brand: ENDOPOUCH RETRIEVER

## (undated) DEVICE — METER,URINE,400ML,DRAIN BAG,L/F,LL,SLIDE: Brand: MEDLINE

## (undated) DEVICE — ARM DRAPE

## (undated) DEVICE — SYR LL TP 10ML STRL

## (undated) DEVICE — BLANKT WARM UPPR/BDY ARM/OUT 57X196CM